# Patient Record
Sex: MALE | Race: BLACK OR AFRICAN AMERICAN | NOT HISPANIC OR LATINO | Employment: OTHER | URBAN - METROPOLITAN AREA
[De-identification: names, ages, dates, MRNs, and addresses within clinical notes are randomized per-mention and may not be internally consistent; named-entity substitution may affect disease eponyms.]

---

## 2021-04-19 ENCOUNTER — HOSPITAL ENCOUNTER (OUTPATIENT)
Dept: MEDSURG UNIT | Facility: HOSPITAL | Age: 66
End: 2021-04-20
Attending: STUDENT IN AN ORGANIZED HEALTH CARE EDUCATION/TRAINING PROGRAM | Admitting: STUDENT IN AN ORGANIZED HEALTH CARE EDUCATION/TRAINING PROGRAM

## 2021-04-19 LAB
ABS NEUT (OLG): 10.29 X10(3)/MCL (ref 2.1–9.2)
ALBUMIN SERPL-MCNC: 3.3 GM/DL (ref 3.4–4.8)
ALBUMIN/GLOB SERPL: 0.7 RATIO (ref 1.1–2)
ALP SERPL-CCNC: 108 UNIT/L (ref 40–150)
ALT SERPL-CCNC: 9 UNIT/L (ref 0–55)
AST SERPL-CCNC: 16 UNIT/L (ref 5–34)
BASOPHILS # BLD AUTO: 0 X10(3)/MCL (ref 0–0.2)
BASOPHILS NFR BLD AUTO: 0 %
BILIRUB SERPL-MCNC: 0.6 MG/DL
BILIRUBIN DIRECT+TOT PNL SERPL-MCNC: 0.3 MG/DL (ref 0–0.5)
BILIRUBIN DIRECT+TOT PNL SERPL-MCNC: 0.3 MG/DL (ref 0–0.8)
BNP BLD-MCNC: <10 PG/ML
BUN SERPL-MCNC: 11.6 MG/DL (ref 8.4–25.7)
CALCIUM SERPL-MCNC: 9.5 MG/DL (ref 8.8–10)
CHLORIDE SERPL-SCNC: 104 MMOL/L (ref 98–107)
CK MB SERPL-MCNC: 1.9 NG/ML
CK SERPL-CCNC: 85 U/L (ref 30–200)
CO2 SERPL-SCNC: 25 MMOL/L (ref 23–31)
CREAT SERPL-MCNC: 0.78 MG/DL (ref 0.73–1.18)
D DIMER PPP IA.FEU-MCNC: 0.36 MCG/ML FEU
EOSINOPHIL # BLD AUTO: 0 X10(3)/MCL (ref 0–0.9)
EOSINOPHIL NFR BLD AUTO: 0 %
ERYTHROCYTE [DISTWIDTH] IN BLOOD BY AUTOMATED COUNT: 14.3 % (ref 11.5–14.5)
FLUAV AG UPPER RESP QL IA.RAPID: NEGATIVE
FLUBV AG UPPER RESP QL IA.RAPID: NEGATIVE
GLOBULIN SER-MCNC: 4.9 GM/DL (ref 2.4–3.5)
GLUCOSE SERPL-MCNC: 93 MG/DL (ref 82–115)
HCO3 UR-SCNC: 21.1 MMOL/L (ref 22–26)
HCT VFR BLD AUTO: 55.1 % (ref 40–51)
HGB BLD-MCNC: 16.9 GM/DL (ref 13.5–17.5)
IMM GRANULOCYTES # BLD AUTO: 0.06 10*3/UL
IMM GRANULOCYTES NFR BLD AUTO: 0 %
LYMPHOCYTES # BLD AUTO: 0.9 X10(3)/MCL (ref 0.6–4.6)
LYMPHOCYTES NFR BLD AUTO: 7 %
MCH RBC QN AUTO: 25.9 PG (ref 26–34)
MCHC RBC AUTO-ENTMCNC: 30.7 GM/DL (ref 31–37)
MCV RBC AUTO: 84.4 FL (ref 80–100)
MONOCYTES # BLD AUTO: 0.9 X10(3)/MCL (ref 0.1–1.3)
MONOCYTES NFR BLD AUTO: 8 %
NEUTROPHILS # BLD AUTO: 10.29 X10(3)/MCL (ref 2.1–9.2)
NEUTROPHILS NFR BLD AUTO: 84 %
O2 HGB ARTERIAL: 85.4 % (ref 94–100)
PCO2 BLDA: 31 MMHG (ref 35–45)
PH SMN: 7.44 [PH] (ref 7.35–7.45)
PLATELET # BLD AUTO: 291 X10(3)/MCL (ref 130–400)
PMV BLD AUTO: 10.3 FL (ref 7.4–10.4)
PO2 BLDA: 51 MMHG (ref 80–100)
POC ALLENS TEST: POSITIVE
POC BE: -1.9 (ref -2–2)
POC CO HGB: 2.7 %
POC CO2: 22.1 MMOL/L (ref 22–26)
POC MET HGB: 1.1 % (ref 0.4–1.5)
POC SAMPLESOURCE: ABNORMAL
POC SATURATED O2: 88.7 %
POC SITE: ABNORMAL
POC THB: 16.5 GM/DL (ref 12–16)
POC TREATMENT: ABNORMAL
POTASSIUM SERPL-SCNC: 4.2 MMOL/L (ref 3.5–5.1)
PROT SERPL-MCNC: 8.2 GM/DL (ref 5.8–7.6)
RBC # BLD AUTO: 6.53 X10(6)/MCL (ref 4.5–5.9)
SARS-COV-2 RNA RESP QL NAA+PROBE: NOT DETECTED
SODIUM SERPL-SCNC: 139 MMOL/L (ref 136–145)
TROPONIN I SERPL-MCNC: <0.01 NG/ML (ref 0–0.04)
WBC # SPEC AUTO: 12.2 X10(3)/MCL (ref 4.5–11)

## 2021-04-20 LAB
ABS NEUT (OLG): 6.74 X10(3)/MCL (ref 2.1–9.2)
ALBUMIN SERPL-MCNC: 2.9 GM/DL (ref 3.4–4.8)
ALBUMIN/GLOB SERPL: 0.6 RATIO (ref 1.1–2)
ALP SERPL-CCNC: 95 UNIT/L (ref 40–150)
ALT SERPL-CCNC: 8 UNIT/L (ref 0–55)
APPEARANCE, UA: CLEAR
AST SERPL-CCNC: 12 UNIT/L (ref 5–34)
BACTERIA #/AREA URNS AUTO: ABNORMAL /HPF
BASOPHILS # BLD AUTO: 0 X10(3)/MCL (ref 0–0.2)
BASOPHILS NFR BLD AUTO: 0 %
BILIRUB SERPL-MCNC: 0.3 MG/DL
BILIRUB UR QL STRIP: NEGATIVE
BILIRUBIN DIRECT+TOT PNL SERPL-MCNC: 0.1 MG/DL (ref 0–0.8)
BILIRUBIN DIRECT+TOT PNL SERPL-MCNC: 0.2 MG/DL (ref 0–0.5)
BUN SERPL-MCNC: 14.6 MG/DL (ref 8.4–25.7)
CALCIUM SERPL-MCNC: 9.2 MG/DL (ref 8.8–10)
CHLORIDE SERPL-SCNC: 106 MMOL/L (ref 98–107)
CO2 SERPL-SCNC: 20 MMOL/L (ref 23–31)
COLOR UR: YELLOW
CREAT SERPL-MCNC: 0.71 MG/DL (ref 0.73–1.18)
ERYTHROCYTE [DISTWIDTH] IN BLOOD BY AUTOMATED COUNT: 14 % (ref 11.5–14.5)
EST. AVERAGE GLUCOSE BLD GHB EST-MCNC: 122.6 MG/DL
GLOBULIN SER-MCNC: 4.7 GM/DL (ref 2.4–3.5)
GLUCOSE (UA): 150 MG/DL
GLUCOSE SERPL-MCNC: 212 MG/DL (ref 82–115)
HBA1C MFR BLD: 5.9 %
HCT VFR BLD AUTO: 48.6 % (ref 40–51)
HGB BLD-MCNC: 14.8 GM/DL (ref 13.5–17.5)
HGB UR QL STRIP: NEGATIVE
HYALINE CASTS #/AREA URNS LPF: ABNORMAL /LPF
IMM GRANULOCYTES # BLD AUTO: 0.03 10*3/UL
IMM GRANULOCYTES NFR BLD AUTO: 0 %
KETONES UR QL STRIP: ABNORMAL
LEUKOCYTE ESTERASE UR QL STRIP: NEGATIVE
LYMPHOCYTES # BLD AUTO: 0.8 X10(3)/MCL (ref 0.6–4.6)
LYMPHOCYTES NFR BLD AUTO: 10 %
MCH RBC QN AUTO: 25.6 PG (ref 26–34)
MCHC RBC AUTO-ENTMCNC: 30.5 GM/DL (ref 31–37)
MCV RBC AUTO: 84.2 FL (ref 80–100)
MONOCYTES # BLD AUTO: 0.1 X10(3)/MCL (ref 0.1–1.3)
MONOCYTES NFR BLD AUTO: 1 %
NEUTROPHILS # BLD AUTO: 6.74 X10(3)/MCL (ref 2.1–9.2)
NEUTROPHILS NFR BLD AUTO: 88 %
NITRITE UR QL STRIP: NEGATIVE
PH UR STRIP: 6 [PH] (ref 4.5–8)
PLATELET # BLD AUTO: 292 X10(3)/MCL (ref 130–400)
PMV BLD AUTO: 10.4 FL (ref 7.4–10.4)
POTASSIUM SERPL-SCNC: 4.3 MMOL/L (ref 3.5–5.1)
PROT SERPL-MCNC: 7.6 GM/DL (ref 5.8–7.6)
PROT UR QL STRIP: 10 MG/DL
RBC # BLD AUTO: 5.77 X10(6)/MCL (ref 4.5–5.9)
RBC #/AREA URNS AUTO: ABNORMAL /HPF
SODIUM SERPL-SCNC: 137 MMOL/L (ref 136–145)
SP GR UR STRIP: >1.05 (ref 1–1.03)
SQUAMOUS #/AREA URNS LPF: ABNORMAL /LPF
T4 FREE SERPL-MCNC: 0.93 NG/DL (ref 0.7–1.48)
TSH SERPL-ACNC: 0.05 UIU/ML (ref 0.35–4.94)
UROBILINOGEN UR STRIP-ACNC: NORMAL
WBC # SPEC AUTO: 7.6 X10(3)/MCL (ref 4.5–11)
WBC #/AREA URNS AUTO: ABNORMAL /HPF

## 2021-06-11 ENCOUNTER — HISTORICAL (OUTPATIENT)
Dept: ADMINISTRATIVE | Facility: HOSPITAL | Age: 66
End: 2021-06-11

## 2021-06-11 LAB
ABS NEUT (OLG): 6.03 X10(3)/MCL (ref 2.1–9.2)
ALBUMIN SERPL-MCNC: 4 GM/DL (ref 3.4–4.8)
ALBUMIN/GLOB SERPL: 0.8 RATIO (ref 1.1–2)
ALP SERPL-CCNC: 132 UNIT/L (ref 40–150)
ALT SERPL-CCNC: 11 UNIT/L (ref 0–55)
ANISOCYTOSIS BLD QL SMEAR: NORMAL
AST SERPL-CCNC: 15 UNIT/L (ref 5–34)
BASOPHILS # BLD AUTO: 0.1 X10(3)/MCL (ref 0–0.2)
BASOPHILS NFR BLD AUTO: 1 %
BILIRUB SERPL-MCNC: 0.7 MG/DL
BILIRUBIN DIRECT+TOT PNL SERPL-MCNC: 0.3 MG/DL (ref 0–0.5)
BILIRUBIN DIRECT+TOT PNL SERPL-MCNC: 0.4 MG/DL (ref 0–0.8)
BUN SERPL-MCNC: 8.9 MG/DL (ref 8.4–25.7)
C3 SERPL-MCNC: 159 MG/DL (ref 80–173)
C4 SERPL-MCNC: 30.1 MG/DL (ref 13–46)
CALCIUM SERPL-MCNC: 10.3 MG/DL (ref 8.8–10)
CHLORIDE SERPL-SCNC: 106 MMOL/L (ref 98–107)
CO2 SERPL-SCNC: 26 MMOL/L (ref 23–31)
CREAT SERPL-MCNC: 0.93 MG/DL (ref 0.73–1.18)
CREAT UR-MCNC: 124.2 MG/DL (ref 58–161)
CRP SERPL-MCNC: 2.59 MG/DL
EOSINOPHIL # BLD AUTO: 0.2 X10(3)/MCL (ref 0–0.9)
EOSINOPHIL NFR BLD AUTO: 2 %
ERYTHROCYTE [DISTWIDTH] IN BLOOD BY AUTOMATED COUNT: 17.7 % (ref 11.5–14.5)
ERYTHROCYTE [SEDIMENTATION RATE] IN BLOOD: 10 MM/HR (ref 0–15)
GLOBULIN SER-MCNC: 5.1 GM/DL (ref 2.4–3.5)
GLUCOSE SERPL-MCNC: 106 MG/DL (ref 82–115)
HCT VFR BLD AUTO: 57.3 % (ref 40–51)
HGB BLD-MCNC: 17.6 GM/DL (ref 13.5–17.5)
IMM GRANULOCYTES # BLD AUTO: 0.01 10*3/UL
IMM GRANULOCYTES NFR BLD AUTO: 0 %
LYMPHOCYTES # BLD AUTO: 2.4 X10(3)/MCL (ref 0.6–4.6)
LYMPHOCYTES NFR BLD AUTO: 26 %
MCH RBC QN AUTO: 26.2 PG (ref 26–34)
MCHC RBC AUTO-ENTMCNC: 30.7 GM/DL (ref 31–37)
MCV RBC AUTO: 85.4 FL (ref 80–100)
MONOCYTES # BLD AUTO: 0.8 X10(3)/MCL (ref 0.1–1.3)
MONOCYTES NFR BLD AUTO: 8 %
NEUTROPHILS # BLD AUTO: 6.03 X10(3)/MCL (ref 2.1–9.2)
NEUTROPHILS NFR BLD AUTO: 64 %
NRBC BLD AUTO-RTO: 0 % (ref 0–0.2)
PLATELET # BLD AUTO: 259 X10(3)/MCL (ref 130–400)
PLATELET # BLD EST: ADEQUATE 10*3/UL
PMV BLD AUTO: 9.9 FL (ref 7.4–10.4)
POTASSIUM SERPL-SCNC: 4.7 MMOL/L (ref 3.5–5.1)
PROT SERPL-MCNC: 9.1 GM/DL (ref 5.8–7.6)
PROT UR STRIP-MCNC: 10.3 MG/DL
PROT/CREAT UR-RTO: 82.9 MG/GM CR
RBC # BLD AUTO: 6.71 X10(6)/MCL (ref 4.5–5.9)
SODIUM SERPL-SCNC: 142 MMOL/L (ref 136–145)
WBC # SPEC AUTO: 9.5 X10(3)/MCL (ref 4.5–11)

## 2021-07-15 ENCOUNTER — HISTORICAL (OUTPATIENT)
Dept: ADMINISTRATIVE | Facility: HOSPITAL | Age: 66
End: 2021-07-15

## 2021-07-15 LAB
ABS NEUT (OLG): 4.86 X10(3)/MCL (ref 2.1–9.2)
ALBUMIN SERPL-MCNC: 3.9 GM/DL (ref 3.4–4.8)
ALBUMIN/GLOB SERPL: 0.9 RATIO (ref 1.1–2)
ALP SERPL-CCNC: 134 UNIT/L (ref 40–150)
ALT SERPL-CCNC: 13 UNIT/L (ref 0–55)
ANISOCYTOSIS BLD QL SMEAR: NORMAL
APPEARANCE, UA: CLEAR
AST SERPL-CCNC: 14 UNIT/L (ref 5–34)
BACTERIA #/AREA URNS AUTO: ABNORMAL /HPF
BASOPHILS NFR BLD MANUAL: 0 %
BILIRUB SERPL-MCNC: 0.4 MG/DL
BILIRUB UR QL STRIP: NEGATIVE
BILIRUBIN DIRECT+TOT PNL SERPL-MCNC: 0.2 MG/DL (ref 0–0.5)
BILIRUBIN DIRECT+TOT PNL SERPL-MCNC: 0.2 MG/DL (ref 0–0.8)
BUN SERPL-MCNC: 7.9 MG/DL (ref 8.4–25.7)
C3 SERPL-MCNC: 142 MG/DL (ref 80–173)
C4 SERPL-MCNC: 26.4 MG/DL (ref 13–46)
CALCIUM SERPL-MCNC: 9.7 MG/DL (ref 8.8–10)
CHLORIDE SERPL-SCNC: 104 MMOL/L (ref 98–107)
CHOLEST SERPL-MCNC: 110 MG/DL
CHOLEST/HDLC SERPL: 3 {RATIO} (ref 0–5)
CO2 SERPL-SCNC: 29 MMOL/L (ref 23–31)
COLOR UR: ABNORMAL
CREAT SERPL-MCNC: 0.93 MG/DL (ref 0.73–1.18)
CREAT UR-MCNC: 45.3 MG/DL (ref 58–161)
CRP SERPL-MCNC: 1.26 MG/DL
EOSINOPHIL NFR BLD MANUAL: 8 %
ERYTHROCYTE [DISTWIDTH] IN BLOOD BY AUTOMATED COUNT: 17.7 % (ref 11.5–14.5)
ERYTHROCYTE [SEDIMENTATION RATE] IN BLOOD: 6 MM/HR (ref 0–15)
GLOBULIN SER-MCNC: 4.3 GM/DL (ref 2.4–3.5)
GLUCOSE (UA): NEGATIVE
GLUCOSE SERPL-MCNC: 74 MG/DL (ref 82–115)
GRANULOCYTES NFR BLD MANUAL: 56 % (ref 43–75)
HBV CORE AB SERPL QL IA: REACTIVE
HBV CORE IGM SERPL QL IA: NONREACTIVE
HBV SURFACE AG SERPL QL IA: NONREACTIVE
HCT VFR BLD AUTO: 55.5 % (ref 40–51)
HCV AB SERPL QL IA: NONREACTIVE
HDLC SERPL-MCNC: 33 MG/DL (ref 35–60)
HGB BLD-MCNC: 16.9 GM/DL (ref 13.5–17.5)
HGB UR QL STRIP: NEGATIVE
HYALINE CASTS #/AREA URNS LPF: ABNORMAL /LPF
KETONES UR QL STRIP: NEGATIVE
LDLC SERPL CALC-MCNC: 56 MG/DL (ref 50–140)
LEUKOCYTE ESTERASE UR QL STRIP: NEGATIVE
LYMPHOCYTES NFR BLD MANUAL: 28 % (ref 20.5–51.1)
MCH RBC QN AUTO: 26.4 PG (ref 26–34)
MCHC RBC AUTO-ENTMCNC: 30.5 GM/DL (ref 31–37)
MCV RBC AUTO: 86.7 FL (ref 80–100)
MICROCYTES BLD QL SMEAR: NORMAL
MONOCYTES NFR BLD MANUAL: 8 % (ref 2–9)
NEG CONT SPOT COUNT: ABNORMAL
NITRITE UR QL STRIP: NEGATIVE
PANEL A SPOT COUNT: >50
PANEL B SPOT COUNT: >50
PH UR STRIP: 5.5 [PH] (ref 4.5–8)
PLATELET # BLD AUTO: 255 X10(3)/MCL (ref 130–400)
PLATELET # BLD EST: ADEQUATE 10*3/UL
PMV BLD AUTO: 10.3 FL (ref 7.4–10.4)
POS CONT SPOT COUNT: ABNORMAL
POTASSIUM SERPL-SCNC: 4.5 MMOL/L (ref 3.5–5.1)
PROT SERPL-MCNC: 8.2 GM/DL (ref 5.8–7.6)
PROT UR QL STRIP: NEGATIVE
PROT UR STRIP-MCNC: <6.8 MG/DL
PROT/CREAT UR-RTO: <150.1 MG/GM CR
RBC # BLD AUTO: 6.4 X10(6)/MCL (ref 4.5–5.9)
RBC #/AREA URNS AUTO: ABNORMAL /HPF
SODIUM SERPL-SCNC: 140 MMOL/L (ref 136–145)
SP GR UR STRIP: 1 (ref 1–1.03)
SQUAMOUS #/AREA URNS LPF: ABNORMAL /LPF
T-SPOT.TB: ABNORMAL
TRIGL SERPL-MCNC: 104 MG/DL (ref 34–140)
UROBILINOGEN UR STRIP-ACNC: NORMAL
VLDLC SERPL CALC-MCNC: 21 MG/DL
WBC # SPEC AUTO: 9.2 X10(3)/MCL (ref 4.5–11)
WBC #/AREA URNS AUTO: ABNORMAL /HPF

## 2021-08-16 ENCOUNTER — HISTORICAL (OUTPATIENT)
Dept: RADIOLOGY | Facility: HOSPITAL | Age: 66
End: 2021-08-16

## 2021-09-09 ENCOUNTER — HISTORICAL (OUTPATIENT)
Dept: ADMINISTRATIVE | Facility: HOSPITAL | Age: 66
End: 2021-09-09

## 2021-11-10 ENCOUNTER — HISTORICAL (OUTPATIENT)
Dept: LAB | Facility: HOSPITAL | Age: 66
End: 2021-11-10

## 2021-11-10 LAB
ABS NEUT (OLG): 4 X10(3)/MCL (ref 2.1–9.2)
ALBUMIN SERPL-MCNC: 4.2 GM/DL (ref 3.4–4.8)
ALBUMIN/GLOB SERPL: 1.3 RATIO (ref 1.1–2)
ALP SERPL-CCNC: 138 UNIT/L (ref 40–150)
ALT SERPL-CCNC: 16 UNIT/L (ref 0–55)
AST SERPL-CCNC: 19 UNIT/L (ref 5–34)
BASOPHILS # BLD AUTO: 0.1 X10(3)/MCL (ref 0–0.2)
BASOPHILS NFR BLD AUTO: 2 %
BILIRUB SERPL-MCNC: 0.7 MG/DL
BILIRUBIN DIRECT+TOT PNL SERPL-MCNC: 0.3 MG/DL (ref 0–0.5)
BILIRUBIN DIRECT+TOT PNL SERPL-MCNC: 0.4 MG/DL (ref 0–0.8)
BUN SERPL-MCNC: 10.6 MG/DL (ref 8.4–25.7)
CALCIUM SERPL-MCNC: 9.6 MG/DL (ref 8.7–10.5)
CHLORIDE SERPL-SCNC: 111 MMOL/L (ref 98–107)
CK SERPL-CCNC: 67 U/L (ref 30–200)
CO2 SERPL-SCNC: 26 MMOL/L (ref 23–31)
CREAT SERPL-MCNC: 0.91 MG/DL (ref 0.73–1.18)
EOSINOPHIL # BLD AUTO: 0.3 X10(3)/MCL (ref 0–0.9)
EOSINOPHIL NFR BLD AUTO: 4 %
ERYTHROCYTE [DISTWIDTH] IN BLOOD BY AUTOMATED COUNT: 16.1 % (ref 11.5–14.5)
GLOBULIN SER-MCNC: 3.3 GM/DL (ref 2.4–3.5)
GLUCOSE SERPL-MCNC: 71 MG/DL (ref 82–115)
HCT VFR BLD AUTO: 58.3 % (ref 40–51)
HGB BLD-MCNC: 18.2 GM/DL (ref 13.5–17.5)
IMM GRANULOCYTES # BLD AUTO: 0.02 10*3/UL
IMM GRANULOCYTES NFR BLD AUTO: 0 %
LYMPHOCYTES # BLD AUTO: 1.9 X10(3)/MCL (ref 0.6–4.6)
LYMPHOCYTES NFR BLD AUTO: 28 %
MCH RBC QN AUTO: 27.9 PG (ref 26–34)
MCHC RBC AUTO-ENTMCNC: 31.2 GM/DL (ref 31–37)
MCV RBC AUTO: 89.4 FL (ref 80–100)
MONOCYTES # BLD AUTO: 0.6 X10(3)/MCL (ref 0.1–1.3)
MONOCYTES NFR BLD AUTO: 9 %
NEUTROPHILS # BLD AUTO: 4 X10(3)/MCL (ref 2.1–9.2)
NEUTROPHILS NFR BLD AUTO: 58 %
NRBC BLD AUTO-RTO: 0 % (ref 0–0.2)
PLATELET # BLD AUTO: 165 X10(3)/MCL (ref 130–400)
PMV BLD AUTO: 10.4 FL (ref 7.4–10.4)
POTASSIUM SERPL-SCNC: 3.7 MMOL/L (ref 3.5–5.1)
PROT SERPL-MCNC: 7.5 GM/DL (ref 5.8–7.6)
RBC # BLD AUTO: 6.52 X10(6)/MCL (ref 4.5–5.9)
SODIUM SERPL-SCNC: 145 MMOL/L (ref 136–145)
WBC # SPEC AUTO: 6.9 X10(3)/MCL (ref 4.5–11)

## 2022-04-10 ENCOUNTER — HISTORICAL (OUTPATIENT)
Dept: ADMINISTRATIVE | Facility: HOSPITAL | Age: 67
End: 2022-04-10
Payer: MEDICARE

## 2022-04-22 ENCOUNTER — HISTORICAL (OUTPATIENT)
Dept: ADMINISTRATIVE | Facility: HOSPITAL | Age: 67
End: 2022-04-22
Payer: MEDICARE

## 2022-04-26 VITALS
WEIGHT: 168.19 LBS | SYSTOLIC BLOOD PRESSURE: 146 MMHG | OXYGEN SATURATION: 89 % | BODY MASS INDEX: 26.4 KG/M2 | HEIGHT: 67 IN | DIASTOLIC BLOOD PRESSURE: 84 MMHG

## 2022-04-30 NOTE — ED PROVIDER NOTES
"   Patient:   Clarence Trejo Sr            MRN: [id number]            FIN: [id number]               Age:   65 years     Sex:  Male     :  1955   Associated Diagnoses:   Hypoxemia   Author:   Tejas Rollins MD      Basic Information   Time seen: Date & time 2021 11:10:00.   History source: Patient.   Arrival mode: Private vehicle.   History limitation: None.   Additional information: Chief Complaint from Nursing Triage Note : Chief Complaint   2021 10:43 CDT      Chief Complaint           PT WEARING MASK W CO SOB AND TROUBLE SLEEPING SINCE  "I RECEIVED MY 2ND SHOT" 21. HR >120 O2 88%. EKG OBTAINED.  .      History of Present Illness   The patient presents with difficulty breathing.  He presents with various complaints, possibly related to recent coronavirus vaccination.  He is a smoker but does not carry a known history of heart or lung disease.  His chart lists a coronavirus infection last summer, but he does not know if he ever tested positive.  He reports that his significant other did at the time and he quarantined at the time.  In fact, his COVID-19 test was positive in  of last year.  He received his 1st Moderna coronavirus vaccine on about 3/28, and reports that he had some symptoms afterward for which he went to [facility].  There he was diagnosed with an upper respiratory infection and declined flu and coronavirus testing, symptomatic management led to improvement of symptoms.  At that time, a chest x-ray showed diffuse mild interstitial pattern that seemed to be progressing, but he is not aware of this.  He now has received his 2nd Moderna COVID-19 vaccine on the  or  of this month, and feels that he has had symptoms since that time of insomnia and increased dyspnea.  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  Smoking less than usual because of symptoms.  No fever, nausea, vomiting, cough, chest " pain, urinary symptoms, gastrointestinal symptoms, change in sense of smell or taste, or other localizing complaints.  At the time of presentation, afebrile but tachycardic, tachypneic, and persistently low room air pulse oximetry in the upper 80s..        Review of Systems   Constitutional symptoms:  Negative except as documented in HPI, no fever, no chills, no weakness.    Skin symptoms:  Negative except as documented in HPI, no rash, no breakdown.    Eye symptoms:  Negative except as documented in HPI, no recent vision problems, no pain.    ENMT symptoms:  Negative except as documented in HPI, no ear pain, no sore throat.    Respiratory symptoms:  Shortness of breath, no cough, no wheezing.    Cardiovascular symptoms:  Negative except as documented in HPI, no chest pain, no palpitations, no syncope.    Gastrointestinal symptoms:  Negative except as documented in HPI, no abdominal pain, no nausea, no vomiting, no diarrhea.    Genitourinary symptoms:  Negative except as documented in HPI, no dysuria, no hematuria.    Musculoskeletal symptoms:  Negative except as documented in HPI, no Muscle pain, no Joint pain.    Neurologic symptoms:  Negative except as documented in HPI, Insomnia, no altered level of consciousness, no weakness.    Psychiatric symptoms:  Negative except as documented in HPI, no anxiety, no depression.    Endocrine symptoms:  Negative except as documented in HPI, no polyuria, no polydipsia.    Hematologic/Lymphatic symptoms:  Negative except as documented in HPI, bleeding tendency negative, bruising tendency negative.    Allergy/immunologic symptoms:  Negative except as documented in HPI, no recurrent infections, no impaired immunity.              Additional review of systems information: All other systems reviewed and otherwise negative, All systems reviewed as documented in chart.      Health Status   Allergies:    Allergic Reactions (Selected)  Severity Not Documented  Lortab- No reactions were  documented.,    Allergies (1) Active Reaction  Lortab None Documented  .   Medications:  (Selected)   Prescriptions  Prescribed  Flonase 50 mcg/inh nasal spray: 1 spray(s), Nasal, BID, PRN PRN nasal congestion, # 16 gm, 0 Refill(s), Pharmacy: [facility]/pharmacy #[id number], 177, cm, Height/Length Dosing, 03/09/21 9:52:00 CST, 68, kg, Weight Dosing, 03/09/21 9:52:00 CST  Zyrtec 10 mg oral tablet: 10 mg = 1 tab(s), Oral, Daily, PRN PRN allergy symptoms, # 14 tab(s), 0 Refill(s), Pharmacy: [facility]/pharmacy #[id number], 177, cm, Height/Length Dosing, 03/09/21 9:52:00 CST, 68, kg, Weight Dosing, 03/09/21 9:52:00 CST.      Past Medical/ Family/ Social History   Surgical history:    denies..   Family history:    History is unknown..   Social history:    Social & Psychosocial Habits    Alcohol  06/23/2020  Use: Past    Substance Use  06/23/2020  Use: Never    Tobacco  06/23/2020  Use: 10 or more cigarettes (1/    Patient Wants Consult For Cessation Counseling No    Tobacco use per day: 40    04/19/2021  Use: 10 or more cigarettes (1/    Patient Wants Consult For Cessation Counseling No    Abuse/Neglect  03/30/2021  SHX Any signs of abuse or neglect No    04/19/2021  SHX Any signs of abuse or neglect No  .   Problem list:    Active Problems (2)  2019-nCoV   Smoker   .      Physical Examination               Vital Signs   Vital Signs   4/19/2021 11:06 CDT      Peripheral Pulse Rate     115 bpm  HI                             Respiratory Rate          24 br/min                             SpO2                      85 %  LOW                             Oxygen Therapy            Room air                             Systolic Blood Pressure   129 mmHg                             Diastolic Blood Pressure  76 mmHg                             Mean Arterial Pressure, Cuff              94 mmHg    4/19/2021 10:43 CDT      Temperature Oral          36.7 DegC                             Temperature Oral (calculated)             98.06 DegF                              Peripheral Pulse Rate     122 bpm  HI                             Respiratory Rate          22 br/min                             SpO2                      88 %  LOW                             Oxygen Therapy            Room air                             Systolic Blood Pressure   120 mmHg                             Diastolic Blood Pressure  83 mmHg  .      Vital Signs (last 24 hrs)_____  Last Charted___________  Temp Oral     36.7 DegC  (APR 19 10:43)  Heart Rate Peripheral   H 115bpm  (APR 19 11:06)  Resp Rate         24 br/min  (APR 19 11:06)  SBP      129 mmHg  (APR 19 11:06)  DBP      76 mmHg  (APR 19 11:06)  SpO2      L 85%  (APR 19 11:06)  Weight      69 kg  (APR 19 10:43)  Height      172 cm  (APR 19 10:43)  BMI      23.32  (APR 19 10:43)  .   Measurements   4/19/2021 10:43 CDT      Weight Dosing             69 kg                             Weight Measured           69 kg                             Weight Measured and Calculated in Lbs     152.12 lb                             Height/Length Dosing      172 cm                             Height/Length Measured    172 cm                             Body Mass Index Measured  23.32 kg/m2  .   Basic Oxygen Information   4/19/2021 11:06 CDT      SpO2                      85 %  LOW                             Oxygen Therapy            Room air    4/19/2021 10:43 CDT      SpO2                      88 %  LOW                             Oxygen Therapy            Room air  .   General:  Alert, mild distress, Thin.    Skin:  Warm, dry, normal for ethnicity.    Head:  Normocephalic, atraumatic.    Neck:  Supple, trachea midline, no tenderness.    Eye:  Pupils are equal, round and reactive to light, extraocular movements are intact, normal conjunctiva.    Ears, nose, mouth and throat:  Oral mucosa moist, no pharyngeal erythema or exudate.    Cardiovascular:  No murmur, Normal peripheral perfusion, No edema, Tachycardic RRR, mildly hyperkinetic precordium; no  m/n/g/ JVD/ edema, No Regular rate and rhythm,    Respiratory:  Symmetrical chest wall expansion, Mild tachypnea, mild dyspnea, moderately decreased air entry throughout, left more so than right.  No definite rales or wheezing..    Chest wall:  No tenderness, No deformity.    Back:  Nontender, Normal range of motion, Normal alignment.    Musculoskeletal:  Normal ROM, normal strength, no tenderness, no swelling, no deformity.    Gastrointestinal:  Soft, Nontender, Non distended, Normal bowel sounds.    Neurological:  Alert and oriented to person, place, time, and situation, No focal neurological deficit observed, CN II-XII intact, normal motor observed, normal speech observed.    Lymphatics:  No lymphadenopathy.   Psychiatric:  Cooperative, appropriate mood & affect, normal judgment.       Medical Decision Making   Documents reviewed:  Emergency department nurses' notes, emergency department records, prior records.    Orders  Launch Orders   Patient Care:  Room air for 10 min for ABG then resume O2 (Order): 4/19/2021 11:19 CDT, Room air for 10 min for ABG then resume O2, Launch Orders   Laboratory:  COVID-19 IDnow (Order): Stat collect, Nasal, 4/19/2021 11:22 CDT, Nurse collect  Troponin-I (Order): Stat collect, 4/19/2021 11:21 CDT, Blood, Lab Collect, 4/19/2021 11:21 CDT  Flu A & B PCR (Order): Stat collect, Nasopharyngeal Swab, 4/19/2021 11:21 CDT, Nurse collect, Print Label By Order Location  D-Dimer (Order): Stat collect, 4/19/2021 11:21 CDT, Blood, Lab Collect, 4/19/2021 11:21 CDT  CK MB (Order): Stat collect, 4/19/2021 11:21 CDT, Blood, Lab Collect, 4/19/2021 11:21 CDT  CPK (Order): Stat collect, 4/19/2021 11:21 CDT, Blood, Lab Collect, 4/19/2021 11:21 CDT  CMP (Order): Stat collect, 4/19/2021 11:21 CDT, Blood, Lab Collect, 4/19/2021 11:21 CDT  CBC w/ Auto Diff (Order): Now collect, 4/19/2021 11:21 CDT, Blood, Lab Collect, 4/19/2021 11:21 CDT  BNP (Order): Stat collect, 4/19/2021 11:20 CDT, Blood, Lab Collect,  4/19/2021 11:20 CDT  ABG Adult w/ Coox RT (Order): Stat collect, Arterial Blood, 4/19/2021 11:20 CDT, Once, Stop date 4/19/2021 11:20 CDT  Patient Care:  Pulse Oximetry (Order): 4/19/2021 11:22 CDT  Saline Lock Insert (Order): 4/19/2021 11:21 CDT  Radiology:  CXR 1 View (Order): Stat, 4/19/2021 11:21 CDT, Dyspnea, None, Stretcher, Rad Type, Not Scheduled  Cardiology:  EKG (Order): 4/19/2021 11:21 CDT, NOW, -1, -1, 4/19/2021 11:21 CDT  Respiratory Therapy:  Oxygen Therapy (Order): 4/19/2021 11:20 CDT, 2, Nasal Cannula, CM Oxygen, Launch Orders   Consults:  [facility] ED Consult Internal Medicine (Order): 4/19/2021 14:55 CDT, Hypoxemia.    Cardiac monitor:  Time 4/19/2021 11:00:00, Rate 120, Sinus Tachycardia.    Electrocardiogram:  Time 4/19/2021 10:46:00, rate 126, No ST-T changes, no ectopy, EP Interp, Left atrial enlargement, sinus tachycardia, otherwise no significant findings..    Results review:  Lab results : Lab View   4/19/2021 12:38 CDT      Est Creat Clearance Ser   90.48 mL/min    4/19/2021 11:33 CDT      Sodium Lvl                139 mmol/L                             Potassium Lvl             4.2 mmol/L                             Chloride                  104 mmol/L                             CO2                       25 mmol/L                             Calcium Lvl               9.5 mg/dL                             Glucose Lvl               93 mg/dL                             BUN                       11.6 mg/dL                             Creatinine                0.78 mg/dL                             eGFR-AA                   >105                             eGFR-DEBBY                  >105 mL/min/1.73 m2                             Bili Total                0.6 mg/dL                             Bili Direct               0.3 mg/dL                             Bili Indirect             0.30 mg/dL                             AST                       16 unit/L                             ALT                        9 unit/L                             Alk Phos                  108 unit/L                             Total Protein             8.2 gm/dL  HI                             Albumin Lvl               3.3 gm/dL  LOW                             Globulin                  4.9 gm/dL  HI                             A/G Ratio                 0.7 ratio  LOW                             BNP                       <10.0 pg/mL                             Total CK                  85 U/L                             CK MB                     1.9 ng/mL                             Troponin-I                <0.010 ng/mL                             D-Dimer                   0.36 mcg/ml FEU                             WBC                       12.2 x10(3)/mcL  HI                             RBC                       6.53 x10(6)/mcL  HI                             Hgb                       16.9 gm/dL                             Hct                       55.1 %  HI                             Platelet                  291 x10(3)/mcL                             MCV                       84.4 fL                             MCH                       25.9 pg  LOW                             MCHC                      30.7 gm/dL  LOW                             RDW                       14.3 %                             MPV                       10.3 fL                             Abs Neut                  10.29 x10(3)/mcL  HI                             Neutro Auto               84 %  NA                             Lymph Auto                7 %  NA                             Mono Auto                 8 %  NA                             Eos Auto                  0 %  NA                             Abs Eos                   0.0 x10(3)/mcL                             Basophil Auto             0 %  NA                             Abs Neutro                10.29 x10(3)/mcL  HI                             Abs Lymph                 0.9  x10(3)/mcL                             Abs Mono                  0.9 x10(3)/mcL                             Abs Baso                  0.0 x10(3)/mcL                             IG%                       0 %  NA                             IG#                       0.060  NA                             Sample ABG                ARTERIAL                             Treatment                 ROOM AIR                             Site                      Radial Rt                             pH Art                    7.44                             pCO2 Art                  31.0 mmHg  LOW                             pO2 Art                   51.0 mmHg  CRIT                             HCO3 Art                  21.1 mmol/L  LOW                             CO2 Totl Art              22.1 mmol/L                             O2 Sat Art                88.7 %  LOW                             D base                    -1.9                             THB ABG                   16.5 gm/dL  HI                             CO Hgb                    2.7 %  NA                             Met Hgb Art               1.1 %                             O2 Hgb                    85.4 %  LOW                             Allens                    Positive    4/19/2021 11:31 CDT      Influ A PCR               Negative                             Influ B PCR               Negative                             SARS-CoV-2 PCR            Not Detected  .   Radiology results:  Rad Results (ST)  < 12 hrs   Accession: [id number]  Order: XR Chest 1 View  Report Dt/Tm: 04/19/2021 12:26  Report:   PORTABLE CHEST X-RAY, ONE FRONTAL VIEW     HISTORY: Dyspnea     COMPARISON:   3/9/2021     FINDINGS:     Diffuse reticulonodular interstitial opacities.  No lobar consolidations, pleural effusions or pneumothoraces.  Cardiomediastinal silhouette within normal limits.  No acute bony pathology.  Soft tissues within normal limits.     IMPRESSION:     Diffuse  reticulonodular interstitial opacities may reflect chronic  changes.  No significant interval change.    .      Reexamination/ Reevaluation   Time: 4/19/2021 14:43:00 .   Vital signs   results included from flowsheet : Vital Signs   4/19/2021 14:33 CDT      Peripheral Pulse Rate     97 bpm                             Heart Rate Monitored      97 bpm                             Respiratory Rate          30 br/min  HI                             SpO2                      96 %                             Oxygen Therapy            Nasal cannula                             Oxygen Flow Rate          2 L/min                             Systolic Blood Pressure   121 mmHg                             Diastolic Blood Pressure  85 mmHg                             Mean Arterial Pressure, Cuff              97 mmHg    4/19/2021 14:29 CDT      Peripheral Pulse Rate     96 bpm                             Respiratory Rate          20 br/min    4/19/2021 14:22 CDT      Peripheral Pulse Rate     94 bpm                             Peripheral Pulse Rate     94 bpm                             Respiratory Rate          20 br/min                             Respiratory Rate          20 br/min                             FIO2                      100 %                             SpO2                      98 %                             Oxygen Therapy            All-Purpose nebulizer    4/19/2021 14:19 CDT      Peripheral Pulse Rate     93 bpm                             Peripheral Pulse Rate     93 bpm                             Respiratory Rate          20 br/min                             Respiratory Rate          20 br/min                             FIO2                      100 %                             SpO2                      98 %                             Oxygen Therapy            All-Purpose nebulizer     Notes: Some improvement but remains dyspneic, tachypneic, and tachycardic.  Counseled patient in detail.  Consult  internal medicine for significant hypoxemia in the setting of chronic tobacco abuse and prior COVID-19 infection..      Impression and Plan   Diagnosis   Hypoxemia ([id number] R09.02)      Calls-Consults   -  4/19/2021 14:54:00 , Int. Med. - to see and admit.    Plan   Disposition: Admit time  4/19/2021 14:56:00, Admit to Inpatient Telemetry Unit, Int. Med..

## 2022-05-03 NOTE — HISTORICAL [facility] CERNER
This is a historical note converted from [employer]. Formatting and pictures may have been removed.  Please reference [employer] for original formatting and attached multimedia. Admit and Discharge Dates  Admit Date: 04/19/2021  Discharge Date: 04/20/2021  Physicians  Attending Physician - Hayden ADAMS, Fabian  Admitting Physician - Hayden ADAMS, Fabian  Consulting Physician - Bethany Davison DO  Primary Care Physician - Alesia PCP, No  Discharge Diagnosis  Acute Hypoxemic Respiratory Failure 2/2 to Acute ILD Exacerbation  Community Acquired Pneumonia  Surgical Procedures  No procedures recorded for this visit.  Immunizations  No immunizations recorded for this visit.  Admission Information  Mr. Trejo is a 65 year old male who presented to the ED with dyspnea for three days. He denies any significant past medical history including, asthma, COPD, diabetes or hypertension and does not follow regularly with a PCP. He is?on no medications at home.?He received his second covid-19 vaccine?12 days ago and believes that his shortness of breath is related to the the vaccine. He reports a similar episode after his first vaccine. He first noticed the shortness of breath three nights ago when he had difficult sleeping. He denies orthopnea or PND. He had some improvement yesterday and then acute worsening of dyspnea today while at work. 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 He has?smoked 2 PPD for many years. He denies any recent sick contacts or associated headache, chest pain, dizziness, nausea, vomiting, or abdominal pain.  In the ED pt was noted to be hypoxic with A-a gradient of 60 (20 = normal for age), requiring 3L?O2 to maintain Sats at 88%. CXR showed diffuse reticulonodular interstitial opacities. ABG: pH 7.44 PaCo2 31, PaO2 51. Covid negative. Medicine was consulted and the patient was admitted for acute hypoxemia.  Hospital Course  This is a 65 year old male with hx of tobacco  use who presented to [facility] on 4/19 and was admitted with a diagnosis of acute hypoxemic respiratory failure 2/2 to COPD exacerbation vs. ILD exacerbation. Patient was started on 80 mg of solumedrol, duonebs q6hr and levaquin. The patient had a CTA PE protocol that did not reveal a PE but did reveal significant honeycombing. The patient showed improvement of symptoms after day of admission. He was given a prescription for home O2, scheduled follow up with Rheumatology, Pulmonology and Internal Medicine as an outpatient.  Time Spent on discharge  > 30 minutes  Objective  Vitals & Measurements  T:?36.5? ?C (Oral)? TMIN:?36.5? ?C (Oral)? TMAX:?36.6? ?C (Oral)? HR:?107(Peripheral)? RR:?20? BP:?175/63? SpO2:?96%?  Physical Exam  General:?well-developed well-nourished in no acute distress  Eye:?EOMI, clear conjunctiva, eyelids normal  HEENT: Normocephalic, atraumatic  Respiratory:?diminished air sounds at the apices b/l, fine dry inspiratory crackles at the bases  Cardiovascular:?regular rate and rhythm without murmurs, gallops or rubs  Gastrointestinal:?soft, non-tender, non-distended with normal bowel sounds, without masses to palpation  Musculoskeletal:?full range of motion of all extremities/spine without limitation or discomfort  Integumentary: Prominent nail clubbing  Neurologic: cranial nerves II-VII grossly intact, no signs of peripheral neurological deficit  Patient Discharge Condition  The patient is hemodynamically and clinically stable for discharge.  Discharge Disposition  1) The patient will be discharged home.  2) The patient will be discharged with home O2, Advair inhaler, Albuterol rescue inhalers and a prednisone taper.  ??? The patient will take 60 mg/day of prednisone x7 days, then 40 mg/day of prednisone x7 days then 20 mg/day of prednisone x7 days, then 10 mg/day of prednisone x7 days then 5 mg/day????? of?prednisone x7 days.  3) The patient is to follow up with Dr. Mcarthur Firm [facility], in Post wards clinic at  the next available spot.  ??? The patient is to follow up with Rheumatology as an outpatient.  ??? The patient is to follow up with Pulmonology as an outpatient.  4) The patient was given return precautions for new or worsening symptoms.  ?  Faculty? addendum:  I have reviewed the patients history, residents ?findings on physical examination, diagnosis and treatment plan. Care provided was reasonable and necessary.   Discharge Medication Reconciliation  Prescribed  albuterol (ProAir HFA 90 mcg/inh inhalation aerosol with adapter)?2 puff(s), INH, Once, PRN as needed for wheezing  fluticasone-salmeterol (fluticasone-salmeterol 55 mcg-14 mcg/inh inhalation powder)?1 puff(s), INH, BID  levoFLOXacin (Levaquin 750 mg oral tablet)?750 mg, Oral, q24hr  predniSONE (predniSONE 5 mg oral delayed release tablet)?5 mg, Oral, Daily  predniSONE (prednisONE 10 mg oral tablet)?10 mg, Oral, Daily  Continue  Misc Prescription (Home O2)?See Instructions  Discontinue  cetirizine (Zyrtec 10 mg oral tablet)?10 mg, Oral, Daily, PRN allergy symptoms  fluticasone nasal (Flonase 50 mcg/inh nasal spray)?1 spray(s), Nasal, BID, PRN nasal congestion  Education and Orders Provided  Pulmonary Fibrosis  Blank Custom Personal (Custom) (Custom)  Discharge - 04/20/21 15:22:00 CDT, Home?  Follow up  Report to Emergency Department if symptoms return or worsen  [facility]  ????Please follow up with Dr. Mcarthur, [facility] 2, in post wards clinic.  Car Seat Challenge  No Qualifying Data

## 2022-05-03 NOTE — HISTORICAL [facility] CERNER
This is a historical note converted from Harjit. Formatting and pictures may have been removed.  Please reference Cerjas for original formatting and attached multimedia. Chief Complaint  New patient  History of Present Illness  Mr. 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 He reported to the ED on 4/19/21 w/ SOB. States he had received his second COVID vaccine and SOB worsened following this and has not improved. He does have BRAN w/ ADLs and w/ talking. Denies cough.?Using home O2 to sleep. 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  ?  ROS: +SOB, back pain w/ morning stiffness, hands and wrists get stiff, spasm.  Denies constitutional s/s, eye inflammation, sicca, LAD, ulcers, serositis, seizures, clots, kidney or liver issues, blood dyscrasias, rash, sun sensitivity,?Raynauds, hair loss, muscle weakness.  ?  PMH: non-contributory  Social Hx: +TBO 1.5-2 PPD x 40 + yr. Denies ETOH or drug use.  FH AI dz: negative  Review of Systems  General: Denies chills and fever  Ophthalmologic: Denies discharge. Denies flashes of light in visual field  ENT: Denies decreased sense of smell. Denies nosebleeds.?  Endocrine: Denies excessive sweating. Denies weight loss  Respiratory: Denies hemoptysis. Denies tuberculosis.  Cardiovascular: Denies cyanosis. Denies rheumatic fever.  Gastrointestinal: Denies difficulty swallowing. Denies hematemesis.  Hematology: Denies bleeding problems. Denies recent transfusion.  Peripheral vascular: Denies absent pulses in feet. Denies ulceration of the feet  Skin: Denies blistering of skin. Denies skin cancer.  Neurologic denies seizures. Denies stroke.  Psychiatric: Denies eating disorder. Denies loss of appetite.?  Physical Exam  Vitals & Measurements  T:?36.8? ?C (Oral)? HR:?111(Peripheral)? RR:?18? BP:?134/75?  HT:?180.00?cm? WT:?69.800?kg? BMI:?21.54?  General examination: Alert,  pleasant, in no acute distress.  Head: Normocephalic, atraumatic.  Eyes: Conjunctiva clear, upper eyelids normal, lower eyelids normal.  Oral cavity: Mucosa moist, no lesions, fair dentition.  Throat: No exudate, no erythema.  Neck/thyroid: Neck supple, no lymphadenopathy, no thyroid nodules  Skin: No suspicious lesions, warm and dry.  Heart: S1, S2 normal, regular rate and rhythm. No JVD.  Lungs: LLL crackles, RLL absent, upper 2/3 diminished, no accessory muscle use.  Musculoskeletal: loss of ROM right wrist w/ bony changes, left wrist tender and full. ?No synovitis. No deformities.  Extremities: +clubbing fingers and toes. No cyanosis.  Neurologic: Alert and oriented, cranial nerves II through XII grossly intact. Muscle strength grossly intact. Sensation grossly intact.  Psych: Cognitive function intact, mood/affect full range.?  ?  Echocardiogram 4/20/2021:  Structurally normal mitral valve.  Structurally aortic valve is trileaflet.  No evidence of tricuspid regurgitation.? Unable to estimate RVSP.  Normal-sized left atrium.  LVEF approximately 60%  Normal right atrial size.  Normal right ventricular size.  No evidence of pericardial effusion or pleural effusion.  IVC is normal.  ?  (04/19/2021 21:45 CDT CT Angio Chest PE W Contrast)  Radiology Report  CTA of the chest with coronal and sagittal MIPS images  ?  Clinical history is high probability for pulmonary embolism.  ?  Automatic exposure control.  ?  The DLP is 1052.72.  ?  There are mildly enlarged nodes in the mediastinum and in both  pulmonary hilum. The thoracic aorta shows no evidence of an aneurysm.  The visualized portion of the upper abdomen appears normal. There are  no filling defects seen within the pulmonary arteries to indicate an  embolus there are mild emphysematous changes seen bilaterally. Cannot  rule out cylindrical bronchiectasis in both lower lobes there is  marked emphysema throughout both upper lobes.  ?  IMPRESSION: Extensive chronic  lung disease.  ?  No pulmonary embolus are seen. [1]  Assessment/Plan  1. ILD. As detected on CTA chest 4/19/21. He reported to the ED on 4/19/21 w/ SOB. States he had received his second COVID vaccine and SOB worsened following this and has not improved. He does have BRAN w/ ADLs and w/ talking. Denies cough.?Using home O2 to sleep. From an autoimmune standpoint, he does have some joint deformities in the wrists and loss of ROM. Will cast a large net for autoimmune eval w/ RDL labs: SAM, MIGUEL, RF, CCP, Scleroderma panel, Myositis panel. CBC, CMP, ESR, CRP, UPC, C3, C4, dsDNA. Check PFT. Plan to repeat ECHO (no RVSP on first).  ?  2. Low back pain. Lumbar excursion ? + Schobers. Occiput to wall. Check HLA-B27. Consider SI joint film.  ?  3. TBO. 1.5-2 PPD x 40+ years. Quit in April 2021.  ?  4. High Risk Med Use. Will need to check Hep serologies, Tspot, HIV and syphilis ab, TPMT.  ?  RTC 1 month.   Problem List/Past Medical History  Ongoing  2019-nCoV  Clubbing of fingers  Clubbing of toes  ILD (interstitial lung disease)  Joint pain  Smoker  Historical  No qualifying data  Procedure/Surgical History  jaw   Medications  cetirizine 10 mg oral tablet, 10 mg= 1 tab(s), Oral, Daily,? ?Not taking  fluticasone 50 mcg/inh nasal spray, 1 spray(s), Both Nostrils, BID,? ?Not taking  fluticasone-salmeterol 55 mcg-14 mcg/inh inhalation powder, 1 puff(s), INH, BID,? ?Not taking  Home O2, See Instructions  levofloxacin 750 mg oral tablet, 750 mg= 1 tab(s), Oral, Daily,? ?Not taking  prednisONE 10 mg oral tablet, 10 mg= 1 tab(s), Oral, Daily,? ?Not taking  predniSONE 5 mg oral delayed release tablet, 5 mg= 1 tab(s), Oral, Daily,? ?Not taking  Allergies  Lortab  Social History  Abuse/Neglect  No, No, Yes, 06/11/2021  Alcohol  Past, 06/23/2020  Substance Use  Never, 06/23/2020  Tobacco  Former smoker, quit more than 30 days ago, N/A, 06/11/2021  Family History  Family history is unknown  Health Maintenance  Health  Maintenance  ???Pending?(in the next year)  ??? ??OverDue  ??? ? ? ?Influenza Vaccine due??10/01/20??and every 1??day(s)  ??? ? ? ?Cognitive Screening due??01/02/21??and every 1??year(s)  ??? ??Due?  ??? ? ? ?Aspirin Therapy for CVD Prevention due??06/11/21??and every 1??year(s)  ??? ? ? ?Colorectal Screening due??06/11/21??Unknown Frequency  ??? ? ? ?Depression Screening due??06/11/21??Unknown Frequency  ??? ? ? ?IPPE due??06/11/21??One-time only  ??? ? ? ?Lipid Screening due??06/11/21??Unknown Frequency  ??? ? ? ?Medicare Annual Wellness Exam due??06/11/21??and every 1??year(s)  ??? ? ? ?Pneumococcal Vaccine due??06/11/21??Unknown Frequency  ??? ? ? ?Tetanus Vaccine due??06/11/21??and every 10??year(s)  ??? ? ? ?Zoster Vaccine due??06/11/21??Unknown Frequency  ??? ??Refused?  ??? ? ? ?Alcohol Misuse Screening due??01/02/21??and every 1??year(s)  ??? ??Due In Future?  ??? ? ? ?Obesity Screening not due until??01/01/22??and every 1??year(s)  ??? ? ? ?Advance Directive not due until??01/02/22??and every 1??year(s)  ??? ? ? ?Fall Risk Assessment not due until??01/02/22??and every 1??year(s)  ??? ? ? ?Functional Assessment not due until??01/02/22??and every 1??year(s)  ???Satisfied?(in the past 1 year)  ??? ??Satisfied?  ??? ? ? ?ADL Screening on??06/11/21.??Satisfied by LILI Peacock, Riya  ??? ? ? ?Advance Directive on??04/19/21.??Satisfied by Yulia Millan  ??? ? ? ?Blood Pressure Screening on??06/11/21.??Satisfied by LILI Peacock Krystle  ??? ? ? ?Body Mass Index Check on??06/11/21.??Satisfied by LILI Peacock Krystle  ??? ? ? ?Diabetes Screening on??06/11/21.??Satisfied by Tejas Aguilar  ??? ? ? ?Fall Risk Assessment on??06/11/21.??Satisfied by LILI Peacock Krystle  ??? ? ? ?Functional Assessment on??04/20/21.??Satisfied by Scarlett Richards PT  ??? ? ? ?Obesity Screening on??06/11/21.??Satisfied by LILI Peacock Krystle  ??? ??Refused?  ??? ? ? ?Alcohol Misuse Screening on??06/23/20.??Recorded by Travis  Bishop??Reason: Patient Refuses  ?     [1]?CT Angio Chest PE W Contrast; Tami VEE, Alberto INIGUEZ 04/19/2021 21:45 CDT

## 2022-05-03 NOTE — HISTORICAL [facility] CERNER
This is a historical note converted from Harjit. Formatting and pictures may have been removed.  Please reference Cerjas for original formatting and attached multimedia. Chief Complaint  PT WEARING MASK W CO SOB AND TROUBLE SLEEPING SINCE ?I RECEIVED MY 2ND SHOT 4/9/21. HR >120 O2 88%. EKG OBTAINED.  History of Present Illness  Mr. Trejo is a 65 year old male who presented to the ED with dyspnea for three days. He denies any significant past medical history including, asthma, COPD, diabetes or hypertension and does not follow regularly with a PCP. He is?on no medications at home.?He received his second covid-19 vaccine?12 days ago and believes that his shortness of breath is related to the the vaccine. He reports a similar episode after his first vaccine. He first noticed the shortness of breath three nights ago when he had difficult sleeping. He denies orthopnea or PND. He had some improvement yesterday and then acute worsening of dyspnea today while at work. 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 He has?smoked 2 PPD for many years. He denies any recent sick contacts or associated headache, chest pain, dizziness, nausea, vomiting, or abdominal pain.  ?  In the ED pt was noted to be hypoxic with A-a gradient of 60 (20 = normal for age), requiring 3L?O2 to maintain Sats at 88%. CXR showed diffuse reticulonodular interstitial opacities. ABG: pH 7.44 PaCo2 31, PaO2 51. Covid negative. Medicine was consulted and the patient was admitted for acute hypoxemia.  ?  Review of Systems  Constitutional:?no fever, fatigue, weakness  Eye:?no vision loss, eye redness, drainage, or pain  ENMT:?no sore throat, ear pain, sinus pain/congestion, nasal congestion/drainage  Respiratory: +?intermittent dry cough, + shortness of breath,?no wheezing,  Cardiovascular:?no chest pain, no palpitations, no edema  Gastrointestinal:?no nausea, vomiting, or diarrhea. No abdominal  pain  Integumentary:?no skin rash or abnormal lesion  Neurologic: no headache, no dizziness, no weakness or numbness  ?  Physical Exam  Vitals & Measurements  T:?36.7? ?C (Oral)? HR:?108(Peripheral)? HR:?108(Monitored)? RR:?29? BP:?128/83? SpO2:?81%? WT:?69?kg? BMI:?23.32?  General:?well-developed well-nourished in no acute distress  Eye:?EOMI, clear conjunctiva, eyelids normal  HEENT:Normocephalic, atraumatic  Respiratory:?diminished air sounds at the apices b/l, fine dry inspiratory crackles at the bases  Cardiovascular:?regular rate and rhythm without murmurs, gallops or rubs  Gastrointestinal:?soft, non-tender, non-distended with normal bowel sounds, without masses to palpation  Musculoskeletal:?full range of motion of all extremities/spine without limitation or discomfort  Integumentary: Prominent nail clubbing  Neurologic: cranial nerves II-VII grossly intact, no signs of peripheral neurological deficit  ?  Assessment/Plan  Acute Hypoxemic Respiratory Failure  Possible Acute ILD Exacerbation Vs Acute COPD Exacerbation  Severe Sepsis 2/2 to possible CAP  -No prior pulmonary diagnosis, currently smokes 2 ppd  -A-a gradient of 60 (20 = normal for age)  -ABG: pH 7.44 PaCo2 31, PaO2 51  -Requiring 3L O2 via?NC to maintain?Sat at 88%  -Elevated WBC 12.2  -Start IV Methylprednisolone 80mg?q8h?(already received 125 mg in ED)  -Scheduled DuoNebs q6h  -Levaquin 750mg daily  -CTA ordered to r/o PE given tachypnea and tachycardia  ?  ?   Disposition: Pt without significant medical history presented with acute hypoxemia. History of smoking and working as a /in shipyard concerning for ILD vs COPD exacerbation. Will admit given pts oxygen requirements and treat with IV steroids, IV ABX, and respiratory support.  ?   Kristopher Yates, U MS3  ?  ?  ?  ?  ?  ?  ?  ?  ?  ?  ?  ?  ?  ?  ?  ?  ?  ?  HOIII RAN: Pt seen and examined with the medical student. Agree with above assessment and plan.?  ?  The patient is a  65-year-old male with no significant past medical history who presented to the ED with complaints of worsening shortness of breath for the last 3 days.? He reports having difficulty sleeping over the weekend which made him feel more fatigued, earlier this morning he felt acutely dyspneic while at work.? He denies any sick contacts, fevers, chills, nausea/vomiting.? Does endorse a worsening cough from baseline that is productive with yellow sputum.? Of note, he did have his second Covid vaccine a little over 1 week ago, has been blaming his symptoms on this.? Medicine consulted for COPD exacerbation.  ?  On exam, the patient is in no acute distress.? He is breathing well on 3 L of nasal cannula.? He does have some faint wheezing in bilateral lung fields.? Coarse crackles heard bilaterally.? RRR without appreciable murmurs.? Abdomen is soft, nontender, bowel sounds present, no distention.? No focal deficits appreciated.? Pulses are present and palpable, equal bilaterally.  ?  Assessment and plan:  1.? Acute hypoxemic respiratory failure requiring oxygen supplementation  2.? Probable acute COPD exacerbation in the setting of undiagnosed COPD  3.? History of COVID-19 infection in June 2020 with possible post infectious fibrotic changes  ?  -Admit to telemetry for observation  -Wean O2 as tolerated  -Treating COPD exacerbation with levaquin, solumedrol and prn duonebs  -If unable to wean O2 will need to consult case management for home O2  -CTPA ordered to rule out PE. Also would have utility in better viewing the lung fields as cxr suggests possible fibrotic changes  ?  Faculty addendum:  I have reviewed the patients history, residents ?findings on physical examination, diagnosis and treatment plan. Care provided was reasonable and necessary.  spoke with rheum and pulm today, suspicion is idiopathic pulmonary fibrosis, but will send off extensive collagen vascular disease workup  ECHO, 6 min walk pending   Problem  List/Past Medical History  Ongoing  2019-nCoV  Smoker  Historical  No qualifying data  Procedure/Surgical History  denies   Medications  Inpatient  No active inpatient medications  Home  Flonase 50 mcg/inh nasal spray, 1 spray(s), Nasal, BID, PRN,? ?Not taking  Zyrtec 10 mg oral tablet, 10 mg= 1 tab(s), Oral, Daily, PRN,? ?Not taking  Allergies  Lortab  Social History  Abuse/Neglect  No, 04/19/2021  No, 03/30/2021  Alcohol  Past, 06/23/2020  Substance Use  Never, 06/23/2020  Tobacco  10 or more cigarettes (1/2 pack or more)/day in last 30 days, No, 04/19/2021  10 or more cigarettes (1/2 pack or more)/day in last 30 days, No, 40 per day., 06/23/2020  Family History  Family history is unknown  Lab Results  Labs Last 24 Hours?  ?Chemistry? Hematology/Coagulation? Blood Gases?   Sodium Lvl: 139 mmol/L (04/19/21 11:33:00) D-Dimer: 0.36 mcg/ml FEU (04/19/21 11:33:00) Sample ABG: ARTERIAL (04/19/21 11:33:00)   Potassium Lvl: 4.2 mmol/L (04/19/21 11:33:00) WBC:?12.2 x10(3)/mcL?High (04/19/21 11:33:00) Treatment: ROOM AIR (04/19/21 11:33:00)   Chloride: 104 mmol/L (04/19/21 11:33:00) RBC:?6.53 x10(6)/mcL?High (04/19/21 11:33:00) Site: Radial Rt (04/19/21 11:33:00)   CO2: 25 mmol/L (04/19/21 11:33:00) Hgb: 16.9 gm/dL (04/19/21 11:33:00) pH Art: 7.44 (04/19/21 11:33:00)   Calcium Lvl: 9.5 mg/dL (04/19/21 11:33:00) Hct:?55.1 %?High (04/19/21 11:33:00) pCO2 Art:?31 mmHg?Low (04/19/21 11:33:00)   Glucose Lvl: 93 mg/dL (04/19/21 11:33:00) Platelet: 291 x10(3)/mcL (04/19/21 11:33:00) pO2 Art:?51 mmHg?Critical (04/19/21 11:33:00)   BUN: 11.6 mg/dL (04/19/21 11:33:00) MCV: 84.4 fL (04/19/21 11:33:00) HCO3 Art:?21.1 mmol/L?Low (04/19/21 11:33:00)   Creatinine: 0.78 mg/dL (04/19/21 11:33:00) MCH:?25.9 pg?Low (04/19/21 11:33:00) CO2 Totl Art: 22.1 mmol/L (04/19/21 11:33:00)   Est Creat Clearance Ser: 90.48 mL/min (04/19/21 12:38:14) MCHC:?30.7 gm/dL?Low (04/19/21 11:33:00) O2 Sat Art:?88.7 %?Low (04/19/21 11:33:00)   eGFR-AA: >105  (04/19/21 11:33:00) RDW: 14.3 % (04/19/21 11:33:00) D base: -1.9 (04/19/21 11:33:00)   eGFR-DEBBY: >105 (04/19/21 11:33:00) MPV: 10.3 fL (04/19/21 11:33:00) THB ABG:?16.5 gm/dL?High (04/19/21 11:33:00)   Bili Total: 0.6 mg/dL (04/19/21 11:33:00) Abs Neut:?10.29 x10(3)/mcL?High (04/19/21 11:33:00) CO Hgb: 2.7 % (04/19/21 11:33:00)   Bili Direct: 0.3 mg/dL (04/19/21 11:33:00) Neutro Auto: 84 % (04/19/21 11:33:00) Met Hgb Art: 1.1 % (04/19/21 11:33:00)   Bili Indirect: 0.3 mg/dL (04/19/21 11:33:00) Lymph Auto: 7 % (04/19/21 11:33:00) O2 Hgb:?85.4 %?Low (04/19/21 11:33:00)   AST: 16 unit/L (04/19/21 11:33:00) Mono Auto: 8 % (04/19/21 11:33:00) Allens: Positive (04/19/21 11:33:00)   ALT: 9 unit/L (04/19/21 11:33:00) Eos Auto: 0 % (04/19/21 11:33:00)    Alk Phos: 108 unit/L (04/19/21 11:33:00) Abs Eos: 0 x10(3)/mcL (04/19/21 11:33:00)    Total Protein:?8.2 gm/dL?High (04/19/21 11:33:00) Basophil Auto: 0 % (04/19/21 11:33:00)    Albumin Lvl:?3.3 gm/dL?Low (04/19/21 11:33:00) Abs Neutro:?10.29 x10(3)/mcL?High (04/19/21 11:33:00)    Globulin:?4.9 gm/dL?High (04/19/21 11:33:00) Abs Lymph: 0.9 x10(3)/mcL (04/19/21 11:33:00)    A/G Ratio:?0.7 ratio?Low (04/19/21 11:33:00) Abs Mono: 0.9 x10(3)/mcL (04/19/21 11:33:00)    BNP: <10.0 (04/19/21 11:33:00) Abs Baso: 0 x10(3)/mcL (04/19/21 11:33:00)    Total CK: 85 U/L (04/19/21 11:33:00) IG%: 0 % (04/19/21 11:33:00)    CK MB: 1.9 ng/mL (04/19/21 11:33:00) IG#: 0.06 (04/19/21 11:33:00)    Troponin-I: <0.010 (04/19/21 11:33:00)     Diagnostic Results  Accession:?[id number]  Order:?XR Chest 1 View  Report Dt/Tm:?04/19/2021 12:26  Report:?  PORTABLE CHEST X-RAY, ONE FRONTAL VIEW  ?  HISTORY: Dyspnea  ?  COMPARISON:?  3/9/2021  ?  FINDINGS:  ?  Diffuse reticulonodular interstitial opacities.  No lobar consolidations, pleural effusions or pneumothoraces.  Cardiomediastinal silhouette within normal limits.  No acute bony pathology.  Soft tissues within normal  limits.  ?  IMPRESSION:  ?  Diffuse reticulonodular interstitial opacities may reflect chronic  changes.  No significant interval change.  ?

## 2022-05-03 NOTE — HISTORICAL [facility] CERNER
This is a historical note converted from Harjit. Formatting and pictures may have been removed.  Please reference Cerjas for original formatting and attached multimedia. Chief Complaint  Established  History of Present Illness  Mr. Trejo is a 64 y/o AAM who presents to Rheumatology?w/ seronegative RA and ILD. Autoimmune work up resulted with weakly positive PL-7 and positive SAM.1:320?homogeneous and 1:160 speckled patterns.?He reported to the ED on 4/19/21 w/ SOB. States he had received his second COVID vaccine and SOB worsened following this and has not improved. He does have BRAN w/ ADLs and w/ talking. Denies cough.?Using home O2 to sleep. 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 Here for scheduled f/u to discuss labs and treatment.  ?   ROS: +SOB, back pain w/ morning stiffness, hands and wrists get stiff, spasm.  Denies constitutional s/s, eye inflammation, sicca, LAD, ulcers, serositis, seizures, clots, kidney or liver issues, blood dyscrasias, rash, sun sensitivity,?Raynauds, hair loss, muscle weakness.  ?   PMH: non-contributory  Social Hx: +TBO 1.5-2 PPD x 40 + yr. Denies ETOH or drug use.  FH AI dz: negative  Review of Systems  General: Denies chills and fever  Ophthalmologic: Denies discharge. Denies flashes of light in visual field  ENT: Denies decreased sense of smell. Denies nosebleeds.?  Endocrine: Denies excessive sweating. Denies weight loss  Respiratory: Denies hemoptysis. Denies tuberculosis.  Cardiovascular: Denies cyanosis. Denies rheumatic fever.  Gastrointestinal: Denies difficulty swallowing. Denies hematemesis.  Hematology: Denies bleeding problems. Denies recent transfusion.  Peripheral vascular: Denies absent pulses in feet. Denies ulceration of the feet  Skin: Denies blistering of skin. Denies skin cancer.  Neurologic denies seizures. Denies stroke.  Psychiatric: Denies eating disorder. Denies loss of appetite.?  Physical Exam  Vitals &  Measurements  T:?36.4? ?C (Oral)? HR:?76(Peripheral)? RR:?16? BP:?115/75?  HT:?178.00?cm? WT:?70.400?kg? BMI:?22.22?  General examination: Alert, pleasant, in no acute distress.  Head: Normocephalic, atraumatic.  Eyes: Conjunctiva clear, upper eyelids normal, lower eyelids normal.  Oral cavity: Mucosa moist, no lesions, fair dentition.  Throat: No exudate, no erythema.  Neck/thyroid: Neck supple, no lymphadenopathy, no thyroid nodules  Skin: No suspicious lesions, warm and dry.  Heart: S1, S2 normal, regular rate and rhythm. No JVD.  Lungs: LLL crackles, RLL absent, upper 2/3 diminished, no accessory muscle use.  Musculoskeletal: loss of ROM right wrist w/ bony changes, +synovitis?left wrist, +tender and full. ?No synovitis. No deformities.  Extremities: +clubbing fingers and toes. No cyanosis.  Neurologic: Alert and oriented, cranial nerves II through XII grossly intact. Muscle strength grossly intact. Sensation grossly intact.  Psych: Cognitive function intact, mood/affect full range.?  ?   Echocardiogram 4/20/2021:  Structurally normal mitral valve.  Structurally aortic valve is trileaflet.  No evidence of tricuspid regurgitation.? Unable to estimate RVSP.  Normal-sized left atrium.  LVEF approximately 60%  Normal right atrial size.  Normal right ventricular size.  No evidence of pericardial effusion or pleural effusion.  IVC is normal.  ?   (04/19/2021 21:45 CDT CT Angio Chest PE W Contrast)  Radiology Report  CTA of the chest with coronal and sagittal MIPS images  ?  Clinical history is high probability for pulmonary embolism.  ?  Automatic exposure control.  ?  The DLP is 1052.72.  ?  There are mildly enlarged nodes in the mediastinum and in both  pulmonary hilum. The thoracic aorta shows no evidence of an aneurysm.  The visualized portion of the upper abdomen appears normal. There are  no filling defects seen within the pulmonary arteries to indicate an  embolus there are mild emphysematous changes seen  bilaterally. Cannot  rule out cylindrical bronchiectasis in both lower lobes there is  marked emphysema throughout both upper lobes.  ?  IMPRESSION: Extensive chronic lung disease.  ?  No pulmonary embolus are seen.  ?   (06/11/2021 11:05 CDT XR Hand Left Minimum 3 Views)  ?  Radiology Report  Clinical History:  Pain  ?  Technique:  Radiographs of the left hand with AP, lateral and oblique ?views.  ?  Comparison:  No prior imaging available for comparison.  ?  Findings:  No displaced fracture. The soft tissues are grossly unremarkable.  Degenerative changes greatest about the carpal bones with loss of  radiocarpal interval and sclerosis throughout the proximal carpal row.  ?  ?  IMPRESSION:  No displaced fracture. The soft tissues are grossly unremarkable.  Degenerative changes greatest about the carpal bones with loss of  radiocarpal interval. [1] (06/11/2021 11:04 CDT XR Hand Right Minimum 3 Views)  adiology Report  Clinical History:  Arthritis  ?  Technique:  Radiographs of the right hand with AP, lateral and oblique ?views.  ?  Comparison:  No prior imaging available for comparison.  ?  Findings:  No displaced fracture. The soft tissues are grossly unremarkable.  Degenerative changes greatest about the carpal bones with loss of  radiocarpal interval and sclerosis throughout the proximal carpal row.  ?  Impression:  No displaced fracture. The soft tissues are grossly unremarkable.  Degenerative changes greatest about the carpal bones with loss of  radiocarpal interval and sclerosis throughout the proximal carpal row. [2]  Assessment/Plan  1. Seronegative RA. Patient referred w/ ILD of suspected autoimmune etiology. Work up and PE reveal negative RF in the setting of joint pain and synovitis. Does give pattern of morning stiffness however w/ issues quantifying length of time. The patient would benefit from Actemra 162mg subcu weekly for joint symptoms and lung disease as there is positive data with systemic  sclerosis ILD that could be extrapolated to ILD secondary to RA. She has a positive SAM therefore we are avoiding use of TNFi as they can worsen connective tissue disease and they can worsen ILD. Will give samples of Actemra and submit to [facility] for PA processing.  ?   2. SAM. Dual pattern- 1:320?homogeneous and 1:160 speckled. MIGUEL, complements dsDNA, UPC, UA, ESR, all returned negative. Will monitor. Avoid use of TNFi as they can worsen connective tissue disease.  ?  3. ILD. As detected on CTA chest 4/19/21. Using home O2 to sleep. Update CT chest---HRCT. Repeat ECHO to assess right-sided heart pressures w/ fulminant ILD. Check PFT. Avoid use of TNFi as they can worsen ILD.  ?   4. Low back pain. Work up and management per PCP.  ?   5. TBO. 1.5-2 PPD x 40+ years. Quit in April 2021.  ?   6. High Risk Med Use. Check Hep serologies, Tspot, TPMT. Labs every 2-3 months on Actemra. Hold w/ infections. Monitor lipids-check baseline in the next week.  ?   7. Vaccines. PPSV 23 7/6/21. COVID 4/7/21.  ?   RTC?6-8 weeks w/ Dr. Davison   Problem List/Past Medical History  Ongoing  2019-nCoV  SAM positive  Clubbing of fingers  Clubbing of toes  Dyspnea  Fatigue  High risk medication use  ILD (interstitial lung disease)  Joint pain  Lipid screening  Seronegative rheumatoid arthritis  Smoker  Historical  Systemic sclerosis with lung involvement  Procedure/Surgical History  jaw  Knee joint operation   Medications  Actemra ACTPen 162 mg/0.9 mL subcutaneous solution, 162 mg, Subcutaneous, qWeek, 2 refills  baclofen 10 mg oral tablet, 10 mg= 1 tab(s), Oral, TID  Home O2, See Instructions  Symbicort 80 mcg-4.5 mcg/inh inhalation aerosol, 2 puff(s), INH, BID, 2 refills  Voltaren 1% topical gel, 2 gm, TOP, QID  Allergies  No Known Allergies  Social History  Abuse/Neglect  No, No, Yes, 07/15/2021  Alcohol  Past, 06/23/2020  Employment/School  Unemployed, Highest education level: None., 07/06/2021  Exercise  Exercise duration: 0.,  07/06/2021  Home/Environment  Lives with Significant other. Living situation: Home/Independent. Oxygen, Single family house, 07/06/2021  Nutrition/Health  Regular, Good, 07/06/2021  Sexual  Sexually active: Yes. Number of current partners 1. Sexual orientation: Straight or heterosexual. Gender Identity Identifies as male., 07/06/2021  Spiritual/Cultural  Non denomination, Yes, 07/06/2021  Substance Use  Never, 06/23/2020  Tobacco  Former smoker, quit more than 30 days ago, N/A, 07/15/2021  Family History  Family history is unknown  Immunizations  Vaccine Date Status   pneumococcal 23-polyvalent vaccine 07/06/2021 Given   COVID-19 mRNA, LNP-S, PF - Moderna 04/07/2021 Recorded   COVID-19 mRNA, LNP-S, PF - Moderna 03/07/2021 Recorded   Health Maintenance  Health Maintenance  ???Pending?(in the next year)  ??? ??OverDue  ??? ? ? ?Influenza Vaccine due??10/01/20??and every 1??day(s)  ??? ??Due?  ??? ? ? ?Aspirin Therapy for CVD Prevention due??07/15/21??and every 1??year(s)  ??? ? ? ?Colorectal Screening due??07/15/21??Unknown Frequency  ??? ? ? ?IPPE due??07/15/21??One-time only  ??? ? ? ?Medicare Annual Wellness Exam due??07/15/21??and every 1??year(s)  ??? ? ? ?Tetanus Vaccine due??07/15/21??and every 10??year(s)  ??? ? ? ?Zoster Vaccine due??07/15/21??Unknown Frequency  ??? ??Due In Future?  ??? ? ? ?Obesity Screening not due until??01/01/22??and every 1??year(s)  ??? ? ? ?Advance Directive not due until??01/02/22??and every 1??year(s)  ??? ? ? ?Alcohol Misuse Screening not due until??01/02/22??and every 1??year(s)  ??? ? ? ?Cognitive Screening not due until??01/02/22??and every 1??year(s)  ??? ? ? ?Fall Risk Assessment not due until??01/02/22??and every 1??year(s)  ??? ? ? ?Functional Assessment not due until??01/02/22??and every 1??year(s)  ??? ? ? ?Depression Screening not due until??07/06/22??and every 1??year(s)  ???Satisfied?(in the past 1 year)  ??? ??Satisfied?  ??? ? ? ?ADL Screening on??07/15/21.??Satisfied  by LILI Peacock Krystle  ??? ? ? ?Advance Directive on??07/06/21.??Satisfied by Darcy Kim LPN  ??? ? ? ?Alcohol Misuse Screening on??07/06/21.??Satisfied by Emery Mcarthur MD  ??? ? ? ?Blood Pressure Screening on??07/15/21.??Satisfied by LILI Peacock Krystle  ??? ? ? ?Body Mass Index Check on??07/15/21.??Satisfied by LILI Peacock Krystle  ??? ? ? ?Cognitive Screening on??07/06/21.??Satisfied by Darcy Kim LPN  ??? ? ? ?Depression Screening on??07/06/21.??Satisfied by Darcy Kim LPN  ??? ? ? ?Diabetes Screening on??07/15/21.??Satisfied by Gavino Gillette.  ??? ? ? ?Fall Risk Assessment on??07/15/21.??Satisfied by LILI Peacock Krystle  ??? ? ? ?Functional Assessment on??07/06/21.??Satisfied by Darcy Kim LPN  ??? ? ? ?Hypertension Management-BMP on??07/15/21.??Satisfied by Gavino Gillette.  ??? ? ? ?Lipid Screening on??07/15/21.??Satisfied by Gavino Gillette  ??? ? ? ?Obesity Screening on??07/15/21.??Satisfied by LILI Peacock Krystle  ??? ? ? ?Pneumococcal Vaccine on??07/06/21.??Satisfied by Darcy Kim LPN  ?     [1]?XR Hand Left Minimum 3 Views; Nitish Low MD 06/11/2021 11:05 CDT  [2]?XR Hand Right Minimum 3 Views; Nitish Low MD 06/11/2021 11:04 CDT   Positive T spot---Pt reports hx of TB, treated.

## 2022-05-12 DIAGNOSIS — J84.9 LUNG INTERSTITIAL DISEASE: Primary | ICD-10-CM

## 2022-05-12 DIAGNOSIS — J84.9 INTERSTITIAL LUNG DISEASE: Primary | ICD-10-CM

## 2022-06-08 RX ORDER — DULOXETIN HYDROCHLORIDE 20 MG/1
CAPSULE, DELAYED RELEASE ORAL
COMMUNITY
Start: 2022-04-11 | End: 2022-06-14

## 2022-06-08 RX ORDER — PREDNISONE 10 MG/1
10 TABLET ORAL
COMMUNITY
Start: 2021-11-25 | End: 2022-10-13

## 2022-06-08 RX ORDER — BACLOFEN 10 MG/1
10 TABLET ORAL 3 TIMES DAILY
COMMUNITY
Start: 2021-12-29 | End: 2022-09-21

## 2022-06-08 RX ORDER — BUDESONIDE AND FORMOTEROL FUMARATE DIHYDRATE 80; 4.5 UG/1; UG/1
AEROSOL RESPIRATORY (INHALATION)
COMMUNITY
Start: 2022-04-19 | End: 2022-07-14 | Stop reason: SDUPTHER

## 2022-06-08 RX ORDER — CARVEDILOL 3.12 MG/1
6.25 TABLET ORAL 2 TIMES DAILY WITH MEALS
COMMUNITY
Start: 2022-02-16 | End: 2022-06-14

## 2022-06-08 RX ORDER — OXYCODONE AND ACETAMINOPHEN 5; 325 MG/1; MG/1
1 TABLET ORAL EVERY 4 HOURS PRN
COMMUNITY
Start: 2021-12-20 | End: 2022-09-21 | Stop reason: SDUPTHER

## 2022-06-08 RX ORDER — IPRATROPIUM BROMIDE AND ALBUTEROL SULFATE 2.5; .5 MG/3ML; MG/3ML
SOLUTION RESPIRATORY (INHALATION)
COMMUNITY
Start: 2022-05-03 | End: 2022-06-14

## 2022-06-14 ENCOUNTER — OFFICE VISIT (OUTPATIENT)
Dept: PULMONOLOGY | Facility: CLINIC | Age: 67
End: 2022-06-14
Payer: MEDICARE

## 2022-06-14 VITALS
WEIGHT: 160 LBS | OXYGEN SATURATION: 92 % | TEMPERATURE: 98 F | BODY MASS INDEX: 22.9 KG/M2 | RESPIRATION RATE: 20 BRPM | DIASTOLIC BLOOD PRESSURE: 86 MMHG | HEART RATE: 69 BPM | SYSTOLIC BLOOD PRESSURE: 140 MMHG | HEIGHT: 70 IN

## 2022-06-14 DIAGNOSIS — J84.9 INTERSTITIAL LUNG DISEASE: Primary | ICD-10-CM

## 2022-06-14 DIAGNOSIS — D89.89 ANTISYNTHETASE SYNDROME: ICD-10-CM

## 2022-06-14 DIAGNOSIS — R76.8 POSITIVE ANTINUCLEAR ANTIBODY: ICD-10-CM

## 2022-06-14 DIAGNOSIS — J84.112 IPF (IDIOPATHIC PULMONARY FIBROSIS): Chronic | ICD-10-CM

## 2022-06-14 DIAGNOSIS — R68.3 CLUBBING OF FINGERS: ICD-10-CM

## 2022-06-14 PROCEDURE — 99214 OFFICE O/P EST MOD 30 MIN: CPT | Mod: S$PBB,,, | Performed by: INTERNAL MEDICINE

## 2022-06-14 PROCEDURE — 99214 PR OFFICE/OUTPT VISIT, EST, LEVL IV, 30-39 MIN: ICD-10-PCS | Mod: S$PBB,,, | Performed by: INTERNAL MEDICINE

## 2022-06-14 PROCEDURE — 99214 OFFICE O/P EST MOD 30 MIN: CPT | Mod: PBBFAC

## 2022-06-14 NOTE — PROGRESS NOTES
"[facility] Visit    Chief Complaint:      Interstitial Lung Disease (States been feeling better)     Subjective:     HPI:  Mr. 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 Repeat CT imaging with severe centrilobular emphysema. Pt was started on Actemra in Dec 2021, continued to have fibrotic changes, started at [facility] at last visit. Not on Actemra currently, not under rheumatology care due to previous MD moving and patient yet to follow up with new rheumatologist; referral has been pending. Patient endorses SOB on exertion, fever, chills, no cough/wheezing, no syncope, no increased sputum production, has quit smoking x1 year.       Review of Systems  Constitutional: no fever, fatigue, weakness  Eye: no vision loss, eye redness, drainage, or pain  ENMT: no sore throat, ear pain, sinus pain/congestion, nasal congestion/drainage  Respiratory: no cough, no wheezing, +SOB with exertion  Cardiovascular: no chest pain, no palpitations, no edema  Gastrointestinal: no nausea, vomiting, or diarrhea. No abdominal pain  Genitourinary: no dysuria, no urinary frequency or urgency, no hematuria  Hema/Lymph: no abnormal bruising or bleeding  Endocrine: no heat or cold intolerance, no excessive thirst or excessive urination  Musculoskeletal: no muscle or joint pain, no joint swelling  Integumentary: no skin rash or abnormal lesion  Neurologic: no headache, no dizziness, no weakness or numbness    Objective:   Last 24 Hour Vital Signs:  Vitals  BP: (!) 140/86  Temp: 97.7 °F (36.5 °C)  Pulse: 69  Resp: 20  SpO2: (!) 92 %  Height: 5' 10" (177.8 cm)  Weight: 72.6 kg (160 lb)    Physical Examination:    General: Well nourished, well developed " -American male in NAD  HEENT: PERRLA, NC/AT, MMM; poor dentition  Respiratory: diminished breath sounds with fine crackles in bilateral bases, normal respiratory effort; digital clubbing on b/l hands  Cardiovascular: RRR, no m/r/g, no b/l LE edema  Gastrointestinal: S, NT, ND, active BS, no masses palpable  Musculoskeletal: full range of motion of all extremities/spine without limitation or discomfort  Integumentary: no rashes or skin lesions present  Neurologic: AAOx4, CN II-XII grossly intact, normal gait      Assessment & Plan:     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  -High res CT thorax showed stable, severe centrilobular emphysema  -Repeat PFTs pending  -Continue Symbicort  -Continue Ofev 150 mg twice daily  -Ambulatory sats today: started 92% on RA at rest, decreased to 83% on RA w/ ambulation, increased to 92% on 2L NC supplementation; Home O2 ordered today  -Pt has rheumatology referral pending, was instructed to call for appointment    History of longstanding tobacco abuse, quit in 2021.  -Previous smoker, quit x1 year; 60+ pack-year history    +Tb spot, H/o of Rx latent Tb  -s/p previous 9x month tx  -Monitor annually     Robbi Woodard MD  [employer] IM PGY1

## 2022-06-15 DIAGNOSIS — J84.9 INTERSTITIAL LUNG DISEASE: Primary | ICD-10-CM

## 2022-09-15 ENCOUNTER — TELEPHONE (OUTPATIENT)
Dept: PULMONOLOGY | Facility: CLINIC | Age: 67
End: 2022-09-15
Payer: MEDICARE

## 2022-09-15 NOTE — TELEPHONE ENCOUNTER
I called and spoke to Mr. Trejo regarding his pulmonary appt.  He was a no show for his pft in August and stated he forgot to go do them.  I am going to reschedule his pft and see if he can do them before 9/20/2022./tdr

## 2022-09-21 ENCOUNTER — OFFICE VISIT (OUTPATIENT)
Dept: RHEUMATOLOGY | Facility: CLINIC | Age: 67
End: 2022-09-21
Payer: MEDICARE

## 2022-09-21 VITALS
TEMPERATURE: 98 F | SYSTOLIC BLOOD PRESSURE: 136 MMHG | HEART RATE: 83 BPM | HEIGHT: 70 IN | OXYGEN SATURATION: 88 % | BODY MASS INDEX: 22.88 KG/M2 | RESPIRATION RATE: 20 BRPM | DIASTOLIC BLOOD PRESSURE: 86 MMHG | WEIGHT: 159.81 LBS

## 2022-09-21 DIAGNOSIS — M06.00 SERONEGATIVE RHEUMATOID ARTHRITIS: ICD-10-CM

## 2022-09-21 DIAGNOSIS — Z79.899 HIGH RISK MEDICATION USE: ICD-10-CM

## 2022-09-21 DIAGNOSIS — Z22.7 LATENT TUBERCULOSIS: ICD-10-CM

## 2022-09-21 DIAGNOSIS — D89.89 ANTISYNTHETASE SYNDROME: Primary | ICD-10-CM

## 2022-09-21 DIAGNOSIS — R53.83 FATIGUE, UNSPECIFIED TYPE: ICD-10-CM

## 2022-09-21 DIAGNOSIS — J84.9 INTERSTITIAL LUNG DISEASE: ICD-10-CM

## 2022-09-21 PROCEDURE — 3288F FALL RISK ASSESSMENT DOCD: CPT | Mod: CPTII,,, | Performed by: INTERNAL MEDICINE

## 2022-09-21 PROCEDURE — 3079F PR MOST RECENT DIASTOLIC BLOOD PRESSURE 80-89 MM HG: ICD-10-PCS | Mod: CPTII,,, | Performed by: INTERNAL MEDICINE

## 2022-09-21 PROCEDURE — 1159F PR MEDICATION LIST DOCUMENTED IN MEDICAL RECORD: ICD-10-PCS | Mod: CPTII,,, | Performed by: INTERNAL MEDICINE

## 2022-09-21 PROCEDURE — 3288F PR FALLS RISK ASSESSMENT DOCUMENTED: ICD-10-PCS | Mod: CPTII,,, | Performed by: INTERNAL MEDICINE

## 2022-09-21 PROCEDURE — 3075F PR MOST RECENT SYSTOLIC BLOOD PRESS GE 130-139MM HG: ICD-10-PCS | Mod: CPTII,,, | Performed by: INTERNAL MEDICINE

## 2022-09-21 PROCEDURE — 99214 PR OFFICE/OUTPT VISIT, EST, LEVL IV, 30-39 MIN: ICD-10-PCS | Mod: S$PBB,,, | Performed by: INTERNAL MEDICINE

## 2022-09-21 PROCEDURE — 99214 OFFICE O/P EST MOD 30 MIN: CPT | Mod: S$PBB,,, | Performed by: INTERNAL MEDICINE

## 2022-09-21 PROCEDURE — 1160F RVW MEDS BY RX/DR IN RCRD: CPT | Mod: CPTII,,, | Performed by: INTERNAL MEDICINE

## 2022-09-21 PROCEDURE — 3075F SYST BP GE 130 - 139MM HG: CPT | Mod: CPTII,,, | Performed by: INTERNAL MEDICINE

## 2022-09-21 PROCEDURE — 99213 OFFICE O/P EST LOW 20 MIN: CPT | Mod: PBBFAC | Performed by: INTERNAL MEDICINE

## 2022-09-21 PROCEDURE — 1126F AMNT PAIN NOTED NONE PRSNT: CPT | Mod: CPTII,,, | Performed by: INTERNAL MEDICINE

## 2022-09-21 PROCEDURE — 1159F MED LIST DOCD IN RCRD: CPT | Mod: CPTII,,, | Performed by: INTERNAL MEDICINE

## 2022-09-21 PROCEDURE — 1101F PT FALLS ASSESS-DOCD LE1/YR: CPT | Mod: CPTII,,, | Performed by: INTERNAL MEDICINE

## 2022-09-21 PROCEDURE — 3079F DIAST BP 80-89 MM HG: CPT | Mod: CPTII,,, | Performed by: INTERNAL MEDICINE

## 2022-09-21 PROCEDURE — 1101F PR PT FALLS ASSESS DOC 0-1 FALLS W/OUT INJ PAST YR: ICD-10-PCS | Mod: CPTII,,, | Performed by: INTERNAL MEDICINE

## 2022-09-21 PROCEDURE — 3008F BODY MASS INDEX DOCD: CPT | Mod: CPTII,,, | Performed by: INTERNAL MEDICINE

## 2022-09-21 PROCEDURE — 1126F PR PAIN SEVERITY QUANTIFIED, NO PAIN PRESENT: ICD-10-PCS | Mod: CPTII,,, | Performed by: INTERNAL MEDICINE

## 2022-09-21 PROCEDURE — 1160F PR REVIEW ALL MEDS BY PRESCRIBER/CLIN PHARMACIST DOCUMENTED: ICD-10-PCS | Mod: CPTII,,, | Performed by: INTERNAL MEDICINE

## 2022-09-21 PROCEDURE — 3008F PR BODY MASS INDEX (BMI) DOCUMENTED: ICD-10-PCS | Mod: CPTII,,, | Performed by: INTERNAL MEDICINE

## 2022-09-21 RX ORDER — OXYCODONE AND ACETAMINOPHEN 5; 325 MG/1; MG/1
1 TABLET ORAL DAILY PRN
Qty: 30 TABLET | Refills: 0 | Status: SHIPPED | OUTPATIENT
Start: 2022-09-21 | End: 2022-11-23 | Stop reason: ALTCHOICE

## 2022-09-21 RX ORDER — HYDROXYCHLOROQUINE SULFATE 200 MG/1
200 TABLET, FILM COATED ORAL 2 TIMES DAILY
Qty: 60 TABLET | Refills: 5 | Status: SHIPPED | OUTPATIENT
Start: 2022-09-21 | End: 2022-11-01 | Stop reason: SDUPTHER

## 2022-09-21 NOTE — PROGRESS NOTES
"Subjective:       Patient ID: Clarence Trejo Sr. is a 66 y.o. male.    Chief Complaint: Antisynthetase syndrome and Seronegative, Erosive Rheumatoid Arthritis With Deformities (Complains of shortness of breath uses his machine at night )    Pt  is complaining of joint pain involving his MCP PIP wrist elbow shoulders hips knees and ankles bilaterally.  Is 3/10 in intensity dull in quality and continuous.  It is associated with a morning stiffness lasting for more than 60 minutes. Pt reports having difficulty maintaining a good night of sleep and this has been associated with myalgia of 2/10 in intensity.  This pain is dull continuous and gets worse mainly at night.  It is associated with fatigue.  No fever no chills no others.        Review of Systems   Constitutional:  Negative for appetite change, chills and fever.   HENT:  Negative for congestion, ear pain, mouth sores, nosebleeds and trouble swallowing.    Eyes:  Negative for photophobia and discharge.   Respiratory:  Negative for chest tightness.         ANTISYNTHETASE SYNDROME ON HOME O2 QHS ONLY    Cardiovascular:  Negative for chest pain.   Gastrointestinal:  Negative for abdominal pain and vomiting.   Endocrine: Negative.    Genitourinary:  Negative for hematuria.   Musculoskeletal:         As per HPI   Skin:  Negative for rash.   Neurological:  Negative for weakness.       Objective:   /86 (BP Location: Left arm, Patient Position: Sitting)   Pulse 83   Temp 97.7 °F (36.5 °C)   Resp 20   Ht 5' 10" (1.778 m)   Wt 72.5 kg (159 lb 12.8 oz)   SpO2 (!) 88% Comment: hasn't used his oxygen machine left from work to appointment could be reason for low SpO2  BMI 22.93 kg/m²      Physical Exam   Constitutional: He is oriented to person, place, and time. He appears well-developed and well-nourished. No distress.   HENT:   Head: Normocephalic and atraumatic.   Right Ear: External ear normal.   Left Ear: External ear normal.   Eyes: Pupils are equal, round, " and reactive to light.   Cardiovascular: Normal rate, regular rhythm and normal heart sounds.   Pulmonary/Chest: Breath sounds normal.   Pulmonary Comments: ANTISYNTHETASE SYNDROME ON HOME O2 QHS ONLY   Abdominal: Soft. There is no abdominal tenderness.   Musculoskeletal:      Right shoulder: Normal.      Left shoulder: Normal.      Right elbow: Normal.      Left elbow: Normal.      Right wrist: Normal.      Left wrist: Normal.      Cervical back: Neck supple.      Right hip: Normal.      Left hip: Normal.      Right knee: Normal.      Left knee: Normal.   Lymphadenopathy:     He has no cervical adenopathy.   Neurological: He is alert and oriented to person, place, and time. He displays normal reflexes. No cranial nerve deficit or sensory deficit. He exhibits normal muscle tone. Coordination normal.   Skin: No rash noted. No erythema.   Vitals reviewed.      Right Side Rheumatological Exam     Examination finds the shoulder, elbow, wrist, knee, 1st PIP, 1st MCP, 2nd PIP, 2nd MCP, 3rd PIP, 3rd MCP, 4th PIP, 4th MCP, 5th PIP, 5th MCP, temporomandibular, hip, ankle, 1st MTP, 2nd MTP, 3rd MTP, 4th MTP and 5th MTP normal.    Left Side Rheumatological Exam     Examination finds the shoulder, elbow, wrist, knee, 1st PIP, 1st MCP, 2nd PIP, 2nd MCP, 3rd PIP, 3rd MCP, 4th PIP, 4th MCP, 5th PIP, 5th MCP, temporomandibular, hip, ankle, 1st MTP, 2nd MTP, 3rd MTP, 4th MTP and 5th MTP normal.       Completed Fibromyalgia exam 2/18 tender points.  No data to display    ANTISYNTHETASE SYNDROME ON HOME O2 QHS ONLY      Assessment:       1. Antisynthetase syndrome    2. Seronegative rheumatoid arthritis    3. High risk medication use    4. Fatigue, unspecified type    5. Interstitial lung disease    6. Latent tuberculosis              Plan:       Problem List Items Addressed This Visit          Pulmonary    Interstitial lung disease    Relevant Medications    oxyCODONE-acetaminophen (PERCOCET) 5-325 mg per tablet    hydrOXYchloroQUINE  (PLAQUENIL) 200 mg tablet       Immunology/Multi System    Antisynthetase syndrome - Primary    Relevant Medications    oxyCODONE-acetaminophen (PERCOCET) 5-325 mg per tablet    hydrOXYchloroQUINE (PLAQUENIL) 200 mg tablet    Seronegative rheumatoid arthritis    Relevant Medications    oxyCODONE-acetaminophen (PERCOCET) 5-325 mg per tablet    hydrOXYchloroQUINE (PLAQUENIL) 200 mg tablet       Palliative Care    High risk medication use    Relevant Medications    oxyCODONE-acetaminophen (PERCOCET) 5-325 mg per tablet    hydrOXYchloroQUINE (PLAQUENIL) 200 mg tablet       Other    Fatigue    Relevant Medications    oxyCODONE-acetaminophen (PERCOCET) 5-325 mg per tablet    hydrOXYchloroQUINE (PLAQUENIL) 200 mg tablet     Other Visit Diagnoses       Latent tuberculosis        Relevant Medications    oxyCODONE-acetaminophen (PERCOCET) 5-325 mg per tablet    hydrOXYchloroQUINE (PLAQUENIL) 200 mg tablet

## 2022-10-07 ENCOUNTER — HOSPITAL ENCOUNTER (OUTPATIENT)
Dept: PULMONOLOGY | Facility: HOSPITAL | Age: 67
Discharge: HOME | End: 2022-10-07
Attending: STUDENT IN AN ORGANIZED HEALTH CARE EDUCATION/TRAINING PROGRAM
Payer: MEDICARE

## 2022-10-07 DIAGNOSIS — J84.9 INTERSTITIAL LUNG DISEASE: ICD-10-CM

## 2022-10-07 LAB
DLCO SINGLE BREATH LLN: 18.52
DLCO SINGLE BREATH PRE REF: 18.5 %
DLCO SINGLE BREATH REF: 25.44
DLCOC SBVA LLN: 2.63
DLCOC SBVA REF: 3.9
DLCOC SINGLE BREATH LLN: 18.52
DLCOC SINGLE BREATH REF: 25.44
DLCOVA LLN: 2.63
DLCOVA PRE REF: 36 %
DLCOVA PRE: 1.41 ML/(MIN*MMHG*L) (ref 2.63–5.17)
DLCOVA REF: 3.9
ERV LLN: -16448.97
ERV PRE REF: 118 %
ERV REF: 1.03
FEF 25 75 CHG: 20.1 %
FEF 25 75 LLN: 0.8
FEF 25 75 POST REF: 88.7 %
FEF 25 75 PRE REF: 73.8 %
FEF 25 75 REF: 2.11
FET100 CHG: 6.2 %
FEV1 CHG: 10 %
FEV1 FVC CHG: 1.7 %
FEV1 FVC LLN: 65
FEV1 FVC POST REF: 102.4 %
FEV1 FVC PRE REF: 100.7 %
FEV1 FVC REF: 78
FEV1 LLN: 1.8
FEV1 POST REF: 71.3 %
FEV1 PRE REF: 64.8 %
FEV1 REF: 2.58
FRCPLETH LLN: 2.5
FRCPLETH PREREF: 89.6 %
FRCPLETH REF: 3.49
FVC CHG: 8.2 %
FVC LLN: 2.42
FVC POST REF: 69.7 %
FVC PRE REF: 64.4 %
FVC REF: 3.32
IVC PRE: 2.51 L (ref 2.42–4.24)
IVC SINGLE BREATH LLN: 2.42
IVC SINGLE BREATH PRE REF: 75.5 %
IVC SINGLE BREATH REF: 3.32
MVV LLN: 99
MVV PRE REF: 51.8 %
MVV REF: 117
PEF CHG: 10.9 %
PEF LLN: 5.02
PEF POST REF: 60.4 %
PEF PRE REF: 54.4 %
PEF REF: 7.45
POST FEF 25 75: 1.87 L/S (ref 0.8–4.04)
POST FET 100: 7.41 SEC
POST FEV1 FVC: 79.45 % (ref 64.98–88.74)
POST FEV1: 1.84 L (ref 1.8–3.3)
POST FVC: 2.31 L (ref 2.42–4.24)
POST PEF: 4.5 L/S (ref 5.02–9.88)
PRE DLCO: 4.71 ML/(MIN*MMHG) (ref 18.52–32.37)
PRE ERV: 1.22 L (ref -16448.97–16451.03)
PRE FEF 25 75: 1.55 L/S (ref 0.8–4.04)
PRE FET 100: 6.98 SEC
PRE FEV1 FVC: 78.14 % (ref 64.98–88.74)
PRE FEV1: 1.67 L (ref 1.8–3.3)
PRE FRC PL: 3.12 L (ref 2.5–4.47)
PRE FVC: 2.14 L (ref 2.42–4.24)
PRE MVV: 60.35 L/MIN (ref 99.07–134.03)
PRE PEF: 4.05 L/S (ref 5.02–9.88)
PRE RV: 1.9 L (ref 1.78–3.13)
PRE TLC: 4.08 L (ref 5.37–7.67)
RAW LLN: 3.06
RAW PRE REF: 61.9 %
RAW PRE: 1.89 CMH2O*S/L (ref 3.06–3.06)
RAW REF: 3.06
RV LLN: 1.78
RV PRE REF: 77.6 %
RV REF: 2.45
RVTLC LLN: 31
RVTLC PRE REF: 117.3 %
RVTLC PRE: 46.58 % (ref 30.72–48.68)
RVTLC REF: 40
TLC LLN: 5.37
TLC PRE REF: 62.6 %
TLC REF: 6.52
VA PRE: 3.35 L (ref 6.37–6.37)
VA SINGLE BREATH LLN: 6.37
VA SINGLE BREATH PRE REF: 52.6 %
VA SINGLE BREATH REF: 6.37
VC LLN: 2.42
VC PRE REF: 65.6 %
VC PRE: 2.18 L (ref 2.42–4.24)
VC REF: 3.32

## 2022-10-07 PROCEDURE — 94060 EVALUATION OF WHEEZING: CPT

## 2022-10-07 PROCEDURE — 94640 AIRWAY INHALATION TREATMENT: CPT | Mod: 59

## 2022-10-07 PROCEDURE — 94729 DIFFUSING CAPACITY: CPT

## 2022-10-07 PROCEDURE — 94726 PLETHYSMOGRAPHY LUNG VOLUMES: CPT

## 2022-10-07 RX ORDER — ALBUTEROL SULFATE 0.83 MG/ML
2.5 SOLUTION RESPIRATORY (INHALATION)
Status: COMPLETED | OUTPATIENT
Start: 2022-10-07 | End: 2022-10-07

## 2022-10-07 RX ORDER — IPRATROPIUM BROMIDE 0.5 MG/2.5ML
0.5 SOLUTION RESPIRATORY (INHALATION)
Status: COMPLETED | OUTPATIENT
Start: 2022-10-07 | End: 2022-10-07

## 2022-10-07 RX ADMIN — ALBUTEROL SULFATE 2.5 MG: 0.83 SOLUTION RESPIRATORY (INHALATION) at 08:10

## 2022-10-07 RX ADMIN — IPRATROPIUM BROMIDE 0.5 MG: 0.5 SOLUTION RESPIRATORY (INHALATION) at 08:10

## 2022-10-13 ENCOUNTER — HOSPITAL ENCOUNTER (EMERGENCY)
Facility: HOSPITAL | Age: 67
Discharge: HOME | End: 2022-10-13
Attending: INTERNAL MEDICINE
Payer: MEDICARE

## 2022-10-13 VITALS
HEART RATE: 81 BPM | HEIGHT: 69 IN | DIASTOLIC BLOOD PRESSURE: 98 MMHG | OXYGEN SATURATION: 94 % | BODY MASS INDEX: 23.6 KG/M2 | TEMPERATURE: 98 F | SYSTOLIC BLOOD PRESSURE: 129 MMHG | RESPIRATION RATE: 16 BRPM

## 2022-10-13 DIAGNOSIS — R06.00 DYSPNEA: ICD-10-CM

## 2022-10-13 DIAGNOSIS — R06.02 SHORTNESS OF BREATH: ICD-10-CM

## 2022-10-13 DIAGNOSIS — J44.1 COPD EXACERBATION: Primary | ICD-10-CM

## 2022-10-13 DIAGNOSIS — R06.02 SOB (SHORTNESS OF BREATH): ICD-10-CM

## 2022-10-13 LAB
ALBUMIN SERPL-MCNC: 3.4 GM/DL (ref 3.4–4.8)
ALBUMIN/GLOB SERPL: 0.9 RATIO (ref 1.1–2)
ALP SERPL-CCNC: 109 UNIT/L (ref 40–150)
ALT SERPL-CCNC: 13 UNIT/L (ref 0–55)
AST SERPL-CCNC: 15 UNIT/L (ref 5–34)
BASOPHILS # BLD AUTO: 0.06 X10(3)/MCL (ref 0–0.2)
BASOPHILS NFR BLD AUTO: 0.7 %
BILIRUBIN DIRECT+TOT PNL SERPL-MCNC: 0.7 MG/DL
BUN SERPL-MCNC: 8.1 MG/DL (ref 8.4–25.7)
CALCIUM SERPL-MCNC: 9 MG/DL (ref 8.8–10)
CHLORIDE SERPL-SCNC: 104 MMOL/L (ref 98–107)
CO2 SERPL-SCNC: 22 MMOL/L (ref 23–31)
CORRECTED TEMPERATURE (PCO2): 37 MMHG (ref 35–45)
CORRECTED TEMPERATURE (PH): 7.42 (ref 7.35–7.45)
CORRECTED TEMPERATURE (PO2): 56 MMHG (ref 75–100)
CREAT SERPL-MCNC: 0.89 MG/DL (ref 0.73–1.18)
EOSINOPHIL # BLD AUTO: 0.08 X10(3)/MCL (ref 0–0.9)
EOSINOPHIL NFR BLD AUTO: 0.9 %
ERYTHROCYTE [DISTWIDTH] IN BLOOD BY AUTOMATED COUNT: 17 % (ref 11.5–17)
GFR SERPLBLD CREATININE-BSD FMLA CKD-EPI: >60 MLS/MIN/1.73/M2
GLOBULIN SER-MCNC: 3.8 GM/DL (ref 2.4–3.5)
GLUCOSE SERPL-MCNC: 156 MG/DL (ref 82–115)
HCO3 UR-SCNC: 24 MMOL/L (ref 22–26)
HCT VFR BLD AUTO: 55.7 % (ref 42–52)
HGB BLD-MCNC: 17.3 G/DL (ref 12–18)
HGB BLD-MCNC: 17.8 GM/DL (ref 14–18)
IMM GRANULOCYTES # BLD AUTO: 0.02 X10(3)/MCL (ref 0–0.04)
IMM GRANULOCYTES NFR BLD AUTO: 0.2 %
LYMPHOCYTES # BLD AUTO: 1.5 X10(3)/MCL (ref 0.6–4.6)
LYMPHOCYTES NFR BLD AUTO: 16.9 %
MCH RBC QN AUTO: 26 PG (ref 27–31)
MCHC RBC AUTO-ENTMCNC: 32 MG/DL (ref 33–36)
MCV RBC AUTO: 81.4 FL (ref 80–94)
MONOCYTES # BLD AUTO: 0.76 X10(3)/MCL (ref 0.1–1.3)
MONOCYTES NFR BLD AUTO: 8.6 %
NEUTROPHILS # BLD AUTO: 6.4 X10(3)/MCL (ref 2.1–9.2)
NEUTROPHILS NFR BLD AUTO: 72.7 %
NRBC BLD AUTO-RTO: 0 %
PCO2 BLDA: 37 MMHG (ref 35–45)
PH SMN: 7.42 [PH] (ref 7.35–7.45)
PLATELET # BLD AUTO: 224 X10(3)/MCL (ref 130–400)
PMV BLD AUTO: 11.1 FL (ref 7.4–10.4)
PO2 BLDA: 56 MMHG (ref 75–100)
POC BASE DEFICIT: -0.1 MMOL/L (ref -2–2)
POC COHB: 2.5 % (ref 0.5–1.5)
POC METHB: 0.7 % (ref 0–1.5)
POC O2HB: 86.6 % (ref 94–100)
POC PERFORMED BY: ABNORMAL
POC SATURATED O2: 89.3 % (ref 90–100)
POC TEMPERATURE: 37 C
POTASSIUM SERPL-SCNC: 4.1 MMOL/L (ref 3.5–5.1)
PROT SERPL-MCNC: 7.2 GM/DL (ref 5.8–7.6)
RBC # BLD AUTO: 6.84 X10(6)/MCL (ref 4.7–6.1)
SODIUM SERPL-SCNC: 139 MMOL/L (ref 136–145)
SPECIMEN SOURCE: ABNORMAL
TROPONIN I SERPL-MCNC: <0.01 NG/ML (ref 0–0.04)
WBC # SPEC AUTO: 8.9 X10(3)/MCL (ref 4.5–11.5)

## 2022-10-13 PROCEDURE — 85025 COMPLETE CBC W/AUTO DIFF WBC: CPT

## 2022-10-13 PROCEDURE — 94640 AIRWAY INHALATION TREATMENT: CPT

## 2022-10-13 PROCEDURE — 82803 BLOOD GASES ANY COMBINATION: CPT

## 2022-10-13 PROCEDURE — 36600 WITHDRAWAL OF ARTERIAL BLOOD: CPT

## 2022-10-13 PROCEDURE — 93005 ELECTROCARDIOGRAM TRACING: CPT

## 2022-10-13 PROCEDURE — 94760 N-INVAS EAR/PLS OXIMETRY 1: CPT

## 2022-10-13 PROCEDURE — 25000242 PHARM REV CODE 250 ALT 637 W/ HCPCS

## 2022-10-13 PROCEDURE — 84484 ASSAY OF TROPONIN QUANT: CPT

## 2022-10-13 PROCEDURE — 63600175 PHARM REV CODE 636 W HCPCS

## 2022-10-13 PROCEDURE — 25000242 PHARM REV CODE 250 ALT 637 W/ HCPCS: Performed by: INTERNAL MEDICINE

## 2022-10-13 PROCEDURE — 36415 COLL VENOUS BLD VENIPUNCTURE: CPT | Performed by: INTERNAL MEDICINE

## 2022-10-13 PROCEDURE — 99900035 HC TECH TIME PER 15 MIN (STAT)

## 2022-10-13 PROCEDURE — 96374 THER/PROPH/DIAG INJ IV PUSH: CPT

## 2022-10-13 PROCEDURE — 80053 COMPREHEN METABOLIC PANEL: CPT

## 2022-10-13 PROCEDURE — 99285 EMERGENCY DEPT VISIT HI MDM: CPT | Mod: 25

## 2022-10-13 RX ORDER — DOXYCYCLINE 100 MG/1
100 CAPSULE ORAL 2 TIMES DAILY
Qty: 20 CAPSULE | Refills: 0 | Status: SHIPPED | OUTPATIENT
Start: 2022-10-13 | End: 2022-10-23

## 2022-10-13 RX ORDER — ALBUTEROL SULFATE 90 UG/1
1-2 AEROSOL, METERED RESPIRATORY (INHALATION) EVERY 6 HOURS PRN
Qty: 18 G | Refills: 6 | Status: SHIPPED | OUTPATIENT
Start: 2022-10-13 | End: 2023-05-23 | Stop reason: SDUPTHER

## 2022-10-13 RX ORDER — ALBUTEROL SULFATE 0.83 MG/ML
2.5 SOLUTION RESPIRATORY (INHALATION)
Status: COMPLETED | OUTPATIENT
Start: 2022-10-13 | End: 2022-10-13

## 2022-10-13 RX ORDER — METHYLPREDNISOLONE SOD SUCC 125 MG
125 VIAL (EA) INJECTION
Status: COMPLETED | OUTPATIENT
Start: 2022-10-13 | End: 2022-10-13

## 2022-10-13 RX ORDER — PREDNISONE 20 MG/1
40 TABLET ORAL DAILY
Qty: 14 TABLET | Refills: 0 | Status: SHIPPED | OUTPATIENT
Start: 2022-10-13 | End: 2022-10-20

## 2022-10-13 RX ORDER — IPRATROPIUM BROMIDE AND ALBUTEROL SULFATE 2.5; .5 MG/3ML; MG/3ML
3 SOLUTION RESPIRATORY (INHALATION)
Status: COMPLETED | OUTPATIENT
Start: 2022-10-13 | End: 2022-10-13

## 2022-10-13 RX ORDER — IPRATROPIUM BROMIDE AND ALBUTEROL SULFATE 2.5; .5 MG/3ML; MG/3ML
3 SOLUTION RESPIRATORY (INHALATION) EVERY 6 HOURS PRN
Qty: 75 ML | Refills: 5 | Status: SHIPPED | OUTPATIENT
Start: 2022-10-13 | End: 2023-05-23 | Stop reason: SDUPTHER

## 2022-10-13 RX ADMIN — IPRATROPIUM BROMIDE AND ALBUTEROL SULFATE 3 ML: 2.5; .5 SOLUTION RESPIRATORY (INHALATION) at 11:10

## 2022-10-13 RX ADMIN — METHYLPREDNISOLONE SODIUM SUCCINATE 125 MG: 125 INJECTION, POWDER, FOR SOLUTION INTRAMUSCULAR; INTRAVENOUS at 11:10

## 2022-10-13 RX ADMIN — ALBUTEROL SULFATE 2.5 MG: 2.5 SOLUTION RESPIRATORY (INHALATION) at 10:10

## 2022-10-13 NOTE — Clinical Note
"Clarence Fitzpatrick" Neil was seen and treated in our emergency department on 10/13/2022.  He may return to work on 10/17/2022.       If you have any questions or concerns, please don't hesitate to call.      Henry CHAVEZ    "

## 2022-10-13 NOTE — ED PROVIDER NOTES
Encounter Date: 10/13/2022       History     Chief Complaint   Patient presents with    Shortness of Breath     PT W CO SOB AND COUGH X 3 DAYS.  DENIES CP, NO FEVER. O2 85%,  STATES ON HOME O2.   EKG OBTAINED.       Pt is a 66-year-old male on home O2 after shortness of breath developed about 1 year ago after covid. He  states that about 1 day ago his dyspnea acutely worsened. He reports no change in sputum character or production. He always uses 2 pillows to sleep at night. He denies hx of blood clots, hemoptysis, chest pain, leg swelling, fever, chills, nausea. He states that other than his home O2 he takes no meds other than percocet for back pain and does not think he has any medical diagnoses. Today in ED his O2 sat was in the 80s upon arrival, dropped to 77% in the room, then was placed on 4L NC and is satting above 90 percent.      Shortness of Breath  This is a new (acute on chronic) problem. The average episode lasts 1 day. The problem occurs continuously.The problem has not changed since onset.Associated symptoms include cough and orthopnea. Pertinent negatives include no fever, no headaches, no rhinorrhea, no sore throat, no sputum production, no hemoptysis, no wheezing, no chest pain, no syncope, no vomiting, no abdominal pain, no rash, no leg pain, no leg swelling and no claudication. He has tried nothing for the symptoms. He has had Prior hospitalizations. Associated medical issues include chronic lung disease. Associated medical issues do not include asthma, PE or CAD.   Review of patient's allergies indicates:  No Known Allergies  No past medical history on file.  No past surgical history on file.  No family history on file.  Social History     Tobacco Use    Smoking status: Former    Smokeless tobacco: Never   Substance Use Topics    Alcohol use: Never    Drug use: Never     Review of Systems   Constitutional:  Negative for fever.   HENT:  Negative for rhinorrhea and sore throat.    Respiratory:   Positive for cough and shortness of breath. Negative for hemoptysis, sputum production, chest tightness and wheezing.    Cardiovascular:  Positive for orthopnea. Negative for chest pain, palpitations, claudication, leg swelling and syncope.   Gastrointestinal:  Negative for abdominal distention, abdominal pain, nausea and vomiting.   Genitourinary:  Negative for difficulty urinating and dysuria.   Musculoskeletal:  Negative for arthralgias and joint swelling.   Skin:  Negative for rash.   Neurological:  Negative for headaches.   Psychiatric/Behavioral: Negative.       Physical Exam     Initial Vitals [10/13/22 0833]   BP Pulse Resp Temp SpO2   126/81 106 20 97.9 °F (36.6 °C) (!) 85 %      MAP       --         Physical Exam    Nursing note and vitals reviewed.  Constitutional: He appears well-developed and well-nourished. No distress.   HENT:   Head: Normocephalic and atraumatic.   Eyes: Conjunctivae and EOM are normal.   Neck: Neck supple.   Normal range of motion.  Cardiovascular:  Regular rhythm, normal heart sounds and intact distal pulses.           No murmur heard.  tachycardic   Pulmonary/Chest:   Crackles lower lobes bilaterally   Abdominal: Abdomen is soft. Bowel sounds are normal.   Musculoskeletal:         General: Normal range of motion.      Cervical back: Normal range of motion and neck supple.     Neurological: He is alert and oriented to person, place, and time. No cranial nerve deficit.   Skin: Skin is warm and dry.   Psychiatric: He has a normal mood and affect.       ED Course   Critical Care    Date/Time: 10/13/2022 12:23 PM  Performed by: Nicholas Greene MD  Authorized by: Nicholas Greene MD   Direct patient critical care time: 10 minutes  Additional history critical care time: 5 minutes  Ordering / reviewing critical care time: 12 minutes  Documentation critical care time: 5 minutes  Total critical care time (exclusive of procedural time) : 32 minutes  Critical care was  necessary to treat or prevent imminent or life-threatening deterioration of the following conditions: respiratory failure.  Critical care was time spent personally by me on the following activities: development of treatment plan with patient or surrogate, evaluation of patient's response to treatment, examination of patient, obtaining history from patient or surrogate, ordering and performing treatments and interventions, ordering and review of laboratory studies, ordering and review of radiographic studies, pulse oximetry, re-evaluation of patient's condition and review of old charts.      Labs Reviewed   COMPREHENSIVE METABOLIC PANEL - Abnormal; Notable for the following components:       Result Value    Carbon Dioxide 22 (*)     Glucose Level 156 (*)     Blood Urea Nitrogen 8.1 (*)     Globulin 3.8 (*)     Albumin/Globulin Ratio 0.9 (*)     All other components within normal limits   CBC WITH DIFFERENTIAL - Abnormal; Notable for the following components:    RBC 6.84 (*)     Hct 55.7 (*)     MCH 26.0 (*)     MCHC 32.0 (*)     MPV 11.1 (*)     All other components within normal limits   TROPONIN I - Normal   CBC W/ AUTO DIFFERENTIAL    Narrative:     The following orders were created for panel order CBC Auto Differential.  Procedure                               Abnormality         Status                     ---------                               -----------         ------                     CBC with Differential[[id number]]        Abnormal            Final result                 Please view results for these tests on the individual orders.   EXTRA TUBES    Narrative:     The following orders were created for panel order EXTRA TUBES.  Procedure                               Abnormality         Status                     ---------                               -----------         ------                     Light Blue Top Hold[[id number]]                              In process                   Please view results  for these tests on the individual orders.   LIGHT BLUE TOP HOLD     EKG Readings: (Independently Interpreted)   Initial Reading: No STEMI. Rhythm: Sinus Tachycardia. Heart Rate: 103. Conduction: Normal. ST Segments: Normal ST Segments. Axis: Normal. Clinical Impression: Sinus Tachycardia   ECG Results              EKG 12-lead (Shortness of Breath) Age > 50 (In process)  Result time 10/13/22 08:45:32      In process by Interface, Lab In [facility] (10/13/22 08:45:32)                   Narrative:    Test Reason : R06.02,    Vent. Rate : 103 BPM     Atrial Rate : 103 BPM     P-R Int : 160 ms          QRS Dur : 072 ms      QT Int : 346 ms       P-R-T Axes : 060 040 057 degrees     QTc Int : 453 ms    Sinus tachycardia  Biatrial enlargement  Abnormal ECG  No previous ECGs available    Referred By: [email or web address]   SELF           Confirmed By:                                   Imaging Results              X-Ray Chest PA And Lateral (Final result)  Result time 10/13/22 10:05:33      Final result by Chica Mckenzie MD (10/13/22 10:05:33)                   Impression:      Chronic interstitial changes of the lungs.  No new focal airspace consolidation.      Electronically signed by: Chica Mckenzie  Date:    10/13/2022  Time:    10:05               Narrative:    EXAMINATION:  XR CHEST PA AND LATERAL    CLINICAL HISTORY:  Dyspnea, unspecified    TECHNIQUE:  PA and lateral chest radiographs    COMPARISON:  Chest x-ray dated 11/20/2021, CT chest dated 04/22/2022    FINDINGS:  The heart is normal in size.  There are diffuse bilateral interstitial opacities, likely corresponding to chronic interstitial changes of the lungs.  There is no new focal airspace consolidation.  There is no pleural effusion or pneumothorax.  There are mild degenerative changes of the thoracic spine.                                       Medications   albuterol nebulizer solution 2.5 mg (2.5 mg Nebulization Given 10/13/22 1010)   methylPREDNISolone sodium  succinate injection 125 mg (125 mg Intravenous Given 10/13/22 1126)   albuterol-ipratropium 2.5 mg-0.5 mg/3 mL nebulizer solution 3 mL (3 mLs Nebulization Given 10/13/22 1155)     12:20 PM  At this time pt states he is feeling better. Pt with CXR with chronic fibrotic changes and ABG with marked hypoxia, pt using 2 L O2 at home and after nebs in ED pulse Ox between 90 and 92 % with his 2 L nasal cannula. Pt does not want admission, states will like to go home because he feels better and his wife is alone at home. Pt understood his condition and agrees to return to ED if condition worsen          Attending Attestation:   Physician Attestation Statement for Resident:  As the supervising MD   Physician Attestation Statement: I have personally seen and examined this patient.   I agree with the above history.  -:   As the supervising MD I agree with the above PE.     As the supervising MD I agree with the above treatment, course, plan, and disposition.    I have reviewed and agree with the residents interpretation of the following: lab data, x-rays and EKG.  I have reviewed the following: old records at this facility, records from a referring facility, old x-rays and old CTs.            Attending ED Notes:   I performed a face to face evaluation of the patient Clarence Trejo  and my history reveal COPD )2 depended presents with SOB the physical exam was remarkable for tachypnea, decrease breath sounds and dry crackles.  I reviewed the history, physical exam and diagnostic studies performed by the resident Dr. ROGELIO Lyles and I concur with the diagnosis and assessment. This case was initially evaluated by Dr. Lyles  under my supervision and I agree with the documentation and plan.    Total teaching time: 15 min                     Clinical Impression:   Final diagnoses:  [R06.02] Shortness of breath  [R06.02] SOB (shortness of breath)  [J44.1] COPD exacerbation (Primary)        ED Disposition Condition    Discharge Stable           ED Prescriptions    None       Follow-up Information    None          Nicholas Greene MD  10/13/22 8990

## 2022-10-26 ENCOUNTER — OFFICE VISIT (OUTPATIENT)
Dept: INTERNAL MEDICINE | Facility: CLINIC | Age: 67
End: 2022-10-26
Payer: MEDICARE

## 2022-10-26 VITALS
WEIGHT: 156.5 LBS | HEART RATE: 83 BPM | SYSTOLIC BLOOD PRESSURE: 127 MMHG | BODY MASS INDEX: 23.18 KG/M2 | RESPIRATION RATE: 20 BRPM | DIASTOLIC BLOOD PRESSURE: 84 MMHG | HEIGHT: 69 IN | TEMPERATURE: 98 F | OXYGEN SATURATION: 90 %

## 2022-10-26 DIAGNOSIS — Z12.12 ENCOUNTER FOR COLORECTAL CANCER SCREENING: ICD-10-CM

## 2022-10-26 DIAGNOSIS — Z12.11 ENCOUNTER FOR COLORECTAL CANCER SCREENING: ICD-10-CM

## 2022-10-26 DIAGNOSIS — Z13.6 ENCOUNTER FOR ABDOMINAL AORTIC ANEURYSM (AAA) SCREENING: ICD-10-CM

## 2022-10-26 DIAGNOSIS — D89.89 ANTISYNTHETASE SYNDROME: Primary | ICD-10-CM

## 2022-10-26 DIAGNOSIS — M54.9 BACK PAIN, UNSPECIFIED BACK LOCATION, UNSPECIFIED BACK PAIN LATERALITY, UNSPECIFIED CHRONICITY: ICD-10-CM

## 2022-10-26 DIAGNOSIS — J84.9 INTERSTITIAL LUNG DISEASE: ICD-10-CM

## 2022-10-26 PROCEDURE — 99214 OFFICE O/P EST MOD 30 MIN: CPT | Mod: PBBFAC

## 2022-10-26 RX ORDER — HYDROCODONE BITARTRATE AND ACETAMINOPHEN 5; 325 MG/1; MG/1
1 TABLET ORAL EVERY 6 HOURS PRN
Qty: 12 TABLET | Refills: 0 | Status: SHIPPED | OUTPATIENT
Start: 2022-10-26 | End: 2023-01-19 | Stop reason: SDUPTHER

## 2022-10-26 NOTE — PROGRESS NOTES
"[facility] INTERNAL MEDICINE  OUTPATIENT OFFICE VISIT NOTE    SUBJECTIVE:      HPI: Clarence Trejo Sr. is a 66 y.o. 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 Patient continues to complain of the same back pain he has been complaining of for the last few years. Notes compliance with his inhalers and home oxygen when he asleep. 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 Denies associated chest pain, fever, chills, nausea and vomiting.      ROS:  (+)  (-) Chest pain, palpitations, SOB, fever, night sweats, chills, diarrhea, constipation.       OBJECTIVE:     Vital signs:   /84 (BP Location: Right arm, Patient Position: Sitting, BP Method: Medium (Automatic))   Pulse 83   Temp 97.5 °F (36.4 °C) (Oral)   Resp 20   Ht 5' 8.9" (1.75 m)   Wt 71 kg (156 lb 8.4 oz)   SpO2 (!) 90%   BMI 23.18 kg/m²      Physical Examination:  General: Well nourished w/o distress  HEENT: NC/AT; PERRLA; nasal and oral mucosa moist and clear; no sinus tenderness; no thyromegaly  Neck: Full ROM; no lymphadenopathy  Pulm: Ronchi noted bilaterally with good air movement  CV: S1, S2 w/o murmurs or gallops; no edema noted  GI: Soft with normal bowel sounds in all quadrants, no masses on palpation  MSK: Full ROM of all extremities and spine w/o limitation or discomfort  Derm: No rashes, abnormal bruising, or skin lesions  Neuro: AAOx4; CN II-XII intact; motor/sensory function intact  Psych: Cooperative; appropriate mood and affect         ASSESSMENT & PLAN:     Ant-synthetase Syndrome  Interstitial Lung Disease  -Followed by [facility] and is currently on Plaquenil.  -Will be seen by [facility] next month.  -Will continue with Symbicort, duonebs and albuterol PRN.      Health Maintenance:    Colorectal CA screening: Will get cologuard today    Lung CA screening: UTD    Abdominal Aorta Aneurysm screening: Will order " ultrasound today    Prostate CA screening: UTD    Vaccines: Will discuss next time  -Varicella:    -Tdap:    -Pneumonia:  A) Prevnar 13  B) Pneumovax 23    -Flu:    -Covid:    Return to clinic in 4 months.     Emery Mcarthur MD  [employer] [facility], PGY-3

## 2022-10-27 ENCOUNTER — TELEPHONE (OUTPATIENT)
Dept: PULMONOLOGY | Facility: CLINIC | Age: 67
End: 2022-10-27
Payer: MEDICARE

## 2022-10-27 NOTE — PROGRESS NOTES
I have reviewed and concur with the resident's history, physical, assessment, and plan.  I have discussed with him all issues related to the diagnosis, workup and treatment plan.Care provided as reasonable and necessary.    Tha Schreiber MD  [facility]

## 2022-10-30 ENCOUNTER — HOSPITAL ENCOUNTER (EMERGENCY)
Facility: HOSPITAL | Age: 67
Discharge: HOME | End: 2022-10-30
Attending: FAMILY MEDICINE
Payer: MEDICARE

## 2022-10-30 VITALS
HEART RATE: 95 BPM | BODY MASS INDEX: 22.87 KG/M2 | HEIGHT: 68 IN | SYSTOLIC BLOOD PRESSURE: 137 MMHG | DIASTOLIC BLOOD PRESSURE: 91 MMHG | RESPIRATION RATE: 26 BRPM | TEMPERATURE: 98 F | OXYGEN SATURATION: 88 % | WEIGHT: 150.88 LBS

## 2022-10-30 DIAGNOSIS — Z99.81 ON HOME OXYGEN THERAPY: ICD-10-CM

## 2022-10-30 DIAGNOSIS — R06.02 SOB (SHORTNESS OF BREATH): ICD-10-CM

## 2022-10-30 DIAGNOSIS — J84.9 CHRONIC INTERSTITIAL LUNG DISEASE: Primary | ICD-10-CM

## 2022-10-30 DIAGNOSIS — J18.9 COMMUNITY ACQUIRED PNEUMONIA, UNSPECIFIED LATERALITY: ICD-10-CM

## 2022-10-30 LAB
ALBUMIN SERPL-MCNC: 3.6 GM/DL (ref 3.4–4.8)
ALBUMIN/GLOB SERPL: 1.1 RATIO (ref 1.1–2)
ALP SERPL-CCNC: 106 UNIT/L (ref 40–150)
ALT SERPL-CCNC: 16 UNIT/L (ref 0–55)
AST SERPL-CCNC: 16 UNIT/L (ref 5–34)
BASOPHILS # BLD AUTO: 0.07 X10(3)/MCL (ref 0–0.2)
BASOPHILS NFR BLD AUTO: 0.6 %
BILIRUBIN DIRECT+TOT PNL SERPL-MCNC: 0.9 MG/DL
BUN SERPL-MCNC: 13.9 MG/DL (ref 8.4–25.7)
CALCIUM SERPL-MCNC: 9.2 MG/DL (ref 8.8–10)
CHLORIDE SERPL-SCNC: 104 MMOL/L (ref 98–107)
CK MB SERPL-MCNC: 0.5 NG/ML
CK SERPL-CCNC: 39 U/L (ref 30–200)
CO2 SERPL-SCNC: 24 MMOL/L (ref 23–31)
CORRECTED TEMPERATURE (PCO2): 36 MMHG (ref 35–45)
CORRECTED TEMPERATURE (PH): 7.37 (ref 7.35–7.45)
CORRECTED TEMPERATURE (PO2): 62 MMHG (ref 75–100)
CREAT SERPL-MCNC: 1.16 MG/DL (ref 0.73–1.18)
EOSINOPHIL # BLD AUTO: 0.01 X10(3)/MCL (ref 0–0.9)
EOSINOPHIL NFR BLD AUTO: 0.1 %
ERYTHROCYTE [DISTWIDTH] IN BLOOD BY AUTOMATED COUNT: 18.5 % (ref 11.5–17)
FLUAV AG UPPER RESP QL IA.RAPID: NOT DETECTED
FLUBV AG UPPER RESP QL IA.RAPID: NOT DETECTED
GFR SERPLBLD CREATININE-BSD FMLA CKD-EPI: >60 MLS/MIN/1.73/M2
GLOBULIN SER-MCNC: 3.3 GM/DL (ref 2.4–3.5)
GLUCOSE SERPL-MCNC: 85 MG/DL (ref 82–115)
HCO3 UR-SCNC: 20.8 MMOL/L (ref 22–26)
HCT VFR BLD AUTO: 59.3 % (ref 42–52)
HGB BLD-MCNC: 17.8 G/DL (ref 12–18)
HGB BLD-MCNC: 18 GM/DL (ref 14–18)
IMM GRANULOCYTES # BLD AUTO: 0.03 X10(3)/MCL (ref 0–0.04)
IMM GRANULOCYTES NFR BLD AUTO: 0.3 %
LYMPHOCYTES # BLD AUTO: 1.3 X10(3)/MCL (ref 0.6–4.6)
LYMPHOCYTES NFR BLD AUTO: 11.5 %
MCH RBC QN AUTO: 25 PG (ref 27–31)
MCHC RBC AUTO-ENTMCNC: 30.4 MG/DL (ref 33–36)
MCV RBC AUTO: 82.4 FL (ref 80–94)
MONOCYTES # BLD AUTO: 1.72 X10(3)/MCL (ref 0.1–1.3)
MONOCYTES NFR BLD AUTO: 15.2 %
NEUTROPHILS # BLD AUTO: 8.2 X10(3)/MCL (ref 2.1–9.2)
NEUTROPHILS NFR BLD AUTO: 72.3 %
NRBC BLD AUTO-RTO: 0 %
PCO2 BLDA: 36 MMHG (ref 35–45)
PH SMN: 7.37 [PH] (ref 7.35–7.45)
PLATELET # BLD AUTO: 200 X10(3)/MCL (ref 130–400)
PMV BLD AUTO: 10.6 FL (ref 7.4–10.4)
PO2 BLDA: 62 MMHG (ref 75–100)
POC BASE DEFICIT: -3.7 MMOL/L (ref -2–2)
POC COHB: 2.9 % (ref 0.5–1.5)
POC METHB: 0.7 % (ref 0–1.5)
POC O2HB: 88.8 % (ref 94–100)
POC PERFORMED BY: ABNORMAL
POC SATURATED O2: 90.6 % (ref 90–100)
POC TEMPERATURE: 37 C
POTASSIUM SERPL-SCNC: 4.6 MMOL/L (ref 3.5–5.1)
PROT SERPL-MCNC: 6.9 GM/DL (ref 5.8–7.6)
RBC # BLD AUTO: 7.2 X10(6)/MCL (ref 4.7–6.1)
SARS-COV-2 RNA RESP QL NAA+PROBE: NOT DETECTED
SODIUM SERPL-SCNC: 139 MMOL/L (ref 136–145)
SPECIMEN SOURCE: ABNORMAL
TROPONIN I SERPL-MCNC: 0.02 NG/ML (ref 0–0.04)
WBC # SPEC AUTO: 11.3 X10(3)/MCL (ref 4.5–11.5)

## 2022-10-30 PROCEDURE — 25000003 PHARM REV CODE 250: Performed by: FAMILY MEDICINE

## 2022-10-30 PROCEDURE — 84484 ASSAY OF TROPONIN QUANT: CPT | Performed by: FAMILY MEDICINE

## 2022-10-30 PROCEDURE — 82553 CREATINE MB FRACTION: CPT | Performed by: FAMILY MEDICINE

## 2022-10-30 PROCEDURE — 82550 ASSAY OF CK (CPK): CPT | Performed by: FAMILY MEDICINE

## 2022-10-30 PROCEDURE — 99285 EMERGENCY DEPT VISIT HI MDM: CPT | Mod: 25

## 2022-10-30 PROCEDURE — 36600 WITHDRAWAL OF ARTERIAL BLOOD: CPT

## 2022-10-30 PROCEDURE — 99900035 HC TECH TIME PER 15 MIN (STAT)

## 2022-10-30 PROCEDURE — 96360 HYDRATION IV INFUSION INIT: CPT

## 2022-10-30 PROCEDURE — 82803 BLOOD GASES ANY COMBINATION: CPT

## 2022-10-30 PROCEDURE — 85025 COMPLETE CBC W/AUTO DIFF WBC: CPT | Performed by: FAMILY MEDICINE

## 2022-10-30 PROCEDURE — 93005 ELECTROCARDIOGRAM TRACING: CPT

## 2022-10-30 PROCEDURE — 0241U COVID/FLU A&B PCR: CPT | Performed by: FAMILY MEDICINE

## 2022-10-30 PROCEDURE — 36415 COLL VENOUS BLD VENIPUNCTURE: CPT | Performed by: FAMILY MEDICINE

## 2022-10-30 PROCEDURE — 80053 COMPREHEN METABOLIC PANEL: CPT | Performed by: FAMILY MEDICINE

## 2022-10-30 RX ORDER — AZITHROMYCIN 250 MG/1
250 TABLET, FILM COATED ORAL DAILY
Qty: 6 TABLET | Refills: 0 | Status: SHIPPED | OUTPATIENT
Start: 2022-10-30 | End: 2022-11-23 | Stop reason: ALTCHOICE

## 2022-10-30 RX ADMIN — SODIUM CHLORIDE 500 ML: 9 INJECTION, SOLUTION INTRAVENOUS at 08:10

## 2022-10-30 NOTE — ED PROVIDER NOTES
"Encounter Date: 10/30/2022       History     Chief Complaint   Patient presents with    Shortness of Breath     SOB since Friday. Pale nail beds. Denies cough. Subjective fever. Denies CP. Poor historian.     66-year-old male with history of chronic chronic interstitial lung disease presents to the ED with complaint of "my oxygen ran out and I feel weak ".  Patient reports at baseline he is on home oxygen on 2-3 L. patient denies chest pain, headache, fever, chills,  dizziness, abdominal pain, N/V/D.     The history is provided by the patient. No  was used.   Shortness of Breath  This is a recurrent problem. The problem occurs intermittently.Episode onset: this morning. The problem has not changed since onset.Pertinent negatives include no headaches, no rhinorrhea, no sore throat, no cough, no sputum production, no hemoptysis, no wheezing, no chest pain, no syncope, no vomiting, no abdominal pain, no rash, no leg pain and no leg swelling. He has had Prior hospitalizations. He has had Prior ED visits. Associated medical issues include chronic lung disease.   Review of patient's allergies indicates:  No Known Allergies  Past Medical History:   Diagnosis Date    SAM positive     Antisynthetase syndrome     ILD (interstitial lung disease)     Seronegative rheumatoid arthritis     TB lung, latent     Tobacco use      No past surgical history on file.  Family History   Problem Relation Age of Onset    COPD Mother     COPD Father      Social History     Tobacco Use    Smoking status: Former    Smokeless tobacco: Never   Substance Use Topics    Alcohol use: Not Currently    Drug use: Never     Review of Systems   HENT:  Negative for congestion, rhinorrhea and sore throat.    Respiratory:  Positive for shortness of breath. Negative for cough, hemoptysis, sputum production and wheezing.    Cardiovascular:  Negative for chest pain, palpitations, leg swelling and syncope.   Gastrointestinal:  Negative for " abdominal pain, diarrhea, nausea and vomiting.   Genitourinary:  Negative for dysuria.   Musculoskeletal:  Negative for gait problem.   Skin:  Negative for rash.   Neurological:  Positive for weakness. Negative for dizziness, tremors, syncope and headaches.   All other systems reviewed and are negative.    Physical Exam     Initial Vitals [10/30/22 0733]   BP Pulse Resp Temp SpO2   126/74 104 (!) 26 97.9 °F (36.6 °C) (!) 84 %      MAP       --         Physical Exam    Nursing note and vitals reviewed.  Constitutional: He appears well-developed and well-nourished. He is not diaphoretic.   HENT:   Head: Normocephalic and atraumatic.   Eyes: Conjunctivae are normal.   Cardiovascular:  Normal heart sounds.           Pulmonary/Chest: Breath sounds normal. No respiratory distress. He has no wheezes. He has no rales.   Abdominal: Abdomen is soft. Bowel sounds are normal. There is no abdominal tenderness. There is no rebound and no guarding.   Musculoskeletal:         General: Normal range of motion.     Neurological: He is alert and oriented to person, place, and time. He has normal strength. No sensory deficit. Gait normal. GCS score is 15. GCS eye subscore is 4. GCS verbal subscore is 5. GCS motor subscore is 6.   Skin: Skin is warm and dry. Capillary refill takes less than 2 seconds.   Psychiatric: He has a normal mood and affect. His behavior is normal. Judgment and thought content normal.       ED Course   Procedures  Labs Reviewed   CBC WITH DIFFERENTIAL - Abnormal; Notable for the following components:       Result Value    RBC 7.20 (*)     Hct 59.3 (*)     MCH 25.0 (*)     MCHC 30.4 (*)     RDW 18.5 (*)     MPV 10.6 (*)     Mono # 1.72 (*)     All other components within normal limits   TROPONIN I - Normal   CK-MB - Normal   CK - Normal   COVID/FLU A&B PCR - Normal    Narrative:     The Xpert Xpress SARS-CoV-2/FLU/RSV plus is a rapid, multiplexed real-time PCR test intended for the simultaneous qualitative  detection and differentiation of SARS-CoV-2, Influenza A, Influenza B, and respiratory syncytial virus (RSV) viral RNA in either nasopharyngeal swab or nasal swab specimens.         CBC W/ AUTO DIFFERENTIAL    Narrative:     The following orders were created for panel order CBC Auto Differential.  Procedure                               Abnormality         Status                     ---------                               -----------         ------                     CBC with Differential[[id number]]        Abnormal            Final result                 Please view results for these tests on the individual orders.   COMPREHENSIVE METABOLIC PANEL   EXTRA TUBES    Narrative:     The following orders were created for panel order EXTRA TUBES.  Procedure                               Abnormality         Status                     ---------                               -----------         ------                     Light Blue Top Hold[[id number]]                              In process                   Please view results for these tests on the individual orders.   LIGHT BLUE TOP HOLD     EKG Readings: (Independently Interpreted)   Initial Reading: No STEMI. Rhythm: Sinus Tachycardia. Heart Rate: 101. ST Segments: Normal ST Segments. T Waves: Normal.   ECG Results              EKG 12-lead (Shortness of Breath) Age > 50 (Final result)  Result time 10/30/22 12:51:21      Final result by Interface, Lab In [facility] (10/30/22 12:51:21)                   Narrative:    Test Reason : R06.02,    Vent. Rate : 101 BPM     Atrial Rate : 101 BPM     P-R Int : 156 ms          QRS Dur : 066 ms      QT Int : 348 ms       P-R-T Axes : 057 015 067 degrees     QTc Int : 451 ms    Sinus tachycardia with Premature atrial complexes  Possible Left atrial enlargement  Borderline Abnormal ECG  When compared with ECG of 13-OCT-2022 08:44,  Premature atrial complexes are now Present  Confirmed by Salvador Joy MD ([id number]) on 10/30/2022 12:51:14  PM    Referred By: UNKNOWN REFERRING           Confirmed By:Salvador Joy MD                                  Imaging Results              X-Ray Chest 1 View (Final result)  Result time 10/30/22 11:40:17      Final result by Neftaly Grimaldo MD (10/30/22 11:40:17)                   Impression:      Chronic lungs interstitial changes, unchanged.      Electronically signed by: Neftaly Grimaldo  Date:    10/30/2022  Time:    11:40               Narrative:    EXAMINATION:  XR CHEST 1 VIEW    CLINICAL HISTORY:  sob;    TECHNIQUE:  Two-view    COMPARISON:  November 20, 2021.    FINDINGS:  Cardiopericardial silhouette is within normal limits.  There are pulmonary emphysematous changes combined with severe fibrosis with overall similar appearance.  Difficult to exclude possibility of mild superimposed congestion.  No focally dense consolidation or fluid accumulation within the pleural spaces.                                       Medications   sodium chloride 0.9% bolus 500 mL (0 mLs Intravenous Stopped 10/30/22 0904)     Medical Decision Making:   ED Management:  Curb 65 score - 1 ( age > 65)  Patient resting comfortably in hospital bed.  Patient reports he is feeling better with treatment in the ED.  Reviewed all labs and imaging is at bedside.  I Did offer the patient CT to rule out PE given his tachycardia upon her arrival to ED however patient declines reports he is ready to go home.  Patient understand the risk for having a blood clot to his lungs.  Patient reports if he feels better he will come back.  Patient is medically coherent competent in his medical decision-making today. I Offered admission today however pt declines today. Pt reports he has oxygen at home. Strict ER precautions given to the patient.  Patient stable for discharge.    At time of discharge- Manual HR- 98,  manual RR- 20                        Clinical Impression:   Final diagnoses:  [R06.02] SOB (shortness of breath)  [J84.9] Chronic interstitial  lung disease (Primary)  [J18.9] Community acquired pneumonia, unspecified laterality  [Z99.81] On home oxygen therapy        ED Disposition Condition    Discharge Stable          ED Prescriptions       Medication Sig Dispense Start Date End Date Auth. Provider    azithromycin (ZITHROMAX Z-MI) 250 MG tablet Take 1 tablet (250 mg total) by mouth once daily. 6 tablet 10/30/2022 -- Brandyn Szymanski MD          Follow-up Information       Follow up With Specialties Details Why Contact Info    Emery Mcarthur MD Internal Medicine In 1 day  [address] [facility] [place] [address]  [phone]      [facility] Emergency Medicine  As needed, If symptoms worsen [address] [facility] [address] [phone]             Brandyn Szymanski MD  10/30/22 1300

## 2022-10-30 NOTE — LETTER
"Clarence Julesrandall Trejo was seen and treated in our emergency department on 10/30/2022.  He may return to work on 11/01/2022.  ?     If you have any questions or concerns, please don't hesitate to call.      Randi Hoover RN    "

## 2022-11-01 ENCOUNTER — OFFICE VISIT (OUTPATIENT)
Dept: PULMONOLOGY | Facility: CLINIC | Age: 67
End: 2022-11-01
Payer: MEDICARE

## 2022-11-01 VITALS
BODY MASS INDEX: 24.64 KG/M2 | OXYGEN SATURATION: 92 % | RESPIRATION RATE: 20 BRPM | TEMPERATURE: 98 F | HEART RATE: 92 BPM | HEIGHT: 67 IN | DIASTOLIC BLOOD PRESSURE: 76 MMHG | SYSTOLIC BLOOD PRESSURE: 112 MMHG | WEIGHT: 157 LBS

## 2022-11-01 DIAGNOSIS — Z22.7 LATENT TUBERCULOSIS: ICD-10-CM

## 2022-11-01 DIAGNOSIS — D89.89 ANTISYNTHETASE SYNDROME: ICD-10-CM

## 2022-11-01 DIAGNOSIS — J84.9 INTERSTITIAL LUNG DISEASE: ICD-10-CM

## 2022-11-01 DIAGNOSIS — R68.3 CLUBBING OF FINGERS: ICD-10-CM

## 2022-11-01 DIAGNOSIS — Z99.81 DEPENDENCE ON CONTINUOUS SUPPLEMENTAL OXYGEN: ICD-10-CM

## 2022-11-01 DIAGNOSIS — R53.83 FATIGUE, UNSPECIFIED TYPE: ICD-10-CM

## 2022-11-01 DIAGNOSIS — Z86.15 HISTORY OF LATENT TUBERCULOSIS: ICD-10-CM

## 2022-11-01 DIAGNOSIS — J84.9 INTERSTITIAL LUNG DISEASE: Primary | ICD-10-CM

## 2022-11-01 DIAGNOSIS — M06.00 SERONEGATIVE RHEUMATOID ARTHRITIS: ICD-10-CM

## 2022-11-01 DIAGNOSIS — Z79.899 HIGH RISK MEDICATION USE: ICD-10-CM

## 2022-11-01 PROCEDURE — 3074F PR MOST RECENT SYSTOLIC BLOOD PRESSURE < 130 MM HG: ICD-10-PCS | Mod: CPTII,,, | Performed by: INTERNAL MEDICINE

## 2022-11-01 PROCEDURE — 1101F PR PT FALLS ASSESS DOC 0-1 FALLS W/OUT INJ PAST YR: ICD-10-PCS | Mod: CPTII,,, | Performed by: INTERNAL MEDICINE

## 2022-11-01 PROCEDURE — 3008F BODY MASS INDEX DOCD: CPT | Mod: CPTII,,, | Performed by: INTERNAL MEDICINE

## 2022-11-01 PROCEDURE — 1159F MED LIST DOCD IN RCRD: CPT | Mod: CPTII,,, | Performed by: INTERNAL MEDICINE

## 2022-11-01 PROCEDURE — 3008F PR BODY MASS INDEX (BMI) DOCUMENTED: ICD-10-PCS | Mod: CPTII,,, | Performed by: INTERNAL MEDICINE

## 2022-11-01 PROCEDURE — 1126F AMNT PAIN NOTED NONE PRSNT: CPT | Mod: CPTII,,, | Performed by: INTERNAL MEDICINE

## 2022-11-01 PROCEDURE — 3078F DIAST BP <80 MM HG: CPT | Mod: CPTII,,, | Performed by: INTERNAL MEDICINE

## 2022-11-01 PROCEDURE — 1160F RVW MEDS BY RX/DR IN RCRD: CPT | Mod: CPTII,,, | Performed by: INTERNAL MEDICINE

## 2022-11-01 PROCEDURE — 99214 OFFICE O/P EST MOD 30 MIN: CPT | Mod: S$PBB,GC,, | Performed by: INTERNAL MEDICINE

## 2022-11-01 PROCEDURE — 1126F PR PAIN SEVERITY QUANTIFIED, NO PAIN PRESENT: ICD-10-PCS | Mod: CPTII,,, | Performed by: INTERNAL MEDICINE

## 2022-11-01 PROCEDURE — 1101F PT FALLS ASSESS-DOCD LE1/YR: CPT | Mod: CPTII,,, | Performed by: INTERNAL MEDICINE

## 2022-11-01 PROCEDURE — 1159F PR MEDICATION LIST DOCUMENTED IN MEDICAL RECORD: ICD-10-PCS | Mod: CPTII,,, | Performed by: INTERNAL MEDICINE

## 2022-11-01 PROCEDURE — 3074F SYST BP LT 130 MM HG: CPT | Mod: CPTII,,, | Performed by: INTERNAL MEDICINE

## 2022-11-01 PROCEDURE — 3078F PR MOST RECENT DIASTOLIC BLOOD PRESSURE < 80 MM HG: ICD-10-PCS | Mod: CPTII,,, | Performed by: INTERNAL MEDICINE

## 2022-11-01 PROCEDURE — 99214 PR OFFICE/OUTPT VISIT, EST, LEVL IV, 30-39 MIN: ICD-10-PCS | Mod: S$PBB,GC,, | Performed by: INTERNAL MEDICINE

## 2022-11-01 PROCEDURE — 99214 OFFICE O/P EST MOD 30 MIN: CPT | Mod: PBBFAC

## 2022-11-01 PROCEDURE — 1160F PR REVIEW ALL MEDS BY PRESCRIBER/CLIN PHARMACIST DOCUMENTED: ICD-10-PCS | Mod: CPTII,,, | Performed by: INTERNAL MEDICINE

## 2022-11-01 PROCEDURE — 3288F FALL RISK ASSESSMENT DOCD: CPT | Mod: CPTII,,, | Performed by: INTERNAL MEDICINE

## 2022-11-01 PROCEDURE — 3288F PR FALLS RISK ASSESSMENT DOCUMENTED: ICD-10-PCS | Mod: CPTII,,, | Performed by: INTERNAL MEDICINE

## 2022-11-01 RX ORDER — HYDROXYCHLOROQUINE SULFATE 200 MG/1
200 TABLET, FILM COATED ORAL 2 TIMES DAILY
Qty: 60 TABLET | Refills: 5 | Status: SHIPPED | OUTPATIENT
Start: 2022-11-01 | End: 2022-11-23

## 2022-11-01 NOTE — PROGRESS NOTES
"[facility] PULMONARY MEDICINE  OUTPATIENT OFFICE VISIT NOTE    SUBJECTIVE:      HPI: Clarence Trejo Sr. is a 66 y.o. 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 He was seen in the ED on 10/30/2022 d/t fever, increased shortness of breath and cough; he was sent home with Z-pack 250mg 1 tab PO daily x 6 days. Today, his SpO2 decreased to 86-87% d/t his home oxygen tank running out; he was placed on 3 L/min via NC with SpO2 93-94%. He states he continues to have productive cough with clear sputum and dyspnea on exertion; he uses albuterol approximately once a week and duo-neb 2-3 times per week.     ROS:  (+) Shortness of breath, cough, decreased endurance  (-) Chest pain, palpitations, SOB, fever, night sweats, chills, diarrhea, constipation.       OBJECTIVE:     Vital signs:   /76 (BP Location: Left arm, Patient Position: Sitting, BP Method: Medium (Automatic))   Pulse 92   Temp 98.3 °F (36.8 °C)   Resp 20   Ht 5' 7" (1.702 m)   Wt 71.2 kg (157 lb)   SpO2 (!) 92% Comment: 4 liter nasal cannula  BMI 24.59 kg/m²      Physical Examination:  General: Thin w/o distress  HEENT: NC/AT; PERRL; nasal and oral mucosa moist and clear  Neck: Full ROM; no lymphadenopathy  Pulm: Fine crackles in lower lobes, rhonchi in upper lobes; normal work of breathing on 3L/min via NC; barrel chest  CV: S1, S2 w/o murmurs or gallops; no edema noted  GI: Soft with normal bowel sounds in all quadrants, no masses on palpation  MSK: Full ROM of all extremities and spine w/o limitation or discomfort  Derm: Significant clubbing in all fingers  Neuro: AAOx4; motor/sensory function intact  Psych: Cooperative; appropriate mood and affect    Significant findings:  4/22/2022 - CT chest high resolution shows advanced emphysema, grossly stable; mildly enlarged mediastinal lymph nodes  8/16/2021 - CT chest high res shows severe emphysema with mild bronchiectasis.   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  -Previous smoker, quit x1 year; 60+ pack-year history     HX of latent TB  -s/p previous 9x month TX    Return to clinic in 4 months. Will consider obtaining Trelegy next F/U visit.     Niko Hsu DO   [facility] Internal Medicine, PGY-1

## 2022-11-18 ENCOUNTER — TELEPHONE (OUTPATIENT)
Dept: INTERNAL MEDICINE | Facility: CLINIC | Age: 67
End: 2022-11-18
Payer: MEDICARE

## 2022-11-18 NOTE — TELEPHONE ENCOUNTER
Korey Taveras,    Please let Mr. Trejo know that our clinic has a strict policy for no recurring opiate orders. Tell him he ahs to unfortunately go to the ED.    Thanks,  Emery

## 2022-11-18 NOTE — TELEPHONE ENCOUNTER
----- Message from Marietta Suarez sent at 11/18/2022  8:36 AM CST -----  Regarding: Mcarthur/medication refill  Hydrocodone 5- Super one

## 2022-11-22 NOTE — TELEPHONE ENCOUNTER
Pt called, name and  verified. Pt informed of refill of [place] was denied per Dr. Mcarthur and pt is to report to the urgent care or ED if pain gets unbearable. Pt verbalized 100% understanding. Call ended.

## 2022-11-23 ENCOUNTER — LAB VISIT (OUTPATIENT)
Dept: LAB | Facility: HOSPITAL | Age: 67
End: 2022-11-23
Attending: INTERNAL MEDICINE
Payer: MEDICARE

## 2022-11-23 ENCOUNTER — OFFICE VISIT (OUTPATIENT)
Dept: RHEUMATOLOGY | Facility: CLINIC | Age: 67
End: 2022-11-23
Payer: MEDICARE

## 2022-11-23 VITALS
DIASTOLIC BLOOD PRESSURE: 89 MMHG | RESPIRATION RATE: 16 BRPM | TEMPERATURE: 98 F | HEIGHT: 66 IN | WEIGHT: 154 LBS | HEART RATE: 97 BPM | BODY MASS INDEX: 24.75 KG/M2 | SYSTOLIC BLOOD PRESSURE: 130 MMHG | OXYGEN SATURATION: 89 %

## 2022-11-23 DIAGNOSIS — R76.8 POSITIVE ANTINUCLEAR ANTIBODY: ICD-10-CM

## 2022-11-23 DIAGNOSIS — D89.89 ANTISYNTHETASE SYNDROME: ICD-10-CM

## 2022-11-23 DIAGNOSIS — J84.9 INTERSTITIAL LUNG DISEASE: ICD-10-CM

## 2022-11-23 DIAGNOSIS — Z87.891 HISTORY OF TOBACCO ABUSE: ICD-10-CM

## 2022-11-23 DIAGNOSIS — Z79.899 HIGH RISK MEDICATION USE: ICD-10-CM

## 2022-11-23 DIAGNOSIS — Z22.7 TB LUNG, LATENT: ICD-10-CM

## 2022-11-23 DIAGNOSIS — M06.00 SERONEGATIVE RHEUMATOID ARTHRITIS: ICD-10-CM

## 2022-11-23 DIAGNOSIS — Z79.899 HIGH RISK MEDICATION USE: Primary | ICD-10-CM

## 2022-11-23 PROBLEM — R06.02 SHORTNESS OF BREATH: Status: ACTIVE | Noted: 2022-11-23

## 2022-11-23 LAB
CK SERPL-CCNC: 71 U/L (ref 30–200)
HBV CORE AB SERPL QL IA: REACTIVE
HBV SURFACE AB SER-ACNC: 0.97 MIU/ML
HBV SURFACE AB SERPL IA-ACNC: NONREACTIVE M[IU]/ML
HBV SURFACE AG SERPL QL IA: NONREACTIVE
HCV AB SERPL QL IA: NONREACTIVE
HIV 1+2 AB+HIV1 P24 AG SERPL QL IA: NONREACTIVE

## 2022-11-23 PROCEDURE — 82550 ASSAY OF CK (CPK): CPT

## 2022-11-23 PROCEDURE — 99215 OFFICE O/P EST HI 40 MIN: CPT | Mod: S$PBB,,, | Performed by: INTERNAL MEDICINE

## 2022-11-23 PROCEDURE — 87389 HIV-1 AG W/HIV-1&-2 AB AG IA: CPT

## 2022-11-23 PROCEDURE — 1159F MED LIST DOCD IN RCRD: CPT | Mod: CPTII,,, | Performed by: INTERNAL MEDICINE

## 2022-11-23 PROCEDURE — 36415 COLL VENOUS BLD VENIPUNCTURE: CPT

## 2022-11-23 PROCEDURE — 3008F BODY MASS INDEX DOCD: CPT | Mod: CPTII,,, | Performed by: INTERNAL MEDICINE

## 2022-11-23 PROCEDURE — 1101F PR PT FALLS ASSESS DOC 0-1 FALLS W/OUT INJ PAST YR: ICD-10-PCS | Mod: CPTII,,, | Performed by: INTERNAL MEDICINE

## 2022-11-23 PROCEDURE — 3008F PR BODY MASS INDEX (BMI) DOCUMENTED: ICD-10-PCS | Mod: CPTII,,, | Performed by: INTERNAL MEDICINE

## 2022-11-23 PROCEDURE — 86803 HEPATITIS C AB TEST: CPT

## 2022-11-23 PROCEDURE — 3079F PR MOST RECENT DIASTOLIC BLOOD PRESSURE 80-89 MM HG: ICD-10-PCS | Mod: CPTII,,, | Performed by: INTERNAL MEDICINE

## 2022-11-23 PROCEDURE — 86704 HEP B CORE ANTIBODY TOTAL: CPT

## 2022-11-23 PROCEDURE — 87340 HEPATITIS B SURFACE AG IA: CPT

## 2022-11-23 PROCEDURE — 1125F AMNT PAIN NOTED PAIN PRSNT: CPT | Mod: CPTII,,, | Performed by: INTERNAL MEDICINE

## 2022-11-23 PROCEDURE — 86480 TB TEST CELL IMMUN MEASURE: CPT

## 2022-11-23 PROCEDURE — 99214 OFFICE O/P EST MOD 30 MIN: CPT | Mod: PBBFAC | Performed by: INTERNAL MEDICINE

## 2022-11-23 PROCEDURE — 99417 PROLNG OP E/M EACH 15 MIN: CPT | Mod: S$PBB,,, | Performed by: INTERNAL MEDICINE

## 2022-11-23 PROCEDURE — 86706 HEP B SURFACE ANTIBODY: CPT

## 2022-11-23 PROCEDURE — 1159F PR MEDICATION LIST DOCUMENTED IN MEDICAL RECORD: ICD-10-PCS | Mod: CPTII,,, | Performed by: INTERNAL MEDICINE

## 2022-11-23 PROCEDURE — 3288F FALL RISK ASSESSMENT DOCD: CPT | Mod: CPTII,,, | Performed by: INTERNAL MEDICINE

## 2022-11-23 PROCEDURE — 1125F PR PAIN SEVERITY QUANTIFIED, PAIN PRESENT: ICD-10-PCS | Mod: CPTII,,, | Performed by: INTERNAL MEDICINE

## 2022-11-23 PROCEDURE — 82085 ASSAY OF ALDOLASE: CPT

## 2022-11-23 PROCEDURE — 3075F SYST BP GE 130 - 139MM HG: CPT | Mod: CPTII,,, | Performed by: INTERNAL MEDICINE

## 2022-11-23 PROCEDURE — 99215 PR OFFICE/OUTPT VISIT, EST, LEVL V, 40-54 MIN: ICD-10-PCS | Mod: S$PBB,,, | Performed by: INTERNAL MEDICINE

## 2022-11-23 PROCEDURE — 3079F DIAST BP 80-89 MM HG: CPT | Mod: CPTII,,, | Performed by: INTERNAL MEDICINE

## 2022-11-23 PROCEDURE — 1101F PT FALLS ASSESS-DOCD LE1/YR: CPT | Mod: CPTII,,, | Performed by: INTERNAL MEDICINE

## 2022-11-23 PROCEDURE — 99417 PR PROLONGED SVC, OUTPT, W/WO DIRECT PT CONTACT,  EA ADDTL 15 MIN: ICD-10-PCS | Mod: S$PBB,,, | Performed by: INTERNAL MEDICINE

## 2022-11-23 PROCEDURE — 3075F PR MOST RECENT SYSTOLIC BLOOD PRESS GE 130-139MM HG: ICD-10-PCS | Mod: CPTII,,, | Performed by: INTERNAL MEDICINE

## 2022-11-23 PROCEDURE — 3288F PR FALLS RISK ASSESSMENT DOCUMENTED: ICD-10-PCS | Mod: CPTII,,, | Performed by: INTERNAL MEDICINE

## 2022-11-23 RX ORDER — TOFACITINIB 11 MG/1
11 TABLET, FILM COATED, EXTENDED RELEASE ORAL DAILY
Qty: 30 TABLET | Refills: 6 | Status: SHIPPED | OUTPATIENT
Start: 2022-11-23 | End: 2022-12-23

## 2022-11-23 RX ORDER — NINTEDANIB 150 MG/1
150 CAPSULE ORAL 2 TIMES DAILY
Qty: 60 EACH | Refills: 0 | Status: SHIPPED | OUTPATIENT
Start: 2022-11-23 | End: 2022-11-23 | Stop reason: SDUPTHER

## 2022-11-23 RX ORDER — NINTEDANIB 150 MG/1
150 CAPSULE ORAL 2 TIMES DAILY
Qty: 60 CAPSULE | Refills: 0 | Status: SHIPPED | OUTPATIENT
Start: 2022-11-23 | End: 2022-12-23

## 2022-11-23 NOTE — TELEPHONE ENCOUNTER
Pt was in clinic today and asked about pain med for back pain  Reviewed chart and noted documentation from PCP Dr. Mcarthur re: not filling ongoing opioids  Message sent to Dr. Mcarhtur to ensure he's aware pt's back pain is not related to Rheumatology dx and we won't be assessing cause of his back pain

## 2022-11-23 NOTE — PROGRESS NOTES
"  Patient ID: [id number]     Chief Complaint: follow up- New to [place] (Back and legs are hurting/Loratab is helping but needing more. /)      HPI:     Clarence Trejo Sr. is a 67 y.o. male here today for a follow up of antisynthetase syndrome.     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 Diagnosed in 2021. He was treated with Actemra and OFEV in the past.  CPK was normal in the past.  He does not like taking injection and stopped taking it. He ran out of OFEV. He was recently started on Plaquenil 200 mg BID, he is taking it.     C/o back pain and asking refills on Lortab and baclofen. Chronic DJD and osteoarthritis, advised to follow up with PCP.  Denies bowel/bladder incontinence or saddle anesthesia.  He stopped taking Actemra"I am done with it and do not want to take it no more".     C/o SOB and cough, oxygen saturation is low on room air, he is supposed to have oxygen continuously.  He is currently only using ulcers and at home, 3 L and he is not using oxygen when he goes to work.  Oxygen saturation was low in clinic, improved with 3 L of oxygen, nasal cannula.  He had a recent ER visit for shortness of breath, he was treated with azithromycin as an outpatient.    Denies history of fevers, rashes, photosensitivity, oral or nasal ulcers, h/o MI, stroke, seizures, h/o PE or DVT, Raynaud's phenomenon, uveitis, malignancies.     Family history of autoimmune disease: none.     Smokin pack years. Quit smoking in 2022.  Social History     Tobacco Use   Smoking Status Former   Smokeless Tobacco Never         ----------------------------  SAM positive  Antisynthetase syndrome  ILD (interstitial lung disease)  Seronegative rheumatoid arthritis  TB lung, latent  Tobacco use     History reviewed. No pertinent surgical history.    Review of patient's allergies indicates:  No Known Allergies    Outpatient " Medications Marked as Taking for the 11/23/22 encounter (Office Visit) with Dixie Caraballo MD   Medication Sig Dispense Refill    albuterol (PROVENTIL/VENTOLIN HFA) 90 mcg/actuation inhaler Inhale 1-2 puffs into the lungs every 6 (six) hours as needed for Wheezing. Rescue 18 g 6    albuterol-ipratropium (DUO-NEB) 2.5 mg-0.5 mg/3 mL nebulizer solution Take 3 mLs by nebulization every 6 (six) hours as needed for Wheezing. Rescue 75 mL 5    budesonide-formoterol 80-4.5 mcg (SYMBICORT) 80-4.5 mcg/actuation HFAA Inhale 2 puffs into the lungs 2 (two) times a day. 3 g 0    HYDROcodone-acetaminophen (NORCO) 5-325 mg per tablet Take 1 tablet by mouth every 6 (six) hours as needed for Pain. 12 tablet 0    [DISCONTINUED] hydrOXYchloroQUINE (PLAQUENIL) 200 mg tablet Take 1 tablet (200 mg total) by mouth 2 (two) times daily. After food 60 tablet 5       Social History     Socioeconomic History    Marital status:    Tobacco Use    Smoking status: Former    Smokeless tobacco: Never   Substance and Sexual Activity    Alcohol use: Not Currently    Drug use: Never    Sexual activity: Not Currently     Partners: Female        Family History   Problem Relation Age of Onset    COPD Mother     COPD Father         Immunization History   Administered Date(s) Administered    COVID-19, MRNA, LN-S, PF (MODERNA FULL 0.5 ML DOSE) 03/07/2021, 04/07/2021    Pneumococcal Polysaccharide - 23 Valent 07/06/2021       Patient Care Team:  Emery Mcarthur MD as PCP - General (Internal Medicine)     Subjective:     ROS    Constitutional:  Denies chills. Denies fever. Denies night sweats. Denies weight loss. Denies sleep disturbance.   Ophthalmology: Denies blurred vision. Denies diminished visual acuity. Denies dry eyes. Denies eye pain. Denies Itching and redness. Denies red eye.   ENT: Denies decreased hearing. Denies oral ulcers. Denies epistaxis. Denies swelling of ears or throat. Denies dry mouth. Denies swollen glands.   Endocrine: Denies  "diabetes. Denies thyroid Problems.   Respiratory: Admits cough. Admits  shortness of breath. Denies shortness of breath with exertion. Denies hemoptysis.   Cardiovascular: Denies chest pain at rest. Denies chest pain with exertion. Denies Irregular heartbeat. Denies palpitations. Denies edema. Denies orthopnea.   Gastrointestinal: Denies abdominal pain. Denies diarrhea. Denies nausea. Denies vomiting. Denies hematemesis or hematochezia. Denies change in appetite. Denies heartburn.  Genitourinary: Denies dysuria. Denies urinary frequency. Denies urinary urgency. Denies blood in urine.  Musculoskeletal: See HPI for details  Integumentary: Denies rash. Denies Itching. Denies dry skin. Denies photosensitivity.   Peripheral Vascular: Denies Ulcers of hands and/or feet. Denies Cold extremities.   Neurologic: Denies dizziness. Denies headache. Denies difficulty speaking. Denies loss of strength. Denies seizures. Denies history of stroke. Denies numbness or tingling.   Psychiatric: Denies depression. Denies anxiety. Denies suicidal/homicidal ideations.      Objective:     /89 (BP Location: Right arm, Patient Position: Sitting)   Pulse 97   Temp 98 °F (36.7 °C) (Oral)   Resp 16   Ht 5' 6" (1.676 m)   Wt 69.9 kg (154 lb)   SpO2 (!) 89%   BMI 24.86 kg/m²     Physical Exam    General Appearance: alert, pleasant, in no acute distress.  Skin: Skin color, texture, turgor normal. No rashes or lesions.  Club in bilateral hands.  Eyes:  extraocular movement intact (EOMI), pupils equal, round, reactive to light and accommodation, conjunctiva clear.  ENT: No oral or nasal ulcers.  Neck:  Neck supple. No adenopathy.   Lungs:  Crackles on exam.  Heart: RRR w/o murmurs.  No JVD or edema.  Abdomen: Soft, non-tender, no masses, rebound or guarding.  Neuro: Alert, oriented, CN II-XII GI, sensory and motor innervation intact.  Musculoskeletal:  Decreased range of motion of bilateral wrists, worse on right.  DJD changes in " bilateral hands.  Crepitus in bilateral knees.  No overt synovitis on exam.  Psych: Alert, oriented, normal eye contact.        Labs:     Lab Results   Component Value Date    WBC 11.3 10/30/2022    HGB 18.0 10/30/2022    HCT 59.3 (H) 10/30/2022     10/30/2022    ALT 16 10/30/2022    AST 16 10/30/2022    BUN 13.9 10/30/2022    CREATININE 1.16 10/30/2022     10/30/2022    K 4.6 10/30/2022    CO2 24 10/30/2022    CRP 1.26 (H) 07/15/2021    SEDRATE 6 07/15/2021    No results found for: NEUTROABS       LAB 6/11/2021: SAM by IFA (RDL) A Positive.  PL 7 antibody weak positive.  Other myositis panel negative.  Negative centromere, PM/Scl, RNA polymerase 3, Scl 70, SRP, SSA, RNP.  SAM 1:320 homogeneous and 1:160 speckle  6/21/21:  C3-C4 okay.  Anticardiolipin negative.  Negative anti TPO and chromatin antibody.    Imaging:   ECHO 11/21:  LV EF 64%. Cavity normal size, no hypertrophy. Normal RV structure and systolic function. No effusion noted PASP unable to estimate.    April 2022:  CT chest showed advanced emphysema, grossly stable.  Slightly enlarged mediastinal lymph nodes unchanged.  Centrilobular emphysema.  08/17/2021:  High-resolution chest CT showed prominent mediastinal and hilar lymph nodes.  Severe emphysema with subpleural scarring.  Diffuse ground-glass opacities or septal thickening.  No honeycombing.  Severe emphysema with mild bronchiectasis.    PFTs August 2021:  FVC 71%, ratio 71%, total lung capacity 82%, DLCO by VA 57%.  10/7/2022 shows severe diffusion defect.  FVC 64%, FEV1 64%, ratio 100%, total lung capacity 62%, DLCO 18%, DLCO by VA 36%.    Assessment:       ICD-10-CM ICD-9-CM   1. High risk medication use  Z79.899 V58.69   2. Antisynthetase syndrome  D89.89 279.49   3. Interstitial lung disease  J84.9 515   4. Seronegative rheumatoid arthritis  M06.00 714.0   5. History of tobacco abuse  Z87.891 V15.82   6. TB lung, latent  Z22.7 795.51   7. Positive antinuclear antibody  R76.8 795.79         Plan:     1. 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 Diagnosed in 4/2021. He was treated with Actemra and OFEV in the past.  CPK was normal in the past.  He does not like taking injection and stopped taking Actemra. He ran out of OFEV. He was recently started on Plaquenil 200 mg BID, he is taking it.   - Will start him on Xeljanz 11 mg daily to help with RA and antisynthetase syndrome.  Discussed the risks and benefits of medication with the patient.  - restart OFEV.   2. Interstitial lung disease:  Fibrotic changes, diagnosed in April 2021.  Using 3 L of oxygen at home.  - severe emphysematous changes and bronchiectasis on CT chest, the secondary to smoking.  - restart OFEV.  Repeat CT chest and PFTs.   3. Seronegative rheumatoid arthritis: Seronegative, erosive RA w/ deformities of wrists.   Patient self discontinued Actemra, starting him on Xeljanz.   6/21/21: RF and anti CCP negative.     4. High risk medication use:  Repeat labs in 4 weeks.Advised to stay up-to-date on age appropriate vaccinations and malignancy screening.     5. History of tobacco abuse:  60 pack year smoking history, encouraged continuing smoking cessation.   6. TB lung, latent:  He was treated for latent TB in the past, years back.  Check QuantiFERON TB today.   7. Persons with rheumatoid arthritis, lupus, psoriatic arthritis and other autoimmune diseases are at increased risk of cardiovascular disease including heart attack and stroke. We recommend that all patients with these conditions have annual health maintenance exams including lipid measurements, blood pressure measurements, and smoking cessation counseling when applicable at their primary care provider's office.     8.   His major complaint is back pain and requesting for narcotics, reviewed x-rays of lumbar spine, x-ray showed  osteoarthritis.  Advised to follow up with his PCP for further management of osteoarthritis.  Recommend doing stretches and exercises as tolerated.  Recommend heat therapy.      Discussed the risks/benefits of taking Janus Kinases pathway inhibitor. Specifically but not limited to: increased risks for infection, reactivation of TB, lab abnormalities: including decreased ANC, anemia, elevated liver enzymes, malignancies, paresthesia, insomnia, respiratory disorders, GI issues: Abdominal pain, vomiting, nausea, diverticulitis, GI perforation, neoplasms benign, malignant and unspecified: Nonmelanoma skin cancers, peripheral edema, fatigue, shingles, & DVT''s and increased overall risk of thrombosis. The patient was encouraged to get Shingrix vaccine before starting this new medication. The patient was also advised of fetal toxicity and need for pregnancy prevention. The patient understood the risks and benefits and has agreed to initiate therapy, and will hold the medication for serious infections, surgery or immunizations. They were encouraged to call with questions or concerns as well as to continue routine lab work as directed to ensure safety.      Follow up in about 7 weeks (around 1/11/2023). In addition to their scheduled follow up, the patient has also been instructed to follow up on as needed basis.        Total time spent with patient and documentation is more than 75 minute. All questions were answered to patient's satisfaction and patient verbalized understanding.

## 2022-11-25 DIAGNOSIS — J84.9 ILD (INTERSTITIAL LUNG DISEASE): Primary | ICD-10-CM

## 2022-11-26 LAB — ALDOLASE SERPL-CCNC: 5.8 U/L

## 2022-11-28 LAB
GAMMA INTERFERON BACKGROUND BLD IA-ACNC: 0.25 IU/ML
M TB IFN-G BLD-IMP: POSITIVE
M TB IFN-G CD4+ BCKGRND COR BLD-ACNC: 9.51 IU/ML
M TB IFN-G CD4+CD8+ BCKGRND COR BLD-ACNC: 9.28 IU/ML
MITOGEN IGNF BCKGRD COR BLD-ACNC: 9.58 IU/ML

## 2022-12-05 ENCOUNTER — TELEPHONE (OUTPATIENT)
Dept: RHEUMATOLOGY | Facility: CLINIC | Age: 67
End: 2022-12-05
Payer: MEDICARE

## 2022-12-09 ENCOUNTER — HOSPITAL ENCOUNTER (OUTPATIENT)
Dept: PULMONOLOGY | Facility: HOSPITAL | Age: 67
Discharge: HOME | End: 2022-12-09
Attending: INTERNAL MEDICINE
Payer: MEDICARE

## 2022-12-09 ENCOUNTER — TELEPHONE (OUTPATIENT)
Dept: INTERNAL MEDICINE | Facility: CLINIC | Age: 67
End: 2022-12-09
Payer: MEDICARE

## 2022-12-09 ENCOUNTER — HOSPITAL ENCOUNTER (OUTPATIENT)
Dept: RADIOLOGY | Facility: HOSPITAL | Age: 67
Discharge: HOME | End: 2022-12-09
Attending: INTERNAL MEDICINE
Payer: MEDICARE

## 2022-12-09 DIAGNOSIS — Z22.7 TB LUNG, LATENT: ICD-10-CM

## 2022-12-09 DIAGNOSIS — D89.89 ANTISYNTHETASE SYNDROME: ICD-10-CM

## 2022-12-09 DIAGNOSIS — J84.9 INTERSTITIAL LUNG DISEASE: ICD-10-CM

## 2022-12-09 DIAGNOSIS — Z87.891 HISTORY OF TOBACCO ABUSE: ICD-10-CM

## 2022-12-09 DIAGNOSIS — J84.9 ILD (INTERSTITIAL LUNG DISEASE): ICD-10-CM

## 2022-12-09 LAB
DLCO SINGLE BREATH LLN: 17.48
DLCO SINGLE BREATH PRE REF: 24.7 %
DLCO SINGLE BREATH REF: 24.4
DLCOC SBVA LLN: 2.56
DLCOC SBVA REF: 3.86
DLCOC SINGLE BREATH LLN: 17.48
DLCOC SINGLE BREATH REF: 24.4
DLCOVA LLN: 2.56
DLCOVA PRE REF: 38.3 %
DLCOVA PRE: 1.48 ML/(MIN*MMHG*L) (ref 2.56–5.17)
DLCOVA REF: 3.86
ERV LLN: -16449
ERV PRE REF: 39.7 %
ERV REF: 1
FEF 25 75 LLN: 0.77
FEF 25 75 PRE REF: 96.5 %
FEF 25 75 REF: 2.05
FEV1 FVC LLN: 65
FEV1 FVC PRE REF: 103.8 %
FEV1 FVC REF: 78
FEV1 LLN: 1.74
FEV1 PRE REF: 75.3 %
FEV1 REF: 2.49
FRCPLETH LLN: 2.45
FRCPLETH PREREF: 66.1 %
FRCPLETH REF: 3.44
FVC LLN: 2.33
FVC PRE REF: 72.5 %
FVC REF: 3.2
IVC PRE: 2.77 L (ref 2.33–4.08)
IVC SINGLE BREATH LLN: 2.33
IVC SINGLE BREATH PRE REF: 86.5 %
IVC SINGLE BREATH REF: 3.2
PEF LLN: 4.81
PEF PRE REF: 95.9 %
PEF REF: 7.17
PRE DLCO: 6.03 ML/(MIN*MMHG) (ref 17.48–31.33)
PRE ERV: 0.4 L (ref -16449–16451)
PRE FEF 25 75: 1.97 L/S (ref 0.77–3.93)
PRE FET 100: 6.56 SEC
PRE FEV1 FVC: 80.7 % (ref 65.08–88.95)
PRE FEV1: 1.87 L (ref 1.74–3.18)
PRE FRC PL: 2.27 L (ref 2.45–4.42)
PRE FVC: 2.32 L (ref 2.33–4.08)
PRE PEF: 6.88 L/S (ref 4.81–9.53)
PRE RV: 1.88 L (ref 1.77–3.11)
PRE TLC: 4.54 L (ref 5.16–7.47)
RAW LLN: 3.06
RAW PRE REF: 62.7 %
RAW PRE: 1.92 CMH2O*S/L (ref 3.06–3.06)
RAW REF: 3.06
RV LLN: 1.77
RV PRE REF: 76.8 %
RV REF: 2.44
RVTLC LLN: 31
RVTLC PRE REF: 102.9 %
RVTLC PRE: 41.27 % (ref 31.11–49.07)
RVTLC REF: 40
TLC LLN: 5.16
TLC PRE REF: 71.9 %
TLC REF: 6.31
VA PRE: 4.08 L (ref 6.16–6.16)
VA SINGLE BREATH LLN: 6.16
VA SINGLE BREATH PRE REF: 66.2 %
VA SINGLE BREATH REF: 6.16
VC LLN: 2.33
VC PRE REF: 83.4 %
VC PRE: 2.67 L (ref 2.33–4.08)
VC REF: 3.2

## 2022-12-09 PROCEDURE — 94726 PLETHYSMOGRAPHY LUNG VOLUMES: CPT

## 2022-12-09 PROCEDURE — 94010 BREATHING CAPACITY TEST: CPT

## 2022-12-09 PROCEDURE — 94729 DIFFUSING CAPACITY: CPT

## 2022-12-09 PROCEDURE — 71250 CT THORAX DX C-: CPT | Mod: TC

## 2022-12-09 NOTE — TELEPHONE ENCOUNTER
----- Message from Marietta Suarez sent at 12/9/2022  8:40 AM CST -----  Regarding: Dr.Chan Godinez 5mg

## 2022-12-12 ENCOUNTER — TELEPHONE (OUTPATIENT)
Dept: INTERNAL MEDICINE | Facility: CLINIC | Age: 67
End: 2022-12-12
Payer: MEDICARE

## 2022-12-12 NOTE — TELEPHONE ENCOUNTER
Pt called, name and  verified. Pt informed of unable to continue Rx pain medication for pt , needs an pain management doctor. Pt reported has an appointment in February with pain management doctor but his back is hurting him now. Pt requesting some medication for his back until he sees the pain management doctor in February. Please review and advise.

## 2023-01-12 ENCOUNTER — OFFICE VISIT (OUTPATIENT)
Dept: RHEUMATOLOGY | Facility: CLINIC | Age: 68
End: 2023-01-12
Payer: MEDICARE

## 2023-01-12 VITALS
BODY MASS INDEX: 25.42 KG/M2 | HEART RATE: 95 BPM | SYSTOLIC BLOOD PRESSURE: 123 MMHG | HEIGHT: 66 IN | DIASTOLIC BLOOD PRESSURE: 80 MMHG | OXYGEN SATURATION: 91 % | WEIGHT: 158.19 LBS | RESPIRATION RATE: 16 BRPM | TEMPERATURE: 98 F

## 2023-01-12 DIAGNOSIS — Z87.891 HISTORY OF TOBACCO ABUSE: ICD-10-CM

## 2023-01-12 DIAGNOSIS — M06.00 SERONEGATIVE RHEUMATOID ARTHRITIS: ICD-10-CM

## 2023-01-12 DIAGNOSIS — D89.89 ANTISYNTHETASE SYNDROME: Primary | ICD-10-CM

## 2023-01-12 DIAGNOSIS — R76.8 POSITIVE ANTINUCLEAR ANTIBODY: ICD-10-CM

## 2023-01-12 DIAGNOSIS — Z79.899 HIGH RISK MEDICATION USE: ICD-10-CM

## 2023-01-12 DIAGNOSIS — J84.9 INTERSTITIAL LUNG DISEASE: ICD-10-CM

## 2023-01-12 DIAGNOSIS — R76.8 HEPATITIS B CORE ANTIBODY POSITIVE: ICD-10-CM

## 2023-01-12 DIAGNOSIS — Z22.7 TB LUNG, LATENT: ICD-10-CM

## 2023-01-12 PROCEDURE — 99215 OFFICE O/P EST HI 40 MIN: CPT | Mod: S$PBB,,, | Performed by: INTERNAL MEDICINE

## 2023-01-12 PROCEDURE — 3008F PR BODY MASS INDEX (BMI) DOCUMENTED: ICD-10-PCS | Mod: CPTII,,, | Performed by: INTERNAL MEDICINE

## 2023-01-12 PROCEDURE — 3079F DIAST BP 80-89 MM HG: CPT | Mod: CPTII,,, | Performed by: INTERNAL MEDICINE

## 2023-01-12 PROCEDURE — 3288F PR FALLS RISK ASSESSMENT DOCUMENTED: ICD-10-PCS | Mod: CPTII,,, | Performed by: INTERNAL MEDICINE

## 2023-01-12 PROCEDURE — 3074F SYST BP LT 130 MM HG: CPT | Mod: CPTII,,, | Performed by: INTERNAL MEDICINE

## 2023-01-12 PROCEDURE — 1159F PR MEDICATION LIST DOCUMENTED IN MEDICAL RECORD: ICD-10-PCS | Mod: CPTII,,, | Performed by: INTERNAL MEDICINE

## 2023-01-12 PROCEDURE — 1125F AMNT PAIN NOTED PAIN PRSNT: CPT | Mod: CPTII,,, | Performed by: INTERNAL MEDICINE

## 2023-01-12 PROCEDURE — 99215 PR OFFICE/OUTPT VISIT, EST, LEVL V, 40-54 MIN: ICD-10-PCS | Mod: S$PBB,,, | Performed by: INTERNAL MEDICINE

## 2023-01-12 PROCEDURE — 3288F FALL RISK ASSESSMENT DOCD: CPT | Mod: CPTII,,, | Performed by: INTERNAL MEDICINE

## 2023-01-12 PROCEDURE — 99214 OFFICE O/P EST MOD 30 MIN: CPT | Mod: PBBFAC | Performed by: INTERNAL MEDICINE

## 2023-01-12 PROCEDURE — 3074F PR MOST RECENT SYSTOLIC BLOOD PRESSURE < 130 MM HG: ICD-10-PCS | Mod: CPTII,,, | Performed by: INTERNAL MEDICINE

## 2023-01-12 PROCEDURE — 1125F PR PAIN SEVERITY QUANTIFIED, PAIN PRESENT: ICD-10-PCS | Mod: CPTII,,, | Performed by: INTERNAL MEDICINE

## 2023-01-12 PROCEDURE — 1101F PT FALLS ASSESS-DOCD LE1/YR: CPT | Mod: CPTII,,, | Performed by: INTERNAL MEDICINE

## 2023-01-12 PROCEDURE — 1159F MED LIST DOCD IN RCRD: CPT | Mod: CPTII,,, | Performed by: INTERNAL MEDICINE

## 2023-01-12 PROCEDURE — 1101F PR PT FALLS ASSESS DOC 0-1 FALLS W/OUT INJ PAST YR: ICD-10-PCS | Mod: CPTII,,, | Performed by: INTERNAL MEDICINE

## 2023-01-12 PROCEDURE — 3079F PR MOST RECENT DIASTOLIC BLOOD PRESSURE 80-89 MM HG: ICD-10-PCS | Mod: CPTII,,, | Performed by: INTERNAL MEDICINE

## 2023-01-12 PROCEDURE — 3008F BODY MASS INDEX DOCD: CPT | Mod: CPTII,,, | Performed by: INTERNAL MEDICINE

## 2023-01-12 RX ORDER — NINTEDANIB 100 MG/1
CAPSULE ORAL
COMMUNITY
End: 2023-01-12 | Stop reason: SDUPTHER

## 2023-01-12 RX ORDER — NINTEDANIB 150 MG/1
CAPSULE ORAL
COMMUNITY
End: 2023-01-12 | Stop reason: SDUPTHER

## 2023-01-12 RX ORDER — NINTEDANIB 150 MG/1
150 CAPSULE ORAL 2 TIMES DAILY
Qty: 60 CAPSULE | Refills: 6 | Status: SHIPPED | OUTPATIENT
Start: 2023-01-12 | End: 2023-01-12

## 2023-01-12 NOTE — PROGRESS NOTES
"  Patient ID: [id number]     Chief Complaint: Follow-up (Back giving issues, hurts him a lot. More when he takes a deep breath/States when he takes his "lung pill" twice a day, he has trouble with his breathing. Half hour after taking it, it feels like he is breathing harder. /)      HPI:     Clarence Trejo Sr. is a 67 y.o. male here today for a follow up of antisynthetase syndrome.     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 Diagnosed in 2021. He was treated with Actemra and OFEV in the past.  CPK was normal in the past.  He does not like taking injection and stopped taking it.     He started taking OFEV 150 mg BID. Stopped taking Plaquenil, reason not clear, poor historian. He has not started taking Xeljanz.   C/o back pain and asking refills on Lortab and baclofen. Chronic DJD and osteoarthritis, advised to follow up with PCP.  Denies bowel/bladder incontinence or saddle anesthesia. Scheduled to see pain management in 2023.  He stopped taking Actemra"I am done with it and do not want to take it no more".     C/o SOB and cough, oxygen saturation is low on room air, he is supposed to have oxygen continuously.  He is currently only using at home, 3 L and he is not using oxygen when he goes to work.  Oxygen saturation was low in clinic, improved with 3 L of oxygen, nasal cannula. Scheduled to see pulm in 3/2023.    Denies history of fevers, rashes, photosensitivity, oral or nasal ulcers, h/o MI, stroke, seizures, h/o PE or DVT, Raynaud's phenomenon, uveitis, malignancies.   Family history of autoimmune disease: none.   Smokin pack years. Quit smoking in 2022.    Social History     Tobacco Use   Smoking Status Former   Smokeless Tobacco Never         ----------------------------  SAM positive  Antisynthetase syndrome  ILD (interstitial lung disease)  Seronegative rheumatoid arthritis  TB lung, " latent  Tobacco use     History reviewed. No pertinent surgical history.    Review of patient's allergies indicates:  No Known Allergies    Outpatient Medications Marked as Taking for the 1/12/23 encounter (Office Visit) with Dixie Caraballo MD   Medication Sig Dispense Refill    albuterol (PROVENTIL/VENTOLIN HFA) 90 mcg/actuation inhaler Inhale 1-2 puffs into the lungs every 6 (six) hours as needed for Wheezing. Rescue 18 g 6    albuterol-ipratropium (DUO-NEB) 2.5 mg-0.5 mg/3 mL nebulizer solution Take 3 mLs by nebulization every 6 (six) hours as needed for Wheezing. Rescue 75 mL 5    budesonide-formoterol 80-4.5 mcg (SYMBICORT) 80-4.5 mcg/actuation HFAA Inhale 2 puffs into the lungs 2 (two) times a day. 3 g 0    [DISCONTINUED] nintedanib (OFEV) 150 mg Cap Take by mouth.         Social History     Socioeconomic History    Marital status:    Tobacco Use    Smoking status: Former    Smokeless tobacco: Never   Substance and Sexual Activity    Alcohol use: Not Currently    Drug use: Never    Sexual activity: Not Currently     Partners: Female        Family History   Problem Relation Age of Onset    COPD Mother     COPD Father         Immunization History   Administered Date(s) Administered    COVID-19, MRNA, LN-S, PF (MODERNA FULL 0.5 ML DOSE) 03/07/2021, 04/07/2021    Pneumococcal Polysaccharide - 23 Valent 07/06/2021       Patient Care Team:  Emery Mcarthur MD as PCP - General (Internal Medicine)     Subjective:     ROS    Constitutional:  Denies chills. Denies fever. Denies night sweats. Denies weight loss. Denies sleep disturbance.   Ophthalmology: Denies blurred vision. Denies diminished visual acuity. Denies dry eyes. Denies eye pain. Denies Itching and redness. Denies red eye.   ENT: Denies decreased hearing. Denies oral ulcers. Denies epistaxis. Denies swelling of ears or throat. Denies dry mouth. Denies swollen glands.   Endocrine: Denies diabetes. Denies thyroid Problems.   Respiratory: Admits cough.  "Admits shortness of breath. Denies shortness of breath with exertion. Denies hemoptysis.   Cardiovascular: Denies chest pain at rest. Denies chest pain with exertion. Denies Irregular heartbeat. Denies palpitations. Denies edema. Denies orthopnea.   Gastrointestinal: Denies abdominal pain. Denies diarrhea. Denies nausea. Denies vomiting. Denies hematemesis or hematochezia. Denies change in appetite. Denies heartburn.  Genitourinary: Denies dysuria. Denies urinary frequency. Denies urinary urgency. Denies blood in urine.  Musculoskeletal: See HPI for details  Integumentary: Denies rash. Denies Itching. Denies dry skin. Denies photosensitivity.   Peripheral Vascular: Denies Ulcers of hands and/or feet. Denies Cold extremities.   Neurologic: Denies dizziness. Denies headache. Denies difficulty speaking. Denies loss of strength. Denies seizures. Denies history of stroke. Denies numbness or tingling.   Psychiatric: Denies depression. Denies anxiety. Denies suicidal/homicidal ideations.      Objective:     /80 (BP Location: Right arm, Patient Position: Sitting)   Pulse 95   Temp 97.7 °F (36.5 °C) (Oral)   Resp 16   Ht 5' 6" (1.676 m)   Wt 71.8 kg (158 lb 3.2 oz)   SpO2 (!) 91%   BMI 25.53 kg/m²     Physical Exam    General Appearance: alert, pleasant, in no acute distress.  Skin: Skin color, texture, turgor normal. No rashes or lesions.  Club in bilateral hands.  Eyes:  extraocular movement intact (EOMI), pupils equal, round, reactive to light and accommodation, conjunctiva clear.  ENT: No oral or nasal ulcers.  Neck:  Neck supple. No adenopathy.   Lungs:  Crackles on exam.  Heart: RRR w/o murmurs.  No JVD or edema.  Abdomen: Soft, non-tender, no masses, rebound or guarding.  Neuro: Alert, oriented, CN II-XII GI, sensory and motor innervation intact.  Musculoskeletal:  Decreased range of motion of bilateral wrists, worse on right.  DJD changes in bilateral hands.  Crepitus in bilateral knees.  No overt " synovitis on exam.  Psych: Alert, oriented, normal eye contact.        Labs:     Lab Results   Component Value Date    WBC 8.8 01/12/2023    HGB 18.4 (H) 01/12/2023    HCT 56.9 (H) 01/12/2023     01/12/2023    ALT 8 01/12/2023    AST 15 01/12/2023    BUN 11.6 01/12/2023    CREATININE 0.86 01/12/2023     01/12/2023    K 4.2 01/12/2023    CO2 22 (L) 01/12/2023    CRP 1.26 (H) 07/15/2021    SEDRATE 6 07/15/2021    No results found for: NEUTROABS     11/22/22: quanteiferon tb positive, he was treated for it in the past. Aldolase and CPK normal. Hep B core positive, surface ag and ab negative. Hep C, HIV negative.    LAB 6/11/2021: SAM by IFA (RDL) A Positive.  PL 7 antibody weak positive.  Other myositis panel negative.  Negative centromere, PM/Scl, RNA polymerase 3, Scl 70, SRP, SSA, RNP.  SAM 1:320 homogeneous and 1:160 speckle  6/21/21:  C3-C4 okay.  Anticardiolipin negative.  Negative anti TPO and chromatin antibody.    Imaging:   ECHO 11/21:  LV EF 64%. Cavity normal size, no hypertrophy. Normal RV structure and systolic function. No effusion noted PASP unable to estimate.    12/9/2022: CT Chest HR: FINDINGS: Advanced emphysema with mild upper lung predominance.  Overall similar appearance since April.  No significant bronchiectasis or air trapping.   Normal heart size.  Mild coronary artery and aortic calcification.  No significant pleural or pericardial fluid.  Mildly enlarged mediastinal lymph nodes stable since prior exam.  No acute or aggressive osseous findings.  Impression: Emphysema and mediastinal lymphadenopathy show no significant change since April.  April 2022:  CT chest showed advanced emphysema, grossly stable.  Slightly enlarged mediastinal lymph nodes unchanged.  Centrilobular emphysema.  08/17/2021:  High-resolution chest CT showed prominent mediastinal and hilar lymph nodes.  Severe emphysema with subpleural scarring.  No diffuse groundglass or septal thickening. No convincing  Honeycombing. Severe emphysema with mild bronchiectasis.     PFTs August 2021:  FVC 71%, ratio 71%, total lung capacity 82%, DLCO by VA 57%.  10/7/2022 shows severe diffusion defect.  FVC 64%, FEV1 64%, ratio 100%, total lung capacity 62%, DLCO 18%, DLCO by VA 36%.  PFTs:  12/09/2022:  FVC 72%, FEV1 75%, ratio 78%, total lung capacity 71%, DLCO 24%.  DLCO by VA 38%    Assessment:       ICD-10-CM ICD-9-CM   1. Antisynthetase syndrome  D89.89 279.49   2. Positive antinuclear antibody  R76.8 795.79   3. Seronegative rheumatoid arthritis  M06.00 714.0   4. Interstitial lung disease  J84.9 515   5. High risk medication use  Z79.899 V58.69   6. TB lung, latent  Z22.7 795.51   7. History of tobacco abuse  Z87.891 V15.82   8. Hepatitis B core antibody positive  R76.8 795.79        Plan:     1. 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 Diagnosed in 4/2021. He was treated with Actemra and OFEV in the past.  CPK was normal in the past.  He does not like taking injection and stopped taking Actemra.   - Restarted OFEV after last visit. Taking 150 mg BID. C/o SOB mild 30 min after taking OFEV, is not side effect of OFEV, he can continue to take it.  - He stopped taking Plaquenil, reason not known.  - He has not started taking Xeljanz, will not start it now, will watch him off of immunosuppressive medications and follow up closely.  - labs today.     2. Interstitial lung disease:  Fibrotic changes, diagnosed in April 2021.  Using 3 L of oxygen at home. PFTs:  12/09/2022:  FVC 72%, FEV1 75%, ratio 78%, total lung capacity 71%, DLCO 24%.  DLCO by VA 38%. 12/2022 HR Chest CT: Emphysema and mediastinal lymphadenopathy show no significant change since April.  - I reviewed films with radiologist, he has some ground glass opacities in 8/2021 CT and it resolved after that, which were not seen on CT done  in 4/2022 and in 12/2022. CT chest on 12/2022 showed more emphysema and UIP pattern. No progression since 4/2022. Will not start him on any immunosuppressively medication now as ILD is not progressing and also because there are no inflammatory signs of ILD on CT.  - Will monitor him close will do CT chest in 3-4 months and will reevaluate.   - Severe emphysematous changes and bronchiectasis on CT chest, the secondary to smoking.  - Continue OFEV.       3. Previous history of seroneg RA: Patient self discontinued Actemra.   6/21/21: RF and anti CCP negative.  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 MCP's  normal, no erosions, I doubt the diagnosis of RA.      4. High risk medication use:  Repeat labs in 4 weeks.Advised to stay up-to-date on age appropriate vaccinations and malignancy screening.       5. History of tobacco abuse:  60 pack year smoking history, encouraged continuing smoking cessation.     6. TB lung, latent:  He was treated for latent TB in the past, years back.  QuantiFERON TB today, 11/23/2022.     7. Persons with rheumatoid arthritis, lupus, psoriatic arthritis and other autoimmune diseases are at increased risk of cardiovascular disease including heart attack and stroke. We recommend that all patients with these conditions have annual health maintenance exams including lipid measurements, blood pressure measurements, and smoking cessation counseling when applicable at their primary care provider's office.   - Advised to stay up-to-date on age appropriate vaccinations and malignancy screening, including yearly skin exams.       8. His major complaint is back pain and requesting for narcotics, reviewed x-rays of lumbar spine, x-ray showed osteoarthritis.  Advised to follow up with his PCP for further management of osteoarthritis.  Recommend doing stretches and exercises as tolerated.  Recommend heat therapy.  - Scheduled to see pain management in 2/2023.     9. Hep  B core positive: will check hep B RNA in the future. Surface ag negative. Likely old infection, will check labs periodically.           Follow up in about 3 months (around 4/12/2023). In addition to their scheduled follow up, the patient has also been instructed to follow up on as needed basis.      Total time spent with patient and documentation is more than 45 minute. All questions were answered to patient's satisfaction and patient verbalized understanding.

## 2023-01-12 NOTE — LETTER
January 12, 2023      [facility]  [address] [facility] [address] [address]  Phone: [phone]       Patient: Clarence Trejo   YOB: 1955  Date of Visit: 01/12/2023    To Whom It May Concern:    Debi Trejo  was at [facility] on 01/12/2023. The patient may return to work/school on 1/12/2023 with no restrictions. If you have any questions or concerns, or if I can be of further assistance, please do not hesitate to contact me.    Sincerely,    Marce Stephens MT

## 2023-01-18 ENCOUNTER — HOSPITAL ENCOUNTER (EMERGENCY)
Facility: HOSPITAL | Age: 68
Discharge: HOME | End: 2023-01-18
Attending: FAMILY MEDICINE
Payer: MEDICARE

## 2023-01-18 VITALS
OXYGEN SATURATION: 96 % | HEART RATE: 90 BPM | SYSTOLIC BLOOD PRESSURE: 133 MMHG | HEIGHT: 66 IN | DIASTOLIC BLOOD PRESSURE: 94 MMHG | TEMPERATURE: 99 F | RESPIRATION RATE: 8 BRPM | BODY MASS INDEX: 24.8 KG/M2 | WEIGHT: 154.31 LBS

## 2023-01-18 DIAGNOSIS — R07.9 CHEST PAIN: ICD-10-CM

## 2023-01-18 DIAGNOSIS — R53.1 WEAKNESS: Primary | ICD-10-CM

## 2023-01-18 LAB
ALBUMIN SERPL-MCNC: 3.4 G/DL (ref 3.4–4.8)
ALBUMIN/GLOB SERPL: 0.8 RATIO (ref 1.1–2)
ALP SERPL-CCNC: 118 UNIT/L (ref 40–150)
ALT SERPL-CCNC: 7 UNIT/L (ref 0–55)
AST SERPL-CCNC: 13 UNIT/L (ref 5–34)
BASOPHILS # BLD AUTO: 0.07 X10(3)/MCL (ref 0–0.2)
BASOPHILS NFR BLD AUTO: 0.7 %
BILIRUBIN DIRECT+TOT PNL SERPL-MCNC: 1.1 MG/DL
BUN SERPL-MCNC: 6.9 MG/DL (ref 8.4–25.7)
CALCIUM SERPL-MCNC: 9.2 MG/DL (ref 8.8–10)
CHLORIDE SERPL-SCNC: 107 MMOL/L (ref 98–107)
CO2 SERPL-SCNC: 22 MMOL/L (ref 23–31)
CREAT SERPL-MCNC: 0.82 MG/DL (ref 0.73–1.18)
D DIMER PPP IA.FEU-MCNC: 0.38 UG/ML FEU (ref 0–0.5)
EOSINOPHIL # BLD AUTO: 0.16 X10(3)/MCL (ref 0–0.9)
EOSINOPHIL NFR BLD AUTO: 1.5 %
ERYTHROCYTE [DISTWIDTH] IN BLOOD BY AUTOMATED COUNT: 18.5 % (ref 11.5–17)
FLUAV AG UPPER RESP QL IA.RAPID: NOT DETECTED
FLUBV AG UPPER RESP QL IA.RAPID: NOT DETECTED
GFR SERPLBLD CREATININE-BSD FMLA CKD-EPI: >60 MLS/MIN/1.73/M2
GLOBULIN SER-MCNC: 4.2 GM/DL (ref 2.4–3.5)
GLUCOSE SERPL-MCNC: 96 MG/DL (ref 82–115)
HCT VFR BLD AUTO: 53.2 % (ref 42–52)
HGB BLD-MCNC: 16.8 GM/DL (ref 14–18)
IMM GRANULOCYTES # BLD AUTO: 0.03 X10(3)/MCL (ref 0–0.04)
IMM GRANULOCYTES NFR BLD AUTO: 0.3 %
LYMPHOCYTES # BLD AUTO: 1.32 X10(3)/MCL (ref 0.6–4.6)
LYMPHOCYTES NFR BLD AUTO: 12.6 %
MCH RBC QN AUTO: 26.4 PG
MCHC RBC AUTO-ENTMCNC: 31.6 MG/DL (ref 33–36)
MCV RBC AUTO: 83.5 FL (ref 80–94)
MONOCYTES # BLD AUTO: 1.03 X10(3)/MCL (ref 0.1–1.3)
MONOCYTES NFR BLD AUTO: 9.8 %
NEUTROPHILS # BLD AUTO: 7.89 X10(3)/MCL (ref 2.1–9.2)
NEUTROPHILS NFR BLD AUTO: 75.1 %
NRBC BLD AUTO-RTO: 0 %
PLATELET # BLD AUTO: 212 X10(3)/MCL (ref 130–400)
PMV BLD AUTO: 10 FL (ref 7.4–10.4)
POTASSIUM SERPL-SCNC: 4.1 MMOL/L (ref 3.5–5.1)
PROT SERPL-MCNC: 7.6 GM/DL (ref 5.8–7.6)
RBC # BLD AUTO: 6.37 X10(6)/MCL (ref 4.7–6.1)
RSV A 5' UTR RNA NPH QL NAA+PROBE: NOT DETECTED
SARS-COV-2 RNA RESP QL NAA+PROBE: NOT DETECTED
SODIUM SERPL-SCNC: 138 MMOL/L (ref 136–145)
TROPONIN I SERPL-MCNC: <0.01 NG/ML (ref 0–0.04)
WBC # SPEC AUTO: 10.5 X10(3)/MCL (ref 4.5–11.5)

## 2023-01-18 PROCEDURE — 80053 COMPREHEN METABOLIC PANEL: CPT | Performed by: STUDENT IN AN ORGANIZED HEALTH CARE EDUCATION/TRAINING PROGRAM

## 2023-01-18 PROCEDURE — 84484 ASSAY OF TROPONIN QUANT: CPT | Performed by: FAMILY MEDICINE

## 2023-01-18 PROCEDURE — 85379 FIBRIN DEGRADATION QUANT: CPT | Performed by: FAMILY MEDICINE

## 2023-01-18 PROCEDURE — 85025 COMPLETE CBC W/AUTO DIFF WBC: CPT | Performed by: STUDENT IN AN ORGANIZED HEALTH CARE EDUCATION/TRAINING PROGRAM

## 2023-01-18 PROCEDURE — 99285 EMERGENCY DEPT VISIT HI MDM: CPT | Mod: 25

## 2023-01-18 PROCEDURE — 0241U COVID/RSV/FLU A&B PCR: CPT | Performed by: STUDENT IN AN ORGANIZED HEALTH CARE EDUCATION/TRAINING PROGRAM

## 2023-01-18 NOTE — Clinical Note
"Clarence"Alyson Trejo was seen and treated in our emergency department on 1/18/2023.  He may return to work on 01/19/2023.       If you have any questions or concerns, please don't hesitate to call.      Cristobal French MD"

## 2023-01-18 NOTE — DISCHARGE INSTRUCTIONS
You came into the emergency department with weakness.    Your labs, EKGs, chest x-ray were all normal. You tested negative for COVID, flu, RSV.    You should follow-up with a primary care doctor for reevaluation.      Return to the emergency department if you have worsening weakness, chest pain, shortness of breath, feel lightheaded or dizzy to the point where you may pass out.

## 2023-01-18 NOTE — Clinical Note
"Clarence Julesrandall Trejo was seen and treated in our emergency department on 1/18/2023.  He may return to work on 01/19/2023.       If you have any questions or concerns, please don't hesitate to call.      ray canales RN    "

## 2023-01-18 NOTE — ED PROVIDER NOTES
Name: Clarence Trejo Sr.   Age: 67 y.o.  Sex: male    Chief complaint:   Chief Complaint   Patient presents with    Weakness      Patient arrived with: Private  History obtained from: Patient    Subjective:   67-year-old male with a history of rheumatoid arthritis, interstitial lung disease (denies history of lung transplant) that presents to emergency department for generalized weakness.  Patient said he is on continuous 3 L of oxygen at home.  Started to feel weak last night.  Denies chest pain or shortness of breath.  Denies fever, chills, sweats.  Complains of a mild cough, denies any mucus production.  Denies sore throat or runny nose.  Denies abdominal pain, nausea, vomiting, diarrhea, dysuria, hematuria.    Past Medical History:   Diagnosis Date    SAM positive     Antisynthetase syndrome     ILD (interstitial lung disease)     Seronegative rheumatoid arthritis     TB lung, latent     Tobacco use      No past surgical history on file.  Social History     Socioeconomic History    Marital status:    Tobacco Use    Smoking status: Former    Smokeless tobacco: Never   Substance and Sexual Activity    Alcohol use: Not Currently    Drug use: Never    Sexual activity: Not Currently     Partners: Female     Review of patient's allergies indicates:  No Known Allergies     Review of Systems   Constitutional:  Negative for diaphoresis and fever.   HENT:  Negative for congestion and sore throat.    Eyes:  Negative for pain and discharge.   Respiratory:  Negative for cough and shortness of breath.    Cardiovascular:  Negative for chest pain and palpitations.   Gastrointestinal:  Negative for diarrhea and vomiting.   Genitourinary:  Negative for dysuria and hematuria.   Musculoskeletal:  Negative for back pain and myalgias.   Skin:  Negative for itching and rash.   Neurological:  Positive for weakness. Negative for headaches.        Objective:     Vitals:    01/18/23 0905   BP: (!) 128/107   Pulse: 97   Resp:     Temp:         Physical Exam  Constitutional:       Appearance: He is not toxic-appearing.   HENT:      Head: Normocephalic and atraumatic.   Cardiovascular:      Rate and Rhythm: Regular rhythm.      Pulses: Normal pulses.   Pulmonary:      Effort: Pulmonary effort is normal.      Breath sounds: Normal breath sounds.   Abdominal:      General: Abdomen is flat. Bowel sounds are normal.      Palpations: Abdomen is soft.      Tenderness: There is no right CVA tenderness or left CVA tenderness.   Musculoskeletal:         General: No deformity. Normal range of motion.      Cervical back: Normal range of motion and neck supple.      Right lower leg: No edema.      Left lower leg: No edema.   Skin:     General: Skin is warm and dry.   Neurological:      General: No focal deficit present.      Mental Status: He is alert and oriented to person, place, and time.   Psychiatric:         Mood and Affect: Mood normal.         Behavior: Behavior normal.        Records:  Nursing records and triage records reviewed  Prior records reviewed    Medical decision making:   Presents to the emergency department for generalized weakness.  Patient is afebrile, tachycardic to 102, tachypnea 32, normotensive, saturating well on room air.  Lungs are clear to auscultation bilaterally with no signs of lower extremity edema.  Will get cardiac workup for further evaluation.  Will add on D-dimer because of sinus tachycardia and a new right axis deviation on EKG.  Patient says right now he is feeling much better compared to when he initially came into emergency department.  He is concerned that there may been a hole in his oxygen tubing, when he was put on oxygen here all his symptoms improved.    ED Course as of 01/18/23 0933   Wed Jan 18, 2023   0735 EKG is nonischemic, shows a new right axis deviation.    My interpretation of chest x-ray is within normal limits.    Troponin negative.    No leukocytosis, anemia, thrombocytopenia.  No severe  electrolyte abnormality.  No ELIA hyperglycemia.  Liver enzymes are within normal limits. [RK]   0752 Dimer negative   [RK]   0928 COVID, flu, RSV negative    On re-evaluation patient is nontoxic appearing.  Continues to be comfortable.  Will be discharged with a nose are nasal cannula tubing.  We discussed return precautions and I listed them in the discharge instructions.  Patient understands the plan, had no further questions, felt safe for discharge. [RK]   0932 Has appointment with the primary care doctor tomorrow. [RK]      ED Course User Index  [RK] Cristobal French MD          EKG:  ECG Results              EKG 12-lead (Preliminary result)  Result time 01/18/23 07:35:46      ED Interpretation by Cristobal French MD (01/18/23 07:35:46, [facility] Medicine)    Normal sinus rhythm at a rate of 97, no signs of ST elevation or depression, right axis deviation, normal intervals with a QTC of 447.  Compared to previous EKG on 10/30/2022 there is a new right axis deviation.                                       Procedures       Diagnosis:  Final diagnoses:  [R07.9] Chest pain  [R53.1] Weakness (Primary)     ED Prescriptions    None       Follow-up Information       Follow up With Specialties Details Why Contact Info    PCP  Schedule an appointment as soon as possible for a visit in 1 week Follow up with your primary care doctor for re-evaluation Follow up with your primary care doctor for re-evaluation            Cristobal French M.D.  Emergency Medicine Physician     (Please note that this chart was completed via voice to text dictation. There may be typographical errors or substitutions that are unintentional, or uncorrected. Every attempt was made to proofread the chart prior to completion. If there are any questions, please contact the physician for final clarification).         Cristobal French MD  01/18/23 9295

## 2023-01-19 ENCOUNTER — OFFICE VISIT (OUTPATIENT)
Dept: INTERNAL MEDICINE | Facility: CLINIC | Age: 68
End: 2023-01-19
Payer: MEDICARE

## 2023-01-19 VITALS
DIASTOLIC BLOOD PRESSURE: 84 MMHG | WEIGHT: 154.56 LBS | HEIGHT: 67 IN | SYSTOLIC BLOOD PRESSURE: 128 MMHG | TEMPERATURE: 98 F | OXYGEN SATURATION: 98 % | BODY MASS INDEX: 24.26 KG/M2 | HEART RATE: 93 BPM | RESPIRATION RATE: 20 BRPM

## 2023-01-19 DIAGNOSIS — J84.9 INTERSTITIAL LUNG DISEASE: Primary | ICD-10-CM

## 2023-01-19 DIAGNOSIS — M54.9 BACK PAIN, UNSPECIFIED BACK LOCATION, UNSPECIFIED BACK PAIN LATERALITY, UNSPECIFIED CHRONICITY: ICD-10-CM

## 2023-01-19 PROCEDURE — 99214 OFFICE O/P EST MOD 30 MIN: CPT | Mod: PBBFAC

## 2023-01-19 RX ORDER — HYDROCODONE BITARTRATE AND ACETAMINOPHEN 5; 325 MG/1; MG/1
1 TABLET ORAL EVERY 6 HOURS PRN
Qty: 12 TABLET | Refills: 0 | Status: SHIPPED | OUTPATIENT
Start: 2023-01-19 | End: 2023-08-28 | Stop reason: ALTCHOICE

## 2023-01-19 NOTE — LETTER
Pt Name: Clarence Trejo .  YOB: 1955   Employer: [employer]   Job Title:        Doctors comments: Please excuse the patient from work on 1/19/23. He can return to work on Sunday, 1/22.      Additional comments:            Provider Signature/Printed Name: Emery Mcarthur MD Date:01/19/2023     [facility]  [address] [place] [id number]

## 2023-01-19 NOTE — PROGRESS NOTES
"[facility] INTERNAL MEDICINE  OUTPATIENT OFFICE VISIT NOTE    SUBJECTIVE:      HPI: Clarence Trejo Sr. is a 67 y.o. yo male w/ PMH of Anti-synthetase syndrome, Interstitial Lung Disease and degenerative disc disease of lumbar spine who presents today for chronic back pain. States this is chronic pain and that he sees his pain doctor in 7 days but he is out of his medications.  Otherwise, states his breathing is stable. Continues to use 3L of O2. Does note that his back seems to feel worse daily and he has more pain he breathes. Denies associated fever, chills, nausea, vomiting.      ROS:  (+) Back Pain, dry cough  (-) Chest pain, palpitations, SOB, fever, night sweats, chills, diarrhea, constipation.       OBJECTIVE:     Vital signs:   /84 (BP Location: Right arm, Patient Position: Sitting, BP Method: Medium (Automatic))   Pulse 93   Temp 98.4 °F (36.9 °C) (Oral)   Resp 20   Ht 5' 6.93" (1.7 m)   Wt 70.1 kg (154 lb 8.7 oz)   SpO2 98%   BMI 24.26 kg/m²      Physical Examination:  General: Well nourished w/o distress  HEENT: NC/AT; PERRLA; nasal and oral mucosa moist and clear; no sinus tenderness; no thyromegaly  Neck: Full ROM; no lymphadenopathy  Pulm: Ronchi noted on expiration. Clubbing noted to all fingers  CV: S1, S2 w/o murmurs or gallops; no edema noted  GI: Soft with normal bowel sounds in all quadrants, no masses on palpation  MSK: Full ROM of all extremities and spine w/o limitation or discomfort  Derm: No rashes, abnormal bruising, or skin lesions  Neuro: AAOx4; CN II-XII intact; motor/sensory function intact  Psych: Cooperative; appropriate mood and affect         ASSESSMENT & PLAN:     Ant-synthetase Syndrome  -Followed by [facility]. Patient took himself off his plaquenil and their plan is to watch him off immunosuppressants.    Chronic Hypoxic Respiratory Failure   Interstitial Lung Disease  -Followed by [facility]. Currently on Symbicort and Ofev. There is a tentative plan to switch " to Trelegy.  -Will continue with Symbicort, duonebs and albuterol PRN.  -Currently on home O2, 3L.  -Will consult case management for palliative care consult. Will talk to daughter, [phone], [place].    Multilevel Degenerative Disc Disease of Lumbar spine  -Seen on x-ray on 9/9/21.  -Patient followed by outside pain management doctor.    HX of latent TB  -s/p previous 9x month [place]     Health Maintenance:     Colorectal CA screening: Will get cologuard today     Lung CA screening: UTD     Abdominal Aorta Aneurysm screening: US ordered, but awaiting patient to do it.     Prostate CA screening: UTD     Vaccines: Will discuss next time  -Varicella:     -Tdap:     -Pneumonia:  A) Prevnar 13  B) Pneumovax 23     -Flu:     -Covid:       Return to clinic in 5 months.     Emery Mcarthur MD  [facility], PGY-3

## 2023-01-25 ENCOUNTER — PATIENT OUTREACH (OUTPATIENT)
Dept: ADMINISTRATIVE | Facility: OTHER | Age: 68
End: 2023-01-25

## 2023-01-25 NOTE — PROGRESS NOTES
CHW - Initial Contact    This Community Health Worker completed the Social Determinant of Health questionnaire with patient via telephone today.    Pt identified barriers of most importance are: food insecurities.    Referrals to community agencies completed with patient/caregiver consent outside of [facility] include: none at this time.  Referrals were put through [facility] - yes: [facility]  Support and Services: has support from significant other.  Other information discussed the patient needs / wants help with: none at this time.   Follow up required: yes, follow up on status on resources for emergency food assistance.  Follow-up Outreach - Due: 1/31/2023

## 2023-02-01 ENCOUNTER — PATIENT OUTREACH (OUTPATIENT)
Dept: ADMINISTRATIVE | Facility: OTHER | Age: 68
End: 2023-02-01

## 2023-02-01 NOTE — PROGRESS NOTES
CHW - Outreach Attempt    Community Health Worker to attempt to contact patient on:2/1/23 for follow up visit.

## 2023-02-09 NOTE — PROGRESS NOTES
CHW - Outreach Attempt    Community Health Worker to attempt to contact patient on: 2/9/23 voicemail box not set up.

## 2023-02-13 ENCOUNTER — TELEPHONE (OUTPATIENT)
Dept: RHEUMATOLOGY | Facility: CLINIC | Age: 68
End: 2023-02-13

## 2023-02-13 RX ORDER — BACLOFEN 10 MG/1
10 TABLET ORAL 2 TIMES DAILY
COMMUNITY
Start: 2023-01-27

## 2023-02-13 RX ORDER — MELOXICAM 7.5 MG/1
7.5 TABLET ORAL 2 TIMES DAILY
COMMUNITY
Start: 2023-01-27 | End: 2023-08-28 | Stop reason: SDUPTHER

## 2023-02-13 NOTE — TELEPHONE ENCOUNTER
Notified by pharmacy that they can't reach pt to ship his Ofev  Tried to reach pt, VM not set up  Will call again

## 2023-02-16 NOTE — PROGRESS NOTES
CHW - Follow Up    This Community Health Worker completed a follow up visit with patient via telephone today.  Pt/Caregiver reported: Has to send paperwork to the [employer] office to get an approval.  Community Health Worker provided: will follow up in one week to follow up on status of sent paperwork.  Follow up required: yes.  Follow-up Outreach - Due: 2/22/2023

## 2023-02-23 ENCOUNTER — PATIENT OUTREACH (OUTPATIENT)
Dept: ADMINISTRATIVE | Facility: OTHER | Age: 68
End: 2023-02-23

## 2023-02-23 NOTE — PROGRESS NOTES
CHW - Follow Up    This Community Health Worker completed a follow up visit with patient via telephone today.  Pt/Caregiver reported: patient would like for his provider to refer him to Pain Management for his back problems.  Community Health Worker provided: sent a in basket message to patient provider about the request, and will follow up with patient on his well being for a possible case closure.  Follow up required: yes.  Follow-up Outreach - Due: 3/1/2023

## 2023-03-03 ENCOUNTER — TELEPHONE (OUTPATIENT)
Dept: PULMONOLOGY | Facility: CLINIC | Age: 68
End: 2023-03-03

## 2023-03-03 ENCOUNTER — PATIENT OUTREACH (OUTPATIENT)
Dept: ADMINISTRATIVE | Facility: OTHER | Age: 68
End: 2023-03-03

## 2023-03-03 NOTE — PROGRESS NOTES
CHW - Outreach Attempt    Community Health Worker to attempt to contact patient on: 3/3/23 voicemail box not set up.

## 2023-03-10 NOTE — PROGRESS NOTES
CHW - Outreach Attempt    Community Health Worker to attempt to contact patient on: 3/10/23 Voicemail box isn't set up. 2nd follow up attempt.

## 2023-03-17 NOTE — TELEPHONE ENCOUNTER
"Called and spoke to pt  He said he got the Ofev but needs more "little blue pills"  Pt not at home and not sure of name of med he needs  Told pt to check med package when he gets home and to call the pharmacy and let them know he needs a refill  If no refills available they should contact our office  If pt has any issues he said he'll contact us for help  "

## 2023-04-06 ENCOUNTER — TELEPHONE (OUTPATIENT)
Dept: GASTROENTEROLOGY | Facility: CLINIC | Age: 68
End: 2023-04-06

## 2023-04-08 ENCOUNTER — HOSPITAL ENCOUNTER (INPATIENT)
Facility: HOSPITAL | Age: 68
LOS: 1 days | Discharge: HOME | DRG: 194 | End: 2023-04-11
Attending: EMERGENCY MEDICINE | Admitting: STUDENT IN AN ORGANIZED HEALTH CARE EDUCATION/TRAINING PROGRAM
Payer: MEDICARE

## 2023-04-08 DIAGNOSIS — Z13.9 SCREENING DUE: ICD-10-CM

## 2023-04-08 DIAGNOSIS — R06.00 DYSPNEA: Primary | ICD-10-CM

## 2023-04-08 DIAGNOSIS — R07.9 CHEST PAIN: ICD-10-CM

## 2023-04-08 DIAGNOSIS — J84.9 ILD (INTERSTITIAL LUNG DISEASE): ICD-10-CM

## 2023-04-08 LAB
ALBUMIN SERPL-MCNC: 3.2 G/DL (ref 3.4–4.8)
ALBUMIN/GLOB SERPL: 0.6 RATIO (ref 1.1–2)
ALP SERPL-CCNC: 85 UNIT/L (ref 40–150)
ALT SERPL-CCNC: 12 UNIT/L (ref 0–55)
APPEARANCE UR: CLEAR
AST SERPL-CCNC: 21 UNIT/L (ref 5–34)
BACTERIA #/AREA URNS AUTO: ABNORMAL /HPF
BASOPHILS # BLD AUTO: 0.04 X10(3)/MCL (ref 0–0.2)
BASOPHILS NFR BLD AUTO: 0.6 %
BILIRUB UR QL STRIP.AUTO: NEGATIVE MG/DL
BILIRUBIN DIRECT+TOT PNL SERPL-MCNC: 0.9 MG/DL
BNP BLD-MCNC: 31.9 PG/ML
BUN SERPL-MCNC: 11 MG/DL (ref 8.4–25.7)
CALCIUM SERPL-MCNC: 9.2 MG/DL (ref 8.8–10)
CHLORIDE SERPL-SCNC: 102 MMOL/L (ref 98–107)
CK MB SERPL-MCNC: 0.4 NG/ML
CK SERPL-CCNC: 59 U/L (ref 30–200)
CO2 SERPL-SCNC: 23 MMOL/L (ref 23–31)
COLOR UR AUTO: ABNORMAL
CREAT SERPL-MCNC: 0.87 MG/DL (ref 0.73–1.18)
EOSINOPHIL # BLD AUTO: 0.01 X10(3)/MCL (ref 0–0.9)
EOSINOPHIL NFR BLD AUTO: 0.2 %
ERYTHROCYTE [DISTWIDTH] IN BLOOD BY AUTOMATED COUNT: 17.2 % (ref 11.5–17)
GFR SERPLBLD CREATININE-BSD FMLA CKD-EPI: >60 MLS/MIN/1.73/M2
GLOBULIN SER-MCNC: 5.4 GM/DL (ref 2.4–3.5)
GLUCOSE SERPL-MCNC: 92 MG/DL (ref 82–115)
GLUCOSE UR QL STRIP.AUTO: NORMAL MG/DL
HCT VFR BLD AUTO: 59.2 % (ref 42–52)
HGB BLD-MCNC: 18.6 G/DL (ref 14–18)
HYALINE CASTS #/AREA URNS LPF: ABNORMAL /LPF
IMM GRANULOCYTES # BLD AUTO: 0.03 X10(3)/MCL (ref 0–0.04)
IMM GRANULOCYTES NFR BLD AUTO: 0.5 %
KETONES UR QL STRIP.AUTO: NEGATIVE MG/DL
LACTATE SERPL-SCNC: 1.7 MMOL/L (ref 0.5–2.2)
LEUKOCYTE ESTERASE UR QL STRIP.AUTO: NEGATIVE UNIT/L
LYMPHOCYTES # BLD AUTO: 1.37 X10(3)/MCL (ref 0.6–4.6)
LYMPHOCYTES NFR BLD AUTO: 21.2 %
MCH RBC QN AUTO: 26 PG (ref 27–31)
MCHC RBC AUTO-ENTMCNC: 31.4 G/DL (ref 33–36)
MCV RBC AUTO: 82.7 FL (ref 80–94)
MONOCYTES # BLD AUTO: 0.61 X10(3)/MCL (ref 0.1–1.3)
MONOCYTES NFR BLD AUTO: 9.4 %
MUCOUS THREADS URNS QL MICRO: ABNORMAL /LPF
NEUTROPHILS # BLD AUTO: 4.4 X10(3)/MCL (ref 2.1–9.2)
NEUTROPHILS NFR BLD AUTO: 68.1 %
NITRITE UR QL STRIP.AUTO: NEGATIVE
NRBC BLD AUTO-RTO: 0 %
PH UR STRIP.AUTO: 6 [PH]
PLATELET # BLD AUTO: 197 X10(3)/MCL (ref 130–400)
PMV BLD AUTO: 10.9 FL (ref 7.4–10.4)
POTASSIUM SERPL-SCNC: 4.6 MMOL/L (ref 3.5–5.1)
PROT SERPL-MCNC: 8.6 GM/DL (ref 5.8–7.6)
PROT UR QL STRIP.AUTO: ABNORMAL MG/DL
RBC # BLD AUTO: 7.16 X10(6)/MCL (ref 4.7–6.1)
RBC #/AREA URNS AUTO: ABNORMAL /HPF
RBC UR QL AUTO: NEGATIVE UNIT/L
SODIUM SERPL-SCNC: 135 MMOL/L (ref 136–145)
SP GR UR STRIP.AUTO: 1.04
SQUAMOUS #/AREA URNS LPF: ABNORMAL /HPF
TROPONIN I SERPL-MCNC: 0.02 NG/ML (ref 0–0.04)
UROBILINOGEN UR STRIP-ACNC: NORMAL MG/DL
WBC # SPEC AUTO: 6.5 X10(3)/MCL (ref 4.5–11.5)
WBC #/AREA URNS AUTO: ABNORMAL /HPF

## 2023-04-08 PROCEDURE — 96367 TX/PROPH/DG ADDL SEQ IV INF: CPT

## 2023-04-08 PROCEDURE — G0378 HOSPITAL OBSERVATION PER HR: HCPCS

## 2023-04-08 PROCEDURE — 25500020 PHARM REV CODE 255

## 2023-04-08 PROCEDURE — 96365 THER/PROPH/DIAG IV INF INIT: CPT

## 2023-04-08 PROCEDURE — 63600175 PHARM REV CODE 636 W HCPCS: Performed by: EMERGENCY MEDICINE

## 2023-04-08 PROCEDURE — 82553 CREATINE MB FRACTION: CPT | Performed by: EMERGENCY MEDICINE

## 2023-04-08 PROCEDURE — 83880 ASSAY OF NATRIURETIC PEPTIDE: CPT | Performed by: EMERGENCY MEDICINE

## 2023-04-08 PROCEDURE — 96372 THER/PROPH/DIAG INJ SC/IM: CPT

## 2023-04-08 PROCEDURE — 85025 COMPLETE CBC W/AUTO DIFF WBC: CPT | Performed by: EMERGENCY MEDICINE

## 2023-04-08 PROCEDURE — 83605 ASSAY OF LACTIC ACID: CPT | Performed by: EMERGENCY MEDICINE

## 2023-04-08 PROCEDURE — 63600175 PHARM REV CODE 636 W HCPCS

## 2023-04-08 PROCEDURE — 82550 ASSAY OF CK (CPK): CPT | Performed by: EMERGENCY MEDICINE

## 2023-04-08 PROCEDURE — 99291 CRITICAL CARE FIRST HOUR: CPT

## 2023-04-08 PROCEDURE — 80053 COMPREHEN METABOLIC PANEL: CPT | Performed by: EMERGENCY MEDICINE

## 2023-04-08 PROCEDURE — 96375 TX/PRO/DX INJ NEW DRUG ADDON: CPT

## 2023-04-08 PROCEDURE — 93005 ELECTROCARDIOGRAM TRACING: CPT

## 2023-04-08 PROCEDURE — 63700000 PHARM REV CODE 250 ALT 637 W/O HCPCS

## 2023-04-08 PROCEDURE — 84484 ASSAY OF TROPONIN QUANT: CPT | Performed by: EMERGENCY MEDICINE

## 2023-04-08 PROCEDURE — 81001 URINALYSIS AUTO W/SCOPE: CPT

## 2023-04-08 PROCEDURE — 96361 HYDRATE IV INFUSION ADD-ON: CPT

## 2023-04-08 PROCEDURE — 87040 BLOOD CULTURE FOR BACTERIA: CPT | Performed by: EMERGENCY MEDICINE

## 2023-04-08 PROCEDURE — 87641 MR-STAPH DNA AMP PROBE: CPT

## 2023-04-08 PROCEDURE — 25000003 PHARM REV CODE 250: Performed by: EMERGENCY MEDICINE

## 2023-04-08 RX ORDER — GLUCAGON 1 MG
1 KIT INJECTION
Status: DISCONTINUED | OUTPATIENT
Start: 2023-04-08 | End: 2023-04-11 | Stop reason: HOSPADM

## 2023-04-08 RX ORDER — FLUTICASONE FUROATE AND VILANTEROL 100; 25 UG/1; UG/1
1 POWDER RESPIRATORY (INHALATION) DAILY
Status: DISCONTINUED | OUTPATIENT
Start: 2023-04-08 | End: 2023-04-11 | Stop reason: HOSPADM

## 2023-04-08 RX ORDER — METHYLPREDNISOLONE SOD SUCC 125 MG
125 VIAL (EA) INJECTION
Status: COMPLETED | OUTPATIENT
Start: 2023-04-08 | End: 2023-04-08

## 2023-04-08 RX ORDER — SODIUM CHLORIDE 0.9 % (FLUSH) 0.9 %
10 SYRINGE (ML) INJECTION EVERY 12 HOURS PRN
Status: DISCONTINUED | OUTPATIENT
Start: 2023-04-08 | End: 2023-04-11 | Stop reason: HOSPADM

## 2023-04-08 RX ORDER — IBUPROFEN 200 MG
24 TABLET ORAL
Status: DISCONTINUED | OUTPATIENT
Start: 2023-04-08 | End: 2023-04-11 | Stop reason: HOSPADM

## 2023-04-08 RX ORDER — IBUPROFEN 200 MG
16 TABLET ORAL
Status: DISCONTINUED | OUTPATIENT
Start: 2023-04-08 | End: 2023-04-11 | Stop reason: HOSPADM

## 2023-04-08 RX ORDER — NALOXONE HCL 0.4 MG/ML
0.02 VIAL (ML) INJECTION
Status: DISCONTINUED | OUTPATIENT
Start: 2023-04-08 | End: 2023-04-11 | Stop reason: HOSPADM

## 2023-04-08 RX ORDER — ACETAMINOPHEN 500 MG
1000 TABLET ORAL
Status: COMPLETED | OUTPATIENT
Start: 2023-04-08 | End: 2023-04-08

## 2023-04-08 RX ORDER — ENOXAPARIN SODIUM 100 MG/ML
40 INJECTION SUBCUTANEOUS EVERY 24 HOURS
Status: DISCONTINUED | OUTPATIENT
Start: 2023-04-08 | End: 2023-04-11 | Stop reason: HOSPADM

## 2023-04-08 RX ORDER — ALBUTEROL SULFATE 90 UG/1
1 AEROSOL, METERED RESPIRATORY (INHALATION) EVERY 6 HOURS PRN
Status: DISCONTINUED | OUTPATIENT
Start: 2023-04-08 | End: 2023-04-11 | Stop reason: HOSPADM

## 2023-04-08 RX ORDER — ACETAMINOPHEN 325 MG/1
650 TABLET ORAL EVERY 8 HOURS PRN
Status: DISCONTINUED | OUTPATIENT
Start: 2023-04-08 | End: 2023-04-11 | Stop reason: HOSPADM

## 2023-04-08 RX ORDER — AZITHROMYCIN 250 MG/1
500 TABLET, FILM COATED ORAL DAILY
Status: DISCONTINUED | OUTPATIENT
Start: 2023-04-08 | End: 2023-04-11 | Stop reason: HOSPADM

## 2023-04-08 RX ORDER — SODIUM CHLORIDE FOR INHALATION 3 %
4 VIAL, NEBULIZER (ML) INHALATION ONCE
Status: DISCONTINUED | OUTPATIENT
Start: 2023-04-08 | End: 2023-04-11 | Stop reason: HOSPADM

## 2023-04-08 RX ADMIN — IOHEXOL 100 ML: 350 INJECTION, SOLUTION INTRAVENOUS at 06:04

## 2023-04-08 RX ADMIN — VANCOMYCIN HYDROCHLORIDE 1500 MG: 1.5 INJECTION, POWDER, LYOPHILIZED, FOR SOLUTION INTRAVENOUS at 08:04

## 2023-04-08 RX ADMIN — AZITHROMYCIN MONOHYDRATE 500 MG: 250 TABLET ORAL at 09:04

## 2023-04-08 RX ADMIN — ENOXAPARIN SODIUM 40 MG: 40 INJECTION SUBCUTANEOUS at 09:04

## 2023-04-08 RX ADMIN — PIPERACILLIN AND TAZOBACTAM 4.5 G: 4; .5 INJECTION, POWDER, LYOPHILIZED, FOR SOLUTION INTRAVENOUS; PARENTERAL at 07:04

## 2023-04-08 RX ADMIN — ACETAMINOPHEN 1000 MG: 500 TABLET, FILM COATED ORAL at 06:04

## 2023-04-08 RX ADMIN — METHYLPREDNISOLONE SODIUM SUCCINATE 125 MG: 125 INJECTION, POWDER, FOR SOLUTION INTRAMUSCULAR; INTRAVENOUS at 06:04

## 2023-04-08 RX ADMIN — SODIUM CHLORIDE 1950 ML: 9 INJECTION, SOLUTION INTRAVENOUS at 06:04

## 2023-04-08 NOTE — ED PROVIDER NOTES
Encounter Date: 4/8/2023       History     Chief Complaint   Patient presents with    Shortness of Breath    Cough     C/o cough and sob, fever since o2 machine broke Monday. O2 @ 3l/nc at home. O2 sats 67% RA    Fever     Hypoxia since 2020, patient believes this is secondary to having received covid vaccine. Patient requireds o2 at 3L at home on chronic basis. He reports dyspnea since Wednesday despite home o2. He is hypoxic in 70's on arrival and is found febrile here. He his bibasilarly rhonchorous on arrrival in the ED today.      Shortness of Breath  This is a new problem. The average episode lasts 4 days. The problem occurs continuously.The current episode started more than 2 days ago. The problem has been gradually worsening. Associated symptoms include a fever, cough and wheezing. Pertinent negatives include no headaches, no coryza, no rhinorrhea, no sore throat, no swollen glands, no ear pain, no neck pain, no sputum production, no hemoptysis, no PND, no orthopnea, no chest pain, no syncope, no vomiting, no abdominal pain, no rash, no leg pain, no leg swelling and no claudication. He has tried beta-agonist inhalers for the symptoms. The treatment provided mild relief. He has had Prior hospitalizations. He has had Prior ED visits. Associated medical issues include COPD and chronic lung disease. Associated medical issues do not include asthma, pneumonia, PE, CAD, heart failure, past MI, DVT or recent surgery.   Review of patient's allergies indicates:  No Known Allergies  Past Medical History:   Diagnosis Date    SAM positive     Antisynthetase syndrome     ILD (interstitial lung disease)     Seronegative rheumatoid arthritis     TB lung, latent     Tobacco use      History reviewed. No pertinent surgical history.  Family History   Problem Relation Age of Onset    COPD Mother     COPD Father      Social History     Tobacco Use    Smoking status: Former    Smokeless tobacco: Never   Substance Use Topics     Alcohol use: Not Currently    Drug use: Never     Review of Systems   Constitutional:  Positive for fever.   HENT:  Negative for ear pain, rhinorrhea and sore throat.    Respiratory:  Positive for cough, shortness of breath and wheezing. Negative for hemoptysis and sputum production.    Cardiovascular:  Negative for chest pain, orthopnea, claudication, leg swelling, syncope and PND.   Gastrointestinal:  Negative for abdominal pain and vomiting.   Musculoskeletal:  Negative for neck pain.   Skin:  Negative for rash.   Neurological:  Negative for headaches.   All other systems reviewed and are negative.    Physical Exam     Initial Vitals [04/08/23 1716]   BP Pulse Resp Temp SpO2   (!) 150/91 103 (!) 28 (!) 101.3 °F (38.5 °C) (!) 76 %      MAP       --         Physical Exam    Nursing note and vitals reviewed.  Constitutional: He appears well-developed and well-nourished. He is not diaphoretic. No distress.   HENT:   Head: Normocephalic and atraumatic.   Right Ear: External ear normal.   Left Ear: External ear normal.   Eyes: Conjunctivae and EOM are normal. Pupils are equal, round, and reactive to light. Right eye exhibits no discharge. Left eye exhibits no discharge. No scleral icterus.   Neck: Neck supple. No thyromegaly present. No tracheal deviation present. No JVD present.   Normal range of motion.  Cardiovascular:  Normal rate, regular rhythm, normal heart sounds and intact distal pulses.     Exam reveals no gallop and no friction rub.       No murmur heard.  Pulmonary/Chest: No stridor. He is in respiratory distress. He has no wheezes. He has rhonchi. He has no rales. He exhibits no tenderness.   Abdominal: Abdomen is soft. Bowel sounds are normal. He exhibits no distension and no mass. There is no abdominal tenderness. There is no rebound and no guarding.   Musculoskeletal:         General: No tenderness or edema. Normal range of motion.      Cervical back: Normal range of motion and neck supple.      Lymphadenopathy:     He has no cervical adenopathy.   Neurological: He is alert and oriented to person, place, and time. He has normal strength. He displays normal reflexes. No cranial nerve deficit or sensory deficit. GCS score is 15. GCS eye subscore is 4. GCS verbal subscore is 5. GCS motor subscore is 6.   Skin: Skin is warm and dry. Capillary refill takes less than 2 seconds. No rash and no abscess noted. No erythema. No pallor.   Psychiatric: He has a normal mood and affect. His behavior is normal. Judgment and thought content normal.       ED Course   Critical Care    Date/Time: 4/8/2023 9:15 PM  Performed by: Akbar Davis MD  Authorized by: Fabian Blake MD   Direct patient critical care time: 30 minutes  Additional history critical care time: 10 minutes  Ordering / reviewing critical care time: 10 minutes  Documentation critical care time: 10 minutes  Consulting other physicians critical care time: 5 minutes  Total critical care time (exclusive of procedural time) : 65 minutes  Critical care time was exclusive of separately billable procedures and treating other patients.  Critical care was necessary to treat or prevent imminent or life-threatening deterioration of the following conditions: dehydration, metabolic crisis, sepsis and respiratory failure.  Critical care was time spent personally by me on the following activities: blood draw for specimens, development of treatment plan with patient or surrogate, discussions with consultants, interpretation of cardiac output measurements, evaluation of patient's response to treatment, examination of patient, ordering and performing treatments and interventions, ordering and review of laboratory studies, ordering and review of radiographic studies, pulse oximetry, re-evaluation of patient's condition and review of old charts.      Labs Reviewed   COMPREHENSIVE METABOLIC PANEL - Abnormal; Notable for the following components:       Result Value     Sodium Level 135 (*)     Protein Total 8.6 (*)     Albumin Level 3.2 (*)     Globulin 5.4 (*)     Albumin/Globulin Ratio 0.6 (*)     All other components within normal limits   CBC WITH DIFFERENTIAL - Abnormal; Notable for the following components:    RBC 7.16 (*)     Hgb 18.6 (*)     Hct 59.2 (*)     MCH 26.0 (*)     MCHC 31.4 (*)     RDW 17.2 (*)     MPV 10.9 (*)     All other components within normal limits   TROPONIN I - Normal   CK - Normal   CK-MB - Normal   LACTIC ACID, PLASMA - Normal   B-TYPE NATRIURETIC PEPTIDE - Normal   BLOOD CULTURE [facility]   BLOOD CULTURE [facility]   RESPIRATORY CULTURE ([facility])   CBC W/ AUTO DIFFERENTIAL    Narrative:     The following orders were created for panel order CBC auto differential.  Procedure                               Abnormality         Status                     ---------                               -----------         ------                     CBC with Differential[[id number]]        Abnormal            Final result                 Please view results for these tests on the individual orders.   EXTRA TUBES    Narrative:     The following orders were created for panel order EXTRA TUBES.  Procedure                               Abnormality         Status                     ---------                               -----------         ------                     Light Blue Top Hold[[id number]]                              In process                 Red Top Hold[[id number]]                                     In process                 Lavender Top Hold[[id number]]                                In process                 Gold Top Hold[[id number]]                                    In process                   Please view results for these tests on the individual orders.   LIGHT BLUE TOP HOLD   RED TOP HOLD   LAVENDER TOP HOLD   GOLD TOP HOLD   URINALYSIS, REFLEX TO URINE CULTURE   MRSA PCR     EKG Readings: (Independently Interpreted)   ST @ 101, non acute and non ischemic appearing ;    ECG Results              EKG 12-lead (In process)  Result time 04/08/23 18:17:35      In process by Interface, Lab In [facility] (04/08/23 18:17:35)                   Narrative:    Test Reason : Z13.9,    Vent. Rate : 101 BPM     Atrial Rate : 101 BPM     P-R Int : 144 ms          QRS Dur : 072 ms      QT Int : 344 ms       P-R-T Axes : 060 032 056 degrees     QTc Int : 446 ms    Sinus tachycardia  Otherwise normal ECG  When compared with ECG of 18-JAN-2023 07:05,  No significant change was found    Referred By: [email or web address]   SELF           Confirmed By:                                   Imaging Results              CTA Chest Non-Coronary (PE Studies) (Final result)  Result time 04/08/23 20:28:26      Final result by Neftaly Grimaldo MD (04/08/23 20:28:26)                   Impression:      1.  No pulmonary embolism identified.    2.  Extensive emphysematous changes and lungs fibrosis.    3.  Extensive mediastinal and bilateral hilar lymphadenopathy, unchanged.    .      Electronically signed by: Neftaly Grimaldo  Date:    04/08/2023  Time:    20:28               Narrative:    EXAMINATION:  CTA CHEST NON CORONARY (PE STUDIES)    CLINICAL HISTORY:  hypoxia, fever;    TECHNIQUE:  Sequential axial images performed of the chest using pulmonary embolism protocol following intravenous contrast bolus. Sagittal and contrast reformations performed.    Dose product length of 144 mGycm. Automated exposure control was utilized to minimize radiation dose.    COMPARISON:  December 9, 2022.    FINDINGS:  Optimal contrast bolus timing with adequately opacified pulmonary artery system is without evidence of filling defects from pulmonary thromboembolism within the main pulmonary artery and the major branches. Thoracic aorta is without aneurysmal dilatation or dissection.  Cardiac chambers are within normal limits in size.    There is extensive mediastinal and bilateral hilar lymphadenopathy.  For example the aortic pulmonary window lymph  node is 2.0 cm and a paratracheal enlarged lymph node is 2.4 cm.  These lymph nodes are without significant interval change.    Lungs are remarkable for extensive emphysematous changes with cystic formations and ground-glass attenuation due to small airway disease.  No overt superimposed pulmonary edema, dense consolidation, cavitary abnormality or fluid within the pleural or the pericardial spaces.                                       X-Ray Chest AP Portable (Final result)  Result time 04/08/23 18:38:05      Final result by Neftaly Grimaldo MD (04/08/23 18:38:05)                   Impression:      As above.      Electronically signed by: Neftaly Grimaldo  Date:    04/08/2023  Time:    18:38               Narrative:    EXAMINATION:  XR CHEST AP PORTABLE    CLINICAL HISTORY:  Dyspnea, unspecified    TECHNIQUE:  One view    COMPARISON:  January 18, 2023    FINDINGS:  Lungs are remarkable for emphysematous changes and severe interstitial fibrosis without significant interval change.  Mild superimposed congestive process or hazy pneumonitis is difficult exclude.  No focally dense consolidation or significant fluid accumulation within the pleural spaces.  No pneumothorax.                                    X-Rays:   Independently Interpreted Readings:   Chest X-Ray: No acute abnormal ;   Other Readings:  Ct chest: diffuse emphysema, no focal infiltrate identified ;  Medications   vancomycin 1,500 mg in sodium chloride 0.9% 250 mL IVPB (1,500 mg Intravenous New Bag 4/8/23 2000)   vancomycin - pharmacy to dose (has no administration in time range)   piperacillin-tazobactam (ZOSYN) 4.5 g in sodium chloride 0.9 % 100 mL IVPB (MB+) (has no administration in time range)   sodium chloride 3% nebulizer solution 4 mL (has no administration in time range)   sodium chloride 0.9% flush 10 mL (has no administration in time range)   naloxone 0.4 mg/mL injection 0.02 mg (has no administration in time range)   glucose chewable tablet 16 g  (has no administration in time range)   glucose chewable tablet 24 g (has no administration in time range)   dextrose 50% injection 12.5 g (has no administration in time range)   dextrose 10% bolus 250 mL 250 mL (has no administration in time range)   glucagon (human recombinant) injection 1 mg (has no administration in time range)   enoxaparin injection 40 mg (has no administration in time range)   acetaminophen tablet 650 mg (has no administration in time range)   azithromycin tablet 500 mg (has no administration in time range)   albuterol inhaler 1 puff (has no administration in time range)   fluticasone furoate-vilanteroL 100-25 mcg/dose diskus inhaler 1 puff (has no administration in time range)   sodium chloride 0.9% bolus 1,950 mL 1,950 mL (0 mLs Intravenous Stopped 4/8/23 2020)   iohexoL (OMNIPAQUE 350) 350 mg iodine/mL injection (100 mLs Intravenous Given 4/8/23 1845)   acetaminophen tablet 1,000 mg (1,000 mg Oral Given 4/8/23 1859)   methylPREDNISolone sodium succinate injection 125 mg (125 mg Intravenous Given 4/8/23 1859)   piperacillin-tazobactam (ZOSYN) 4.5 g in sodium chloride 0.9 % 100 mL IVPB (MB+) (0 g Intravenous Stopped 4/8/23 2020)     Medical Decision Making:   History:   Old Medical Records: I decided to obtain old medical records.  Old Records Summarized: other records.       <> Summary of Records: Records confirm history of interstitial lung disease and chronic home o2  Initial Assessment:   Patient here with significant dyspnea, cough and endorsing chills and is relatively hypoxic in comparison to baseline. Patient spikes temperature >102 in the ED. Given quite severe decompensation from baseline pulmonary status, find risk sufficient to warrant expanded evaluation with objective data.  Differential Diagnosis:   Pnuemonia, exacerabation of reactive airway syndrome, bronchitis in setting of pulmonary fibrosis, sepsis, acs/nstemi, pe, some of these, all of these, others ...  Independently  Interpreted Test(s):   I have ordered and independently interpreted X-rays - see prior notes.  I have ordered and independently interpreted EKG Reading(s) - see prior notes  Clinical Tests:   Lab Tests: Ordered and Reviewed  Radiological Study: Ordered and Reviewed  Medical Tests: Ordered and Reviewed  ED Management:  Patient improving with supplemental oxygen and initializing interventions targeting sepsis. Patient is admitted to hospital in improved, nevertheless guarded condition.   Other:   I have discussed this case with another health care provider.       <> Summary of the Discussion: Medicine team is aware of case, agrees plan admit for further evaluation and treatment.            ED Course as of 04/08/23 2117   Sat Apr 08, 2023   1946 Bnp 31 ; [CT]   1946 Initial lactate negative ; [CT]   1947 Reassuring hemogram ; [CT]   1947 Reasuring chemistries ; [CT]   1947 Negative troponin ; [CT]      ED Course User Index  [CT] Akbar Davis MD                 Clinical Impression:   Final diagnoses:  [Z13.9] Screening due  [R06.00] Dyspnea (Primary)        ED Disposition Condition    Observation                 Akbar Davis MD  04/08/23 2117

## 2023-04-09 LAB
ALBUMIN SERPL-MCNC: 2.7 G/DL (ref 3.4–4.8)
ALBUMIN/GLOB SERPL: 0.6 RATIO (ref 1.1–2)
ALP SERPL-CCNC: 72 UNIT/L (ref 40–150)
ALT SERPL-CCNC: 12 UNIT/L (ref 0–55)
AST SERPL-CCNC: 21 UNIT/L (ref 5–34)
BASOPHILS # BLD AUTO: 0.01 X10(3)/MCL (ref 0–0.2)
BASOPHILS NFR BLD AUTO: 0.3 %
BILIRUBIN DIRECT+TOT PNL SERPL-MCNC: 0.5 MG/DL
BUN SERPL-MCNC: 13.6 MG/DL (ref 8.4–25.7)
CALCIUM SERPL-MCNC: 8.8 MG/DL (ref 8.8–10)
CHLORIDE SERPL-SCNC: 110 MMOL/L (ref 98–107)
CO2 SERPL-SCNC: 19 MMOL/L (ref 23–31)
CREAT SERPL-MCNC: 0.73 MG/DL (ref 0.73–1.18)
EOSINOPHIL # BLD AUTO: 0 X10(3)/MCL (ref 0–0.9)
EOSINOPHIL NFR BLD AUTO: 0 %
ERYTHROCYTE [DISTWIDTH] IN BLOOD BY AUTOMATED COUNT: 17 % (ref 11.5–17)
GFR SERPLBLD CREATININE-BSD FMLA CKD-EPI: >60 MLS/MIN/1.73/M2
GLOBULIN SER-MCNC: 4.6 GM/DL (ref 2.4–3.5)
GLUCOSE SERPL-MCNC: 162 MG/DL (ref 82–115)
HCT VFR BLD AUTO: 53.7 % (ref 42–52)
HGB BLD-MCNC: 17 G/DL (ref 14–18)
IMM GRANULOCYTES # BLD AUTO: 0.01 X10(3)/MCL (ref 0–0.04)
IMM GRANULOCYTES NFR BLD AUTO: 0.3 %
LYMPHOCYTES # BLD AUTO: 0.81 X10(3)/MCL (ref 0.6–4.6)
LYMPHOCYTES NFR BLD AUTO: 21 %
MAGNESIUM SERPL-MCNC: 2 MG/DL (ref 1.6–2.6)
MCH RBC QN AUTO: 26.6 PG (ref 27–31)
MCHC RBC AUTO-ENTMCNC: 31.7 G/DL (ref 33–36)
MCV RBC AUTO: 84 FL (ref 80–94)
MONOCYTES # BLD AUTO: 0.03 X10(3)/MCL (ref 0.1–1.3)
MONOCYTES NFR BLD AUTO: 0.8 %
MRSA PCR SCRN (OHS): NOT DETECTED
NEUTROPHILS # BLD AUTO: 2.99 X10(3)/MCL (ref 2.1–9.2)
NEUTROPHILS NFR BLD AUTO: 77.6 %
NRBC BLD AUTO-RTO: 0 %
PHOSPHATE SERPL-MCNC: 3.5 MG/DL (ref 2.3–4.7)
PLATELET # BLD AUTO: 172 X10(3)/MCL (ref 130–400)
PMV BLD AUTO: 10.9 FL (ref 7.4–10.4)
POTASSIUM SERPL-SCNC: 4.8 MMOL/L (ref 3.5–5.1)
PROT SERPL-MCNC: 7.3 GM/DL (ref 5.8–7.6)
RBC # BLD AUTO: 6.39 X10(6)/MCL (ref 4.7–6.1)
SODIUM SERPL-SCNC: 137 MMOL/L (ref 136–145)
WBC # SPEC AUTO: 3.9 X10(3)/MCL (ref 4.5–11.5)

## 2023-04-09 PROCEDURE — 96372 THER/PROPH/DIAG INJ SC/IM: CPT

## 2023-04-09 PROCEDURE — 63700000 PHARM REV CODE 250 ALT 637 W/O HCPCS

## 2023-04-09 PROCEDURE — 96368 THER/DIAG CONCURRENT INF: CPT

## 2023-04-09 PROCEDURE — 87070 CULTURE OTHR SPECIMN AEROBIC: CPT

## 2023-04-09 PROCEDURE — 25000003 PHARM REV CODE 250

## 2023-04-09 PROCEDURE — 80053 COMPREHEN METABOLIC PANEL: CPT

## 2023-04-09 PROCEDURE — 94761 N-INVAS EAR/PLS OXIMETRY MLT: CPT

## 2023-04-09 PROCEDURE — 83735 ASSAY OF MAGNESIUM: CPT

## 2023-04-09 PROCEDURE — 84100 ASSAY OF PHOSPHORUS: CPT

## 2023-04-09 PROCEDURE — 96366 THER/PROPH/DIAG IV INF ADDON: CPT

## 2023-04-09 PROCEDURE — G0378 HOSPITAL OBSERVATION PER HR: HCPCS

## 2023-04-09 PROCEDURE — 63600175 PHARM REV CODE 636 W HCPCS

## 2023-04-09 PROCEDURE — 27100171 HC OXYGEN HIGH FLOW UP TO 24 HOURS

## 2023-04-09 PROCEDURE — 85025 COMPLETE CBC W/AUTO DIFF WBC: CPT

## 2023-04-09 PROCEDURE — 94640 AIRWAY INHALATION TREATMENT: CPT

## 2023-04-09 RX ORDER — AMOXICILLIN AND CLAVULANATE POTASSIUM 875; 125 MG/1; MG/1
1 TABLET, FILM COATED ORAL 2 TIMES DAILY
Qty: 28 TABLET | Refills: 0 | Status: SHIPPED | OUTPATIENT
Start: 2023-04-09 | End: 2023-04-11 | Stop reason: SDUPTHER

## 2023-04-09 RX ORDER — METHYLPREDNISOLONE 4 MG/1
TABLET ORAL
Qty: 21 EACH | Refills: 0 | Status: SHIPPED | OUTPATIENT
Start: 2023-04-09 | End: 2023-04-11 | Stop reason: HOSPADM

## 2023-04-09 RX ORDER — AZITHROMYCIN 250 MG/1
TABLET, FILM COATED ORAL
Qty: 6 TABLET | Refills: 0 | Status: SHIPPED | OUTPATIENT
Start: 2023-04-09 | End: 2023-04-11 | Stop reason: HOSPADM

## 2023-04-09 RX ADMIN — PIPERACILLIN AND TAZOBACTAM 4.5 G: 4; .5 INJECTION, POWDER, LYOPHILIZED, FOR SOLUTION INTRAVENOUS; PARENTERAL at 04:04

## 2023-04-09 RX ADMIN — VANCOMYCIN HYDROCHLORIDE 750 MG: 750 INJECTION, POWDER, LYOPHILIZED, FOR SOLUTION INTRAVENOUS at 08:04

## 2023-04-09 RX ADMIN — AZITHROMYCIN MONOHYDRATE 500 MG: 250 TABLET ORAL at 09:04

## 2023-04-09 RX ADMIN — PIPERACILLIN AND TAZOBACTAM 4.5 G: 4; .5 INJECTION, POWDER, LYOPHILIZED, FOR SOLUTION INTRAVENOUS; PARENTERAL at 08:04

## 2023-04-09 RX ADMIN — ENOXAPARIN SODIUM 40 MG: 40 INJECTION SUBCUTANEOUS at 04:04

## 2023-04-09 RX ADMIN — VANCOMYCIN HYDROCHLORIDE 750 MG: 750 INJECTION, POWDER, LYOPHILIZED, FOR SOLUTION INTRAVENOUS at 09:04

## 2023-04-09 RX ADMIN — PIPERACILLIN AND TAZOBACTAM 4.5 G: 4; .5 INJECTION, POWDER, LYOPHILIZED, FOR SOLUTION INTRAVENOUS; PARENTERAL at 10:04

## 2023-04-09 NOTE — PROGRESS NOTES
Pharmacokinetic Initial Assessment: IV Vancomycin    Assessment/Plan:    Initiate intravenous vancomycin with loading dose of 1500 mg once followed by a maintenance dose of vancomycin 750mg IV every 12 hours  Desired empiric serum trough concentration is 15 to 20 mcg/mL  Draw vancomycin trough level 60 min prior to fourth dose on 04/10/23 at approximately 0700  Pharmacy will continue to follow and monitor vancomycin.      Please contact pharmacy at extension [phone] with any questions regarding this assessment.     Thank you for the consult,   Lizzy Devine       Patient brief summary:  Clarence Trejo Sr. is a 67 y.o. male initiated on antimicrobial therapy with IV Vancomycin for treatment of suspected sepsis    Drug Allergies:   Review of patient's allergies indicates:  No Known Allergies    Actual Body Weight:   65 kg    Renal Function:   Estimated Creatinine Clearance: 74.4 mL/min (based on SCr of 0.87 mg/dL).,     Dialysis Method (if applicable):  N/A    CBC (last 72 hours):  Recent Labs   Lab Result Units 04/08/23  1822   WBC x10(3)/mcL 6.5   Hgb g/dL 18.6*   Hct % 59.2*   Platelet x10(3)/mcL 197   Mono % % 9.4   Eos % % 0.2   Basophil % % 0.6       Metabolic Panel (last 72 hours):  Recent Labs   Lab Result Units 04/08/23  1822   Sodium Level mmol/L 135*   Potassium Level mmol/L 4.6   Chloride mmol/L 102   Carbon Dioxide mmol/L 23   Glucose Level mg/dL 92   Blood Urea Nitrogen mg/dL 11.0   Creatinine mg/dL 0.87   Albumin Level g/dL 3.2*   Bilirubin Total mg/dL 0.9   Alkaline Phosphatase unit/L 85   Aspartate Aminotransferase unit/L 21   Alanine Aminotransferase unit/L 12       Drug levels (last 3 results):  No results for input(s): VANCOMYCINRA, VANCORANDOM, VANCOMYCINPE, VANCOPEAK, VANCOMYCINTR, VANCOTROUGH in the last 72 hours.    Microbiologic Results:  Microbiology Results (last 7 days)       Procedure Component Value Units Date/Time    Respiratory Culture [[id number]]     Order Status: Sent Specimen:  Sputum, Induced     Blood Culture #2 **CANNOT BE ORDERED STAT** [[id number]] Collected: 04/08/23 1822    Order Status: Sent Specimen: Blood from Antecubital, Right Updated: 04/08/23 1831    Blood Culture #1 **CANNOT BE ORDERED STAT** [[id number]] Collected: 04/08/23 1822    Order Status: Sent Specimen: Blood from Hand, Right Updated: 04/08/23 1831

## 2023-04-09 NOTE — PROGRESS NOTES
[facility]   Progress Note     Resident Team: [facility] Medicine List 3  Attending Physician: Fabian Blake MD  Resident: Alesha Woodard  Intern: Sania Valles   Hospital Length of Stay: 0 days    Subjective:      Brief HPI:  Clarence Trjeo Sr. is a 67 y.o. male who with a history of antisynthetase syndrome, ILD, chronic hypoxic respiratory failure on 3L NC at home who presented to [facility] ED on 4/8/2023 with a primary complaint of shortness of breath.     Patient is a poor historian. 2 day history of fevers/chills with a non-productive cough. Reported shortness of breath because his home O2 broke earlier this week. Unknown if he had increasing O2 requirements over the past few days that coincided with his fevers/cough. Followed by rheumatology, but only taking OFEV, took himself off plaquenil. Has been out of his inhalers for the past week. Denies chest pain, dyspnea on exertion.     In the ED, initial vitals were febrile at 101.3, tachycardic 103, tachypneic 28, bp 150/91, 76% on RA. Patient was resting comfortably in bed on 10L NRB, saturating 95%. Crackles heard bilaterally and in all lung fields. Lab workup revealed no leukocytosis, normocytic anemia at 7.16, around baseline, negative lactate, negative for Flu and COVID. CXR showed mild superimposed congestive process, no focally dense consolidation or pneumothorax. CTA chest did not reveal PE, but showed extensive emphysematous changes and lung fibrosis, and extensive mediastinal/bilateral hilar lymphadenopathy unchanged. Admitted to internal medicine for CAP rule out/ILD flare management.     Interval History: NAEON. No complaints this morning, reports feeling well and is ready to leave. Afebrile overnight, VSS. Discussed with patient that he has a pneumonia, and should stay to see rheumatology and pulmonology tomorrow.       Review of Systems:  Pertinent items are noted in HPI.     Objective:     Vital Signs (Most Recent):  Temp: 97.6 °F (36.4 °C) (04/08/23  2215)  Pulse: 69 (04/09/23 0324)  Resp: 20 (04/08/23 2215)  BP: 139/84 (04/09/23 0324)  SpO2: 98 % (04/09/23 0324) Vital Signs (24h Range):  Temp:  [97.6 °F (36.4 °C)-102.6 °F (39.2 °C)] 97.6 °F (36.4 °C)  Pulse:  [] 69  Resp:  [20-28] 20  SpO2:  [76 %-98 %] 98 %  BP: (112-150)/(77-91) 139/84       Physical Examination:  General appearance: alert, appears stated age, and cooperative  Lungs: Crackles in all lung fields  Heart: regular rate and rhythm, S1, S2 normal, no murmur, click, rub or gallop  Abdomen: soft, non-tender; bowel sounds normal; no masses,  no organomegaly  Extremities: extremities normal, atraumatic, no cyanosis or edema  Pulses: 2+ and symmetric  Skin: Skin color, texture, turgor normal. No rashes or lesions    Laboratory:  Most Recent Data:  CBC:   Recent Labs   Lab 04/08/23  1822 04/09/23  0345   WBC 6.5 3.9*   HGB 18.6* 17.0   HCT 59.2* 53.7*    172     CMP:   Recent Labs   Lab 04/09/23  0345   CALCIUM 8.8   ALBUMIN 2.7*      K 4.8   CO2 19*   BUN 13.6   CREATININE 0.73   ALKPHOS 72   ALT 12   AST 21   BILITOT 0.5         Microbiology Data Reviewed: yes  Pertinent Findings:  4/9/23 Respiratory culture pending  4/8/23 Blood culture x 2 pending    Other Results:      Radiology:  Imaging Results              CTA Chest Non-Coronary (PE Studies) (Final result)  Result time 04/08/23 20:28:26      Final result by Neftaly Grimaldo MD (04/08/23 20:28:26)                   Impression:      1.  No pulmonary embolism identified.    2.  Extensive emphysematous changes and lungs fibrosis.    3.  Extensive mediastinal and bilateral hilar lymphadenopathy, unchanged.    .      Electronically signed by: Neftaly Grimaldo  Date:    04/08/2023  Time:    20:28               Narrative:    EXAMINATION:  CTA CHEST NON CORONARY (PE STUDIES)    CLINICAL HISTORY:  hypoxia, fever;    TECHNIQUE:  Sequential axial images performed of the chest using pulmonary embolism protocol following intravenous contrast  bolus. Sagittal and contrast reformations performed.    Dose product length of 144 mGycm. Automated exposure control was utilized to minimize radiation dose.    COMPARISON:  December 9, 2022.    FINDINGS:  Optimal contrast bolus timing with adequately opacified pulmonary artery system is without evidence of filling defects from pulmonary thromboembolism within the main pulmonary artery and the major branches. Thoracic aorta is without aneurysmal dilatation or dissection.  Cardiac chambers are within normal limits in size.    There is extensive mediastinal and bilateral hilar lymphadenopathy.  For example the aortic pulmonary window lymph node is 2.0 cm and a paratracheal enlarged lymph node is 2.4 cm.  These lymph nodes are without significant interval change.    Lungs are remarkable for extensive emphysematous changes with cystic formations and ground-glass attenuation due to small airway disease.  No overt superimposed pulmonary edema, dense consolidation, cavitary abnormality or fluid within the pleural or the pericardial spaces.                                       X-Ray Chest AP Portable (Final result)  Result time 04/08/23 18:38:05      Final result by Neftaly Grimaldo MD (04/08/23 18:38:05)                   Impression:      As above.      Electronically signed by: Neftaly Grimaldo  Date:    04/08/2023  Time:    18:38               Narrative:    EXAMINATION:  XR CHEST AP PORTABLE    CLINICAL HISTORY:  Dyspnea, unspecified    TECHNIQUE:  One view    COMPARISON:  January 18, 2023    FINDINGS:  Lungs are remarkable for emphysematous changes and severe interstitial fibrosis without significant interval change.  Mild superimposed congestive process or hazy pneumonitis is difficult exclude.  No focally dense consolidation or significant fluid accumulation within the pleural spaces.  No pneumothorax.                                      Current Medications:     Infusions:       Scheduled:   azithromycin  500 mg Oral  Daily    enoxaparin  40 mg Subcutaneous Daily    fluticasone furoate-vilanteroL  1 puff Inhalation Daily    piperacillin-tazobactam (ZOSYN) IVPB  4.5 g Intravenous Q8H    sodium chloride 3%  4 mL Nebulization Once    vancomycin (VANCOCIN) IVPB  750 mg Intravenous Q12H        PRN:  acetaminophen, albuterol, dextrose 10%, dextrose 10%, glucagon (human recombinant), glucose, glucose, naloxone, sodium chloride 0.9%, Pharmacy to dose Vancomycin consult **AND** vancomycin - pharmacy to dose    Antibiotics and Day Number of Therapy:  Vancomycin, Zosyn, and Azithromycin, 4/8/23-present      Intake/Output Summary (Last 24 hours) at 4/9/2023 0731  Last data filed at 4/9/2023 0614  Gross per 24 hour   Intake 120 ml   Output 400 ml   Net -280 ml       Lines/Drains/Airways       Peripheral Intravenous Line  Duration                  Peripheral IV - Single Lumen 04/08/23 1815 18 G Anterior;Distal;Left Antecubital <1 day                      Assessment & Plan:     1) Shortness of breath, likely CAP superimposed on ILD, SIRS 3/4  -Increasing shortness of breath for the past 2 days, with fevers and dry cough that started at the same time  -Saturating 100% on RA, no documented fevers since starting antibiotics and only received one dose of tylenol yesterday in the ED  -Lactate negative  -Blood cultures drawn, induced respiratory culture pending  -MRSA PCR pending, will d/c vanc if negative  -Continue vancomycin, zosyn, and azithromycin  -More likely an infectious process, will hold solumedrol  -Attempt to wean off NRB while maintaining O2 saturation at 90-95%.  -Will consult Pulmonology 4/10/23     2) Anti-synthetase syndrome, ILD  -Has not been on plaquenil, been out of inhalers for the past week  -No OFEV in hospital  -Started breo qd and albuterol prn wheezing  -Will consult Rheumatology on 4/10/23      CODE STATUS: Full Code  Access: Peripheral  Antibiotics: Vanc, Zosyn, Azithromycin  Diet: Regular  DVT Prophylaxis: Lovenox  GI  Prophylaxis: none needed  Fluids: none    Disposition: day 1 of admission for CAP. Pending blood cultures and sputum cultures. Continue IV antibiotics. Pending MRSA PCR.      Ridge Valles DO  [facility], PGY-1

## 2023-04-09 NOTE — H&P
[facility] History & Physical Note     Date of Admit: 4/8/2023    Chief Complaint     Shortness of breath    Subjective:      History of Present Illness:  Clarence Trejo Sr. is a 67 y.o. male who with a history of antisynthetase syndrome, ILD, chronic hypoxic respiratory failure on 3L NC at home who presented to [facility] ED on 4/8/2023 with a primary complaint of shortness of breath.    Patient is a poor historian. 2 day history of fevers/chills with a non-productive cough. Reported shortness of breath because his home O2 broke earlier this week. Unknown if he had increasing O2 requirements over the past few days that coincided with his fevers/cough. Followed by rheumatology, but only taking OFEV, took himself off plaquenil. Has been out of his inhalers for the past week. Denies chest pain, dyspnea on exertion.    In the ED, initial vitals were febrile at 101.3, tachycardic 103, tachypneic 28, bp 150/91, 76% on RA. Patient was resting comfortably in bed on 10L NRB, saturating 95%. Crackles heard bilaterally and in all lung fields. Lab workup revealed no leukocytosis, normocytic anemia at 7.16, around baseline, negative lactate, negative for Flu and COVID. CXR showed mild superimposed congestive process, no focally dense consolidation or pneumothorax. CTA chest did not reveal PE, but showed extensive emphysematous changes and lung fibrosis, and extensive mediastinal/bilateral hilar lymphadenopathy unchanged. Admitted to internal medicine for CAP rule out/ILD flare management.       Past Medical History:  Past Medical History:   Diagnosis Date    SAM positive     Antisynthetase syndrome     ILD (interstitial lung disease)     Seronegative rheumatoid arthritis     TB lung, latent     Tobacco use        Past Surgical History:  History reviewed. No pertinent surgical history.    Family History:  Family History   Problem Relation Age of Onset    COPD Mother     COPD Father        Social History:  Social History  "    Tobacco Use    Smoking status: Former    Smokeless tobacco: Never   Substance Use Topics    Alcohol use: Not Currently    Drug use: Never       Allergies:  Review of patient's allergies indicates:  No Known Allergies    Home Medications:  Prior to Admission medications    Medication Sig Start Date End Date Taking? Authorizing Provider   albuterol (PROVENTIL/VENTOLIN HFA) 90 mcg/actuation inhaler Inhale 1-2 puffs into the lungs every 6 (six) hours as needed for Wheezing. Rescue 10/13/22   Nicholas Greene MD   albuterol-ipratropium (DUO-NEB) 2.5 mg-0.5 mg/3 mL nebulizer solution Take 3 mLs by nebulization every 6 (six) hours as needed for Wheezing. Rescue 10/13/22 10/13/23  Nicholas Greene MD   baclofen (LIORESAL) 10 MG tablet Take 10 mg by mouth 2 (two) times daily. 1/27/23   Historical Provider   budesonide-formoterol 80-4.5 mcg (SYMBICORT) 80-4.5 mcg/actuation HFAA Inhale 2 puffs into the lungs 2 (two) times a day. 7/14/22 1/19/23  Kim Morrison MD   HYDROcodone-acetaminophen (NORCO) 5-325 mg per tablet Take 1 tablet by mouth every 6 (six) hours as needed for Pain. 1/19/23   Emery Mcarthur MD   meloxicam (MOBIC) 7.5 MG tablet Take 7.5 mg by mouth 2 (two) times daily. 1/27/23   Historical Provider   OFEV 150 mg Cap TAKE 1 CAPSULE BY MOUTH TWICE A DAY 1/12/23   Dixie Caraballo MD         Review of Systems:  Gen: +fever/chills, no weight changes  Eye: No blindness or vision changes  Heart: No chest pain, palpitations, or diaphoresis  Lungs: +shortness of breath and cough, no wheezing  GI: No abdominal pain, nausea, vomiting, or diarrhea  : No hematuria, No dysuria  Musk: No lower extremity swelling  Integumentary: No rash or itching  Neuro: Normal speech, no focal weakness or headache       Objective:   Last 24 Hour Vital Signs:  Blood pressure (!) 133/90, pulse 100, temperature 97.8 °F (36.6 °C), temperature source Oral, resp. rate (!) 26, height 5' 6" (1.676 m), weight 65 kg (143 " lb 4.8 oz), SpO2 96 %.  Body mass index is 23.13 kg/m².    Physical Examination:    General: NAD, on nasal cannula  Eye: PERRL, EOMI  HENT: Normocephalic, atraumatic, moist mucous membranes  Neck: Supple, nontender  Respiratory: crackles, no diminished breathing  Cardiovascular: Tachycardic, regular rhythm, no murmurs, rubs, or gallops  Gastrointestinal: soft, nontender  Musculoskeletal: full ROM  Integumentary: warm, dry  Neurologic: grossly intact  Psychiatric: Appropriate mood and affect    Laboratory:  Most Recent Data:  Admission on 04/08/2023   Component Date Value    Sodium Level 04/08/2023 135 (L)     Potassium Level 04/08/2023 4.6     Chloride 04/08/2023 102     Carbon Dioxide 04/08/2023 23     Glucose Level 04/08/2023 92     Blood Urea Nitrogen 04/08/2023 11.0     Creatinine 04/08/2023 0.87     Calcium Level Total 04/08/2023 9.2     Protein Total 04/08/2023 8.6 (H)     Albumin Level 04/08/2023 3.2 (L)     Globulin 04/08/2023 5.4 (H)     Albumin/Globulin Ratio 04/08/2023 0.6 (L)     Bilirubin Total 04/08/2023 0.9     Alkaline Phosphatase 04/08/2023 85     Alanine Aminotransferase 04/08/2023 12     Aspartate Aminotransfera* 04/08/2023 21     eGFR 04/08/2023 >60     Troponin-I 04/08/2023 0.016     Creatine Kinase 04/08/2023 59     Creatine Kinase MB 04/08/2023 0.4     Lactic Acid Level 04/08/2023 1.7     Natriuretic Peptide 04/08/2023 31.9     WBC 04/08/2023 6.5     RBC 04/08/2023 7.16 (H)     Hgb 04/08/2023 18.6 (H)     Hct 04/08/2023 59.2 (H)     MCV 04/08/2023 82.7     MCH 04/08/2023 26.0 (L)     MCHC 04/08/2023 31.4 (L)     RDW 04/08/2023 17.2 (H)     Platelet 04/08/2023 197     MPV 04/08/2023 10.9 (H)     Neut % 04/08/2023 68.1     Lymph % 04/08/2023 21.2     Mono % 04/08/2023 9.4     Eos % 04/08/2023 0.2     Basophil % 04/08/2023 0.6     Lymph # 04/08/2023 1.37     Neut # 04/08/2023 4.40     Mono # 04/08/2023 0.61     Eos # 04/08/2023 0.01     Baso # 04/08/2023 0.04     IG# 04/08/2023 0.03     IG%  04/08/2023 0.5     NRBC% 04/08/2023 0.0        Radiology:  Imaging Results              CTA Chest Non-Coronary (PE Studies) (Final result)  Result time 04/08/23 20:28:26      Final result by Neftaly Grimaldo MD (04/08/23 20:28:26)                   Impression:      1.  No pulmonary embolism identified.    2.  Extensive emphysematous changes and lungs fibrosis.    3.  Extensive mediastinal and bilateral hilar lymphadenopathy, unchanged.    .      Electronically signed by: Neftaly Grimaldo  Date:    04/08/2023  Time:    20:28               Narrative:    EXAMINATION:  CTA CHEST NON CORONARY (PE STUDIES)    CLINICAL HISTORY:  hypoxia, fever;    TECHNIQUE:  Sequential axial images performed of the chest using pulmonary embolism protocol following intravenous contrast bolus. Sagittal and contrast reformations performed.    Dose product length of 144 mGycm. Automated exposure control was utilized to minimize radiation dose.    COMPARISON:  December 9, 2022.    FINDINGS:  Optimal contrast bolus timing with adequately opacified pulmonary artery system is without evidence of filling defects from pulmonary thromboembolism within the main pulmonary artery and the major branches. Thoracic aorta is without aneurysmal dilatation or dissection.  Cardiac chambers are within normal limits in size.    There is extensive mediastinal and bilateral hilar lymphadenopathy.  For example the aortic pulmonary window lymph node is 2.0 cm and a paratracheal enlarged lymph node is 2.4 cm.  These lymph nodes are without significant interval change.    Lungs are remarkable for extensive emphysematous changes with cystic formations and ground-glass attenuation due to small airway disease.  No overt superimposed pulmonary edema, dense consolidation, cavitary abnormality or fluid within the pleural or the pericardial spaces.                                       X-Ray Chest AP Portable (Final result)  Result time 04/08/23 18:38:05      Final result by  Neftaly Grimaldo MD (04/08/23 18:38:05)                   Impression:      As above.      Electronically signed by: Neftaly Grimaldo  Date:    04/08/2023  Time:    18:38               Narrative:    EXAMINATION:  XR CHEST AP PORTABLE    CLINICAL HISTORY:  Dyspnea, unspecified    TECHNIQUE:  One view    COMPARISON:  January 18, 2023    FINDINGS:  Lungs are remarkable for emphysematous changes and severe interstitial fibrosis without significant interval change.  Mild superimposed congestive process or hazy pneumonitis is difficult exclude.  No focally dense consolidation or significant fluid accumulation within the pleural spaces.  No pneumothorax.                                         Assessment & Plan:     1) Shortness of breath, likely CAP superimposed on ILD, SIRS 3/4  -Increasing shortness of breath for the past 2 days, with fevers and dry cough that started 2 days ago  -Desaturating to the high 70s on RA.  -Non-compliant with plaquenil, OFEV since 11/2022 and has been off of pulmonary inhalers for at least a week  -Subjective fevers at home, documented 102.6F in ED, tachycardic to 103, tachypneic 28, no leukocytosis  -Lactate negative  -Blood cultures drawn, induced respiratory culture pending  -MRSA PCR pending, will d/c vanc if negative  -Started on vancomycin, zosyn, and azithromycin  -Given solumedrol 125 in the ED.  -Consider starting 500 mg solumedrol qd for severe disease    2) Anti-synthetase syndrome  -Has not been on plaquenil, been out of inhalers for the past week  -No OFEV in hospital  -Started breo qd and albuterol prn wheezing    Antibiotics: Vancomycin, Zosyn, Azithromycin  Diet: Adult regular  DVT Prophylaxis: Lovenox  GI Prophylaxis: None  Fluids: None    Disposition: Day 1 of admission for possible CAP/ILD flare. Receiving IV antibiotics and IV steroids    Ridge Valles DO  [facility] HO-1

## 2023-04-09 NOTE — NURSING
"Received 67M from ER. Dx: SOB. Hx: TB (latent), clubbing fingers, RA, Interstitial Lung Disease, Antisynthetase Syndrome, smoker. Patient is alert and O X 4, independent. SOB upon exertion. Coughing at intervals.    Introduced patient and significant other to unit, room, TV, call light, and surroundings. All questions answered. O2 at 10L via NRB. Head to toe assessment done. Patient requested to keep on clothes, stating, "I'll be leaving tomorrow anyway". Will monitor  "

## 2023-04-10 LAB
ALBUMIN SERPL-MCNC: 2.5 G/DL (ref 3.4–4.8)
ALBUMIN/GLOB SERPL: 0.6 RATIO (ref 1.1–2)
ALP SERPL-CCNC: 64 UNIT/L (ref 40–150)
ALT SERPL-CCNC: 11 UNIT/L (ref 0–55)
AST SERPL-CCNC: 13 UNIT/L (ref 5–34)
AV INDEX (PROSTH): 0.9
AV MEAN GRADIENT: 3 MMHG
AV PEAK GRADIENT: 6 MMHG
AV VALVE AREA: 2.85 CM2
AV VELOCITY RATIO: 0.89
BASOPHILS # BLD AUTO: 0.02 X10(3)/MCL (ref 0–0.2)
BASOPHILS NFR BLD AUTO: 0.2 %
BILIRUBIN DIRECT+TOT PNL SERPL-MCNC: 0.3 MG/DL
BSA FOR ECHO PROCEDURE: 1.75 M2
BUN SERPL-MCNC: 14.1 MG/DL (ref 8.4–25.7)
CALCIUM SERPL-MCNC: 8.4 MG/DL (ref 8.8–10)
CHLORIDE SERPL-SCNC: 109 MMOL/L (ref 98–107)
CO2 SERPL-SCNC: 23 MMOL/L (ref 23–31)
CREAT SERPL-MCNC: 0.74 MG/DL (ref 0.73–1.18)
CV ECHO LV RWT: 0.34 CM
DOP CALC AO PEAK VEL: 1.2 M/S
DOP CALC AO VTI: 24 CM
DOP CALC LVOT AREA: 3.2 CM2
DOP CALC LVOT DIAMETER: 2.01 CM
DOP CALC LVOT PEAK VEL: 1.07 M/S
DOP CALC LVOT STROKE VOLUME: 68.5 CM3
DOP CALC MV VTI: 22.2 CM
DOP CALCLVOT PEAK VEL VTI: 21.6 CM
E WAVE DECELERATION TIME: 194.37 MSEC
E/A RATIO: 0.74
E/E' RATIO: 9.47 M/S
ECHO LV POSTERIOR WALL: 0.83 CM (ref 0.6–1.1)
EJECTION FRACTION: 60 %
EOSINOPHIL # BLD AUTO: 0.02 X10(3)/MCL (ref 0–0.9)
EOSINOPHIL NFR BLD AUTO: 0.2 %
ERYTHROCYTE [DISTWIDTH] IN BLOOD BY AUTOMATED COUNT: 15.8 % (ref 11.5–17)
FRACTIONAL SHORTENING: 33 % (ref 28–44)
GFR SERPLBLD CREATININE-BSD FMLA CKD-EPI: >60 MLS/MIN/1.73/M2
GLOBULIN SER-MCNC: 4.1 GM/DL (ref 2.4–3.5)
GLUCOSE SERPL-MCNC: 107 MG/DL (ref 82–115)
HCT VFR BLD AUTO: 52.2 % (ref 42–52)
HGB BLD-MCNC: 15.9 G/DL (ref 14–18)
IMM GRANULOCYTES # BLD AUTO: 0.05 X10(3)/MCL (ref 0–0.04)
IMM GRANULOCYTES NFR BLD AUTO: 0.5 %
INTERVENTRICULAR SEPTUM: 1.18 CM (ref 0.6–1.1)
LEFT ATRIUM SIZE: 3.79 CM
LEFT INTERNAL DIMENSION IN SYSTOLE: 3.27 CM (ref 2.1–4)
LEFT VENTRICLE DIASTOLIC VOLUME INDEX: 63.3 ML/M2
LEFT VENTRICLE DIASTOLIC VOLUME: 110.14 ML
LEFT VENTRICLE MASS INDEX: 100 G/M2
LEFT VENTRICLE SYSTOLIC VOLUME INDEX: 24.7 ML/M2
LEFT VENTRICLE SYSTOLIC VOLUME: 43.03 ML
LEFT VENTRICULAR INTERNAL DIMENSION IN DIASTOLE: 4.85 CM (ref 3.5–6)
LEFT VENTRICULAR MASS: 174.28 G
LV LATERAL E/E' RATIO: 10.14 M/S
LV SEPTAL E/E' RATIO: 8.88 M/S
LVOT MG: 1.6 MMHG
LVOT MV: 0.53 CM/S
LYMPHOCYTES # BLD AUTO: 1.48 X10(3)/MCL (ref 0.6–4.6)
LYMPHOCYTES NFR BLD AUTO: 15.1 %
MAGNESIUM SERPL-MCNC: 1.9 MG/DL (ref 1.6–2.6)
MCH RBC QN AUTO: 25.6 PG (ref 27–31)
MCHC RBC AUTO-ENTMCNC: 30.5 G/DL (ref 33–36)
MCV RBC AUTO: 84.2 FL (ref 80–94)
MONOCYTES # BLD AUTO: 0.76 X10(3)/MCL (ref 0.1–1.3)
MONOCYTES NFR BLD AUTO: 7.7 %
MV MEAN GRADIENT: 1 MMHG
MV PEAK A VEL: 0.96 M/S
MV PEAK E VEL: 0.71 M/S
MV PEAK GRADIENT: 3 MMHG
MV STENOSIS PRESSURE HALF TIME: 56.37 MS
MV VALVE AREA BY CONTINUITY EQUATION: 3.09 CM2
MV VALVE AREA P 1/2 METHOD: 3.9 CM2
NEUTROPHILS # BLD AUTO: 7.48 X10(3)/MCL (ref 2.1–9.2)
NEUTROPHILS NFR BLD AUTO: 76.3 %
NRBC BLD AUTO-RTO: 0 %
PHOSPHATE SERPL-MCNC: 3.4 MG/DL (ref 2.3–4.7)
PISA TR MAX VEL: 3.29 M/S
PLATELET # BLD AUTO: 164 X10(3)/MCL (ref 130–400)
PMV BLD AUTO: 10.8 FL (ref 7.4–10.4)
POTASSIUM SERPL-SCNC: 4.1 MMOL/L (ref 3.5–5.1)
PROT SERPL-MCNC: 6.6 GM/DL (ref 5.8–7.6)
RA PRESSURE: 8 MMHG
RBC # BLD AUTO: 6.2 X10(6)/MCL (ref 4.7–6.1)
RIGHT VENTRICULAR END-DIASTOLIC DIMENSION: 2.44 CM
SODIUM SERPL-SCNC: 140 MMOL/L (ref 136–145)
TDI LATERAL: 0.07 M/S
TDI SEPTAL: 0.08 M/S
TDI: 0.08 M/S
TR MAX PG: 43 MMHG
TV REST PULMONARY ARTERY PRESSURE: 51 MMHG
VANCOMYCIN TROUGH SERPL-MCNC: 8.7 UG/ML (ref 15–20)
WBC # SPEC AUTO: 9.8 X10(3)/MCL (ref 4.5–11.5)

## 2023-04-10 PROCEDURE — 25000003 PHARM REV CODE 250

## 2023-04-10 PROCEDURE — 11000001 HC ACUTE MED/SURG PRIVATE ROOM

## 2023-04-10 PROCEDURE — 80053 COMPREHEN METABOLIC PANEL: CPT

## 2023-04-10 PROCEDURE — 84100 ASSAY OF PHOSPHORUS: CPT

## 2023-04-10 PROCEDURE — 99223 PR INITIAL HOSPITAL CARE,LEVL III: ICD-10-PCS | Mod: ,,, | Performed by: INTERNAL MEDICINE

## 2023-04-10 PROCEDURE — 63600175 PHARM REV CODE 636 W HCPCS: Performed by: FAMILY MEDICINE

## 2023-04-10 PROCEDURE — 85025 COMPLETE CBC W/AUTO DIFF WBC: CPT

## 2023-04-10 PROCEDURE — 96366 THER/PROPH/DIAG IV INF ADDON: CPT

## 2023-04-10 PROCEDURE — 99223 1ST HOSP IP/OBS HIGH 75: CPT | Mod: ,,, | Performed by: INTERNAL MEDICINE

## 2023-04-10 PROCEDURE — 63700000 PHARM REV CODE 250 ALT 637 W/O HCPCS

## 2023-04-10 PROCEDURE — 80202 ASSAY OF VANCOMYCIN: CPT

## 2023-04-10 PROCEDURE — 63600175 PHARM REV CODE 636 W HCPCS

## 2023-04-10 PROCEDURE — 25000242 PHARM REV CODE 250 ALT 637 W/ HCPCS

## 2023-04-10 PROCEDURE — 99900035 HC TECH TIME PER 15 MIN (STAT)

## 2023-04-10 PROCEDURE — 83735 ASSAY OF MAGNESIUM: CPT

## 2023-04-10 PROCEDURE — 94640 AIRWAY INHALATION TREATMENT: CPT

## 2023-04-10 PROCEDURE — 27100171 HC OXYGEN HIGH FLOW UP TO 24 HOURS

## 2023-04-10 PROCEDURE — 97161 PT EVAL LOW COMPLEX 20 MIN: CPT

## 2023-04-10 PROCEDURE — 94761 N-INVAS EAR/PLS OXIMETRY MLT: CPT

## 2023-04-10 PROCEDURE — 97165 OT EVAL LOW COMPLEX 30 MIN: CPT

## 2023-04-10 RX ADMIN — PIPERACILLIN AND TAZOBACTAM 4.5 G: 4; .5 INJECTION, POWDER, LYOPHILIZED, FOR SOLUTION INTRAVENOUS; PARENTERAL at 03:04

## 2023-04-10 RX ADMIN — AZITHROMYCIN MONOHYDRATE 500 MG: 250 TABLET ORAL at 08:04

## 2023-04-10 RX ADMIN — PIPERACILLIN AND TAZOBACTAM 4.5 G: 4; .5 INJECTION, POWDER, LYOPHILIZED, FOR SOLUTION INTRAVENOUS; PARENTERAL at 11:04

## 2023-04-10 RX ADMIN — METHYLPREDNISOLONE SODIUM SUCCINATE 80 MG: 40 INJECTION, POWDER, FOR SOLUTION INTRAMUSCULAR; INTRAVENOUS at 01:04

## 2023-04-10 RX ADMIN — FLUTICASONE FUROATE AND VILANTEROL TRIFENATATE 1 PUFF: 100; 25 POWDER RESPIRATORY (INHALATION) at 07:04

## 2023-04-10 RX ADMIN — ENOXAPARIN SODIUM 40 MG: 40 INJECTION SUBCUTANEOUS at 04:04

## 2023-04-10 RX ADMIN — PIPERACILLIN AND TAZOBACTAM 4.5 G: 4; .5 INJECTION, POWDER, LYOPHILIZED, FOR SOLUTION INTRAVENOUS; PARENTERAL at 06:04

## 2023-04-10 RX ADMIN — METHYLPREDNISOLONE SODIUM SUCCINATE 80 MG: 40 INJECTION, POWDER, FOR SOLUTION INTRAMUSCULAR; INTRAVENOUS at 09:04

## 2023-04-10 NOTE — NURSING
PT CONTINUALLY ASKING ABOUT DISCHARGE. STATES HE IS BEING DISCHARGED TODAY. DR. JOHNSON CONTACTED VIA TELEPHONE AND NURSE QUESTIONS DISCHARGE PLAN AS NO NOTE IS PUBLISHED AS OF YET. MD STATES PT IS NOT BEING DISCHARGED AND HE WILL COME SPEAK WITH PT.

## 2023-04-10 NOTE — PROGRESS NOTES
[facility] Medicine Wards   Progress Note     Resident Team: [facility] Medicine List 3  Attending Physician: Fabian Blake MD  Resident: Alesha Woodard  Intern: Sania Valles   Hospital Length of Stay: 0 days    Subjective:      Brief HPI:  Clarence Trejo Sr. is a 67 y.o. male who with a history of antisynthetase syndrome, ILD, chronic hypoxic respiratory failure on 3L NC at home who presented to [facility] ED on 4/8/2023 with a primary complaint of shortness of breath.     Patient is a poor historian. 2 day history of fevers/chills with a non-productive cough. Reported shortness of breath because his home O2 broke earlier this week. Unknown if he had increasing O2 requirements over the past few days that coincided with his fevers/cough. Followed by rheumatology, but only taking OFEV, took himself off plaquenil. Has been out of his inhalers for the past week. Denies chest pain, dyspnea on exertion.     In the ED, initial vitals were febrile at 101.3, tachycardic 103, tachypneic 28, bp 150/91, 76% on RA. Patient was resting comfortably in bed on 10L NRB, saturating 95%. Crackles heard bilaterally and in all lung fields. Lab workup revealed no leukocytosis, normocytic anemia at 7.16, around baseline, negative lactate, negative for Flu and COVID. CXR showed mild superimposed congestive process, no focally dense consolidation or pneumothorax. CTA chest did not reveal PE, but showed extensive emphysematous changes and lung fibrosis, and extensive mediastinal/bilateral hilar lymphadenopathy unchanged. Admitted to internal medicine for CAP rule out/ILD flare management.     Interval History:   Patient had no acute events overnight. He says he is doing well is feels like he is back to his baseline. He is currently on 4L NC saturating well in the room. When working with PT, patient would saturate in the upper 80s with minimal exertion. Consulted Pulmonology this morning who recommend starting patient on IV steroids 80 mg BID. Rheumatology has  also been consulted who plan to see patient later today.      Review of Systems:  Pertinent items are noted in HPI.     Objective:     Vital Signs (Most Recent):  Temp: 97.6 °F (36.4 °C) (04/10/23 0001)  Pulse: 71 (04/10/23 0523)  Resp: 20 (04/09/23 0749)  BP: 119/77 (04/10/23 0523)  SpO2: 97 % (04/10/23 0523) Vital Signs (24h Range):  Temp:  [97.5 °F (36.4 °C)-98.7 °F (37.1 °C)] 97.6 °F (36.4 °C)  Pulse:  [63-86] 71  Resp:  [20] 20  SpO2:  [95 %-99 %] 97 %  BP: (119-154)/(74-85) 119/77       Physical Examination:  General appearance: alert, appears stated age, and cooperative  Lungs: Crackles in all lung fields  Heart: regular rate and rhythm, S1, S2 normal, no murmur, click, rub or gallop  Abdomen: soft, non-tender; bowel sounds normal; no masses,  no organomegaly  Extremities: extremities normal, atraumatic, no cyanosis or edema  Pulses: 2+ and symmetric  Skin: Skin color, texture, turgor normal. No rashes or lesions    Laboratory:  Most Recent Data:  CBC:   Recent Labs   Lab 04/08/23  1822 04/09/23  0345 04/10/23  0353   WBC 6.5 3.9* 9.8   HGB 18.6* 17.0 15.9   HCT 59.2* 53.7* 52.2*    172 164       CMP:   Recent Labs   Lab 04/10/23  0353   CALCIUM 8.4*   ALBUMIN 2.5*      K 4.1   CO2 23   BUN 14.1   CREATININE 0.74   ALKPHOS 64   ALT 11   AST 13   BILITOT 0.3           Microbiology Data Reviewed: yes  Pertinent Findings:  4/9/23 Respiratory culture pending  4/8/23 Blood culture x 2 pending    Other Results:      Radiology:  Imaging Results              CTA Chest Non-Coronary (PE Studies) (Final result)  Result time 04/08/23 20:28:26      Final result by Neftaly Grimaldo MD (04/08/23 20:28:26)                   Impression:      1.  No pulmonary embolism identified.    2.  Extensive emphysematous changes and lungs fibrosis.    3.  Extensive mediastinal and bilateral hilar lymphadenopathy, unchanged.    .      Electronically signed by: Neftaly Grimaldo  Date:    04/08/2023  Time:    20:28                Narrative:    EXAMINATION:  CTA CHEST NON CORONARY (PE STUDIES)    CLINICAL HISTORY:  hypoxia, fever;    TECHNIQUE:  Sequential axial images performed of the chest using pulmonary embolism protocol following intravenous contrast bolus. Sagittal and contrast reformations performed.    Dose product length of 144 mGycm. Automated exposure control was utilized to minimize radiation dose.    COMPARISON:  December 9, 2022.    FINDINGS:  Optimal contrast bolus timing with adequately opacified pulmonary artery system is without evidence of filling defects from pulmonary thromboembolism within the main pulmonary artery and the major branches. Thoracic aorta is without aneurysmal dilatation or dissection.  Cardiac chambers are within normal limits in size.    There is extensive mediastinal and bilateral hilar lymphadenopathy.  For example the aortic pulmonary window lymph node is 2.0 cm and a paratracheal enlarged lymph node is 2.4 cm.  These lymph nodes are without significant interval change.    Lungs are remarkable for extensive emphysematous changes with cystic formations and ground-glass attenuation due to small airway disease.  No overt superimposed pulmonary edema, dense consolidation, cavitary abnormality or fluid within the pleural or the pericardial spaces.                                       X-Ray Chest AP Portable (Final result)  Result time 04/08/23 18:38:05      Final result by Neftaly Grimaldo MD (04/08/23 18:38:05)                   Impression:      As above.      Electronically signed by: Neftaly Grimaldo  Date:    04/08/2023  Time:    18:38               Narrative:    EXAMINATION:  XR CHEST AP PORTABLE    CLINICAL HISTORY:  Dyspnea, unspecified    TECHNIQUE:  One view    COMPARISON:  January 18, 2023    FINDINGS:  Lungs are remarkable for emphysematous changes and severe interstitial fibrosis without significant interval change.  Mild superimposed congestive process or hazy pneumonitis is difficult exclude.  No  focally dense consolidation or significant fluid accumulation within the pleural spaces.  No pneumothorax.                                      Current Medications:     Infusions:       Scheduled:   azithromycin  500 mg Oral Daily    enoxaparin  40 mg Subcutaneous Daily    fluticasone furoate-vilanteroL  1 puff Inhalation Daily    piperacillin-tazobactam (ZOSYN) IVPB  4.5 g Intravenous Q8H    sodium chloride 3%  4 mL Nebulization Once    vancomycin (VANCOCIN) IVPB  750 mg Intravenous Q12H        PRN:  acetaminophen, albuterol, dextrose 10%, dextrose 10%, glucagon (human recombinant), glucose, glucose, naloxone, sodium chloride 0.9%, Pharmacy to dose Vancomycin consult **AND** vancomycin - pharmacy to dose    Antibiotics and Day Number of Therapy:  Vancomycin, Zosyn, and Azithromycin, 4/8/23-present      Intake/Output Summary (Last 24 hours) at 4/10/2023 0714  Last data filed at 4/10/2023 0627  Gross per 24 hour   Intake 1490 ml   Output 1475 ml   Net 15 ml         Lines/Drains/Airways       Peripheral Intravenous Line  Duration                  Peripheral IV - Single Lumen 04/08/23 1815 18 G Anterior;Distal;Left Antecubital 1 day                      Assessment & Plan:     1) Shortness of breath, likely CAP superimposed on ILD, SIRS 3/4  -Increasing shortness of breath for the past 2 days, with fevers and dry cough that started at the same time  -Saturating 100% on RA, no documented fevers since starting antibiotics and only received one dose of tylenol in the ED  -Lactate negative  -Blood cultures pending, induced respiratory culture negative  -MRSA PCR negative; DC vancomycin   -Continue Zosyn, and azithromycin  -More likely an infectious process  -Attempt to wean off NRB while maintaining O2 saturation at 90-95%.  -Will consult Pulmonology    -Recommend starting patient on IV steroids 80 mg TID x 5 days     2) Anti-synthetase syndrome, ILD  -Has not been on plaquenil, been out of inhalers for the past week  -No  OFEV in hospital  -Started breo qd and albuterol prn wheezing  -Will consult Rheumatology about utility of IV corticosteroids      CODE STATUS: Full Code  Access: Peripheral  Antibiotics: Zosyn, Azithromycin  Diet: Regular  DVT Prophylaxis: Lovenox  GI Prophylaxis: none needed  Fluids: none    Disposition: Admission for CAP. Pending blood cultures. Continue IV antibiotics. MRSA PCR negative. Pulmonology and Rheumatology consulted. Will need 5 days of IV steroids per Pulmonology recs.      Aldair Lui MD  [facility], [facility]

## 2023-04-10 NOTE — PLAN OF CARE
Problem: Adult Inpatient Plan of Care  Goal: Plan of Care Review  4/10/2023 0933 by Gary Sanchez RN  Flowsheets (Taken 4/10/2023 0933)  Plan of Care Reviewed With: patient  4/10/2023 0933 by Gary Sanchez RN  Outcome: Ongoing, Progressing  Goal: Patient-Specific Goal (Individualized)  Outcome: Ongoing, Progressing  Goal: Absence of Hospital-Acquired Illness or Injury  Outcome: Ongoing, Progressing  Goal: Optimal Comfort and Wellbeing  Outcome: Ongoing, Progressing  Goal: Readiness for Transition of Care  Outcome: Ongoing, Progressing

## 2023-04-10 NOTE — NURSING
DR. JOHNSON CONTACTED VIA TELEPHONE AND INFORMED THAT PT HAS HOME O2 PRIOR TO ADMISSION TO HOSPITAL. MD ALSO INFORMED THAT PT O2 SATS DECREASED TO 87% WHILE WORKING WITH PHYS THERAPY AND RECOVERED TO 93% AFTER A FEW MOMENTS OF RESTING ON SIDE OF BED. NO NEW ORDERS AT THIS TIME.

## 2023-04-10 NOTE — CONSULTS
[facility] Rheumatology  Consult Note    Patient Name: Clarence Trejo Sr.  : 1955  MRN: [id number]  Admission Date: 2023  Attending Provider: Fabian Blake MD  Primary Care Physician: Emery Mcarthur MD    Date of Consultation: 4/10/23    Consultation Requested By: Dr Blake    Reason for Consultation: SOB and ILD    Subjective:     HPI: Mr Clarence Trejo Sr. is a 67 y.o. Black or  male with past medical history of ILD, chronic respiratory failure on 3 L of oxygen at home, SAM 1:320 homogeneous and 1:160 speckled pattern. Low titer PL-7, ILD and deformities of wrists d/t inflammatory arthritis. Diagnosed in 2021. He was treated with Actemra in the past.  He does not like taking injection and stopped taking it. CPK was normal in the past.  He was on Ofev 150 mg b.i.d..  Self discontinued Plaquenil in the past.    Last week oxygen machine at home stopped working, he presented to the ER with 2 day history of fever, chills, nonproductive cough and worsening shortness of breath.  Denies chest pain.  Received broad-spectrum antibiotics and IV steroids, today he reported feeling much better and waiting to be discharged.  Resting comfortably in his bed, denies any joint pain or red, warm and swollen joints.     Denies fevers, rashes, oral and nasal ulcers, history of DVT or PE, history of stroke or seizures, history of MI, history of malignancies, Raynaud's phenomenon, history of inflammatory eye diseases, history of inflammatory bowel disease.  Family history of autoimmune disease: none  Smokin pack-year smoking history, quit smoking in 2022.    Patient has No Known Allergies.     Past Medical History:  has a past medical history of SAM positive, Antisynthetase syndrome, ILD (interstitial lung disease), Seronegative rheumatoid arthritis, TB lung, latent, and Tobacco use.    Procedure History:  has no past surgical history on file.    Family History: family history includes COPD  in his father and mother.    Social History:  reports that he has quit smoking. He has never used smokeless tobacco. He reports that he does not currently use alcohol. He reports that he does not use drugs.    Review of systems:   CONSTITUTIONAL: negative with no fatigue.  Positive for fevers.  SKIN: negative with no recent rashes  EYES: negative with no complaints of dry irritated eyes  ENT: negative with no mouth dryness or sores  ENDO: negative with no hypothyroid symptoms  HEME: negative, with no history of anemia or DVT  CV: negative without exertional chest pain, palpitations, or edema  RESP:  Positive for nonproductive cough and shortness of breath.  GI: negative without nausea, vomiting, heartburn, or bleeding  NEURO: negative, with no imbalance and no numbness or tingling of the hands or feet  MUSCULOSKELETAL: as per HPI    Inpatient Medications:     Current Facility-Administered Medications:     acetaminophen tablet 650 mg, 650 mg, Oral, Q8H PRN, Ridge Valles, DO    albuterol inhaler 1 puff, 1 puff, Inhalation, Q6H PRN, Ridge Valles DO    azithromycin tablet 500 mg, 500 mg, Oral, Daily, Ridge Valles DO, 500 mg at 04/10/23 0826    dextrose 10% bolus 125 mL 125 mL, 12.5 g, Intravenous, PRN, Fabian Blake MD    dextrose 10% bolus 250 mL 250 mL, 25 g, Intravenous, PRN, Fabian Blake MD    enoxaparin injection 40 mg, 40 mg, Subcutaneous, Daily, Ridge Valles DO, 40 mg at 04/09/23 1650    fluticasone furoate-vilanteroL 100-25 mcg/dose diskus inhaler 1 puff, 1 puff, Inhalation, Daily, Ridge Valles DO, 1 puff at 04/10/23 0741    glucagon (human recombinant) injection 1 mg, 1 mg, Intramuscular, PRN, Ridge Valles DO    glucose chewable tablet 16 g, 16 g, Oral, PRN, Ridge Valles DO    glucose chewable tablet 24 g, 24 g, Oral, PRN, Ridge Valles, DO    methylPREDNISolone sodium succinate injection 80 mg, 80 mg, Intravenous, Q8H, Kristopher Eduardo MD, 80 mg at 04/10/23 1357    naloxone 0.4 mg/mL injection 0.02 mg, 0.02 mg,  Intravenous, PRN, Ridge Valles DO    piperacillin-tazobactam (ZOSYN) 4.5 g in sodium chloride 0.9 % 100 mL IVPB (MB+), 4.5 g, Intravenous, Q8H, Ridge Valles DO, Stopped at 04/10/23 1519    sodium chloride 0.9% flush 10 mL, 10 mL, Intravenous, Q12H PRN, Ridge Valles DO    sodium chloride 3% nebulizer solution 4 mL, 4 mL, Nebulization, Once, Ridge Valles DO     Objective:     Vital Signs (Most Recent):  Temp: 97.7 °F (36.5 °C) (04/10/23 1126)  Pulse: 76 (04/10/23 1126)  Resp: 18 (04/10/23 1126)  BP: 131/84 (04/10/23 1300)  SpO2: (!) 92 % (04/10/23 1500)   Vital Signs (24h Range):  Temp:  [97.6 °F (36.4 °C)-98.7 °F (37.1 °C)] 97.7 °F (36.5 °C)  Pulse:  [71-80] 76  Resp:  [18-20] 18  SpO2:  [92 %-99 %] 92 %  BP: (119-131)/(74-86) 131/84     Weight: 65.8 kg (145 lb) (04/10/23 1300)  Body mass index is 23.4 kg/m².  Body surface area is 1.75 meters squared.      Intake/Output Summary (Last 24 hours) at 4/10/2023 1553  Last data filed at 4/10/2023 1347  Gross per 24 hour   Intake 500 ml   Output 775 ml   Net -275 ml       Physical Exam:   General Appearance: no acute distress and cooperative  Skin: Skin color, texture, turgor normal. No rashes or lesions.  Eyes: conjunctivae/corneas clear. PERRL, EOM's intact.   ENT: No oral or nasal ulcers.  Neck:  Neck supple. No adenopathy.   Lungs:  Crackles in bilateral lungs.  Heart: RRR murmurs.   Abdomen: Soft, non-tender, no masses, organomegaly, rebound or guarding.  Neuro: CN II-XII GI, , sensory and motor innervation intact.  Musculoskeletal:  Decreased range of motion of bilateral wrists, worse on right.  DJD changes in bilateral hands.  Crepitus in bilateral knees.  No overt synovitis on exam.    Significant Labs:      Blood Culture: No results for input(s): LABBLOO in the last 24 hours.  CBC:   Recent Labs   Lab 04/10/23  0353   WBC 9.8   HGB 15.9   HCT 52.2*          CMP:   Recent Labs   Lab 04/10/23  0353   CALCIUM 8.4*   ALBUMIN 2.5*      K 4.1   CO2 23   BUN  14.1   CREATININE 0.74   ALKPHOS 64   ALT 11   AST 13   BILITOT 0.3       CRP:   No results for input(s): CRP in the last 24 hours.    ESR:   No results for input(s): SEDRATE in the last 24 hours.      All pertinent lab results from the last 24 hours have been reviewed.    Significant Imaging:     CTA Chest Non-Coronary (PE Studies)   Final Result      1.  No pulmonary embolism identified.      2.  Extensive emphysematous changes and lungs fibrosis.      3.  Extensive mediastinal and bilateral hilar lymphadenopathy, unchanged.      .         Electronically signed by: Neftaly Grimaldo   Date:    04/08/2023   Time:    20:28      X-Ray Chest AP Portable   Final Result      As above.         Electronically signed by: Neftaly Grimaldo   Date:    04/08/2023   Time:    18:38          Assessment/Plan:     67-year-old pleasant  gentleman with history of interstitial lung disease, hypoxic respiratory failure on oxygen, positive SAM and history of low titer PL 7 antibody presented with worsening shortness of breath, fevers and cough.    1. Interstitial lung disease, fibrotic changes and UIP pattern on CT chest.  CT angio on 04/08/2023 showed extensive emphysematous changes with cystic formation and ground-glass attenuation due to small airway disease.Patient improving with antibiotics and steroids, continue with broad-spectrum antibiotics per primary team.  Agree with IV steroid treatment for pulmonary.  Restart Plaquenil 200 mg daily.  - low titer PL 7 antibody in June 2021, repeat myositis panel was negative in November 2021.  Will check myositis panel now.  -  to work with his insurance to get new oxygen concentrator and machine.  - follow-up with me scheduled on May 23, 2023.    Plan discussed with primary team.     Thank you for your consult. I will sign off. Please contact us if you have any additional questions.    Dixie Caraballo MD  Rheumatology

## 2023-04-10 NOTE — CONSULTS
[facility] - [address] [place] [address] Surg Telemetry  Pulmonology  Consult Note    Patient Name: Clarence Trejo Sr.  MRN: [id number]  Admission Date: 4/8/2023  Hospital Length of Stay: 0 days  Code Status: Full Code  Attending Physician: Fabian Blake MD  Primary Care Provider: Emery Mcarthur MD   Principal Problem: Shortness of breath    Consults  Subjective:     HPI: Clarence Trejo Sr. is a 67 y.o. male who with a history of antisynthetase syndrome, ILD, chronic hypoxic respiratory failure on 3L NC at home who presented to [facility] ED on 4/8/2023 with a primary complaint of shortness of breath.     Patient is a poor historian. 2 day history of fevers/chills with a non-productive cough. Reported shortness of breath because his home O2 broke earlier this week. Unknown if he had increasing O2 requirements over the past few days that coincided with his fevers/cough. Followed by rheumatology, but only taking OFEV, took himself off plaquenil. Has been out of his inhalers for the past week. Denies chest pain, dyspnea on exertion.     In the ED, initial vitals were febrile at 101.3, tachycardic 103, tachypneic 28, bp 150/91, 76% on RA. Patient was resting comfortably in bed on 10L NRB, saturating 95%. Crackles heard bilaterally and in all lung fields. Lab workup revealed no leukocytosis, normocytic anemia at 7.16, around baseline, negative lactate, negative for Flu and COVID. CXR showed mild superimposed congestive process, no focally dense consolidation or pneumothorax. CTA chest did not reveal PE, but showed extensive emphysematous changes and lung fibrosis, and extensive mediastinal/bilateral hilar lymphadenopathy unchanged. Reports doing better today, still on 4L NC.     Past Medical History:   Diagnosis Date    SAM positive     Antisynthetase syndrome     ILD (interstitial lung disease)     Seronegative rheumatoid arthritis     TB lung, latent     Tobacco use        History reviewed. No pertinent surgical history.    Review of  patient's allergies indicates:  No Known Allergies    Family History       Problem Relation (Age of Onset)    COPD Mother, Father          Tobacco Use    Smoking status: Former    Smokeless tobacco: Never   Substance and Sexual Activity    Alcohol use: Not Currently    Drug use: Never    Sexual activity: Not Currently     Partners: Female        Review of Systems   Constitutional:  Positive for chills and fever.   HENT:  Negative for congestion and sinus pain.    Respiratory:  Positive for cough, shortness of breath and wheezing. Negative for chest tightness.    Cardiovascular:  Negative for chest pain and palpitations.   Gastrointestinal:  Negative for abdominal pain, constipation, diarrhea and nausea.   Genitourinary:  Negative for dysuria.   Neurological:  Negative for dizziness, syncope and weakness.   Objective:     Vital Signs (Most Recent):  Temp: 97.7 °F (36.5 °C) (04/10/23 1126)  Pulse: 76 (04/10/23 1126)  Resp: 18 (04/10/23 1126)  BP: 131/84 (04/10/23 1126)  SpO2: (!) 93 % (04/10/23 1126)   Vital Signs (24h Range):  Temp:  [97.6 °F (36.4 °C)-98.7 °F (37.1 °C)] 97.7 °F (36.5 °C)  Pulse:  [71-80] 76  Resp:  [18-20] 18  SpO2:  [92 %-99 %] 93 %  BP: (119-154)/(74-86) 131/84     Body mass index is 23.4 kg/m².      Intake/Output Summary (Last 24 hours) at 4/10/2023 1141  Last data filed at 4/10/2023 0627  Gross per 24 hour   Intake 1490 ml   Output 1125 ml   Net 365 ml       Physical Exam  HENT:      Head: Normocephalic.      Comments: On 4L NC  Eyes:      Conjunctiva/sclera: Conjunctivae normal.      Pupils: Pupils are equal, round, and reactive to light.   Cardiovascular:      Rate and Rhythm: Normal rate and regular rhythm.      Pulses: Normal pulses.      Heart sounds: No murmur heard.  Pulmonary:      Effort: Pulmonary effort is normal.      Breath sounds: Wheezing present.   Abdominal:      General: Abdomen is flat. Bowel sounds are normal. There is no distension.      Palpations: Abdomen is soft.       Tenderness: There is no abdominal tenderness.   Musculoskeletal:         General: Normal range of motion.      Cervical back: Normal range of motion.   Skin:     General: Skin is warm.      Capillary Refill: Capillary refill takes less than 2 seconds.   Neurological:      General: No focal deficit present.      Mental Status: He is alert and oriented to person, place, and time.       Vents:       Lines/Drains/Airways       Peripheral Intravenous Line  Duration                  Peripheral IV - Single Lumen 04/08/23 1815 18 G Anterior;Distal;Left Antecubital 1 day                    Significant Labs:    Lab Results   Component Value Date    WBC 9.8 04/10/2023    HGB 15.9 04/10/2023    HCT 52.2 (H) 04/10/2023    MCV 84.2 04/10/2023     04/10/2023         BMP  Lab Results   Component Value Date     04/10/2023    K 4.1 04/10/2023    CO2 23 04/10/2023    BUN 14.1 04/10/2023    CREATININE 0.74 04/10/2023    CALCIUM 8.4 (L) 04/10/2023    EGFRNONAA >105 11/25/2021       ABG  No results for input(s): PH, PO2, PCO2, HCO3, BE in the last 168 hours.      Blood Culture: No results for input(s): LABBLOO in the last 48 hours.  CMP:   Recent Labs   Lab 04/08/23  1822 04/09/23  0345 04/10/23  0353   * 137 140   K 4.6 4.8 4.1   CO2 23 19* 23   BUN 11.0 13.6 14.1   CREATININE 0.87 0.73 0.74   CALCIUM 9.2 8.8 8.4*   ALBUMIN 3.2* 2.7* 2.5*   BILITOT 0.9 0.5 0.3   ALKPHOS 85 72 64   AST 21 21 13   ALT 12 12 11       Significant Imaging:   I have reviewed all pertinent imaging results/findings within the past 24 hours.    Assessment/Plan:       ILD superimposed on suspected infectious process  -CXR showed emphysematous changes and severe interstitial fibrosis similar to previous CXRs in the past; cannot rule out infectious process  -CTA chest shows extensive emphysematous changes and lung fibrosis, more extensive in the upper lung fields. Also noted to have increased ground glass opacities compared to previous CT chest.  Picture consistent with interstitial lung disease and pulmonary fibrosis.  -In setting of fevers, chills, cough, and increased oxygen requirement while admitted, suspect current episode is due to pulmonary fibrosis disease flare in the setting of infectious process  -Recommend continuing antibiotic treatment   -Recommend starting patient on IV steroids 80 mg TID x5 days  -Will be okay to discontinue steroids after completion of 5 days of treatment as long as patient is able to restart on Plaquenil treatment outpatient      Thank you for your consult.      Aldair Lui MD  Pulmonology  [facility] - [facility] Surg Telemetry

## 2023-04-10 NOTE — PT/OT/SLP EVAL
Occupational Therapy   Evaluation and Discharge Note    Name: Clarence Trejo Sr.  MRN: [id number]  Admitting Diagnosis: Shortness of breath  Recent Surgery: * No surgery found *      Recommendations:     Discharge Recommendations: home  Discharge Equipment Recommendations: none  Barriers to discharge:  None    Assessment:     Clarence Trejo Sr. is a 67 y.o. male with a medical diagnosis of Shortness of breath. At this time, patient is functioning at their prior level of function and does not require further acute OT services.     Plan:     During this hospitalization, patient does not require further acute OT services.  Please re-consult if situation changes.    Plan of Care Reviewed with: patient    Subjective     Chief Complaint: SOB  Patient/Family Comments/goals: return home and resume all activities (including return to work)    Occupational Profile:  Living Environment: SSH with his S.O., 3 steps to enter with L handrail  Previous level of function: I with ADLs and mobility  Roles and Routines: pt drives, works at [employer]  Equipment Used at home: oxygen  Assistance upon Discharge: no anticipated need aside from home O2    Pain/Comfort:  Pain Rating 1: 0/10  Pain Rating Post-Intervention 1: 0/10    Patients cultural, spiritual, Sabianist conflicts given the current situation: no    Objective:     Communicated with: nurse Alamo prior to session.  Patient found HOB elevated with peripheral IV, oxygen upon OT entry to room.    General Precautions: Standard, airborne, other (see comments) (d/t latent TB)  Orthopedic Precautions: N/A  Braces: N/A  Respiratory Status: Nasal cannula, flow 4 L/min   Initial vital signs:  /81, HR 80, O2 sat 93%  Final vital signs:  /81, HR 76, O2 sat 88% after ambulating in room to sink and to restroom    Occupational Performance:    Bed Mobility:    Patient completed Supine to Sit with independence    Functional Mobility/Transfers:  Patient completed Sit  <> Stand Transfer with independence  with  no assistive device   Patient completed Toilet Transfer Step Transfer technique with modified independence with  grab bars  Functional Mobility: independent ambulating in room    Activities of Daily Living:  Feeding:  independence .  Grooming: independence .  Upper Body Dressing: stand by assistance to manage lines during dressing  Lower Body Dressing: modified independence seated EOB  Toileting: modified independence using grab bars    Cognitive/Visual Perceptual:  Cognitive/Psychosocial Skills:     -       Oriented to: Person, Place, Time, and Situation   -       Follows Commands/attention:Follows multistep  commands  -       Safety awareness/insight to disability: intact     Physical Exam:  BUE AROM WNL, strength 4/5, sensation intact      Treatment & Education:  Pt. educated on OT goals, no need for services d/t independent with ADLs, orientation to environment, use of call bell for assist as needed; okay to ambulate to and from restroom but call for nurse to connect nasal cannula extension d/t O2 sats dropping with activity and unsafe to ambulate to toilet on room air.  Pt stated agreement, nurse notified.      Patient left sitting edge of bed with all lines intact, call button in reach, and nurse notified    GOALS:   Multidisciplinary Problems       Occupational Therapy Goals       Not on file                    History:     Past Medical History:   Diagnosis Date    SAM positive     Antisynthetase syndrome     ILD (interstitial lung disease)     Seronegative rheumatoid arthritis     TB lung, latent     Tobacco use        History reviewed. No pertinent surgical history.    Time Tracking:     OT Date of Treatment: 04/10/23  OT Start Time: 0915  OT Stop Time: 0947  OT Total Time (min): 32 min    Billable Minutes:Evaluation 32    4/10/2023

## 2023-04-10 NOTE — PLAN OF CARE
04/10/23 1546   Discharge Assessment   Assessment Type Discharge Planning Assessment   Confirmed/corrected address, phone number and insurance Yes   Confirmed Demographics Correct on Facesheet   Source of Information patient   When was your last doctors appointment?   (Emery Mcarthur)   Reason For Admission CP, Dyspnea   People in Home significant other   Facility Arrived From: home   Do you expect to return to your current living situation? Yes   Do you have help at home or someone to help you manage your care at home? No   Prior to hospitilization cognitive status: Alert/Oriented   Current cognitive status: Alert/Oriented   Equipment Currently Used at Home none   Readmission within 30 days? No   Patient currently being followed by outpatient case management? No   Do you currently have service(s) that help you manage your care at home? No   Do you take prescription medications? Yes   Do you have prescription coverage? Yes   Coverage [facility] [employer] MEDICARE  [employer] [facility] COMPLETE   Do you have any problems affording any of your prescribed medications? No   Is the patient taking medications as prescribed? yes   Who is going to help you get home at discharge? Uncle J Luis Tamez [phone]   How do you get to doctors appointments? family or friend will provide;public transportation   Are you on dialysis? No   Do you take coumadin? No   Discharge Plan A Home   DME Needed Upon Discharge  none   Discharge Plan discussed with: Patient   Discharge Barriers Identified None   Physical Activity   On average, how many days per week do you engage in moderate to strenuous exercise (like a brisk walk)? 7 days   On average, how many minutes do you engage in exercise at this level? 10 min   Financial Resource Strain   How hard is it for you to pay for the very basics like food, housing, medical care, and heating? Not hard   Housing Stability   In the last 12 months, was there a time when you were not able to pay  the mortgage or rent on time? N   In the last 12 months, was there a time when you did not have a steady place to sleep or slept in a shelter (including now)? N   Transportation Needs   In the past 12 months, has lack of transportation kept you from medical appointments or from getting medications? no   In the past 12 months, has lack of transportation kept you from meetings, work, or from getting things needed for daily living? No   Food Insecurity   Within the past 12 months, the food you bought just didn't last and you didn't have money to get more. Never true   Stress   Do you feel stress - tense, restless, nervous, or anxious, or unable to sleep at night because your mind is troubled all the time - these days? Not at all   Social Connections   In a typical week, how many times do you talk on the phone with family, friends, or neighbors? Three   How often do you get together with friends or relatives? Three times   How often do you attend Taoist or Scientologist services? Never   Do you belong to any clubs or organizations such as Taoist groups, unions, fraternal or athletic groups, or school groups? No   How often do you attend meetings of the clubs or organizations you belong to? Never   Are you , , , , never , or living with a partner?    Alcohol Use   Q1: How often do you have a drink containing alcohol? Never   Q2: How many drinks containing alcohol do you have on a typical day when you are drinking? None   Q3: How often do you have six or more drinks on one occasion? Never

## 2023-04-10 NOTE — PT/OT/SLP EVAL
Physical Therapy Evaluation & Discharge Note    Patient Name:  Clarence Trejo Sr.   MRN:  [id number]    Recommendations     Discharge Recommendations:  home   Discharge Equipment Recommendations: none   Barriers to discharge: Barriers: none    Assessment     Clarence Trejo Sr. is a 67 y.o. male admitted with a medical diagnosis of Shortness of breath.  1. Dyspnea    2. Screening due    3. Chest pain      Per medical record    Clarence Trejo Sr. is a 67 y.o. male who with a history of antisynthetase syndrome, ILD, chronic hypoxic respiratory failure on 3L NC at home who presented to [facility] ED on 4/8/2023 with a primary complaint of shortness of breath.     Patient is a poor historian. 2 day history of fevers/chills with a non-productive cough. Reported shortness of breath because his home O2 broke earlier this week. Unknown if he had increasing O2 requirements over the past few days that coincided with his fevers/cough.     Patient is mod (I) w/ mobility and reported no concerns for discharge home. He is anxious for discharge. Pt with decr in ox sats to 87-88% on 4lPM w/ activity in room during evaluation and requires increased time to recover to 91%. Patient is d/c from PT to nursing    Discharge: Patient's current level of function is at baseline (PLOF). Patient does not require further acute PT services.     Recent Surgery: * No surgery found *      Plan     Patient discharged from acute PT Services on 04/10/23.  Re-consult PT Services if patient's status changes and therapy services warranted.    Subjective     Communicated with patient's nurse kimberlee prior to session.    Patient agreeable to participate in evaluation.     Chief Complaint: none  Patient/Family Comments/goals: to go home  Pain/Comfort:  Pain Rating 1: 0/10    Patients cultural, spiritual, Episcopalian conflicts given the current situation: no    Social History:  Living Environment: Patient lives with their significant other in a single level home with  3  steps outside and with left handrail.  Functional Level: Prior to admission patient was Employed, was driving, ambulated without assistive device, and was independent in ADL's. Pt was able to do what he was able to at work and wears home ox. His significant other manages all household duties  Equipment at Home :none  DME owned and not currently used: none  Assistance Upon Discharge: significant other.    Objective     Patient found supine in bed and with HOB elevated with peripheral IV, oxygen  upon PT entry to room.    General Precautions: Standard,     Orthopedic Precautions:N/A   Braces: N/A  Respiratory Status: 4 liters/min O2 via nasal cannula    Vitals   Pre session Post session   Blood Pressure mmHG 140/81 144/81   Heart Rate bpm 80 76   O2 SAT % 93 88     Exams:  Cognition: Patient is oriented to Person, Place, Time, Situation  Patient follows 100% of commands.   Sensation:    -       Intact  light/touch BUE / LE  RLE ROM: WFL  RLE Strength: WFL  LLE ROM: WFL  LLE Strength: WFL    Functional Mobility:    Bed Mobility:  Supine to Sit: independence    Transfers:  Sit to Stand: independence with no AD    Gait:  Patient ambulated 50ft in room (has airborne precautions sign on the door) with no AD and modified independence.  Patient demonstrates steady gait and no loss of balance.    Pt ambulates in room and is mod (I) for toileting and standing hygiene activities. PT  manages IV pole. Pt manages ox lines.  Ox sats are 87-88% on 4lPM after activity. Increased time for recovery to 91%. Pt without c/o SOB    Balance:  Sit  Static: FAIR: Maintains without assist, no challenges given  Dynamic: FAIR+: Maintains balance through MINIMAL excursions of active trunk motion  Stand  Static: FAIR: Maintains without assist but no challenges given  Dynamic: GOOD+: Independent gait (with or without assistive device)    Patient left sitting edge of bed with all lines intact, call button in reach, and nurse notified.    Goals - No  STG's or LTG's established secondary to patient was seen for evaluation and discharge     Multidisciplinary Problems       Physical Therapy Goals       Not on file                    Reasons for Discontinuation of Therapy Services: PT Services not warranted at this time    History     Past Medical History:   Diagnosis Date    SAM positive     Antisynthetase syndrome     ILD (interstitial lung disease)     Seronegative rheumatoid arthritis     TB lung, latent     Tobacco use        History reviewed. No pertinent surgical history.    Time Tracking     PT Received On: 04/10/23  PT Start Time: 0920     PT Stop Time: 0946  PT Total Time (min): 26 min     Billable Minutes: Evaluation 26    04/10/2023

## 2023-04-11 VITALS
DIASTOLIC BLOOD PRESSURE: 83 MMHG | RESPIRATION RATE: 16 BRPM | TEMPERATURE: 98 F | HEART RATE: 86 BPM | BODY MASS INDEX: 23.3 KG/M2 | OXYGEN SATURATION: 92 % | SYSTOLIC BLOOD PRESSURE: 151 MMHG | HEIGHT: 66 IN | WEIGHT: 145 LBS

## 2023-04-11 LAB
ALBUMIN SERPL-MCNC: 2.8 G/DL (ref 3.4–4.8)
ALBUMIN/GLOB SERPL: 0.6 RATIO (ref 1.1–2)
ALP SERPL-CCNC: 68 UNIT/L (ref 40–150)
ALT SERPL-CCNC: 9 UNIT/L (ref 0–55)
AST SERPL-CCNC: 12 UNIT/L (ref 5–34)
BACTERIA SPEC CULT: NORMAL
BASOPHILS # BLD AUTO: 0 X10(3)/MCL (ref 0–0.2)
BASOPHILS NFR BLD AUTO: 0 %
BILIRUBIN DIRECT+TOT PNL SERPL-MCNC: 0.3 MG/DL
BUN SERPL-MCNC: 9 MG/DL (ref 8.4–25.7)
CALCIUM SERPL-MCNC: 8.9 MG/DL (ref 8.8–10)
CHLORIDE SERPL-SCNC: 105 MMOL/L (ref 98–107)
CO2 SERPL-SCNC: 26 MMOL/L (ref 23–31)
CREAT SERPL-MCNC: 0.71 MG/DL (ref 0.73–1.18)
EOSINOPHIL # BLD AUTO: 0 X10(3)/MCL (ref 0–0.9)
EOSINOPHIL NFR BLD AUTO: 0 %
ERYTHROCYTE [DISTWIDTH] IN BLOOD BY AUTOMATED COUNT: 15.9 % (ref 11.5–17)
GFR SERPLBLD CREATININE-BSD FMLA CKD-EPI: >60 MLS/MIN/1.73/M2
GLOBULIN SER-MCNC: 4.5 GM/DL (ref 2.4–3.5)
GLUCOSE SERPL-MCNC: 144 MG/DL (ref 82–115)
GRAM STN SPEC: NORMAL
HCT VFR BLD AUTO: 54.2 % (ref 42–52)
HGB BLD-MCNC: 16.7 G/DL (ref 14–18)
IMM GRANULOCYTES # BLD AUTO: 0.03 X10(3)/MCL (ref 0–0.04)
IMM GRANULOCYTES NFR BLD AUTO: 0.5 %
LYMPHOCYTES # BLD AUTO: 0.65 X10(3)/MCL (ref 0.6–4.6)
LYMPHOCYTES NFR BLD AUTO: 10.8 %
MAGNESIUM SERPL-MCNC: 1.8 MG/DL (ref 1.6–2.6)
MCH RBC QN AUTO: 25.8 PG (ref 27–31)
MCHC RBC AUTO-ENTMCNC: 30.8 G/DL (ref 33–36)
MCV RBC AUTO: 83.8 FL (ref 80–94)
MONOCYTES # BLD AUTO: 0.04 X10(3)/MCL (ref 0.1–1.3)
MONOCYTES NFR BLD AUTO: 0.7 %
NEUTROPHILS # BLD AUTO: 5.3 X10(3)/MCL (ref 2.1–9.2)
NEUTROPHILS NFR BLD AUTO: 88 %
NRBC BLD AUTO-RTO: 0 %
PHOSPHATE SERPL-MCNC: 3.1 MG/DL (ref 2.3–4.7)
PLATELET # BLD AUTO: 191 X10(3)/MCL (ref 130–400)
PMV BLD AUTO: 11.2 FL (ref 7.4–10.4)
POTASSIUM SERPL-SCNC: 4.4 MMOL/L (ref 3.5–5.1)
PROT SERPL-MCNC: 7.3 GM/DL (ref 5.8–7.6)
RBC # BLD AUTO: 6.47 X10(6)/MCL (ref 4.7–6.1)
SODIUM SERPL-SCNC: 138 MMOL/L (ref 136–145)
WBC # SPEC AUTO: 6 X10(3)/MCL (ref 4.5–11.5)

## 2023-04-11 PROCEDURE — 83735 ASSAY OF MAGNESIUM: CPT

## 2023-04-11 PROCEDURE — 25000003 PHARM REV CODE 250

## 2023-04-11 PROCEDURE — 63600175 PHARM REV CODE 636 W HCPCS: Performed by: FAMILY MEDICINE

## 2023-04-11 PROCEDURE — 63600175 PHARM REV CODE 636 W HCPCS

## 2023-04-11 PROCEDURE — 94640 AIRWAY INHALATION TREATMENT: CPT

## 2023-04-11 PROCEDURE — 63700000 PHARM REV CODE 250 ALT 637 W/O HCPCS

## 2023-04-11 PROCEDURE — 85025 COMPLETE CBC W/AUTO DIFF WBC: CPT

## 2023-04-11 PROCEDURE — 86235 NUCLEAR ANTIGEN ANTIBODY: CPT | Mod: 90 | Performed by: INTERNAL MEDICINE

## 2023-04-11 PROCEDURE — 80053 COMPREHEN METABOLIC PANEL: CPT

## 2023-04-11 PROCEDURE — 27000221 HC OXYGEN, UP TO 24 HOURS

## 2023-04-11 PROCEDURE — 94761 N-INVAS EAR/PLS OXIMETRY MLT: CPT

## 2023-04-11 PROCEDURE — 83516 IMMUNOASSAY NONANTIBODY: CPT | Mod: 90 | Performed by: INTERNAL MEDICINE

## 2023-04-11 PROCEDURE — 84100 ASSAY OF PHOSPHORUS: CPT

## 2023-04-11 RX ORDER — HYDROXYCHLOROQUINE SULFATE 200 MG/1
200 TABLET, FILM COATED ORAL DAILY
Status: DISCONTINUED | OUTPATIENT
Start: 2023-04-11 | End: 2023-04-11 | Stop reason: HOSPADM

## 2023-04-11 RX ORDER — HYDROXYCHLOROQUINE SULFATE 200 MG/1
200 TABLET, FILM COATED ORAL DAILY
Qty: 90 TABLET | Refills: 0 | Status: SHIPPED | OUTPATIENT
Start: 2023-04-12 | End: 2023-05-23 | Stop reason: SDUPTHER

## 2023-04-11 RX ORDER — PREDNISONE 20 MG/1
TABLET ORAL
Qty: 56 TABLET | Refills: 0 | Status: SHIPPED | OUTPATIENT
Start: 2023-04-11 | End: 2023-05-23 | Stop reason: ALTCHOICE

## 2023-04-11 RX ORDER — LEVOFLOXACIN 750 MG/1
750 TABLET ORAL DAILY
Qty: 3 TABLET | Refills: 0 | Status: SHIPPED | OUTPATIENT
Start: 2023-04-12 | End: 2023-05-23 | Stop reason: ALTCHOICE

## 2023-04-11 RX ORDER — AMOXICILLIN AND CLAVULANATE POTASSIUM 875; 125 MG/1; MG/1
1 TABLET, FILM COATED ORAL 2 TIMES DAILY
Qty: 7 TABLET | Refills: 0 | Status: SHIPPED | OUTPATIENT
Start: 2023-04-11 | End: 2023-04-11 | Stop reason: HOSPADM

## 2023-04-11 RX ORDER — PREDNISONE 10 MG/1
TABLET ORAL
Qty: 18 TABLET | Refills: 0 | Status: SHIPPED | OUTPATIENT
Start: 2023-05-09 | End: 2023-05-23 | Stop reason: ALTCHOICE

## 2023-04-11 RX ADMIN — METHYLPREDNISOLONE SODIUM SUCCINATE 80 MG: 40 INJECTION, POWDER, FOR SOLUTION INTRAMUSCULAR; INTRAVENOUS at 06:04

## 2023-04-11 RX ADMIN — PIPERACILLIN AND TAZOBACTAM 4.5 G: 4; .5 INJECTION, POWDER, LYOPHILIZED, FOR SOLUTION INTRAVENOUS; PARENTERAL at 02:04

## 2023-04-11 RX ADMIN — FLUTICASONE FUROATE AND VILANTEROL TRIFENATATE 1 PUFF: 100; 25 POWDER RESPIRATORY (INHALATION) at 07:04

## 2023-04-11 RX ADMIN — HYDROXYCHLOROQUINE SULFATE 200 MG: 200 TABLET ORAL at 08:04

## 2023-04-11 RX ADMIN — AZITHROMYCIN MONOHYDRATE 500 MG: 250 TABLET ORAL at 08:04

## 2023-04-11 NOTE — MEDICAL/APP STUDENT
[facility]ards   Progress Note     Resident Team: [facility] Medicine List 3  Attending Physician: Fabian Blake MD  Resident: Alesha Woodard  Intern: Sania Valles   Hospital Length of Stay: 3 days    Subjective:      Brief HPI:  Clarence Trejo Sr. is a 67 y.o. male who with a history of antisynthetase syndrome, ILD, chronic hypoxic respiratory failure on 3L NC at home who presented to [facility] ED on 4/8/2023 with a primary complaint of shortness of breath.     Patient is a poor historian. 2 day history of fevers/chills with a non-productive cough. Reported shortness of breath because his home O2 broke earlier this week. Unknown if he had increasing O2 requirements over the past few days that coincided with his fevers/cough. Followed by rheumatology, but only taking OFEV, took himself off plaquenil. Has been out of his inhalers for the past week. Denies chest pain, dyspnea on exertion.     In the ED, initial vitals were febrile at 101.3, tachycardic 103, tachypneic 28, bp 150/91, 76% on RA. Patient was resting comfortably in bed on 10L NRB, saturating 95%. Crackles heard bilaterally and in all lung fields. Lab workup revealed no leukocytosis, normocytic anemia at 7.16, around baseline, negative lactate, negative for Flu and COVID. CXR showed mild superimposed congestive process, no focally dense consolidation or pneumothorax. CTA chest did not reveal PE, but showed extensive emphysematous changes and lung fibrosis, and extensive mediastinal/bilateral hilar lymphadenopathy unchanged. Admitted to internal medicine for CAP rule out/ILD flare management.      Interval History: NAEON. AFVSS. Patient reports feeling well. No new complaints. He would like to go home. Satting at 92% on nasal canula.     Review of Systems:  Pertinent items are noted in HPI.     Objective:     Vital Signs (Most Recent):  Temp: 98.2 °F (36.8 °C) (04/11/23 0711)  Pulse: 84 (04/11/23 0725)  Resp: 16 (04/11/23 0725)  BP: 118/75 (04/11/23 0711)  SpO2:  (!) 92 % (04/11/23 0725)   Vital Signs (24h Range):  Temp:  [97.6 °F (36.4 °C)-98.2 °F (36.8 °C)] 98.2 °F (36.8 °C)  Pulse:  [71-84] 84  Resp:  [16-20] 16  SpO2:  [86 %-95 %] 92 %  BP: (118-132)/(75-84) 118/75       Physical Examination:  General appearance: alert, appears stated age, and cooperative  Lungs: Crackles in all lung fields  Heart: regular rate and rhythm, S1, S2 normal, no murmur, click, rub or gallop  Abdomen: soft, non-tender; bowel sounds normal; no masses,  no organomegaly  Extremities: extremities normal, atraumatic, no cyanosis or edema, clubbing present on all digits   Pulses: 2+ and symmetric  Skin: Skin color, texture, turgor normal. No rashes or lesions    Laboratory:  Most Recent Data:  CBC:   Recent Labs   Lab 04/10/23  0353 04/11/23  0411   WBC 9.8 6.0   HGB 15.9 16.7   HCT 52.2* 54.2*    191     CMP:   Recent Labs   Lab 04/11/23  0411   CALCIUM 8.9   ALBUMIN 2.8*      K 4.4   CO2 26   BUN 9.0   CREATININE 0.71*   ALKPHOS 68   ALT 9   AST 12   BILITOT 0.3         Microbiology Data Reviewed: yes  Pertinent Findings:  Blood culture NG@24hr  Respiratory culture: normal respiratory alpesh    Radiology:  Imaging Results              CTA Chest Non-Coronary (PE Studies) (Final result)  Result time 04/08/23 20:28:26      Final result by Neftaly Grimaldo MD (04/08/23 20:28:26)                   Impression:      1.  No pulmonary embolism identified.    2.  Extensive emphysematous changes and lungs fibrosis.    3.  Extensive mediastinal and bilateral hilar lymphadenopathy, unchanged.    .      Electronically signed by: Neftaly Grimaldo  Date:    04/08/2023  Time:    20:28               Narrative:    EXAMINATION:  CTA CHEST NON CORONARY (PE STUDIES)    CLINICAL HISTORY:  hypoxia, fever;    TECHNIQUE:  Sequential axial images performed of the chest using pulmonary embolism protocol following intravenous contrast bolus. Sagittal and contrast reformations performed.    Dose product length of 144  mGycm. Automated exposure control was utilized to minimize radiation dose.    COMPARISON:  December 9, 2022.    FINDINGS:  Optimal contrast bolus timing with adequately opacified pulmonary artery system is without evidence of filling defects from pulmonary thromboembolism within the main pulmonary artery and the major branches. Thoracic aorta is without aneurysmal dilatation or dissection.  Cardiac chambers are within normal limits in size.    There is extensive mediastinal and bilateral hilar lymphadenopathy.  For example the aortic pulmonary window lymph node is 2.0 cm and a paratracheal enlarged lymph node is 2.4 cm.  These lymph nodes are without significant interval change.    Lungs are remarkable for extensive emphysematous changes with cystic formations and ground-glass attenuation due to small airway disease.  No overt superimposed pulmonary edema, dense consolidation, cavitary abnormality or fluid within the pleural or the pericardial spaces.                                       X-Ray Chest AP Portable (Final result)  Result time 04/08/23 18:38:05      Final result by Neftaly Grimaldo MD (04/08/23 18:38:05)                   Impression:      As above.      Electronically signed by: Neftaly Grimaldo  Date:    04/08/2023  Time:    18:38               Narrative:    EXAMINATION:  XR CHEST AP PORTABLE    CLINICAL HISTORY:  Dyspnea, unspecified    TECHNIQUE:  One view    COMPARISON:  January 18, 2023    FINDINGS:  Lungs are remarkable for emphysematous changes and severe interstitial fibrosis without significant interval change.  Mild superimposed congestive process or hazy pneumonitis is difficult exclude.  No focally dense consolidation or significant fluid accumulation within the pleural spaces.  No pneumothorax.                                      Current Medications:     Infusions:       Scheduled:   azithromycin  500 mg Oral Daily    enoxaparin  40 mg Subcutaneous Daily    fluticasone furoate-vilanteroL  1  puff Inhalation Daily    hydrOXYchloroQUINE  200 mg Oral Daily    methylPREDNISolone sodium succinate injection  80 mg Intravenous Q8H    piperacillin-tazobactam (ZOSYN) IVPB  4.5 g Intravenous Q8H    sodium chloride 3%  4 mL Nebulization Once        PRN:  acetaminophen, albuterol, dextrose 10%, dextrose 10%, glucagon (human recombinant), glucose, glucose, naloxone, sodium chloride 0.9%    Antibiotics and Day Number of Therapy:  Day 3 of Zosyn and Azithromycin       Intake/Output Summary (Last 24 hours) at 4/11/2023 0811  Last data filed at 4/11/2023 0439  Gross per 24 hour   Intake 100 ml   Output 1575 ml   Net -1475 ml       Lines/Drains/Airways       Peripheral Intravenous Line  Duration                  Peripheral IV - Single Lumen 04/08/23 1815 18 G Anterior;Distal;Left Antecubital 2 days                    Assessment & Plan:   1) Shortness of breath, likely CAP superimposed on ILD, SIRS 3/4  -Blood cultures NG@24hr, respiratory culture negative  -DC vancomycin. Continue zosyn, and azithromycin. On day 3  -Weaned down to 4L nasal cannula.  -Pulmonary consulted.Give IV steroids 80 mg TID x5 days.   -Patient feeling well. No complaints and is ready to go home.  -Will discharge patient on oral steroids and antibiotics (Augmentin and Azithromycin) and resume Plaquenil treatment outpatient. Continue oxygen at home.     2) Anti-synthetase syndrome, ILD  -Has not been on plaquenil, been out of inhalers for the past week  -No OFEV in hospital  -Started breo qd and albuterol prn wheezing  -Follow with rheumatology outpatient.       CODE STATUS: Full Code  Access: Peripheral  Antibiotics: Zosyn, Azithromycin  Diet: Regular  DVT Prophylaxis: Lovenox  GI Prophylaxis: none needed  Fluids: none     Disposition: Day 3 of admission for CAP. On Day 3 IV antibiotics. Patient stable for discharge.     Brinda Mckinney, MS3

## 2023-04-11 NOTE — DISCHARGE SUMMARY
[facility] Discharge Summary    Admitting Physician: Fabian Blake MD  Attending Physician: Fabian Blake MD  Date of Admit: 4/8/2023  Date of Discharge: 4/11/2023    Condition: Good  Outcome: Condition has improved and patient is now ready for discharge.  DISPOSITION: Home or Self Care          Discharge Diagnoses     Patient Active Problem List   Diagnosis    Clubbing of fingers    Antisynthetase syndrome    Interstitial lung disease    Positive antinuclear antibody    Seronegative rheumatoid arthritis    Fatigue    High risk medication use    Shortness of breath    History of tobacco abuse    TB lung, latent       Principal Problem:  Shortness of breath    Consultants and Procedures     Consultants:  IP CONSULT TO HOSPITAL MEDICINE  IP CONSULT TO RHEUMATOLOGY    Procedures:   * No surgery found *     Brief Admission History       Clarence Trejo Sr. is a 67 y.o. male who with a history of antisynthetase syndrome, ILD, chronic hypoxic respiratory failure on 3L NC at home who presented to [facility] ED on 4/8/2023 with a primary complaint of shortness of breath.     Patient is a poor historian. 2 day history of fevers/chills with a non-productive cough. Reported shortness of breath because his home O2 broke earlier this week. Unknown if he had increasing O2 requirements over the past few days that coincided with his fevers/cough. Followed by rheumatology, but only taking OFEV, took himself off plaquenil. Has been out of his inhalers for the past week. Denies chest pain, dyspnea on exertion.     In the ED, initial vitals were febrile at 101.3, tachycardic 103, tachypneic 28, bp 150/91, 76% on RA. Patient was resting comfortably in bed on 10L NRB, saturating 95%. Crackles heard bilaterally and in all lung fields. Lab workup revealed no leukocytosis, normocytic anemia at 7.16, around baseline, negative lactate, negative for Flu and COVID. CXR showed mild superimposed congestive process, no focally dense  "consolidation or pneumothorax. CTA chest did not reveal PE, but showed extensive emphysematous changes and lung fibrosis, and extensive mediastinal/bilateral hilar lymphadenopathy unchanged. Reports doing better today, still on 4L NC.     Hospital Course with Pertinent Findings     Patient admitted for suspected infectious process superimposed on ILD. CXR showing emphysematous changes with severe Interstitial fibrosis, similar to imaging in the past. CTA chest with similar findings, noted to have increased ground glass opacity. Pt had been having fevers but have resolved over 24 hours. Has been getting IV abx and steroids. Breo and albuterol prn for wheezing. Blood cultures and respiratory cultures negative. Rheum consulted and myositis panel drawn, rec to start plaquenil 200mg daily. Patient stated he is feeling better today and he is ready to go home.     Discharge physical exam:  Vitals  BP: 118/75  Temp: 98.2 °F (36.8 °C)  Temp Source: Oral  Pulse: 84  Resp: 16  SpO2: (!) 93 %  Height: 5' 6" (167.6 cm)  Weight: 65.8 kg (145 lb)    Vital signs and nursing notes reviewed.  Constitutional: Patient is in NAD. Awake and alert.  3L NC, comfortable.   Head: Atraumatic. Normocephalic.  Eyes: Conjunctivae nl. No scleral icterus.  ENT: Mucous membranes are moist.  Airway patent  Neck: Supple. Full ROM.   Cardiovascular: Regular rate and rhythm. No murmurs, rubs, or gallops. Distal pulses are 2+ and symmetric .  Pulmonary/Chest: No respiratory distress. Clear to auscultation bilaterally. No wheezing, rales, or rhonchi.  Abdominal: Soft. Non-distended. No TTP. No rebound, guarding, or rigidity.   Musculoskeletal: Moves all extremities. No edema.    Skin: Warm and dry.  Neurological: Awake and alert. No acute focal neurological deficits are appreciated.      TIME SPENT ON DISCHARGE: 50 minutes    Discharge Medications        Medication List        START taking these medications      amoxicillin-clavulanate 875-125mg 875-125 " mg per tablet  Commonly known as: AUGMENTIN  Take 1 tablet by mouth 2 (two) times daily. for 3 days     hydrOXYchloroQUINE 200 mg tablet  Commonly known as: PLAQUENIL  Take 1 tablet (200 mg total) by mouth once daily.  Start taking on: April 12, 2023     levoFLOXacin 750 MG tablet  Commonly known as: LEVAQUIN  Take 1 tablet (750 mg total) by mouth once daily.  Start taking on: April 12, 2023     methylPREDNISolone 4 mg tablet  Commonly known as: MEDROL DOSEPACK  use as directed            CONTINUE taking these medications      albuterol 90 mcg/actuation inhaler  Commonly known as: PROVENTIL/VENTOLIN HFA  Inhale 1-2 puffs into the lungs every 6 (six) hours as needed for Wheezing. Rescue     albuterol-ipratropium 2.5 mg-0.5 mg/3 mL nebulizer solution  Commonly known as: DUO-NEB  Take 3 mLs by nebulization every 6 (six) hours as needed for Wheezing. Rescue     baclofen 10 MG tablet  Commonly known as: LIORESAL     budesonide-formoterol 80-4.5 mcg 80-4.5 mcg/actuation Hfaa  Commonly known as: SYMBICORT  Inhale 2 puffs into the lungs 2 (two) times a day.     HYDROcodone-acetaminophen 5-325 mg per tablet  Commonly known as: NORCO  Take 1 tablet by mouth every 6 (six) hours as needed for Pain.     meloxicam 7.5 MG tablet  Commonly known as: MOBIC     OFEV 150 mg Cap  Generic drug: nintedanib  TAKE 1 CAPSULE BY MOUTH TWICE A DAY               Where to Get Your Medications        These medications were sent to [id number] [place], [place] - [address]   [address] [address]      Phone: [phone]   amoxicillin-clavulanate 875-125mg 875-125 mg per tablet  hydrOXYchloroQUINE 200 mg tablet  levoFLOXacin 750 MG tablet  methylPREDNISolone 4 mg tablet         Discharge Information:     The case has been discussed with staff/attending physician. The patient has been seen and evaluated during rounds where the plan to discharge was discussed with pt. F/u  with rheum, PCP, post wards. RTER if any new/worsening sx. Pt/family express understanding and agree with the plan.      Follow-up Information       Emery Mcarthur MD Follow up.    Specialty: Internal Medicine  Contact information:  [address] [facility] [id number] [phone]               [facility]. Go to.    Specialty: Emergency Medicine  Why: If symptoms worsen, As needed  Contact information:  [address] [facility] [address] [address] [phone]             [facility] INTERNAL MEDICINE Follow up.               Dixie Caraballo MD Follow up on 5/23/2023.    Specialty: Rheumatology  Contact information:  [address] [facility] [id number] [phone]                               Ajay Merino DO  [facility] Internal [facility], [facility]

## 2023-04-12 ENCOUNTER — PATIENT OUTREACH (OUTPATIENT)
Dept: ADMINISTRATIVE | Facility: CLINIC | Age: 68
End: 2023-04-12

## 2023-04-12 NOTE — PROGRESS NOTES
C3 nurse attempted to contact Clarence Trejo Sr.  for a TCC post hospital discharge follow up call. No answer. No voicemail available. The patient has a scheduled HOSFU appointment with [facility] on 04/17/2023 @ 115 pm.

## 2023-04-13 NOTE — PROGRESS NOTES
C3 nurse spoke with Clarence Trejo Sr.  for a TCC post hospital discharge follow up call. While speaking with patient and verifying medications, unsure of prednisone doses and frequency. Requested call back when gets home to check prednisone bottles.   The patient has a scheduled HOSFU appointment with [facility] on 04/17/2023 @ 115 pm.

## 2023-04-13 NOTE — PROGRESS NOTES
C3 nurse spoke with Clarence Trejo Sr.  for a TCC post hospital discharge follow up call. The patient has a scheduled HOSFU appointment with [facility] on 04/17/2023 @ 115 pm.  Appointment with Dr. Nuñez on 05/23/2023 @ 1130 am.

## 2023-04-14 LAB
BACTERIA BLD CULT: NORMAL
BACTERIA BLD CULT: NORMAL

## 2023-04-17 ENCOUNTER — OFFICE VISIT (OUTPATIENT)
Dept: INTERNAL MEDICINE | Facility: CLINIC | Age: 68
End: 2023-04-17
Payer: MEDICARE

## 2023-04-17 VITALS
RESPIRATION RATE: 20 BRPM | HEART RATE: 94 BPM | SYSTOLIC BLOOD PRESSURE: 107 MMHG | DIASTOLIC BLOOD PRESSURE: 70 MMHG | TEMPERATURE: 97 F | OXYGEN SATURATION: 92 % | BODY MASS INDEX: 24.27 KG/M2 | HEIGHT: 66 IN | WEIGHT: 151 LBS

## 2023-04-17 DIAGNOSIS — J84.9 INTERSTITIAL LUNG DISEASE: ICD-10-CM

## 2023-04-17 DIAGNOSIS — R06.00 DYSPNEA: ICD-10-CM

## 2023-04-17 DIAGNOSIS — D89.89 ANTISYNTHETASE SYNDROME: Primary | ICD-10-CM

## 2023-04-17 PROCEDURE — 99214 OFFICE O/P EST MOD 30 MIN: CPT | Mod: PBBFAC | Performed by: FAMILY MEDICINE

## 2023-04-17 NOTE — LETTER
April 17, 2023      [facility]  [facility] [facility] [address]  Phone: [phone]       Date of Visit: 04/17/2023    To Whom It May Concern:    Please be advised that under state and federal laws as it relates to patient privacy and Health Insurance Accountability Act (HIPAA), we can not release our patient(s) name without authorization. Although, we can confirm that the individual listed below did accompany a person to our facility for healthcare services to be provided.    This document confirms that Clarence Trejo Sr. accompanied a patient to our facility on 04/17/2023 and requires the next 3 days off from work.    Sincerely,     Kristopher Eduardo MD

## 2023-04-17 NOTE — PROGRESS NOTES
"  [facility] INTERNAL MEDICINE  OUTPATIENT POST WARDS VISIT NOTE    SUBJECTIVE:   CC:   Chief Complaint   Patient presents with    Follow-up     Hospital follow up for Shortness of Breath.        HPI:  Clarence Trejo Sr. is a 67 y.o. male who presents to [facility] after discharge on 4/11/23 for PNA vs ILD exacerbation. He was discharged on prednisone and reports his symptoms and breathing has been significantly improved. He has started taking plaquenil again.      ROS:   A comprehensive 12 point review of systems was completed. Please see above for pertinent positives and negatives.        OBJECTIVE:     Vital signs:   /70 (BP Location: Left arm, Patient Position: Sitting)   Pulse 94   Temp 97.4 °F (36.3 °C)   Resp 20   Ht 5' 6" (1.676 m)   Wt 68.5 kg (151 lb)   SpO2 (!) 92%   BMI 24.37 kg/m²      Physical Examination:  General: Awake, alert, & oriented to person, place & time. No acute distress  Psychiatric: Mood and affect normal  HEENT: Normocephalic, atraumatic. Face symmetric. Mucous membranes moist.   Cardiovascular: Regular rate & rhythm. Normal S1 & S2 w/out murmurs, rubs or gallops.  Pulmonary: Bilateral symmetric chest rise. Non-labored, CTAB. No tachypnea  Abdominal: Soft, nontender, nondistended. Bowel sounds present  Extremities: + clubbing, no cyanosis or edema  Skin: Exposed skin is warm & dry.  Neuro: Patient moves all extremities equally       ASSESSMENT & PLAN:     1. Dyspnea  2. Antisynthetase syndrome  3. Interstitial lung disease  - continue prednisone taper  - continue f/u with pulmonology  - continue plaquenil    Return to clinic in 2 months. F/U on ILD.     Kristopher Eduardo MD - PGY2  [facility]    "

## 2023-04-17 NOTE — LETTER
[facility]  [address] [address]  Dept: [phone]  April 17, 2023     Patient: Clarence Trejo   YOB: 1955   Date of Visit: 4/17/2023       To whom it may concern,    I saw Clarence Trejo  today in clinic.He has been under my care since 04/17/2023.    He are clear to return to school/work on 4/19/23.     If you have any questions or concerns, please don't hesitate to contact my office. Thank you for accomodating Clarence Lee during this time of his treatment.        Sincerely,    Kristopher Eduardo MD

## 2023-04-29 LAB
EJ AB SER QL: NEGATIVE
ENA JO1 AB SER IA-ACNC: <20 UNITS
ENA PM/SCL AB SER-ACNC: <20 UNITS
ENA SS-A 52KD IGG SER IA-ACNC: <20 UNITS
FIBRILLARIN AB SER QL: NEGATIVE
KU AB SER QL: NEGATIVE
MDA5 AB SER LINE BLOT-ACNC: <20 UNITS
MI2 AB SER QL: NEGATIVE
MJ AB SER LINE BLOT-ACNC: <20 UNITS
OJ AB SER QL: NEGATIVE
PL12 AB SER QL: NEGATIVE
PL7 AB SER QL: NEGATIVE
SRP AB SERPL QL: NEGATIVE
TIF1-GAMMA AB SER LINE BLOT-ACNC: <20 UNITS
U1 SNRNP AB SER IA-ACNC: <20 UNITS
U2 SNRNP AB SER QL: NEGATIVE

## 2023-05-07 ENCOUNTER — HOSPITAL ENCOUNTER (EMERGENCY)
Facility: HOSPITAL | Age: 68
Discharge: HOME | End: 2023-05-07
Attending: EMERGENCY MEDICINE
Payer: MEDICARE

## 2023-05-07 VITALS
BODY MASS INDEX: 24.25 KG/M2 | DIASTOLIC BLOOD PRESSURE: 104 MMHG | RESPIRATION RATE: 14 BRPM | TEMPERATURE: 98 F | SYSTOLIC BLOOD PRESSURE: 165 MMHG | HEART RATE: 72 BPM | WEIGHT: 160 LBS | OXYGEN SATURATION: 95 % | HEIGHT: 68 IN

## 2023-05-07 DIAGNOSIS — R07.9 CHEST PAIN: ICD-10-CM

## 2023-05-07 LAB
ANION GAP SERPL CALC-SCNC: 10 MEQ/L
BASOPHILS # BLD AUTO: 0.03 X10(3)/MCL
BASOPHILS NFR BLD AUTO: 0.3 %
BNP BLD-MCNC: 20.3 PG/ML
BUN SERPL-MCNC: 16.6 MG/DL (ref 8.4–25.7)
CALCIUM SERPL-MCNC: 9 MG/DL (ref 8.8–10)
CHLORIDE SERPL-SCNC: 107 MMOL/L (ref 98–107)
CO2 SERPL-SCNC: 23 MMOL/L (ref 23–31)
CREAT SERPL-MCNC: 0.85 MG/DL (ref 0.73–1.18)
CREAT/UREA NIT SERPL: 20
EOSINOPHIL # BLD AUTO: 0.14 X10(3)/MCL (ref 0–0.9)
EOSINOPHIL NFR BLD AUTO: 1.3 %
ERYTHROCYTE [DISTWIDTH] IN BLOOD BY AUTOMATED COUNT: 21 % (ref 11.5–17)
GFR SERPLBLD CREATININE-BSD FMLA CKD-EPI: >60 MLS/MIN/1.73/M2
GLUCOSE SERPL-MCNC: 104 MG/DL (ref 82–115)
HCT VFR BLD AUTO: 57.7 % (ref 42–52)
HGB BLD-MCNC: 18 G/DL (ref 14–18)
IMM GRANULOCYTES # BLD AUTO: 0.04 X10(3)/MCL (ref 0–0.04)
IMM GRANULOCYTES NFR BLD AUTO: 0.4 %
LYMPHOCYTES # BLD AUTO: 2.55 X10(3)/MCL (ref 0.6–4.6)
LYMPHOCYTES NFR BLD AUTO: 23.2 %
MCH RBC QN AUTO: 26.4 PG (ref 27–31)
MCHC RBC AUTO-ENTMCNC: 31.2 G/DL (ref 33–36)
MCV RBC AUTO: 84.5 FL (ref 80–94)
MONOCYTES # BLD AUTO: 0.82 X10(3)/MCL (ref 0.1–1.3)
MONOCYTES NFR BLD AUTO: 7.4 %
NEUTROPHILS # BLD AUTO: 7.43 X10(3)/MCL (ref 2.1–9.2)
NEUTROPHILS NFR BLD AUTO: 67.4 %
NRBC BLD AUTO-RTO: 0 %
PLATELET # BLD AUTO: 130 X10(3)/MCL (ref 130–400)
PLATELETS.RETICULATED NFR BLD AUTO: 4.5 % (ref 0.9–11.2)
PMV BLD AUTO: 10.8 FL (ref 7.4–10.4)
POTASSIUM SERPL-SCNC: 4.1 MMOL/L (ref 3.5–5.1)
RBC # BLD AUTO: 6.83 X10(6)/MCL (ref 4.7–6.1)
SODIUM SERPL-SCNC: 140 MMOL/L (ref 136–145)
TROPONIN I SERPL-MCNC: 0.01 NG/ML (ref 0–0.04)
TROPONIN I SERPL-MCNC: 0.01 NG/ML (ref 0–0.04)
WBC # SPEC AUTO: 11.01 X10(3)/MCL (ref 4.5–11.5)

## 2023-05-07 PROCEDURE — 80048 BASIC METABOLIC PNL TOTAL CA: CPT | Performed by: EMERGENCY MEDICINE

## 2023-05-07 PROCEDURE — 85025 COMPLETE CBC W/AUTO DIFF WBC: CPT | Performed by: EMERGENCY MEDICINE

## 2023-05-07 PROCEDURE — 99285 EMERGENCY DEPT VISIT HI MDM: CPT | Mod: 25

## 2023-05-07 PROCEDURE — 83880 ASSAY OF NATRIURETIC PEPTIDE: CPT | Performed by: EMERGENCY MEDICINE

## 2023-05-07 PROCEDURE — 84484 ASSAY OF TROPONIN QUANT: CPT | Performed by: EMERGENCY MEDICINE

## 2023-05-07 PROCEDURE — 93005 ELECTROCARDIOGRAM TRACING: CPT

## 2023-05-07 RX ORDER — OMEPRAZOLE 20 MG/1
20 CAPSULE, DELAYED RELEASE ORAL DAILY
Qty: 30 CAPSULE | Refills: 0 | Status: SHIPPED | OUTPATIENT
Start: 2023-05-07 | End: 2023-08-28 | Stop reason: SDUPTHER

## 2023-05-07 NOTE — ED PROVIDER NOTES
ED PROVIDER NOTE  5/7/2023    CHIEF COMPLAINT:   Chief Complaint   Patient presents with    Chest Pain     Pt c/o chest pain x 1 hour that has resolved at present?? Pt on O2 at home, vss.        HISTORY OF PRESENT ILLNESS:   Clarence Trejo Sr. is a 67 y.o. male who presents with chief complaint Chest pain. Onset was this morning when he began having dull substernal chest pain that is relieved with drinking water but then would recur. He reports having this for years and usually when he drinks water it will go away but tonight it kept coming back. Currently pain free. States that he thought about taking some baking soda to drink but was not sure how to mix it. Denies having shortness of breath, nausea, vomiting, no exertional symptoms.     The history is provided by the patient.       REVIEW OF SYSTEMS: as noted in the HPI.  NURSING NOTES REVIEWED      PAST MEDICAL/SURGICAL HISTORY:   Past Medical History:   Diagnosis Date    SAM positive     Antisynthetase syndrome     ILD (interstitial lung disease)     Seronegative rheumatoid arthritis     TB lung, latent     Tobacco use     History reviewed. No pertinent surgical history.    FAMILY HISTORY:   Family History   Problem Relation Age of Onset    COPD Mother     COPD Father        SOCIAL HISTORY:   Social History     Tobacco Use    Smoking status: Former    Smokeless tobacco: Never   Substance Use Topics    Alcohol use: Not Currently    Drug use: Never       ALLERGIES: Review of patient's allergies indicates:  No Known Allergies    PHYSICAL EXAM:  Initial Vitals [05/07/23 0156]   BP Pulse Resp Temp SpO2   (!) 162/105 77 20 98.1 °F (36.7 °C) 95 %      MAP       --         Physical Exam    Nursing note and vitals reviewed.  Constitutional: He appears well-developed and well-nourished. No distress.   HENT:   Head: Normocephalic and atraumatic.   Nose: Nose normal.   Mouth/Throat: Oropharynx is clear and moist and mucous membranes are normal.   Eyes: Conjunctivae and EOM  are normal. Pupils are equal, round, and reactive to light.   Neck: Neck supple. No tracheal deviation present.   Cardiovascular:  Normal rate, regular rhythm, normal heart sounds, intact distal pulses and normal pulses.           Pulmonary/Chest: Effort normal. No respiratory distress.   Abdominal: Abdomen is soft. There is no abdominal tenderness. There is no rebound and no guarding.   Musculoskeletal:         General: Normal range of motion.      Cervical back: Neck supple.     Neurological: He is alert and oriented to person, place, and time. GCS score is 15.   CN II-XII intact. Moves all extremities. No gross sensory or motor deficits.   Skin: Skin is warm, dry and intact.   Psychiatric: He has a normal mood and affect. His speech is normal and behavior is normal. Judgment and thought content normal. Cognition and memory are normal.       RESULTS:  Labs Reviewed   CBC WITH DIFFERENTIAL - Abnormal; Notable for the following components:       Result Value    RBC 6.83 (*)     Hct 57.7 (*)     MCH 26.4 (*)     MCHC 31.2 (*)     RDW 21.0 (*)     MPV 10.8 (*)     All other components within normal limits   B-TYPE NATRIURETIC PEPTIDE - Normal   TROPONIN I - Normal   TROPONIN I - Normal   BASIC METABOLIC PANEL   CBC W/ AUTO DIFFERENTIAL    Narrative:     The following orders were created for panel order CBC auto differential.  Procedure                               Abnormality         Status                     ---------                               -----------         ------                     CBC with Differential[[id number]]        Abnormal            Final result                 Please view results for these tests on the individual orders.   EXTRA TUBES    Narrative:     The following orders were created for panel order EXTRA TUBES.  Procedure                               Abnormality         Status                     ---------                               -----------         ------                     Light  Blue Top Hold[[id number]]                              In process                 Gold Top Hold[[id number]]                                    In process                   Please view results for these tests on the individual orders.   LIGHT BLUE TOP HOLD   GOLD TOP HOLD     Imaging Results              X-Ray Chest AP Portable (Preliminary result)  Result time 05/07/23 02:26:51      Wet Read by Horacio Rausch DO (05/07/23 02:26:51, [facility], Emergency Medicine)    Chronic interstitial changes.  No dense lobar consolidation or pneumothorax.                                    PROCEDURES:  Procedures    ECG:  EKG Readings: (Independently Interpreted)   Initial Reading: No STEMI. Rhythm: Normal Sinus Rhythm. Heart Rate: 69. Conduction: Normal. Axis: Normal.     ED COURSE AND MEDICAL DECISION MAKING:  Medications - No data to display     Additional MDM:   Heart Score:    History:          Slightly suspicious.  ECG:             Normal  Age:               >65 years  Risk factors: 1-2 risk factors  Troponin:       Less than or equal to normal limit  Final Score: 3    Medical Decision Making  The patient presented with chest pain of uncertain etiology. Based on their history, lab analysis, EKG (which showed no evidence of ischemia or infarction), and imaging, in addition to the patient's physical exam, I see no evidence at this time for a malignant etiology for the patient's chest pain. There is no acute evidence for pulmonary embolus, acute myocardial infarction, pneumothorax, esophageal rupture, cardiac tamponade, thoracic artery dissection, or any other emergent cardiac, pulmonary or aortic pathology at this time.    Based on the nature and long duration of the patient's pain, paucity of EKG findings, and normal cardiac enzymatic blood analysis, acute coronary syndrome is exceedingly unlikely. It is highly likely that cardiac enzymes would be abnormal in chest pain of this duration if their chest  pain was attributable to ACS. The patient also has very low risk for coronary artery disease based on their risk factor profile with HEART score 3.    This patient may require cardiac stress testing on an outpatient basis and arrangements for this may be made during their follow-up visit with their primary care physician. The patient understands that at this time there is no evidence for a more malignant underlying process, but the patient also understands that early in the process of an illness, an emergency department workup can be falsely reassuring. Routine discharge counseling was given to the patient and the patient understands that worsening, changing, or persistent symptoms should prompt an immediate call or follow up with their primary physician or the emergency department immediately. The importance of close follow up was also discussed with the patient.  This patient may require cardiac stress testing on an outpatient basis and arrangements for this may be made during their follow-up visit with their primary care physician. The patient understands that at this time there is no evidence for a more malignant underlying process, but the patient also understands that early in the process of an illness, an emergency department workup can be falsely reassuring. Routine discharge counseling was given to the patient and the patient understands that worsening, changing, or persistent symptoms should prompt an immediate call or follow up with their primary physician or the emergency department immediately. The importance of close follow up was also discussed with the patient.  We will start him on omeprazole as I suspect his symptoms are due to gastroesophageal reflux.  Given strict ED return precautions. I have spoken with the patient and/or caregivers. I have explained the patient's condition, diagnoses and treatment plan based on the information available to me at this time. I have answered the patient's and/or  caregiver's questions and addressed any concerns. The patient and/or caregivers have as good an understanding of the patient's diagnosis, condition and treatment plan as can be expected at this point. The vital signs have been stable. The patient's condition is stable and appropriate for discharge from the emergency department.     The patient will pursue further outpatient evaluation with the primary care physician or other designated or consulting physician as outlined in the discharge instructions. The patient and/or caregivers are agreeable to this plan of care and follow-up instructions have been explained in detail. The patient and/or caregivers have received these instructions in written format and have expressed an understanding of the discharge instructions. The patient and/or caregivers are aware that any significant change in condition or worsening of symptoms should prompt an immediate return to this or the closest emergency department or a call to 911.    Amount and/or Complexity of Data Reviewed  External Data Reviewed: radiology and notes.     Details: The left ventricle is normal in size with mild concentric hypertrophy and normal systolic function.   The estimated ejection fraction is 60%.   Grade I left ventricular diastolic dysfunction.   Normal right ventricular size with normal right ventricular systolic function.   Intermediate central venous pressure (8 mmHg).   The estimated PA systolic pressure is 51 mmHg.   There is mild to moderate pulmonary hypertension.  Labs: ordered. Decision-making details documented in ED Course.  Radiology: ordered and independent interpretation performed. Decision-making details documented in ED Course.  ECG/medicine tests: ordered and independent interpretation performed. Decision-making details documented in ED Course.    Risk  Prescription drug management.        CLINICAL IMPRESSION:  1. Chest pain        DISPOSITION:   ED Disposition Condition    Discharge  Stable            ED Prescriptions       Medication Sig Dispense Start Date End Date Auth. Provider    omeprazole (PRILOSEC) 20 MG capsule Take 1 capsule (20 mg total) by mouth once daily. 30 capsule 5/7/2023 6/6/2023 Horacio Rausch DO          Follow-up Information       Follow up With Specialties Details Why Contact Info    Emery Mcarthur MD Internal Medicine Schedule an appointment as soon as possible for a visit   [facility] [facility] [address]  [phone]      [facility] Emergency Medicine  If symptoms worsen [facility] [facility] [address] [phone]               Horacio Rausch DO  05/07/23 0504

## 2023-05-23 ENCOUNTER — OFFICE VISIT (OUTPATIENT)
Dept: RHEUMATOLOGY | Facility: CLINIC | Age: 68
End: 2023-05-23
Payer: MEDICARE

## 2023-05-23 ENCOUNTER — OFFICE VISIT (OUTPATIENT)
Dept: PULMONOLOGY | Facility: CLINIC | Age: 68
End: 2023-05-23
Payer: MEDICARE

## 2023-05-23 VITALS
RESPIRATION RATE: 20 BRPM | HEIGHT: 68 IN | BODY MASS INDEX: 23.34 KG/M2 | OXYGEN SATURATION: 96 % | DIASTOLIC BLOOD PRESSURE: 75 MMHG | WEIGHT: 154 LBS | TEMPERATURE: 97 F | HEART RATE: 92 BPM | SYSTOLIC BLOOD PRESSURE: 114 MMHG

## 2023-05-23 VITALS
HEART RATE: 101 BPM | RESPIRATION RATE: 20 BRPM | BODY MASS INDEX: 23.36 KG/M2 | WEIGHT: 154.13 LBS | SYSTOLIC BLOOD PRESSURE: 137 MMHG | TEMPERATURE: 98 F | OXYGEN SATURATION: 92 % | DIASTOLIC BLOOD PRESSURE: 80 MMHG | HEIGHT: 68 IN

## 2023-05-23 DIAGNOSIS — D89.89 ANTISYNTHETASE SYNDROME: ICD-10-CM

## 2023-05-23 DIAGNOSIS — J84.9 INTERSTITIAL LUNG DISEASE: ICD-10-CM

## 2023-05-23 DIAGNOSIS — R76.8 POSITIVE ANTINUCLEAR ANTIBODY: ICD-10-CM

## 2023-05-23 DIAGNOSIS — R06.02 SHORTNESS OF BREATH: Primary | ICD-10-CM

## 2023-05-23 DIAGNOSIS — R76.8 HEPATITIS B CORE ANTIBODY POSITIVE: ICD-10-CM

## 2023-05-23 DIAGNOSIS — D89.89 ANTISYNTHETASE SYNDROME: Primary | ICD-10-CM

## 2023-05-23 DIAGNOSIS — Z22.7 LATENT TUBERCULOSIS: ICD-10-CM

## 2023-05-23 DIAGNOSIS — Z87.891 HISTORY OF TOBACCO ABUSE: ICD-10-CM

## 2023-05-23 DIAGNOSIS — M06.00 SERONEGATIVE RHEUMATOID ARTHRITIS: ICD-10-CM

## 2023-05-23 DIAGNOSIS — Z79.899 HIGH RISK MEDICATION USE: ICD-10-CM

## 2023-05-23 PROCEDURE — 1101F PR PT FALLS ASSESS DOC 0-1 FALLS W/OUT INJ PAST YR: ICD-10-PCS | Mod: CPTII,,, | Performed by: INTERNAL MEDICINE

## 2023-05-23 PROCEDURE — 3079F PR MOST RECENT DIASTOLIC BLOOD PRESSURE 80-89 MM HG: ICD-10-PCS | Mod: CPTII,,, | Performed by: INTERNAL MEDICINE

## 2023-05-23 PROCEDURE — 3288F FALL RISK ASSESSMENT DOCD: CPT | Mod: CPTII,,, | Performed by: INTERNAL MEDICINE

## 2023-05-23 PROCEDURE — 3074F SYST BP LT 130 MM HG: CPT | Mod: CPTII,,, | Performed by: INTERNAL MEDICINE

## 2023-05-23 PROCEDURE — 3008F BODY MASS INDEX DOCD: CPT | Mod: CPTII,,, | Performed by: INTERNAL MEDICINE

## 2023-05-23 PROCEDURE — 1125F PR PAIN SEVERITY QUANTIFIED, PAIN PRESENT: ICD-10-PCS | Mod: CPTII,,, | Performed by: INTERNAL MEDICINE

## 2023-05-23 PROCEDURE — 3078F PR MOST RECENT DIASTOLIC BLOOD PRESSURE < 80 MM HG: ICD-10-PCS | Mod: CPTII,,, | Performed by: INTERNAL MEDICINE

## 2023-05-23 PROCEDURE — 3008F PR BODY MASS INDEX (BMI) DOCUMENTED: ICD-10-PCS | Mod: CPTII,,, | Performed by: INTERNAL MEDICINE

## 2023-05-23 PROCEDURE — 3079F DIAST BP 80-89 MM HG: CPT | Mod: CPTII,,, | Performed by: INTERNAL MEDICINE

## 2023-05-23 PROCEDURE — 1125F AMNT PAIN NOTED PAIN PRSNT: CPT | Mod: CPTII,,, | Performed by: INTERNAL MEDICINE

## 2023-05-23 PROCEDURE — 3075F PR MOST RECENT SYSTOLIC BLOOD PRESS GE 130-139MM HG: ICD-10-PCS | Mod: CPTII,,, | Performed by: INTERNAL MEDICINE

## 2023-05-23 PROCEDURE — 99214 OFFICE O/P EST MOD 30 MIN: CPT | Mod: PBBFAC

## 2023-05-23 PROCEDURE — 1159F PR MEDICATION LIST DOCUMENTED IN MEDICAL RECORD: ICD-10-PCS | Mod: CPTII,,, | Performed by: INTERNAL MEDICINE

## 2023-05-23 PROCEDURE — 3288F PR FALLS RISK ASSESSMENT DOCUMENTED: ICD-10-PCS | Mod: CPTII,,, | Performed by: INTERNAL MEDICINE

## 2023-05-23 PROCEDURE — 3074F PR MOST RECENT SYSTOLIC BLOOD PRESSURE < 130 MM HG: ICD-10-PCS | Mod: CPTII,,, | Performed by: INTERNAL MEDICINE

## 2023-05-23 PROCEDURE — 3078F DIAST BP <80 MM HG: CPT | Mod: CPTII,,, | Performed by: INTERNAL MEDICINE

## 2023-05-23 PROCEDURE — 1101F PT FALLS ASSESS-DOCD LE1/YR: CPT | Mod: CPTII,,, | Performed by: INTERNAL MEDICINE

## 2023-05-23 PROCEDURE — 99214 OFFICE O/P EST MOD 30 MIN: CPT | Mod: PBBFAC,27 | Performed by: INTERNAL MEDICINE

## 2023-05-23 PROCEDURE — 99215 PR OFFICE/OUTPT VISIT, EST, LEVL V, 40-54 MIN: ICD-10-PCS | Mod: S$PBB,,, | Performed by: INTERNAL MEDICINE

## 2023-05-23 PROCEDURE — 1159F MED LIST DOCD IN RCRD: CPT | Mod: CPTII,,, | Performed by: INTERNAL MEDICINE

## 2023-05-23 PROCEDURE — 3075F SYST BP GE 130 - 139MM HG: CPT | Mod: CPTII,,, | Performed by: INTERNAL MEDICINE

## 2023-05-23 PROCEDURE — 99214 PR OFFICE/OUTPT VISIT, EST, LEVL IV, 30-39 MIN: ICD-10-PCS | Mod: S$PBB,GC,, | Performed by: INTERNAL MEDICINE

## 2023-05-23 PROCEDURE — 99214 OFFICE O/P EST MOD 30 MIN: CPT | Mod: S$PBB,GC,, | Performed by: INTERNAL MEDICINE

## 2023-05-23 PROCEDURE — 99215 OFFICE O/P EST HI 40 MIN: CPT | Mod: S$PBB,,, | Performed by: INTERNAL MEDICINE

## 2023-05-23 RX ORDER — HYDROXYCHLOROQUINE SULFATE 200 MG/1
200 TABLET, FILM COATED ORAL DAILY
Qty: 90 TABLET | Refills: 3 | Status: SHIPPED | OUTPATIENT
Start: 2023-05-23 | End: 2023-09-27 | Stop reason: SDUPTHER

## 2023-05-23 RX ORDER — NINTEDANIB 150 MG/1
1 CAPSULE ORAL 2 TIMES DAILY
Qty: 60 CAPSULE | Refills: 6 | Status: SHIPPED | OUTPATIENT
Start: 2023-05-23 | End: 2023-06-22

## 2023-05-23 RX ORDER — TOFACITINIB 11 MG/1
11 TABLET, FILM COATED, EXTENDED RELEASE ORAL DAILY
COMMUNITY
Start: 2023-05-08 | End: 2023-05-23 | Stop reason: ALTCHOICE

## 2023-05-23 RX ORDER — IPRATROPIUM BROMIDE AND ALBUTEROL SULFATE 2.5; .5 MG/3ML; MG/3ML
3 SOLUTION RESPIRATORY (INHALATION) EVERY 6 HOURS PRN
Qty: 75 ML | Refills: 5 | Status: SHIPPED | OUTPATIENT
Start: 2023-05-23 | End: 2023-08-28 | Stop reason: SDUPTHER

## 2023-05-23 RX ORDER — ALBUTEROL SULFATE 90 UG/1
1-2 AEROSOL, METERED RESPIRATORY (INHALATION) EVERY 6 HOURS PRN
Qty: 18 G | Refills: 6 | Status: SHIPPED | OUTPATIENT
Start: 2023-05-23 | End: 2023-08-28 | Stop reason: SDUPTHER

## 2023-05-23 RX ORDER — BUDESONIDE AND FORMOTEROL FUMARATE DIHYDRATE 80; 4.5 UG/1; UG/1
2 AEROSOL RESPIRATORY (INHALATION) 2 TIMES DAILY
Qty: 10.2 G | Refills: 5 | Status: SHIPPED | OUTPATIENT
Start: 2023-05-23 | End: 2024-01-29

## 2023-05-23 NOTE — PROGRESS NOTES
PULMONOLOGY RESIDENT CLINIC  CLINIC NOTE    Patient Name: Clarence Trejo Sr.  YOB: 1955    PRESENTING HISTORY       History of Present Illness:  Mr. Clarence Trejo Sr. is a 67 y.o. 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     He uses 3L O2 at night. During the day he feels OK walking around his house. 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 Ran out of his symbicort and still has his albuterol but says neither really seems to help him with his breathing. No fevers/chills, cough, sputum production    ROS  Constitutional: no fever/chills  EENT: no sore throat, ear pain, sinus pain/congestion, nasal congestion/drainage  Respiratory: as above  Cardiovascular: no chest pain, no palpitations, no edema  Gastrointestinal: no nausea, vomiting, or diarrhea. No abdominal pain  Genitourinary: no dysuria, no urinary frequency or urgency, no hematuria  Integumentary: no skin rash or abnormal lesion  Neurologic: no headache, no dizziness, no weakness or numbness      PAST HISTORY:     Past Medical History:   Diagnosis Date    SAM positive     Antisynthetase syndrome     ILD (interstitial lung disease)     Seronegative rheumatoid arthritis     TB lung, latent     Tobacco use        History reviewed. No pertinent surgical history.    Family History   Problem Relation Age of Onset    COPD Mother     COPD Father        Social History     Socioeconomic History    Marital status:    Tobacco Use    Smoking status: Former     Types: Cigarettes     Quit date: 2023     Years since quittin.3    Smokeless tobacco: Never   Substance and Sexual Activity    Alcohol use: Not Currently    Drug use: Never    Sexual activity: Not Currently     Partners: Female     Social Determinants of Health     Financial Resource Strain: Low Risk     Difficulty of Paying  Living Expenses: Not hard at all   Food Insecurity: Food Insecurity Present    Worried About Running Out of Food in the Last Year: Often true    Ran Out of Food in the Last Year: Never true   Transportation Needs: No Transportation Needs    Lack of Transportation (Medical): No    Lack of Transportation (Non-Medical): No   Physical Activity: Insufficiently Active    Days of Exercise per Week: 7 days    Minutes of Exercise per Session: 10 min   Stress: No Stress Concern Present    Feeling of Stress : Not at all   Social Connections: Socially Isolated    Frequency of Communication with Friends and Family: Three times a week    Frequency of Social Gatherings with Friends and Family: Three times a week    Attends Caodaism Services: Never    Active Member of Clubs or Organizations: No    Attends Club or Organization Meetings: Never    Marital Status:    Housing Stability: Unknown    Unable to Pay for Housing in the Last Year: No    Unstable Housing in the Last Year: No       MEDICATIONS & ALLERGIES:     Current Outpatient Medications on File Prior to Visit   Medication Sig    albuterol (PROVENTIL/VENTOLIN HFA) 90 mcg/actuation inhaler Inhale 1-2 puffs into the lungs every 6 (six) hours as needed for Wheezing. Rescue    hydrOXYchloroQUINE (PLAQUENIL) 200 mg tablet Take 1 tablet (200 mg total) by mouth once daily.    nintedanib (OFEV) 150 mg Cap Take 1 capsule (150 mg total) by mouth 2 (two) times daily.    albuterol-ipratropium (DUO-NEB) 2.5 mg-0.5 mg/3 mL nebulizer solution Take 3 mLs by nebulization every 6 (six) hours as needed for Wheezing. Rescue (Patient not taking: Reported on 5/23/2023)    baclofen (LIORESAL) 10 MG tablet Take 10 mg by mouth 2 (two) times daily.    budesonide-formoterol 80-4.5 mcg (SYMBICORT) 80-4.5 mcg/actuation HFAA Inhale 2 puffs into the lungs 2 (two) times a day.    HYDROcodone-acetaminophen (NORCO) 5-325 mg per tablet Take 1 tablet by mouth every 6 (six) hours as needed for Pain.  "(Patient not taking: Reported on 4/17/2023)    meloxicam (MOBIC) 7.5 MG tablet Take 7.5 mg by mouth 2 (two) times daily.    omeprazole (PRILOSEC) 20 MG capsule Take 1 capsule (20 mg total) by mouth once daily. (Patient not taking: Reported on 5/23/2023)    [DISCONTINUED] hydrOXYchloroQUINE (PLAQUENIL) 200 mg tablet Take 1 tablet (200 mg total) by mouth once daily.    [DISCONTINUED] levoFLOXacin (LEVAQUIN) 750 MG tablet Take 1 tablet (750 mg total) by mouth once daily. (Patient not taking: Reported on 4/17/2023)    [DISCONTINUED] OFEV 150 mg Cap TAKE 1 CAPSULE BY MOUTH TWICE A DAY    [DISCONTINUED] predniSONE (DELTASONE) 10 MG tablet Take 1.5 tablets (15 mg total) by mouth once daily for 7 days, THEN 1 tablet (10 mg total) once daily for 7 days. (Patient not taking: Reported on 4/17/2023)    [DISCONTINUED] predniSONE (DELTASONE) 20 MG tablet Take 2 tablets (40 mg total) by mouth once daily for 7 days, THEN 3 tablets (60 mg total) once daily for 7 days, THEN 2 tablets (40 mg total) once daily for 7 days, THEN 1 tablet (20 mg total) once daily for 7 days.    [DISCONTINUED] XELJANZ XR 11 mg Tb24 Take 11 mg by mouth once daily.     No current facility-administered medications on file prior to visit.       Review of patient's allergies indicates:  No Known Allergies    OBJECTIVE:   Vital Signs:  Vitals:    05/23/23 1306   BP: 137/80   Pulse: 101   Resp: 20   Temp: 97.8 °F (36.6 °C)   TempSrc: Oral   SpO2: (!) 92%   Weight: 69.9 kg (154 lb 1.6 oz)   Height: 5' 7.72" (1.72 m)       No results found for this or any previous visit (from the past 24 hour(s)).      Physical Exam  General - Appears comfortable, appropriatley conversive   Mental Status - alert and oriented x 3, speaking in logical, relevant sentences   HEENT - no rhinorrhea   Cardiac - RRR, no murmurs, rubs, or gallops; no edema in LE   Respiratory - breathing comfortably; clear to ascultation bilaterally   Abdominal - nondistended, soft, nontender to palpation "   Extremities - LE, UE, and joints are nonerythematous and nonswollen   Skin - no rashes or bruises seen on skin      Laboratory  Lab Results   Component Value Date    WBC 11.01 05/07/2023    HGB 18.0 05/07/2023    HCT 57.7 (H) 05/07/2023    MCV 84.5 05/07/2023     05/07/2023     @[id number](GLU,NA,K,Cl,CO2,BUN,Creatinine,Calcium,MG)@  Lab Results   Component Value Date    INR 1.11 11/19/2021    INR 1.07 01/07/2019    PROTIME 14.1 11/19/2021    PROTIME 13.8 01/07/2019     Lab Results   Component Value Date    HGBA1C 5.9 04/20/2021     No results for input(s): POCTGLUCOSE in the last 72 hours.        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 Grossly appears very extensive but unchanged. Repeat high resoluation CT scan ordered by Rheumatology this morning  -PFTs:  12/09/2022:  FVC 72%, FEV1 75%, ratio 78%, total lung capacity 71%, DLCO 24%.  DLCO by VA 38%  -Continue Symbicort, albuterol  -On home O2 via [place] at 3L/min at night.   -Ambulated him today in clinic: He dropped to 84% with walking, needed 3L with exertion to O2 sats above 88%. He needs oxygen conccentrator as he can't use O2 tanks due to chronic back pain  -autoimmune meds per Rheumatology. Right now is on OFEV    History of longstanding tobacco abuse, quit in 2021.  -Previous smoker, quit 1/2022; 60+ pack-year history     HX of latent TB  -s/p previous 9x month TX    RTC in 6 months with PFT    Discussed with Dr. Owens  - staff attestation to follow    Romain Townsend MD  Internal Medicine PGY-3

## 2023-05-23 NOTE — LETTER
May 23, 2023      [facility]  [address] [address]  Phone: [phone]       Patient: Clarence Trejo   YOB: 1955  Date of Visit: 05/23/2023    To Whom It May Concern:    Debi Trejo  was at [facility] on 05/23/2023. The patient may return to work on 05/24/2023 with restrictions.     Patient with Osteoarthritis to lower back causing significant amount of back pain. Work restrictions are as follows;   No standing for long periods of time or heavy lifting  20-25 hours of work per week  Part-time work schedule    If you have any questions or concerns, or if I can be of further assistance, please do not hesitate to contact me.    Sincerely,    Dr. Dixie Caraballo

## 2023-05-23 NOTE — PROGRESS NOTES
"  Patient ID: [id number]     Chief Complaint: Back Pain      HPI:     Clarence Trejo Sr. is a 67 y.o. male here today for a follow up of antisynthetase syndrome.     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 Diagnosed in 2021. He was treated with Actemra and OFEV in the past.  CPK was normal in the past.  He does not like taking injection and stopped taking it.     He started taking OFEV 150 mg BID. Stopped taking Plaquenil, reason not clear, poor historian.  C/o back pain and asking refills on Lortab and baclofen. Chronic DJD and osteoarthritis, advised to follow up with PCP.  Denies bowel/bladder incontinence or saddle anesthesia. He could not afford to pay for pain management.  He is asking for letter that he can work part time because of back pain.   He stopped taking Actemra"I am done with it and do not want to take it no more".     C/o SOB and cough, SOB is stable. He is supposed to have oxygen continuously, but he only using at home, 3 L and he is not using oxygen when he goes to work.  Follows with pulm.    Denies history of fevers, rashes, photosensitivity, oral or nasal ulcers, h/o MI, stroke, seizures, h/o PE or DVT, Raynaud's phenomenon, uveitis, malignancies.   Family history of autoimmune disease: none.   Smokin pack years. Quit smoking in 2022.    Social History     Tobacco Use   Smoking Status Former    Types: Cigarettes    Quit date: 2023    Years since quittin.3   Smokeless Tobacco Never         ----------------------------  SAM positive  Antisynthetase syndrome  ILD (interstitial lung disease)  Seronegative rheumatoid arthritis  TB lung, latent  Tobacco use     History reviewed. No pertinent surgical history.    Review of patient's allergies indicates:  No Known Allergies    Outpatient Medications Marked as Taking for the 23 encounter (Office Visit) with Dixie Caraballo, " MD   Medication Sig Dispense Refill    baclofen (LIORESAL) 10 MG tablet Take 10 mg by mouth 2 (two) times daily.      meloxicam (MOBIC) 7.5 MG tablet Take 7.5 mg by mouth 2 (two) times daily.      [DISCONTINUED] albuterol (PROVENTIL/VENTOLIN HFA) 90 mcg/actuation inhaler Inhale 1-2 puffs into the lungs every 6 (six) hours as needed for Wheezing. Rescue 18 g 6    [DISCONTINUED] hydrOXYchloroQUINE (PLAQUENIL) 200 mg tablet Take 1 tablet (200 mg total) by mouth once daily. 90 tablet 0    [DISCONTINUED] OFEV 150 mg Cap TAKE 1 CAPSULE BY MOUTH TWICE A DAY 60 capsule 6    [DISCONTINUED] XELJANZ XR 11 mg Tb24 Take 11 mg by mouth once daily.         Social History     Socioeconomic History    Marital status:    Tobacco Use    Smoking status: Former     Types: Cigarettes     Quit date: 2023     Years since quittin.3    Smokeless tobacco: Never   Substance and Sexual Activity    Alcohol use: Not Currently    Drug use: Never    Sexual activity: Not Currently     Partners: Female     Social Determinants of Health     Financial Resource Strain: Low Risk     Difficulty of Paying Living Expenses: Not hard at all   Food Insecurity: Food Insecurity Present    Worried About Running Out of Food in the Last Year: Often true    Ran Out of Food in the Last Year: Never true   Transportation Needs: No Transportation Needs    Lack of Transportation (Medical): No    Lack of Transportation (Non-Medical): No   Physical Activity: Insufficiently Active    Days of Exercise per Week: 7 days    Minutes of Exercise per Session: 10 min   Stress: No Stress Concern Present    Feeling of Stress : Not at all   Social Connections: Socially Isolated    Frequency of Communication with Friends and Family: Three times a week    Frequency of Social Gatherings with Friends and Family: Three times a week    Attends Christian Services: Never    Active Member of Clubs or Organizations: No    Attends Club or Organization Meetings: Never    Marital  Status:    Housing Stability: Unknown    Unable to Pay for Housing in the Last Year: No    Unstable Housing in the Last Year: No        Family History   Problem Relation Age of Onset    COPD Mother     COPD Father         Immunization History   Administered Date(s) Administered    COVID-19, MRNA, LN-S, PF (MODERNA FULL 0.5 ML DOSE) 03/07/2021, 04/07/2021    Pneumococcal Polysaccharide - 23 Valent 07/06/2021       Patient Care Team:  Emery Mcarthur MD as PCP - General (Internal Medicine)     Subjective:     ROS    Constitutional:  Denies chills. Denies fever. Denies night sweats. Denies weight loss. Denies sleep disturbance.   Ophthalmology: Denies blurred vision. Denies diminished visual acuity. Denies dry eyes. Denies eye pain. Denies Itching and redness. Denies red eye.   ENT: Denies decreased hearing. Denies oral ulcers. Denies epistaxis. Denies swelling of ears or throat. Denies dry mouth. Denies swollen glands.   Endocrine: Denies diabetes. Denies thyroid Problems.   Respiratory: Admits cough. Admits shortness of breath. Denies shortness of breath with exertion. Denies hemoptysis.   Cardiovascular: Denies chest pain at rest. Denies chest pain with exertion. Denies Irregular heartbeat. Denies palpitations. Denies edema. Denies orthopnea.   Gastrointestinal: Denies abdominal pain. Denies diarrhea. Denies nausea. Denies vomiting. Denies hematemesis or hematochezia. Denies change in appetite. Denies heartburn.  Genitourinary: Denies dysuria. Denies urinary frequency. Denies urinary urgency. Denies blood in urine.  Musculoskeletal: See HPI for details  Integumentary: Denies rash. Denies Itching. Denies dry skin. Denies photosensitivity.   Peripheral Vascular: Denies Ulcers of hands and/or feet. Denies Cold extremities.   Neurologic: Denies dizziness. Denies headache. Denies difficulty speaking. Denies loss of strength. Denies seizures. Denies history of stroke. Denies numbness or tingling.   Psychiatric: Denies  "depression. Denies anxiety. Denies suicidal/homicidal ideations.      Objective:     /75 (BP Location: Left arm, Patient Position: Sitting, BP Method: Medium (Automatic))   Pulse 92   Temp 97.3 °F (36.3 °C) (Oral)   Resp 20   Ht 5' 8" (1.727 m)   Wt 69.9 kg (154 lb)   SpO2 96%   BMI 23.42 kg/m²     Physical Exam    General Appearance: alert, pleasant, in no acute distress.  Skin: Skin color, texture, turgor normal. No rashes or lesions.  Club in bilateral hands.  Eyes:  extraocular movement intact (EOMI), pupils equal, round, reactive to light and accommodation, conjunctiva clear.  ENT: No oral or nasal ulcers.  Neck:  Neck supple. No adenopathy.   Lungs:  Crackles on exam.  Heart: RRR w/o murmurs.  No JVD or edema.  Abdomen: Soft, non-tender, no masses, rebound or guarding.  Neuro: Alert, oriented, CN II-XII GI, sensory and motor innervation intact.  Musculoskeletal:  Decreased range of motion of bilateral wrists, worse on right.  DJD changes in bilateral hands.  Crepitus in bilateral knees.  No overt synovitis on exam.  Psych: Alert, oriented, normal eye contact.        Labs:     Lab Results   Component Value Date    WBC 11.01 05/07/2023    HGB 18.0 05/07/2023    HCT 57.7 (H) 05/07/2023     05/07/2023    ALT 9 04/11/2023    AST 12 04/11/2023    BUN 16.6 05/07/2023    CREATININE 0.85 05/07/2023     05/07/2023    K 4.1 05/07/2023    CO2 23 05/07/2023    CRP 1.26 (H) 07/15/2021    SEDRATE 6 07/15/2021    No results found for: NEUTROABS     11/22/22: quanteiferon tb positive, he was treated for it in the past. Aldolase and CPK normal. Hep B core positive, surface ag and ab negative. Hep C, HIV negative.    LAB 6/11/2021: SAM by IFA (RDL) A Positive.  PL 7 antibody weak positive.  Other myositis panel negative.  Negative centromere, PM/Scl, RNA polymerase 3, Scl 70, SRP, SSA, RNP.  SAM 1:320 homogeneous and 1:160 speckle  6/21/21:  C3-C4 okay.  Anticardiolipin negative.  Negative anti TPO and " chromatin antibody.  6/2021: Negative dsDNA, Guzman. Rheumatoid factor and anti CCP negative.  11/2021: Negative SSA, SSB, Sonia 1. Myositis panel including PL 7 was negative. Hep B core reactive, surface ag negative. Quantiferon tb positive. Hep C negative. HIV negative.   1/12/23: Hg 18.4. CMP ok.   4/2023: myomarker panel 2 negative at [facility]. 4/11/23: CMP ok.   5/7/23: CBC and BMP ok. Elevated hg/Hct.     Imaging:   ECHO 11/21:  LV EF 64%. Cavity normal size, no hypertrophy. Normal RV structure and systolic function. No effusion noted PASP unable to estimate.    4/8/23: CT chest: There is extensive mediastinal and bilateral hilar lymphadenopathy.   These lymph nodes are without significant interval change.   Lungs are remarkable for extensive emphysematous changes with cystic formations and ground-glass attenuation due to small airway disease.  No overt superimposed pulmonary edema, dense consolidation, cavitary abnormality or fluid within the pleural or the pericardial spaces.  12/9/2022: CT Chest HR: FINDINGS: Advanced emphysema with mild upper lung predominance.  Overall similar appearance since April.  No significant bronchiectasis or air trapping.   Normal heart size.  Mild coronary artery and aortic calcification.  No significant pleural or pericardial fluid.  Mildly enlarged mediastinal lymph nodes stable since prior exam.  No acute or aggressive osseous findings.  Impression: Emphysema and mediastinal lymphadenopathy show no significant change since April.  April 2022:  CT chest showed advanced emphysema, grossly stable.  Slightly enlarged mediastinal lymph nodes unchanged.  Centrilobular emphysema.  08/17/2021:  High-resolution chest CT showed prominent mediastinal and hilar lymph nodes.  Severe emphysema with subpleural scarring.  No diffuse groundglass or septal thickening. No convincing Honeycombing. Severe emphysema with mild bronchiectasis.     PFTs August 2021:  FVC 71%, ratio 71%, total lung capacity  82%, DLCO by VA 57%.  10/7/2022 shows severe diffusion defect.  FVC 64%, FEV1 64%, ratio 100%, total lung capacity 62%, DLCO 18%, DLCO by VA 36%.  PFTs:  12/09/2022:  FVC 72%, FEV1 75%, ratio 78%, total lung capacity 71%, DLCO 24%.  DLCO by VA 38%    Assessment:       ICD-10-CM ICD-9-CM   1. Antisynthetase syndrome  D89.89 279.49   2. Positive antinuclear antibody  R76.8 795.79   3. Interstitial lung disease  J84.9 515   4. Seronegative rheumatoid arthritis  M06.00 714.0   5. High risk medication use  Z79.899 V58.69   6. History of tobacco abuse  Z87.891 V15.82   7. Hepatitis B core antibody positive  R76.8 795.79   8. Latent tuberculosis  Z22.7 795.51          Plan:     1. 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 Diagnosed in 4/2021. He was treated with Actemra and OFEV in the past.  CPK was normal in the past.  He does not like taking injection and stopped taking Actemra.   - Restarted OFEV. Taking 150 mg BID. C/o SOB mild 30 min after taking OFEV, is not side effect of OFEV, he can continue to take it.  - He stopped taking Plaquenil, reason not known.  - Xeljanz was prescribed in the past, but he never started it, will watch him off of immunosuppressive medications and follow up closely.  - labs today.     2. Interstitial lung disease:  Fibrotic changes, diagnosed in April 2021.  Using 3 L of oxygen at home. PFTs:  12/09/2022:  FVC 72%, FEV1 75%, ratio 78%, total lung capacity 71%, DLCO 24%.  DLCO by VA 38%. 12/2022 HR Chest CT: Emphysema and mediastinal lymphadenopathy show no significant change since April 2022.  - I reviewed films with radiologist, he has some ground glass opacities in 8/2021 CT and it resolved after that, which were not seen on CT done in 4/2022 and in 12/2022. CT chest on 12/2022 showed more emphysema and UIP pattern. No progression since 4/2022. Will not  start him on any immunosuppressively medication now as ILD is not progressing and also because there are no inflammatory signs of ILD on CT.  - Will monitor him close will do CT chest, ordered it and will reevaluate.   - Severe emphysematous changes and bronchiectasis on CT chest, the secondary to smoking.  - Continue OFEV.       3. Previous history of seroneg RA: Patient self discontinued Actemra.   6/21/21: RF and anti CCP negative.  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 MCP's  normal, no erosions, I doubt the diagnosis of RA.      4. High risk medication use: Advised to stay up-to-date on age appropriate vaccinations and malignancy screening.       5. History of tobacco abuse:  60 pack year smoking history, quit smoking in 1/2023.     6. TB lung, latent:  He was treated for latent TB in the past, years back.  QuantiFERON TB positive, 11/23/2022.     7. Persons with rheumatoid arthritis, lupus, psoriatic arthritis and other autoimmune diseases are at increased risk of cardiovascular disease including heart attack and stroke. We recommend that all patients with these conditions have annual health maintenance exams including lipid measurements, blood pressure measurements, and smoking cessation counseling when applicable at their primary care provider's office.   - Advised to stay up-to-date on age appropriate vaccinations and malignancy screening, including yearly skin exams.       8. His major complaint is back pain and requesting for narcotics, reviewed x-rays of lumbar spine, x-ray showed osteoarthritis.  Advised to follow up with his PCP for further management of osteoarthritis.  Recommend doing stretches and exercises as tolerated.  Recommend heat therapy.  - Scheduled to see pain management in 2/2023. Could not see them, as he has to pay for the visits.      9. Hep B core positive: will check hep B RNA in the future. Surface ag negative. Likely old infection, will check  labs periodically.           Follow up in about 4 months (around 9/23/2023). In addition to their scheduled follow up, the patient has also been instructed to follow up on as needed basis.      Total time spent with patient and documentation is more than 40 minute. All questions were answered to patient's satisfaction and patient verbalized understanding.

## 2023-05-24 ENCOUNTER — TELEPHONE (OUTPATIENT)
Dept: RHEUMATOLOGY | Facility: CLINIC | Age: 68
End: 2023-05-24

## 2023-05-30 NOTE — PROGRESS NOTES
I have reviewed the notes, assessments, and/or procedures performed by resident physician, I concur with her/his documentation of Clarence Trejo Sr.. Dropped with O2 ambulatory testing today. Treatment of ILD/antisynthetase syndrome per rheum team. Return in 6 months with PFT to compare to Dec 2022. OK to continue Symbicort and Albuterol.

## 2023-05-31 ENCOUNTER — TELEPHONE (OUTPATIENT)
Dept: PULMONOLOGY | Facility: CLINIC | Age: 68
End: 2023-05-31

## 2023-05-31 NOTE — TELEPHONE ENCOUNTER
Returned Jasmyne's call. She stated patient is set up with [facility] for his oxygen needs and they will pay for him to have a B tank if patient wants. Jasmyne also stated that patient's info has also been sent to [employer] for review on the portable oxygen concentrator. Will await their response.    Called and spoke with patient to update him. He stated he does not want to do the B tank at this time. Informed him that I am waiting for [employer] to make a decision to see if it is approved. Patient voiced understanding.

## 2023-05-31 NOTE — TELEPHONE ENCOUNTER
----- Message from Gwen Britt sent at 5/30/2023  9:56 AM CDT -----  Regarding: Please call ins co  Jasmyne W/ [employer] [phone] called in regards to pt's oxygen. Pt is currently set up with [employer] and they are saying pt does not meet requirement for portable oxygen. Please call Ms. Jasmyne back. Pt's contact info is  [phone]          Thank You  Pat COREY

## 2023-06-06 NOTE — TELEPHONE ENCOUNTER
Called and spoke with Jasmyne at [facility] to check on the status of [facility] decision for the patient's portable oxygen concentrator. Jasmyne stated [facility] approved the request yesterday. Jasmyne to fax approval to the clinic. Once approval is received, will contact [facility] to get patient set up for his portable oxygen concentrator.    Called and spoke with patient to inform on the status of his portable oxygen concentrator and that it is approved. Informed him that I am waiting for his insurance to fax the approval to the clinic so it can be sent to [facility]. Patient voiced understanding.

## 2023-06-08 NOTE — TELEPHONE ENCOUNTER
Received fax from [facility] for portable O2 concentrator approval. Scanned into the chart.    Faxed the approval to [facility] so they can get patient set up. Fax confirmation successful.

## 2023-06-22 NOTE — TELEPHONE ENCOUNTER
Nohemy with [facility] called stating that patient will need to have a new walk test completed in order to know O2 sat on room air at rest, O2 sat on room air during exertion, and O2 sat on oxygen with exertion if O2 sat below 88%. She also stated a new order will need to be completed same day as the walk test.     Attempted to contact patient to schedule him for a nurse visit to complete the walk test. No answer. Not able to leave voicemail due to mailbox is not set up.

## 2023-06-26 NOTE — TELEPHONE ENCOUNTER
Attempted to contact patient to set up nurse visit to complete 6 minute walk test. Phone does not ring. Not able to leave voicemail due to mailbox is not set up.

## 2023-06-29 NOTE — TELEPHONE ENCOUNTER
Attempted to contact patient. No answer. Not able to leave voicemail due to mailbox is not setup. Letter mailed to patient to contact the clinic.    Called [facility] to inform them that I have been unsuccessful in trying to reach patient. Understanding voiced.

## 2023-07-07 NOTE — TELEPHONE ENCOUNTER
"Patient presented to clinic stating "I got a letter from y'all." Patient is scheduled for a nurse visit on 07/12/2023 at 8am for a 6 minute walk test.  "

## 2023-07-12 ENCOUNTER — CLINICAL SUPPORT (OUTPATIENT)
Dept: PULMONOLOGY | Facility: CLINIC | Age: 68
End: 2023-07-12
Payer: MEDICARE

## 2023-07-12 VITALS — OXYGEN SATURATION: 90 %

## 2023-07-12 DIAGNOSIS — R06.02 SHORTNESS OF BREATH: Primary | ICD-10-CM

## 2023-07-12 PROCEDURE — 99212 OFFICE O/P EST SF 10 MIN: CPT | Mod: PBBFAC

## 2023-07-12 NOTE — PROGRESS NOTES
Patient came in for 6 minute walk test in order to submit requested documentation to [employer] to see if he qualifies for a portable O2 concentrator. Patient's O2 sat on room air at rest was 90%. Patient began walking and his O2 sat on room air with exertion immediately dropped to 85%. Patient was then placed on 2L O2 via nasal cannula to see if his O2 would recover, O2 sat was between 85%-88% with exertion. O2 increased to 3L via nasal cannula and patient's O2 recovered to 92% with exertion. Patient had no complaints during the walk test. O2 sat on room air at rest before leaving the clinic was 90%. No signs of distress noted when patient left the clinic.

## 2023-07-17 NOTE — TELEPHONE ENCOUNTER
Called and spoke to Bishop at [facility] to verify that Oxygen order/clinicals were received. Bishop voiced understanding and stated that since patient is already established with [facility] they would not be able to pick him as a patient. Bishop also stated that she was on a 3 way call with patient and [employer] on Friday to discuss this. She stated [facility] is to help patient find another provider that will pick him up as a patient. Understanding voiced.

## 2023-07-31 ENCOUNTER — HOSPITAL ENCOUNTER (OUTPATIENT)
Dept: RADIOLOGY | Facility: HOSPITAL | Age: 68
Discharge: HOME | End: 2023-07-31
Attending: INTERNAL MEDICINE
Payer: MEDICARE

## 2023-07-31 DIAGNOSIS — R76.8 POSITIVE ANTINUCLEAR ANTIBODY: ICD-10-CM

## 2023-07-31 DIAGNOSIS — D89.89 ANTISYNTHETASE SYNDROME: ICD-10-CM

## 2023-07-31 DIAGNOSIS — J84.9 INTERSTITIAL LUNG DISEASE: ICD-10-CM

## 2023-07-31 PROCEDURE — 71250 CT THORAX DX C-: CPT | Mod: TC

## 2023-08-22 RX ORDER — TOFACITINIB 11 MG/1
11 TABLET, FILM COATED, EXTENDED RELEASE ORAL DAILY
COMMUNITY
Start: 2023-07-10 | End: 2023-09-27 | Stop reason: ALTCHOICE

## 2023-08-22 RX ORDER — NINTEDANIB 150 MG/1
1 CAPSULE ORAL 2 TIMES DAILY
COMMUNITY
Start: 2023-07-10 | End: 2023-09-27 | Stop reason: SDUPTHER

## 2023-08-28 ENCOUNTER — OFFICE VISIT (OUTPATIENT)
Dept: INTERNAL MEDICINE | Facility: CLINIC | Age: 68
End: 2023-08-28
Payer: MEDICARE

## 2023-08-28 VITALS
TEMPERATURE: 98 F | SYSTOLIC BLOOD PRESSURE: 138 MMHG | OXYGEN SATURATION: 91 % | BODY MASS INDEX: 23.21 KG/M2 | RESPIRATION RATE: 20 BRPM | HEART RATE: 83 BPM | WEIGHT: 151.38 LBS | DIASTOLIC BLOOD PRESSURE: 89 MMHG

## 2023-08-28 DIAGNOSIS — R30.0 DYSURIA: ICD-10-CM

## 2023-08-28 DIAGNOSIS — D89.89 ANTISYNTHETASE SYNDROME: ICD-10-CM

## 2023-08-28 DIAGNOSIS — I10 HYPERTENSION, UNSPECIFIED TYPE: ICD-10-CM

## 2023-08-28 DIAGNOSIS — K21.9 GASTROESOPHAGEAL REFLUX DISEASE, UNSPECIFIED WHETHER ESOPHAGITIS PRESENT: ICD-10-CM

## 2023-08-28 DIAGNOSIS — R06.02 SHORTNESS OF BREATH: ICD-10-CM

## 2023-08-28 DIAGNOSIS — M54.9 BACK PAIN, UNSPECIFIED BACK LOCATION, UNSPECIFIED BACK PAIN LATERALITY, UNSPECIFIED CHRONICITY: Primary | ICD-10-CM

## 2023-08-28 DIAGNOSIS — Z23 NEED FOR VACCINATION: ICD-10-CM

## 2023-08-28 DIAGNOSIS — N52.9 ERECTILE DYSFUNCTION, UNSPECIFIED ERECTILE DYSFUNCTION TYPE: ICD-10-CM

## 2023-08-28 PROCEDURE — 99213 OFFICE O/P EST LOW 20 MIN: CPT | Mod: PBBFAC

## 2023-08-28 PROCEDURE — 90677 PCV20 VACCINE IM: CPT | Mod: PBBFAC

## 2023-08-28 RX ORDER — TADALAFIL 5 MG/1
5 TABLET ORAL DAILY PRN
Qty: 30 TABLET | Refills: 0 | Status: SHIPPED | OUTPATIENT
Start: 2023-08-28 | End: 2024-08-27

## 2023-08-28 RX ORDER — OMEPRAZOLE 20 MG/1
20 CAPSULE, DELAYED RELEASE ORAL DAILY
Qty: 30 CAPSULE | Refills: 0 | Status: SHIPPED | OUTPATIENT
Start: 2023-08-28 | End: 2023-09-27

## 2023-08-28 RX ORDER — MELOXICAM 7.5 MG/1
7.5 TABLET ORAL 2 TIMES DAILY
Qty: 60 TABLET | Refills: 6 | Status: SHIPPED | OUTPATIENT
Start: 2023-08-28

## 2023-08-28 RX ORDER — ALBUTEROL SULFATE 90 UG/1
1-2 AEROSOL, METERED RESPIRATORY (INHALATION) EVERY 6 HOURS PRN
Qty: 18 G | Refills: 6 | Status: SHIPPED | OUTPATIENT
Start: 2023-08-28

## 2023-08-28 RX ORDER — LISINOPRIL 5 MG/1
5 TABLET ORAL DAILY
Qty: 90 TABLET | Refills: 3 | Status: SHIPPED | OUTPATIENT
Start: 2023-08-28 | End: 2024-08-27

## 2023-08-28 RX ORDER — IPRATROPIUM BROMIDE AND ALBUTEROL SULFATE 2.5; .5 MG/3ML; MG/3ML
3 SOLUTION RESPIRATORY (INHALATION) EVERY 6 HOURS PRN
Qty: 75 ML | Refills: 5 | Status: SHIPPED | OUTPATIENT
Start: 2023-08-28 | End: 2024-08-27

## 2023-08-28 NOTE — PROGRESS NOTES
[facility] INTERNAL MEDICINE  OUTPATIENT OFFICE VISIT NOTE    SUBJECTIVE:      Chief Complaint: No chief complaint on file.       HPI: Clarence Trejo Sr. is a 67 y.o. yo male w/ PMH of  has a past medical history of SAM positive, Antisynthetase syndrome, ILD (interstitial lung disease), Seronegative rheumatoid arthritis, TB lung, latent, and Tobacco use., who presents for establishment with myself    Patient reports he is having back pain.  Previous x-rays suggestive of degenerative age-related changes.  Patient also complaining of pain with urination.  He says this is due in started last week.  He is had these symptoms in the past and reports previously.  Taking antibiotics which made him feel better.  Patient also reporting erectile dysfunction.  He says he is taken Cialis from his son which helped greatly.  No coronary history on file.  Most recent echo on file is from April 10, 2023.   Patient is being followed by pulmonology and Rheumatology for his interstitial lung disease and antisynthetase syndrome.  We will defer treatment to rheumatology and pulmonology.  Patient agreeable to pneumonia vaccine today.  Patient also states he would like to go back to work.  His.  PCP did not recommend him working given the patient does not have a mobile oxygen unit and he becomes desaturated when not on oxygen.  Patient is on 3 L oxygen at home.  The nurse outpatient getting contact with the oxygen compressor company.  The company tried to reach him many times but the patient could not be retested phone was not working.  Patient was instructed if he is unable to obtain oxygen mobile oxygen compressor that it would not be recommended for him to continue working.  Only if the patient is able to obtain oxygen compressor he can consider working 1 day a week at the moment.  It will be discussed at his next appointment how he is able to tolerate work with use of a mobile oxygen compressor.       Past Medical History:   has a past  medical history of SAM positive, Antisynthetase syndrome, ILD (interstitial lung disease), Seronegative rheumatoid arthritis, TB lung, latent, and Tobacco use.     Past Surgical History:   has no past surgical history on file.     Family History:  family history includes COPD in his father and mother.     Social History:   reports that he quit smoking about 7 months ago. His smoking use included cigarettes. He has never used smokeless tobacco. He reports that he does not currently use alcohol. He reports that he does not use drugs.     Allergies:  has No Known Allergies.     Home Medications:  Current Outpatient Medications   Medication Instructions    albuterol (PROVENTIL/VENTOLIN HFA) 90 mcg/actuation inhaler 1-2 puffs, Inhalation, Every 6 hours PRN, Rescue    albuterol-ipratropium (DUO-NEB) 2.5 mg-0.5 mg/3 mL nebulizer solution 3 mLs, Nebulization, Every 6 hours PRN, Rescue    baclofen (LIORESAL) 10 mg, Oral, 2 times daily    budesonide-formoterol 80-4.5 mcg (SYMBICORT) 80-4.5 mcg/actuation HFAA 2 puffs, Inhalation, 2 times daily    HYDROcodone-acetaminophen (NORCO) 5-325 mg per tablet 1 tablet, Oral, Every 6 hours PRN    hydrOXYchloroQUINE (PLAQUENIL) 200 mg, Oral, Daily    meloxicam (MOBIC) 7.5 mg, Oral, 2 times daily    OFEV 150 mg Cap 1 capsule, Oral, 2 times daily    omeprazole (PRILOSEC) 20 mg, Oral, Daily    XELJANZ XR 11 mg, Oral, Daily        ROS:  Negative for all symptoms other than those listed in HPI         OBJECTIVE:     Vital signs:   Vitals:    08/28/23 1244   BP: 138/89   Pulse: 83   Resp: 20   Temp: 97.9 °F (36.6 °C)        Physical Examination:    General - Appears comfortable, appropriatley conversive   Mental Status - alert and oriented x 3, speaking being slow biologically logical, relevant sentences.  Patient is a bad historian  HEENT - no rhinorrhea   Cardiac - RRR, no murmurs, rubs, or gallops; no edema in LE   Respiratory - breathing comfortably; some crackles on ascultation bilaterally  "  Abdominal - nondistended, soft, nontender to palpation   Extremities - LE, UE, and joints are nonerythematous and non swollen.   Skin - no rashes or bruises seen on skin.  Clubbing noted in bilateral hands      Labs:  BMP:   Lab Results   Component Value Date    CHLORIDE 107 05/07/2023    CO2 23 05/07/2023    BUN 16.6 05/07/2023    CREATININE 0.85 05/07/2023    GLUCOSE 104 05/07/2023    CALCIUM 9.0 05/07/2023     CBC:   Lab Results   Component Value Date    WBC 11.01 05/07/2023    HGB 18.0 05/07/2023    HCT 57.7 (H) 05/07/2023    MCV 84.5 05/07/2023    RDW 21.0 (H) 05/07/2023     LFTs:   Lab Results   Component Value Date    LABPROT 7.3 04/11/2023    ALBUMIN 2.8 (L) 04/11/2023    AST 12 04/11/2023    ALT 9 04/11/2023    ALKPHOS 68 04/11/2023     FLP:   Lab Results   Component Value Date    CHOL 110 07/15/2021    HDL 33 (L) 07/15/2021    LDL 56.00 07/15/2021    TRIG 104 07/15/2021    TOTALCHOLEST 3 07/15/2021     DM:   Lab Results   Component Value Date    HGBA1C 5.9 04/20/2021    .6 04/20/2021    CREATININE 0.85 05/07/2023    CREATRANDUR 45.3 (L) 07/15/2021    PROTEINURINE <6.8 07/15/2021     Thyroid:   Lab Results   Component Value Date    TSH 0.7013 11/19/2021    [id number] 0.93 04/20/2021     Anemia: No results found for: "IRON", "TIBC", "FERRITIN", "[id number]", "FOLATE"            ASSESSMENT & PLAN:        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  Advanced pulmonary emphysema versus cystic lung disease most pronounced at the lung apices.  -PFTs:  12/09/2022:  FVC 72%, FEV1 75%, ratio 78%, total lung capacity 71%, DLCO 24%.  DLCO by [facility] 38%  -Continue Symbicort, albuterol  -On home O2 via NC at 3L/min at night.   - followed by rheumatology and pulmonology.   -was " treated with Actemra and OFEV. Patient stopped taking actemra and plaquenil. Currently taking OFEV.   - patient also prescribed hydroxychloroquine and xeljanz xr by his rheumatologist.  -patient short of breath as he is not using a mobile oxygen compressor.  Patient is on 3 L oxygen at home    TB lung, latent  -was treated for latent TB in the past.  Year unknown.  Patient states he was treated for 1 year while he was incarcerated in [facility].  -patient not sure where certificate for latent TB treatment is  -we will check her health department records for treatment of latent TB    Osteoarthritis  - previous x-ray of lumbar spine showed  Multilevel mild degenerative disc and hypertrophic facet changes.  No evidence of acute injury.  -patient educated on exercise regimen, stretches, and advised to take Mobic 7.5 b.i.d. with severe pain. Patient can use diclofenac cream for mild to moderate pain     HTN  -patient blood pressure today is 138/89.  Previous blood pressure was 137/80.  -we will start lisinopril 5mg daily.  We will continue to monitor patient's blood pressure    Erectile dysfunction  -patient reports erectile dysfunction.  He is previously taken Cialis and says it helps.  Patient received echo not suggestive of cardiomyopathy.  No history of coronary artery disease.  - Cialis 5 mg once p.r.n. given    Dysuria  -patient reports pain with urination.  He reports he is had these symptoms in the past and was given antibiotic the aminophylline better.  -UA ordered to evaluate for urinary tract infection.      Health Maintenance  Vaccinations  Immunization History   Administered Date(s) Administered    COVID-19, MRNA, LN-S, PF (MODERNA FULL 0.5 ML DOSE) 03/07/2021, 04/07/2021    Pneumococcal Polysaccharide - 23 Valent 07/06/2021       -Flu:  not due      -Pneumonia: Done today 08/28/2023     -Shingles: Refused   -Tdap: Refused   -COVID: Refused   Screening     -Osteoporosis:  not indicated     -Lung  Ca. Screening: done on 7/13/2023   -Colon Ca. Screening: will address at next visit   -Hep C, HIV: done on 11/23/2022  Social   -EtOH:  reports that he does not currently use alcohol.   -Tobacco:  reports that he quit smoking about 7 months ago. His smoking use included cigarettes. He has never used smokeless tobacco.   -Drugs:  reports no history of drug use.    -Depression:   Depression: Low Risk  (5/23/2023)    Depression     Last PHQ-4: Flowsheet Data: 0                Return to clinic in 2 month(s).    Yara Spear M.D  [facility] Internal Medicine PGY-1

## 2023-08-30 NOTE — PROGRESS NOTES
I have reviewed the notes, assessments, and management plan performed by resident, I concur with the documentation of Clarence Trejo Sr..

## 2023-09-13 LAB — NONINV COLON CA DNA+OCC BLD SCRN STL QL: NORMAL

## 2023-09-15 ENCOUNTER — TELEPHONE (OUTPATIENT)
Dept: RHEUMATOLOGY | Facility: CLINIC | Age: 68
End: 2023-09-15
Payer: MEDICARE

## 2023-09-19 DIAGNOSIS — K21.9 GASTROESOPHAGEAL REFLUX DISEASE, UNSPECIFIED WHETHER ESOPHAGITIS PRESENT: ICD-10-CM

## 2023-09-19 RX ORDER — OMEPRAZOLE 20 MG/1
20 CAPSULE, DELAYED RELEASE ORAL DAILY
Qty: 30 CAPSULE | Refills: 0 | Status: CANCELLED | OUTPATIENT
Start: 2023-09-19 | End: 2023-10-19

## 2023-09-27 ENCOUNTER — TELEPHONE (OUTPATIENT)
Dept: RHEUMATOLOGY | Facility: CLINIC | Age: 68
End: 2023-09-27

## 2023-09-27 ENCOUNTER — OFFICE VISIT (OUTPATIENT)
Dept: RHEUMATOLOGY | Facility: CLINIC | Age: 68
End: 2023-09-27
Payer: MEDICARE

## 2023-09-27 ENCOUNTER — LAB VISIT (OUTPATIENT)
Dept: LAB | Facility: HOSPITAL | Age: 68
End: 2023-09-27
Attending: INTERNAL MEDICINE
Payer: MEDICARE

## 2023-09-27 VITALS
RESPIRATION RATE: 20 BRPM | WEIGHT: 152.81 LBS | HEART RATE: 85 BPM | OXYGEN SATURATION: 88 % | BODY MASS INDEX: 23.98 KG/M2 | TEMPERATURE: 98 F | HEIGHT: 67 IN | SYSTOLIC BLOOD PRESSURE: 158 MMHG | DIASTOLIC BLOOD PRESSURE: 95 MMHG

## 2023-09-27 DIAGNOSIS — D89.89 ANTISYNTHETASE SYNDROME: Primary | ICD-10-CM

## 2023-09-27 DIAGNOSIS — J84.9 INTERSTITIAL LUNG DISEASE: ICD-10-CM

## 2023-09-27 DIAGNOSIS — D75.1 POLYCYTHEMIA: Primary | ICD-10-CM

## 2023-09-27 DIAGNOSIS — R76.8 POSITIVE ANTINUCLEAR ANTIBODY: ICD-10-CM

## 2023-09-27 DIAGNOSIS — M06.00 SERONEGATIVE RHEUMATOID ARTHRITIS: ICD-10-CM

## 2023-09-27 DIAGNOSIS — Z22.7 TB LUNG, LATENT: ICD-10-CM

## 2023-09-27 DIAGNOSIS — D89.89 ANTISYNTHETASE SYNDROME: ICD-10-CM

## 2023-09-27 DIAGNOSIS — Z79.899 HIGH RISK MEDICATION USE: ICD-10-CM

## 2023-09-27 DIAGNOSIS — Z87.891 HISTORY OF TOBACCO ABUSE: ICD-10-CM

## 2023-09-27 LAB
ALBUMIN SERPL-MCNC: 4 G/DL (ref 3.4–4.8)
ALBUMIN/GLOB SERPL: 0.9 RATIO (ref 1.1–2)
ALP SERPL-CCNC: 108 UNIT/L (ref 40–150)
ALT SERPL-CCNC: 9 UNIT/L (ref 0–55)
ANISOCYTOSIS BLD QL SMEAR: ABNORMAL
AST SERPL-CCNC: 17 UNIT/L (ref 5–34)
BASOPHILS # BLD AUTO: 0.1 X10(3)/MCL
BASOPHILS NFR BLD AUTO: 1.2 %
BILIRUB SERPL-MCNC: 0.7 MG/DL
BUN SERPL-MCNC: 8.5 MG/DL (ref 8.4–25.7)
C3 SERPL-MCNC: 152 MG/DL (ref 80–173)
C4 SERPL-MCNC: 26 MG/DL (ref 13–46)
CALCIUM SERPL-MCNC: 10.1 MG/DL (ref 8.8–10)
CHLORIDE SERPL-SCNC: 107 MMOL/L (ref 98–107)
CO2 SERPL-SCNC: 29 MMOL/L (ref 23–31)
CREAT SERPL-MCNC: 0.98 MG/DL (ref 0.73–1.18)
CRP SERPL-MCNC: 11.8 MG/L
EOSINOPHIL # BLD AUTO: 0.22 X10(3)/MCL (ref 0–0.9)
EOSINOPHIL NFR BLD AUTO: 2.7 %
ERYTHROCYTE [DISTWIDTH] IN BLOOD BY AUTOMATED COUNT: 17 % (ref 11.5–17)
ERYTHROCYTE [SEDIMENTATION RATE] IN BLOOD: 9 MM/HR (ref 0–15)
GFR SERPLBLD CREATININE-BSD FMLA CKD-EPI: >60 MLS/MIN/1.73/M2
GLOBULIN SER-MCNC: 4.3 GM/DL (ref 2.4–3.5)
GLUCOSE SERPL-MCNC: 56 MG/DL (ref 82–115)
HCT VFR BLD AUTO: 62.9 % (ref 42–52)
HGB BLD-MCNC: 19.3 G/DL (ref 14–18)
HYPOCHROMIA BLD QL SMEAR: ABNORMAL
IMM GRANULOCYTES # BLD AUTO: 0.02 X10(3)/MCL (ref 0–0.04)
IMM GRANULOCYTES NFR BLD AUTO: 0.2 %
LYMPHOCYTES # BLD AUTO: 2.03 X10(3)/MCL (ref 0.6–4.6)
LYMPHOCYTES NFR BLD AUTO: 25.2 %
MCH RBC QN AUTO: 27 PG (ref 27–31)
MCHC RBC AUTO-ENTMCNC: 30.7 G/DL (ref 33–36)
MCV RBC AUTO: 88 FL (ref 80–94)
MICROCYTES BLD QL SMEAR: ABNORMAL
MONOCYTES # BLD AUTO: 0.55 X10(3)/MCL (ref 0.1–1.3)
MONOCYTES NFR BLD AUTO: 6.8 %
NEUTROPHILS # BLD AUTO: 5.13 X10(3)/MCL (ref 2.1–9.2)
NEUTROPHILS NFR BLD AUTO: 63.9 %
NRBC BLD AUTO-RTO: 0 %
PLATELET # BLD AUTO: 199 X10(3)/MCL (ref 130–400)
PLATELET # BLD EST: ADEQUATE 10*3/UL
PMV BLD AUTO: 10.8 FL (ref 7.4–10.4)
POLYCHROMASIA BLD QL SMEAR: SLIGHT
POTASSIUM SERPL-SCNC: 4 MMOL/L (ref 3.5–5.1)
PROT SERPL-MCNC: 8.3 GM/DL (ref 5.8–7.6)
RBC # BLD AUTO: 7.15 X10(6)/MCL (ref 4.7–6.1)
RBC MORPH BLD: ABNORMAL
SODIUM SERPL-SCNC: 143 MMOL/L (ref 136–145)
WBC # SPEC AUTO: 8.05 X10(3)/MCL (ref 4.5–11.5)

## 2023-09-27 PROCEDURE — 85025 COMPLETE CBC W/AUTO DIFF WBC: CPT

## 2023-09-27 PROCEDURE — 87517 HEPATITIS B DNA QUANT: CPT

## 2023-09-27 PROCEDURE — 4010F ACE/ARB THERAPY RXD/TAKEN: CPT | Mod: CPTII,,, | Performed by: INTERNAL MEDICINE

## 2023-09-27 PROCEDURE — 1101F PT FALLS ASSESS-DOCD LE1/YR: CPT | Mod: CPTII,,, | Performed by: INTERNAL MEDICINE

## 2023-09-27 PROCEDURE — 36415 COLL VENOUS BLD VENIPUNCTURE: CPT

## 2023-09-27 PROCEDURE — 85652 RBC SED RATE AUTOMATED: CPT

## 2023-09-27 PROCEDURE — 99215 OFFICE O/P EST HI 40 MIN: CPT | Mod: S$PBB,,, | Performed by: INTERNAL MEDICINE

## 2023-09-27 PROCEDURE — 99215 PR OFFICE/OUTPT VISIT, EST, LEVL V, 40-54 MIN: ICD-10-PCS | Mod: S$PBB,,, | Performed by: INTERNAL MEDICINE

## 2023-09-27 PROCEDURE — 3008F PR BODY MASS INDEX (BMI) DOCUMENTED: ICD-10-PCS | Mod: CPTII,,, | Performed by: INTERNAL MEDICINE

## 2023-09-27 PROCEDURE — 4010F PR ACE/ARB THEARPY RXD/TAKEN: ICD-10-PCS | Mod: CPTII,,, | Performed by: INTERNAL MEDICINE

## 2023-09-27 PROCEDURE — 3008F BODY MASS INDEX DOCD: CPT | Mod: CPTII,,, | Performed by: INTERNAL MEDICINE

## 2023-09-27 PROCEDURE — 3080F PR MOST RECENT DIASTOLIC BLOOD PRESSURE >= 90 MM HG: ICD-10-PCS | Mod: CPTII,,, | Performed by: INTERNAL MEDICINE

## 2023-09-27 PROCEDURE — 80053 COMPREHEN METABOLIC PANEL: CPT

## 2023-09-27 PROCEDURE — 3288F FALL RISK ASSESSMENT DOCD: CPT | Mod: CPTII,,, | Performed by: INTERNAL MEDICINE

## 2023-09-27 PROCEDURE — 3080F DIAST BP >= 90 MM HG: CPT | Mod: CPTII,,, | Performed by: INTERNAL MEDICINE

## 2023-09-27 PROCEDURE — 86160 COMPLEMENT ANTIGEN: CPT

## 2023-09-27 PROCEDURE — 1125F PR PAIN SEVERITY QUANTIFIED, PAIN PRESENT: ICD-10-PCS | Mod: CPTII,,, | Performed by: INTERNAL MEDICINE

## 2023-09-27 PROCEDURE — 3288F PR FALLS RISK ASSESSMENT DOCUMENTED: ICD-10-PCS | Mod: CPTII,,, | Performed by: INTERNAL MEDICINE

## 2023-09-27 PROCEDURE — 1101F PR PT FALLS ASSESS DOC 0-1 FALLS W/OUT INJ PAST YR: ICD-10-PCS | Mod: CPTII,,, | Performed by: INTERNAL MEDICINE

## 2023-09-27 PROCEDURE — 1159F MED LIST DOCD IN RCRD: CPT | Mod: CPTII,,, | Performed by: INTERNAL MEDICINE

## 2023-09-27 PROCEDURE — 3077F SYST BP >= 140 MM HG: CPT | Mod: CPTII,,, | Performed by: INTERNAL MEDICINE

## 2023-09-27 PROCEDURE — 86140 C-REACTIVE PROTEIN: CPT

## 2023-09-27 PROCEDURE — 99215 OFFICE O/P EST HI 40 MIN: CPT | Mod: PBBFAC | Performed by: INTERNAL MEDICINE

## 2023-09-27 PROCEDURE — 3077F PR MOST RECENT SYSTOLIC BLOOD PRESSURE >= 140 MM HG: ICD-10-PCS | Mod: CPTII,,, | Performed by: INTERNAL MEDICINE

## 2023-09-27 PROCEDURE — 1125F AMNT PAIN NOTED PAIN PRSNT: CPT | Mod: CPTII,,, | Performed by: INTERNAL MEDICINE

## 2023-09-27 PROCEDURE — 1159F PR MEDICATION LIST DOCUMENTED IN MEDICAL RECORD: ICD-10-PCS | Mod: CPTII,,, | Performed by: INTERNAL MEDICINE

## 2023-09-27 RX ORDER — NINTEDANIB 150 MG/1
1 CAPSULE ORAL 2 TIMES DAILY
Qty: 60 CAPSULE | Refills: 6 | Status: SHIPPED | OUTPATIENT
Start: 2023-09-27 | End: 2023-10-27

## 2023-09-27 RX ORDER — HYDROXYCHLOROQUINE SULFATE 200 MG/1
200 TABLET, FILM COATED ORAL DAILY
Qty: 90 TABLET | Refills: 3 | Status: SHIPPED | OUTPATIENT
Start: 2023-09-27 | End: 2024-01-29 | Stop reason: SDUPTHER

## 2023-09-27 NOTE — PROGRESS NOTES
09/27/2023; trace blood and no protein in urine.  Upc okay.  Hemoglobin 19.3, hematocrit 62.  CMP okay.  CRP 11.8.  ESR normal.      Elevated hemoglobin likely due to severe COPD.  Asked him to follow-up with his PCP for that.  Also sent referral to Hematology to rule out other etiologies.

## 2023-09-27 NOTE — PROGRESS NOTES
"  Patient ID: [id number]     Chief Complaint: No chief complaint on file.      HPI:     Clarence Trejo Sr. is a 67 y.o. male here today for a follow up of antisynthetase syndrome.     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 Diagnosed in 2021. He was treated with Actemra and OFEV in the past.  CPK was normal in the past.  He does not like taking injection and stopped taking it.     He started taking OFEV 150 mg BID. During last visit he said he was not taking Plaquenil but today he verbalized that he restarted Plaquenil.  He also restarted taking Xeljanz.   C/o back pain and asking refills on percocet, advised to talk to his PCP. Chronic DJD and osteoarthritis, advised to follow up with PCP.  Denies bowel/bladder incontinence or saddle anesthesia. He could not afford to pay for pain management.  He stopped working because of chronic SOB and back pain.   He stopped taking Actemra"I am done with it and do not want to take it no more".   C/o SOB and cough, SOB is stable. He is supposed to have oxygen continuously, but he only using at home, 3 L and he is not using oxygen outside of house(reservoir battery not lasting longer than 5 min). Follows with pulm.    Denies history of fevers, rashes, photosensitivity, oral or nasal ulcers, h/o MI, stroke, seizures, h/o PE or DVT, Raynaud's phenomenon, uveitis, malignancies.   Family history of autoimmune disease: none.   Smokin pack years. Quit smoking in 2022.    Social History     Tobacco Use   Smoking Status Former    Current packs/day: 0.00    Types: Cigarettes    Quit date: 2023    Years since quittin.7   Smokeless Tobacco Never         ----------------------------  SAM positive  Antisynthetase syndrome  ILD (interstitial lung disease)  Seronegative rheumatoid arthritis  TB lung, latent  Tobacco use     History reviewed. No pertinent surgical " history.    Review of patient's allergies indicates:  No Known Allergies    Outpatient Medications Marked as Taking for the 23 encounter (Office Visit) with Dixie Caraballo MD   Medication Sig Dispense Refill    albuterol (PROVENTIL/VENTOLIN HFA) 90 mcg/actuation inhaler Inhale 1-2 puffs into the lungs every 6 (six) hours as needed for Wheezing. Rescue 18 g 6    albuterol-ipratropium (DUO-NEB) 2.5 mg-0.5 mg/3 mL nebulizer solution Take 3 mLs by nebulization every 6 (six) hours as needed for Wheezing. Rescue 75 mL 5    baclofen (LIORESAL) 10 MG tablet Take 10 mg by mouth 2 (two) times daily.      budesonide-formoterol 80-4.5 mcg (SYMBICORT) 80-4.5 mcg/actuation HFAA Inhale 2 puffs into the lungs 2 (two) times a day. 10.2 g 5    lisinopriL (PRINIVIL,ZESTRIL) 5 MG tablet Take 1 tablet (5 mg total) by mouth once daily. (Patient taking differently: Take 5 mg by mouth once daily. PT TAKES 1/2 TABLET STATES IT MAKES HIM DIZZY) 90 tablet 3    meloxicam (MOBIC) 7.5 MG tablet Take 1 tablet (7.5 mg total) by mouth 2 (two) times daily. 60 tablet 6    omeprazole (PRILOSEC) 20 MG capsule Take 1 capsule (20 mg total) by mouth once daily. 30 capsule 0    tadalafiL (CIALIS) 5 MG tablet Take 1 tablet (5 mg total) by mouth daily as needed for Erectile Dysfunction. 30 tablet 0    [DISCONTINUED] hydrOXYchloroQUINE (PLAQUENIL) 200 mg tablet Take 1 tablet (200 mg total) by mouth once daily. 90 tablet 3    [DISCONTINUED] OFEV 150 mg Cap Take 1 capsule by mouth 2 (two) times daily.      [DISCONTINUED] XELJANZ XR 11 mg Tb24 Take 11 mg by mouth once daily.         Social History     Socioeconomic History    Marital status:    Tobacco Use    Smoking status: Former     Current packs/day: 0.00     Types: Cigarettes     Quit date: 2023     Years since quittin.7    Smokeless tobacco: Never   Substance and Sexual Activity    Alcohol use: Not Currently    Drug use: Never    Sexual activity: Not Currently     Partners: Female      Social Determinants of Health     Financial Resource Strain: Low Risk  (4/10/2023)    Overall Financial Resource Strain (CARDIA)     Difficulty of Paying Living Expenses: Not hard at all   Recent Concern: Financial Resource Strain - High Risk (1/25/2023)    Overall Financial Resource Strain (CARDIA)     Difficulty of Paying Living Expenses: Very hard   Food Insecurity: Food Insecurity Present (4/10/2023)    Hunger Vital Sign     Worried About Running Out of Food in the Last Year: Often true     Ran Out of Food in the Last Year: Never true   Transportation Needs: No Transportation Needs (4/10/2023)    PRAPARE - Transportation     Lack of Transportation (Medical): No     Lack of Transportation (Non-Medical): No   Physical Activity: Insufficiently Active (4/10/2023)    Exercise Vital Sign     Days of Exercise per Week: 7 days     Minutes of Exercise per Session: 10 min   Stress: No Stress Concern Present (4/10/2023)    [place] [employer] - Occupational Stress Questionnaire     Feeling of Stress : Not at all   Social Connections: Socially Isolated (4/10/2023)    Social Connection and Isolation Panel [NHANES]     Frequency of Communication with Friends and Family: Three times a week     Frequency of Social Gatherings with Friends and Family: Three times a week     Attends Denominational Services: Never     Active Member of Clubs or Organizations: No     Attends Club or Organization Meetings: Never     Marital Status:    Housing Stability: Unknown (4/10/2023)    Housing Stability Vital Sign     Unable to Pay for Housing in the Last Year: No     Unstable Housing in the Last Year: No        Family History   Problem Relation Age of Onset    COPD Mother     COPD Father         Immunization History   Administered Date(s) Administered    COVID-19, MRNA, LN-S, PF (MODERNA FULL 0.5 ML DOSE) 03/07/2021, 04/07/2021    Pneumococcal Conjugate - 20 Valent 08/28/2023    Pneumococcal Polysaccharide - 23  "Lazarus 07/06/2021       Patient Care Team:  Yara Spear MD as PCP - General (Internal Medicine)     Subjective:     ROS    Constitutional:  Denies chills. Denies fever. Denies night sweats. Denies weight loss. Denies sleep disturbance.   Ophthalmology: Denies blurred vision. Denies diminished visual acuity. Denies dry eyes. Denies eye pain. Denies Itching and redness. Denies red eye.   ENT: Denies decreased hearing. Denies oral ulcers. Denies epistaxis. Denies swelling of ears or throat. Denies dry mouth. Denies swollen glands.   Endocrine: Denies diabetes. Denies thyroid Problems.   Respiratory: Admits cough. Admits shortness of breath. Denies shortness of breath with exertion. Denies hemoptysis.   Cardiovascular: Denies chest pain at rest. Denies chest pain with exertion. Denies Irregular heartbeat. Denies palpitations. Denies edema. Denies orthopnea.   Gastrointestinal: Denies abdominal pain. Denies diarrhea. Denies nausea. Denies vomiting. Denies hematemesis or hematochezia. Denies change in appetite. Denies heartburn.  Genitourinary: Denies dysuria. Denies urinary frequency. Denies urinary urgency. Denies blood in urine.  Musculoskeletal: See HPI for details  Integumentary: Denies rash. Denies Itching. Denies dry skin. Denies photosensitivity.   Peripheral Vascular: Denies Ulcers of hands and/or feet. Denies Cold extremities.   Neurologic: Denies dizziness. Denies headache. Denies difficulty speaking. Denies loss of strength. Denies seizures. Denies history of stroke. Denies numbness or tingling.   Psychiatric: Denies depression. Denies anxiety. Denies suicidal/homicidal ideations.      Objective:     BP (!) 158/95 (BP Location: Right arm, Patient Position: Sitting, BP Method: Small (Automatic))   Pulse 85   Temp 97.5 °F (36.4 °C) (Oral)   Resp 20   Ht 5' 7" (1.702 m)   Wt 69.3 kg (152 lb 12.8 oz)   SpO2 (!) 88% Comment: Pt denies distress at this time. States he does wear oxygen but does not need it " right now.  BMI 23.93 kg/m²     Physical Exam    General Appearance: alert, pleasant, in no acute distress.  Skin: Skin color, texture, turgor normal. No rashes or lesions.  Club in bilateral hands.  Eyes:  extraocular movement intact (EOMI), pupils equal, round, reactive to light and accommodation, conjunctiva clear.  ENT: No oral or nasal ulcers.  Neck:  Neck supple. No adenopathy.   Lungs:  Crackles on exam.  Heart: RRR w/o murmurs.  No JVD or edema.  Abdomen: Soft, non-tender, no masses, rebound or guarding.  Neuro: Alert, oriented, CN II-XII GI, sensory and motor innervation intact.  Musculoskeletal:  Decreased range of motion of bilateral wrists, worse on right.  DJD changes in bilateral hands.  Crepitus in bilateral knees.  No overt synovitis on exam.  Psych: Alert, oriented, normal eye contact.        Labs:   11/22/22: quantiferon tb positive, he was treated for it in the past. Aldolase and CPK normal. Hep B core positive, surface ag and ab negative. Hep C, HIV negative.  6/11/2021: SAM by IFA (RDL) A Positive.  PL 7 antibody weak positive.  Other myositis panel negative.  Negative centromere, PM/Scl, RNA polymerase 3, Scl 70, SRP, SSA, RNP.  SAM 1:320 homogeneous and 1:160 speckle  6/21/21:  C3-C4 okay.  Anticardiolipin negative.  Negative anti TPO and chromatin antibody.  6/2021: Negative dsDNA, Guzman. Rheumatoid factor and anti CCP negative.  11/2021: Negative SSA, SSB, Sonia 1. Myositis panel including PL 7 was negative. Hep B core reactive, surface ag negative. Quantiferon tb positive. Hep C negative. HIV negative.   1/12/23: Hg 18.4. CMP ok.   4/2023: myomarker panel 2 negative at [facility]. 4/11/23: CMP ok.   5/7/23: CBC and BMP ok. Elevated hg/Hct.     Imaging:   ECHO 11/21:  LV EF 64%. Cavity normal size, no hypertrophy. Normal RV structure and systolic function. No effusion noted PASP unable to estimate.    CT chest 08/02/2023 showed advanced pulmonary emphysema versus cystic lung disease more pronounced  at lung apices.  No traction bronchiectasis, no ground-glass opacities.  No significant change compared to December 2022.  4/8/23: CT chest: There is extensive mediastinal and bilateral hilar lymphadenopathy.   These lymph nodes are without significant interval change.   Lungs are remarkable for extensive emphysematous changes with cystic formations and ground-glass attenuation due to small airway disease.  No overt superimposed pulmonary edema, dense consolidation, cavitary abnormality or fluid within the pleural or the pericardial spaces.  12/9/2022: CT Chest HR: FINDINGS: Advanced emphysema with mild upper lung predominance.  Overall similar appearance since April.  No significant bronchiectasis or air trapping.   Normal heart size.  Mild coronary artery and aortic calcification.  No significant pleural or pericardial fluid.  Mildly enlarged mediastinal lymph nodes stable since prior exam.  No acute or aggressive osseous findings.  Impression: Emphysema and mediastinal lymphadenopathy show no significant change since April.  April 2022:  CT chest showed advanced emphysema, grossly stable.  Slightly enlarged mediastinal lymph nodes unchanged.  Centrilobular emphysema.  08/17/2021:  High-resolution chest CT showed prominent mediastinal and hilar lymph nodes.  Severe emphysema with subpleural scarring.  No diffuse groundglass or septal thickening. No convincing Honeycombing. Severe emphysema with mild bronchiectasis.       PFTs August 2021:  FVC 71%, ratio 71%, total lung capacity 82%, DLCO by [facility] 57%.  10/7/2022 shows severe diffusion defect.  FVC 64%, FEV1 64%, ratio 100%, total lung capacity 62%, DLCO 18%, DLCO by VA 36%.  PFTs:  12/09/2022:  FVC 72%, FEV1 75%, ratio 78%, total lung capacity 71%, DLCO 24%.  DLCO by VA 38%    Assessment:       ICD-10-CM ICD-9-CM   1. Antisynthetase syndrome  D89.89 279.49   2. Positive antinuclear antibody  R76.8 795.79   3. Interstitial lung disease  J84.9 515   4. Seronegative  rheumatoid arthritis  M06.00 714.0   5. High risk medication use  Z79.899 V58.69   6. History of tobacco abuse  Z87.891 V15.82   7. TB lung, latent  Z22.7 795.51            Plan:     1. 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 Diagnosed in 4/2021. He was treated with Actemra and OFEV in the past.  CPK was normal in the past.  He does not like taking injection and stopped taking Actemra.   - Restarted OFEV. Taking OFEV 150 mg BID, continue with that.  - He stopped taking Plaquenil in the past but restarted again. Ok to c/w plaquenil 200 mg daily. Sent referral to [facility].    - Patient restarted Xeljanz, advised to stop it as ILD is stable and also because there is no inflammatory component on CT chest. Called pharmacy to let them know not to fill it.   - labs today.     2. Interstitial lung disease:  Fibrotic changes, diagnosed in April 2021.  Using 3 L of oxygen at home. PFTs:  12/09/2022:  FVC 72%, FEV1 75%, ratio 78%, total lung capacity 71%, DLCO 24%.  DLCO by VA 38%. 12/2022 HR Chest CT: Emphysema and mediastinal lymphadenopathy show no significant change since April 2022.  - I reviewed films with radiologist, he has some ground glass opacities in 8/2021 CT and it resolved after that, which were not seen on CT done in 4/2022 and in 12/2022. CT chest on 12/2022 showed more emphysema and UIP pattern. No progression since 4/2022. Will not start him on any immunosuppressively medication now as ILD is not progressing and also because there are no inflammatory signs of ILD on CT.  - Will monitor him closely. CT chest 08/02/2023 showed advanced pulmonary emphysema versus cystic lung disease more pronounced at lung apices.  No traction bronchiectasis, no ground-glass opacities.  No significant change compared to December 2022.  - Severe emphysematous changes and bronchiectasis on CT  chest, the secondary to smoking.  - Continue OFEV.       3. Previous history of seroneg RA: Patient self discontinued Actemra.   6/21/21: RF and anti CCP negative.  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 MCP's  normal, no erosions, I doubt the diagnosis of RA.      4. High risk medication use: Advised to stay up-to-date on age appropriate vaccinations and malignancy screening.       5. History of tobacco abuse:  60 pack year smoking history, quit smoking in 1/2023.     6. TB lung, latent:  He was treated for latent TB in the past, years back.  QuantiFERON TB positive, 11/23/2022.     7. Persons with rheumatoid arthritis, lupus, psoriatic arthritis and other autoimmune diseases are at increased risk of cardiovascular disease including heart attack and stroke. We recommend that all patients with these conditions have annual health maintenance exams including lipid measurements, blood pressure measurements, and smoking cessation counseling when applicable at their primary care provider's office.   - Advised to stay up-to-date on age appropriate vaccinations and malignancy screening, including yearly skin exams.       8. His major complaint is back pain and requesting for narcotics, reviewed x-rays of lumbar spine, x-ray showed osteoarthritis.  Advised to follow up with his PCP for further management of osteoarthritis.  Recommend doing stretches and exercises as tolerated.  Recommend heat therapy.  - Scheduled to see pain management in 2/2023. Could not see them, as he has to pay for the visits.      9. Hep B core positive: will check hep B RNA in the future. Surface ag negative. Likely old infection, will check labs periodically.           Follow up for 4-5 months with me. In addition to their scheduled follow up, the patient has also been instructed to follow up on as needed basis.      Total time spent with patient and documentation is more than 40 minute. All questions were  answered to patient's satisfaction and patient verbalized understanding.

## 2023-09-29 LAB — HBV DNA SERPL NAA+PROBE-ACNC: NORMAL IU/ML

## 2023-09-29 NOTE — TELEPHONE ENCOUNTER
----- Message from Dixie Caraballo MD sent at 9/27/2023  3:48 PM CDT -----  09/27/2023; trace blood and no protein in urine.  Upc okay.  Hemoglobin 19.3, hematocrit 62.  CMP okay.  CRP 11.8.  ESR normal.      Elevated hemoglobin likely due to severe COPD.  Asked him to follow-up with his PCP for that.  Also sent referral to Hematology to rule out other etiologies.  
Called and spoke with patient. Encouraged him to call [employer] ([employer]) to let them know that he is having issues with his battery pack. Patient voiced understanding.   
Pt notified and voiced understanding.   
Pt states his portable oxygen machine battery not working for him and keeps dying.   
Pts portable oxygen machine battery not working-PULM  D/C XELJANZ    
Spoke with Saskia from [facility] spec  
EOMI; PERRL; no drainage or redness

## 2023-10-16 ENCOUNTER — HOSPITAL ENCOUNTER (OUTPATIENT)
Dept: PULMONOLOGY | Facility: HOSPITAL | Age: 68
Discharge: HOME | End: 2023-10-16
Attending: STUDENT IN AN ORGANIZED HEALTH CARE EDUCATION/TRAINING PROGRAM
Payer: MEDICARE

## 2023-10-16 DIAGNOSIS — D89.89 ANTISYNTHETASE SYNDROME: ICD-10-CM

## 2023-10-16 DIAGNOSIS — R06.02 SHORTNESS OF BREATH: ICD-10-CM

## 2024-01-01 ENCOUNTER — HOSPITAL ENCOUNTER (INPATIENT)
Facility: HOSPITAL | Age: 69
LOS: 13 days | DRG: 870 | End: 2024-12-16
Attending: STUDENT IN AN ORGANIZED HEALTH CARE EDUCATION/TRAINING PROGRAM | Admitting: INTERNAL MEDICINE
Payer: MEDICARE

## 2024-01-01 VITALS
WEIGHT: 130.06 LBS | DIASTOLIC BLOOD PRESSURE: 76 MMHG | BODY MASS INDEX: 20.9 KG/M2 | HEIGHT: 66 IN | RESPIRATION RATE: 45 BRPM | SYSTOLIC BLOOD PRESSURE: 127 MMHG | TEMPERATURE: 98 F | OXYGEN SATURATION: 59 % | HEART RATE: 82 BPM

## 2024-01-01 DIAGNOSIS — R06.03 RESPIRATORY DISTRESS: ICD-10-CM

## 2024-01-01 DIAGNOSIS — R06.02 SHORTNESS OF BREATH: ICD-10-CM

## 2024-01-01 DIAGNOSIS — J96.01 ACUTE RESPIRATORY FAILURE WITH HYPOXIA: Primary | ICD-10-CM

## 2024-01-01 DIAGNOSIS — R00.0 TACHYCARDIA: ICD-10-CM

## 2024-01-01 LAB
ABS NEUT (OLG): 20.8 X10(3)/MCL (ref 2.1–9.2)
ALBUMIN SERPL-MCNC: 2.1 G/DL (ref 3.4–4.8)
ALBUMIN SERPL-MCNC: 2.2 G/DL (ref 3.4–4.8)
ALBUMIN SERPL-MCNC: 2.2 G/DL (ref 3.4–4.8)
ALBUMIN SERPL-MCNC: 2.4 G/DL (ref 3.4–4.8)
ALBUMIN SERPL-MCNC: 2.5 G/DL (ref 3.4–4.8)
ALBUMIN SERPL-MCNC: 2.5 G/DL (ref 3.4–4.8)
ALBUMIN SERPL-MCNC: 2.6 G/DL (ref 3.4–4.8)
ALBUMIN SERPL-MCNC: 2.7 G/DL (ref 3.4–4.8)
ALBUMIN SERPL-MCNC: 3 G/DL (ref 3.4–4.8)
ALBUMIN/GLOB SERPL: 0.6 RATIO (ref 1.1–2)
ALBUMIN/GLOB SERPL: 0.7 RATIO (ref 1.1–2)
ALBUMIN/GLOB SERPL: 0.7 RATIO (ref 1.1–2)
ALBUMIN/GLOB SERPL: 0.8 RATIO (ref 1.1–2)
ALBUMIN/GLOB SERPL: 0.9 RATIO (ref 1.1–2)
ALBUMIN/GLOB SERPL: 1.2 RATIO (ref 1.1–2)
ALLENS TEST BLOOD GAS (OHS): YES
ALP SERPL-CCNC: 70 UNIT/L (ref 40–150)
ALP SERPL-CCNC: 70 UNIT/L (ref 40–150)
ALP SERPL-CCNC: 77 UNIT/L (ref 40–150)
ALP SERPL-CCNC: 77 UNIT/L (ref 40–150)
ALP SERPL-CCNC: 78 UNIT/L (ref 40–150)
ALP SERPL-CCNC: 79 UNIT/L (ref 40–150)
ALP SERPL-CCNC: 82 UNIT/L (ref 40–150)
ALP SERPL-CCNC: 83 UNIT/L (ref 40–150)
ALP SERPL-CCNC: 86 UNIT/L (ref 40–150)
ALP SERPL-CCNC: 89 UNIT/L (ref 40–150)
ALP SERPL-CCNC: 96 UNIT/L (ref 40–150)
ALT SERPL-CCNC: 24 UNIT/L (ref 0–55)
ALT SERPL-CCNC: 25 UNIT/L (ref 0–55)
ALT SERPL-CCNC: 27 UNIT/L (ref 0–55)
ALT SERPL-CCNC: 28 UNIT/L (ref 0–55)
ALT SERPL-CCNC: 32 UNIT/L (ref 0–55)
ALT SERPL-CCNC: 39 UNIT/L (ref 0–55)
ALT SERPL-CCNC: 42 UNIT/L (ref 0–55)
ALT SERPL-CCNC: 55 UNIT/L (ref 0–55)
ALT SERPL-CCNC: 63 UNIT/L (ref 0–55)
AMPHET UR QL SCN: NEGATIVE
ANION GAP SERPL CALC-SCNC: 14 MEQ/L
ANION GAP SERPL CALC-SCNC: 23 MEQ/L
ANION GAP SERPL CALC-SCNC: 4 MEQ/L
ANION GAP SERPL CALC-SCNC: 4 MEQ/L
ANION GAP SERPL CALC-SCNC: 5 MEQ/L
ANION GAP SERPL CALC-SCNC: 5 MEQ/L
ANION GAP SERPL CALC-SCNC: 6 MEQ/L
ANION GAP SERPL CALC-SCNC: 7 MEQ/L
ANION GAP SERPL CALC-SCNC: 7 MEQ/L
ANION GAP SERPL CALC-SCNC: 8 MEQ/L
ANION GAP SERPL CALC-SCNC: 9 MEQ/L
APTT PPP: 29.6 SECONDS (ref 23.2–33.7)
AST SERPL-CCNC: 13 UNIT/L (ref 5–34)
AST SERPL-CCNC: 14 UNIT/L (ref 5–34)
AST SERPL-CCNC: 21 UNIT/L (ref 5–34)
AST SERPL-CCNC: 22 UNIT/L (ref 5–34)
AST SERPL-CCNC: 23 UNIT/L (ref 5–34)
AST SERPL-CCNC: 23 UNIT/L (ref 5–34)
AST SERPL-CCNC: 24 UNIT/L (ref 5–34)
AST SERPL-CCNC: 25 UNIT/L (ref 5–34)
AST SERPL-CCNC: 35 UNIT/L (ref 5–34)
AST SERPL-CCNC: 56 UNIT/L (ref 5–34)
AST SERPL-CCNC: 61 UNIT/L (ref 5–34)
B-LACTAMASE USUAL SUSC ISLT: NEGATIVE
BACTERIA BLD CULT: NORMAL
BACTERIA BLD CULT: NORMAL
BACTERIA SPT CULT: ABNORMAL
BARBITURATE SCN PRESENT UR: NEGATIVE
BASE EXCESS BLD CALC-SCNC: -1.2 MMOL/L
BASE EXCESS BLD CALC-SCNC: -3.2 MMOL/L (ref -2–2)
BASE EXCESS BLD CALC-SCNC: -5.8 MMOL/L (ref -2–2)
BASE EXCESS BLD CALC-SCNC: -8.3 MMOL/L (ref -2–2)
BASE EXCESS BLD CALC-SCNC: -9 MMOL/L (ref -2–2)
BASE EXCESS BLD CALC-SCNC: 11.8 MMOL/L
BASE EXCESS BLD CALC-SCNC: 12 MMOL/L (ref -2–2)
BASE EXCESS BLD CALC-SCNC: 13.7 MMOL/L
BASE EXCESS BLD CALC-SCNC: 14.8 MMOL/L
BASE EXCESS BLD CALC-SCNC: 2.8 MMOL/L (ref -2–2)
BASE EXCESS BLD CALC-SCNC: 8.6 MMOL/L
BASE EXCESS BLD CALC-SCNC: 8.9 MMOL/L (ref -2–2)
BASOPHILS # BLD AUTO: 0.02 X10(3)/MCL
BASOPHILS # BLD AUTO: 0.03 X10(3)/MCL
BASOPHILS # BLD AUTO: 0.04 X10(3)/MCL
BASOPHILS # BLD AUTO: 0.05 X10(3)/MCL
BASOPHILS # BLD AUTO: 0.05 X10(3)/MCL
BASOPHILS # BLD AUTO: 0.07 X10(3)/MCL
BASOPHILS # BLD AUTO: 0.1 X10(3)/MCL
BASOPHILS NFR BLD AUTO: 0.1 %
BASOPHILS NFR BLD AUTO: 0.2 %
BASOPHILS NFR BLD AUTO: 0.2 %
BASOPHILS NFR BLD AUTO: 0.4 %
BASOPHILS NFR BLD AUTO: 0.4 %
BASOPHILS NFR BLD AUTO: 0.5 %
BASOPHILS NFR BLD AUTO: 0.6 %
BASOPHILS NFR BLD AUTO: 0.7 %
BENZODIAZ UR QL SCN: NEGATIVE
BILIRUB DIRECT SERPL-MCNC: 0.2 MG/DL (ref 0–?)
BILIRUB SERPL-MCNC: 0.3 MG/DL
BILIRUB SERPL-MCNC: 0.4 MG/DL
BILIRUB SERPL-MCNC: 0.7 MG/DL
BILIRUB SERPL-MCNC: 0.9 MG/DL
BILIRUBIN DIRECT+TOT PNL SERPL-MCNC: 0.1 MG/DL (ref 0–0.8)
BLOOD GAS SAMPLE TYPE (OHS): ABNORMAL
BNP BLD-MCNC: 796.6 PG/ML
BUN SERPL-MCNC: 14.6 MG/DL (ref 8.4–25.7)
BUN SERPL-MCNC: 15.7 MG/DL (ref 8.4–25.7)
BUN SERPL-MCNC: 17 MG/DL (ref 8.4–25.7)
BUN SERPL-MCNC: 17.1 MG/DL (ref 8.4–25.7)
BUN SERPL-MCNC: 17.4 MG/DL (ref 8.4–25.7)
BUN SERPL-MCNC: 18.6 MG/DL (ref 8.4–25.7)
BUN SERPL-MCNC: 18.7 MG/DL (ref 8.4–25.7)
BUN SERPL-MCNC: 19.1 MG/DL (ref 8.4–25.7)
BUN SERPL-MCNC: 19.6 MG/DL (ref 8.4–25.7)
BUN SERPL-MCNC: 19.6 MG/DL (ref 8.4–25.7)
BUN SERPL-MCNC: 20.1 MG/DL (ref 8.4–25.7)
BUN SERPL-MCNC: 20.6 MG/DL (ref 8.4–25.7)
BUN SERPL-MCNC: 21.2 MG/DL (ref 8.4–25.7)
BUN SERPL-MCNC: 21.4 MG/DL (ref 8.4–25.7)
BUN SERPL-MCNC: 22.6 MG/DL (ref 8.4–25.7)
CA-I BLD-SCNC: 1.05 MMOL/L (ref 1.12–1.23)
CA-I BLD-SCNC: 1.06 MMOL/L (ref 1.12–1.23)
CA-I BLD-SCNC: 1.09 MMOL/L (ref 1.12–1.23)
CA-I BLD-SCNC: 1.11 MMOL/L (ref 1.12–1.23)
CA-I BLD-SCNC: 1.12 MMOL/L (ref 1.12–1.23)
CA-I BLD-SCNC: 1.12 MMOL/L (ref 1.12–1.23)
CA-I BLD-SCNC: 1.14 MMOL/L (ref 1.12–1.23)
CA-I BLD-SCNC: 1.15 MMOL/L (ref 1.12–1.23)
CA-I BLD-SCNC: 1.15 MMOL/L (ref 1.12–1.23)
CA-I BLD-SCNC: 1.16 MMOL/L (ref 1.12–1.23)
CA-I BLD-SCNC: 1.17 MMOL/L (ref 1.12–1.23)
CA-I BLD-SCNC: 1.2 MMOL/L (ref 1.12–1.23)
CALCIUM SERPL-MCNC: 6.6 MG/DL (ref 8.8–10)
CALCIUM SERPL-MCNC: 8 MG/DL (ref 8.8–10)
CALCIUM SERPL-MCNC: 8.1 MG/DL (ref 8.8–10)
CALCIUM SERPL-MCNC: 8.2 MG/DL (ref 8.8–10)
CALCIUM SERPL-MCNC: 8.3 MG/DL (ref 8.8–10)
CALCIUM SERPL-MCNC: 8.3 MG/DL (ref 8.8–10)
CALCIUM SERPL-MCNC: 8.4 MG/DL (ref 8.8–10)
CALCIUM SERPL-MCNC: 8.4 MG/DL (ref 8.8–10)
CALCIUM SERPL-MCNC: 8.6 MG/DL (ref 8.8–10)
CALCIUM SERPL-MCNC: 8.6 MG/DL (ref 8.8–10)
CALCIUM SERPL-MCNC: 8.7 MG/DL (ref 8.8–10)
CALCIUM SERPL-MCNC: 8.7 MG/DL (ref 8.8–10)
CALCIUM SERPL-MCNC: 8.9 MG/DL (ref 8.8–10)
CALCIUM SERPL-MCNC: 8.9 MG/DL (ref 8.8–10)
CALCIUM SERPL-MCNC: 9.2 MG/DL (ref 8.8–10)
CANNABINOIDS UR QL SCN: NEGATIVE
CHLORIDE SERPL-SCNC: 100 MMOL/L (ref 98–107)
CHLORIDE SERPL-SCNC: 101 MMOL/L (ref 98–107)
CHLORIDE SERPL-SCNC: 103 MMOL/L (ref 98–107)
CHLORIDE SERPL-SCNC: 104 MMOL/L (ref 98–107)
CHLORIDE SERPL-SCNC: 106 MMOL/L (ref 98–107)
CHLORIDE SERPL-SCNC: 107 MMOL/L (ref 98–107)
CHLORIDE SERPL-SCNC: 107 MMOL/L (ref 98–107)
CHLORIDE SERPL-SCNC: 108 MMOL/L (ref 98–107)
CHLORIDE SERPL-SCNC: 109 MMOL/L (ref 98–107)
CHLORIDE SERPL-SCNC: 111 MMOL/L (ref 98–107)
CHLORIDE SERPL-SCNC: 112 MMOL/L (ref 98–107)
CO2 BLDA-SCNC: 26.8 MMOL/L
CO2 BLDA-SCNC: 27.1 MMOL/L
CO2 BLDA-SCNC: 27.4 MMOL/L
CO2 BLDA-SCNC: 28.4 MMOL/L
CO2 BLDA-SCNC: 28.9 MMOL/L
CO2 BLDA-SCNC: 32.7 MMOL/L
CO2 BLDA-SCNC: 36.3 MMOL/L
CO2 BLDA-SCNC: 38.8 MMOL/L
CO2 BLDA-SCNC: 41.7 MMOL/L
CO2 BLDA-SCNC: 43.7 MMOL/L
CO2 BLDA-SCNC: 44.7 MMOL/L
CO2 BLDA-SCNC: 45.4 MMOL/L
CO2 SERPL-SCNC: 14 MMOL/L (ref 23–31)
CO2 SERPL-SCNC: 20 MMOL/L (ref 23–31)
CO2 SERPL-SCNC: 23 MMOL/L (ref 23–31)
CO2 SERPL-SCNC: 24 MMOL/L (ref 23–31)
CO2 SERPL-SCNC: 24 MMOL/L (ref 23–31)
CO2 SERPL-SCNC: 26 MMOL/L (ref 23–31)
CO2 SERPL-SCNC: 28 MMOL/L (ref 23–31)
CO2 SERPL-SCNC: 28 MMOL/L (ref 23–31)
CO2 SERPL-SCNC: 30 MMOL/L (ref 23–31)
CO2 SERPL-SCNC: 30 MMOL/L (ref 23–31)
CO2 SERPL-SCNC: 33 MMOL/L (ref 23–31)
CO2 SERPL-SCNC: 33 MMOL/L (ref 23–31)
CO2 SERPL-SCNC: 34 MMOL/L (ref 23–31)
CO2 SERPL-SCNC: 34 MMOL/L (ref 23–31)
CO2 SERPL-SCNC: 35 MMOL/L (ref 23–31)
COCAINE UR QL SCN: NEGATIVE
COHGB MFR BLDA: 1.8 % (ref 0.5–1.5)
COHGB MFR BLDA: 2.1 % (ref 0.5–1.5)
COHGB MFR BLDA: 2.4 % (ref 0.5–1.5)
COHGB MFR BLDA: 2.8 % (ref 0.5–1.5)
COHGB MFR BLDA: 2.9 % (ref 0.5–1.5)
COHGB MFR BLDA: 3.1 % (ref 0.5–1.5)
COHGB MFR BLDA: 3.4 % (ref 0.5–1.5)
CREAT SERPL-MCNC: 0.6 MG/DL (ref 0.72–1.25)
CREAT SERPL-MCNC: 0.6 MG/DL (ref 0.72–1.25)
CREAT SERPL-MCNC: 0.62 MG/DL (ref 0.72–1.25)
CREAT SERPL-MCNC: 0.63 MG/DL (ref 0.72–1.25)
CREAT SERPL-MCNC: 0.64 MG/DL (ref 0.72–1.25)
CREAT SERPL-MCNC: 0.67 MG/DL (ref 0.72–1.25)
CREAT SERPL-MCNC: 0.7 MG/DL (ref 0.72–1.25)
CREAT SERPL-MCNC: 0.76 MG/DL (ref 0.72–1.25)
CREAT SERPL-MCNC: 0.97 MG/DL (ref 0.72–1.25)
CREAT SERPL-MCNC: 1.02 MG/DL (ref 0.72–1.25)
CREAT SERPL-MCNC: 1.07 MG/DL (ref 0.72–1.25)
CREAT SERPL-MCNC: 1.16 MG/DL (ref 0.72–1.25)
CREAT SERPL-MCNC: 1.53 MG/DL (ref 0.72–1.25)
CREAT/UREA NIT SERPL: 10
CREAT/UREA NIT SERPL: 18
CREAT/UREA NIT SERPL: 19
CREAT/UREA NIT SERPL: 20
CREAT/UREA NIT SERPL: 20
CREAT/UREA NIT SERPL: 21
CREAT/UREA NIT SERPL: 22
CREAT/UREA NIT SERPL: 28
CREAT/UREA NIT SERPL: 28
CREAT/UREA NIT SERPL: 29
CREAT/UREA NIT SERPL: 30
CREAT/UREA NIT SERPL: 31
CREAT/UREA NIT SERPL: 32
CREAT/UREA NIT SERPL: 33
CREAT/UREA NIT SERPL: 38
DRAWN BY BLOOD GAS (OHS): ABNORMAL
EOSINOPHIL # BLD AUTO: 0 X10(3)/MCL (ref 0–0.9)
EOSINOPHIL # BLD AUTO: 0.01 X10(3)/MCL (ref 0–0.9)
EOSINOPHIL # BLD AUTO: 0.03 X10(3)/MCL (ref 0–0.9)
EOSINOPHIL # BLD AUTO: 0.07 X10(3)/MCL (ref 0–0.9)
EOSINOPHIL # BLD AUTO: 0.12 X10(3)/MCL (ref 0–0.9)
EOSINOPHIL # BLD AUTO: 0.25 X10(3)/MCL (ref 0–0.9)
EOSINOPHIL # BLD AUTO: 0.27 X10(3)/MCL (ref 0–0.9)
EOSINOPHIL # BLD AUTO: 0.3 X10(3)/MCL (ref 0–0.9)
EOSINOPHIL # BLD AUTO: 0.3 X10(3)/MCL (ref 0–0.9)
EOSINOPHIL # BLD AUTO: 0.31 X10(3)/MCL (ref 0–0.9)
EOSINOPHIL NFR BLD AUTO: 0 %
EOSINOPHIL NFR BLD AUTO: 0.1 %
EOSINOPHIL NFR BLD AUTO: 0.2 %
EOSINOPHIL NFR BLD AUTO: 0.3 %
EOSINOPHIL NFR BLD AUTO: 1.1 %
EOSINOPHIL NFR BLD AUTO: 2.7 %
EOSINOPHIL NFR BLD AUTO: 3.2 %
EOSINOPHIL NFR BLD AUTO: 3.3 %
EOSINOPHIL NFR BLD AUTO: 3.8 %
EOSINOPHIL NFR BLD AUTO: 3.9 %
ERYTHROCYTE [DISTWIDTH] IN BLOOD BY AUTOMATED COUNT: 15.9 % (ref 11.5–17)
ERYTHROCYTE [DISTWIDTH] IN BLOOD BY AUTOMATED COUNT: 15.9 % (ref 11.5–17)
ERYTHROCYTE [DISTWIDTH] IN BLOOD BY AUTOMATED COUNT: 16.3 % (ref 11.5–17)
ERYTHROCYTE [DISTWIDTH] IN BLOOD BY AUTOMATED COUNT: 16.4 % (ref 11.5–17)
ERYTHROCYTE [DISTWIDTH] IN BLOOD BY AUTOMATED COUNT: 16.4 % (ref 11.5–17)
ERYTHROCYTE [DISTWIDTH] IN BLOOD BY AUTOMATED COUNT: 16.5 % (ref 11.5–17)
ERYTHROCYTE [DISTWIDTH] IN BLOOD BY AUTOMATED COUNT: 16.6 % (ref 11.5–17)
ERYTHROCYTE [DISTWIDTH] IN BLOOD BY AUTOMATED COUNT: 16.7 % (ref 11.5–17)
ERYTHROCYTE [DISTWIDTH] IN BLOOD BY AUTOMATED COUNT: 17.2 % (ref 11.5–17)
ERYTHROCYTE [DISTWIDTH] IN BLOOD BY AUTOMATED COUNT: 17.6 % (ref 11.5–17)
ERYTHROCYTE [DISTWIDTH] IN BLOOD BY AUTOMATED COUNT: 18.3 % (ref 11.5–17)
FENTANYL UR QL SCN: NEGATIVE
FIBRINOGEN PPP-MCNC: 627 MG/DL (ref 210–463)
FLUAV AG UPPER RESP QL IA.RAPID: NOT DETECTED
FLUBV AG UPPER RESP QL IA.RAPID: NOT DETECTED
GFR SERPLBLD CREATININE-BSD FMLA CKD-EPI: 49 ML/MIN/1.73/M2
GFR SERPLBLD CREATININE-BSD FMLA CKD-EPI: >60 ML/MIN/1.73/M2
GLOBULIN SER-MCNC: 2.5 GM/DL (ref 2.4–3.5)
GLOBULIN SER-MCNC: 2.7 GM/DL (ref 2.4–3.5)
GLOBULIN SER-MCNC: 3.3 GM/DL (ref 2.4–3.5)
GLOBULIN SER-MCNC: 3.5 GM/DL (ref 2.4–3.5)
GLOBULIN SER-MCNC: 4 GM/DL (ref 2.4–3.5)
GLOBULIN SER-MCNC: 4 GM/DL (ref 2.4–3.5)
GLOBULIN SER-MCNC: 4.2 GM/DL (ref 2.4–3.5)
GLOBULIN SER-MCNC: 4.3 GM/DL (ref 2.4–3.5)
GLOBULIN SER-MCNC: 4.3 GM/DL (ref 2.4–3.5)
GLUCOSE SERPL-MCNC: 108 MG/DL (ref 82–115)
GLUCOSE SERPL-MCNC: 111 MG/DL (ref 82–115)
GLUCOSE SERPL-MCNC: 114 MG/DL (ref 82–115)
GLUCOSE SERPL-MCNC: 117 MG/DL (ref 82–115)
GLUCOSE SERPL-MCNC: 133 MG/DL (ref 82–115)
GLUCOSE SERPL-MCNC: 138 MG/DL (ref 82–115)
GLUCOSE SERPL-MCNC: 146 MG/DL (ref 82–115)
GLUCOSE SERPL-MCNC: 150 MG/DL (ref 82–115)
GLUCOSE SERPL-MCNC: 165 MG/DL (ref 82–115)
GLUCOSE SERPL-MCNC: 169 MG/DL (ref 82–115)
GLUCOSE SERPL-MCNC: 185 MG/DL (ref 82–115)
GLUCOSE SERPL-MCNC: 214 MG/DL (ref 82–115)
GLUCOSE SERPL-MCNC: 220 MG/DL (ref 82–115)
GLUCOSE SERPL-MCNC: 275 MG/DL (ref 82–115)
GLUCOSE SERPL-MCNC: 70 MG/DL (ref 82–115)
GRAM STN SPEC: ABNORMAL
GRAM STN SPEC: ABNORMAL
HAPTOGLOB SERPL-MCNC: 205 MG/DL (ref 40–368)
HCO3 BLDA-SCNC: 24.3 MMOL/L (ref 22–26)
HCO3 BLDA-SCNC: 24.6 MMOL/L (ref 22–26)
HCO3 BLDA-SCNC: 25 MMOL/L (ref 22–26)
HCO3 BLDA-SCNC: 26.6 MMOL/L (ref 22–26)
HCO3 BLDA-SCNC: 26.8 MMOL/L (ref 22–26)
HCO3 BLDA-SCNC: 30.9 MMOL/L (ref 22–26)
HCO3 BLDA-SCNC: 34.8 MMOL/L (ref 22–26)
HCO3 BLDA-SCNC: 36.7 MMOL/L (ref 22–26)
HCO3 BLDA-SCNC: 39.6 MMOL/L (ref 22–26)
HCO3 BLDA-SCNC: 41.4 MMOL/L (ref 22–26)
HCO3 BLDA-SCNC: 42.4 MMOL/L (ref 22–26)
HCO3 BLDA-SCNC: 43.2 MMOL/L (ref 22–26)
HCT VFR BLD AUTO: 48.9 % (ref 42–52)
HCT VFR BLD AUTO: 49.1 % (ref 42–52)
HCT VFR BLD AUTO: 49.6 % (ref 42–52)
HCT VFR BLD AUTO: 50.8 % (ref 42–52)
HCT VFR BLD AUTO: 51.4 % (ref 42–52)
HCT VFR BLD AUTO: 53.1 % (ref 42–52)
HCT VFR BLD AUTO: 53.2 % (ref 42–52)
HCT VFR BLD AUTO: 53.3 % (ref 42–52)
HCT VFR BLD AUTO: 53.5 % (ref 42–52)
HCT VFR BLD AUTO: 55.1 % (ref 42–52)
HCT VFR BLD AUTO: 57.9 % (ref 42–52)
HEMATOLOGIST REVIEW: NORMAL
HGB BLD-MCNC: 14.6 G/DL (ref 14–18)
HGB BLD-MCNC: 14.6 G/DL (ref 14–18)
HGB BLD-MCNC: 15 G/DL (ref 14–18)
HGB BLD-MCNC: 15.6 G/DL (ref 14–18)
HGB BLD-MCNC: 16 G/DL (ref 14–18)
HGB BLD-MCNC: 16.2 G/DL (ref 14–18)
HGB BLD-MCNC: 16.2 G/DL (ref 14–18)
HGB BLD-MCNC: 16.7 G/DL (ref 14–18)
HGB BLD-MCNC: 17.4 G/DL (ref 14–18)
IMM GRANULOCYTES # BLD AUTO: 0.04 X10(3)/MCL (ref 0–0.04)
IMM GRANULOCYTES # BLD AUTO: 0.05 X10(3)/MCL (ref 0–0.04)
IMM GRANULOCYTES # BLD AUTO: 0.06 X10(3)/MCL (ref 0–0.04)
IMM GRANULOCYTES # BLD AUTO: 0.07 X10(3)/MCL (ref 0–0.04)
IMM GRANULOCYTES # BLD AUTO: 0.08 X10(3)/MCL (ref 0–0.04)
IMM GRANULOCYTES # BLD AUTO: 0.16 X10(3)/MCL (ref 0–0.04)
IMM GRANULOCYTES # BLD AUTO: 0.27 X10(3)/MCL (ref 0–0.04)
IMM GRANULOCYTES NFR BLD AUTO: 0.4 %
IMM GRANULOCYTES NFR BLD AUTO: 0.5 %
IMM GRANULOCYTES NFR BLD AUTO: 0.7 %
IMM GRANULOCYTES NFR BLD AUTO: 0.7 %
IMM GRANULOCYTES NFR BLD AUTO: 0.8 %
IMM GRANULOCYTES NFR BLD AUTO: 1.1 %
INHALED O2 CONCENTRATION: 100 %
INHALED O2 CONCENTRATION: 40 %
INHALED O2 CONCENTRATION: 50 %
INHALED O2 CONCENTRATION: 60 %
INHALED O2 CONCENTRATION: 65 %
INR PPP: 1
INSTRUMENT WBC (OLG): 21.89 X10(3)/MCL
ITIME (SEC) (OHS): 0.8 SEC
LACTATE SERPL-SCNC: 1.5 MMOL/L (ref 0.5–2.2)
LYMPHOCYTES # BLD AUTO: 0.76 X10(3)/MCL (ref 0.6–4.6)
LYMPHOCYTES # BLD AUTO: 1.03 X10(3)/MCL (ref 0.6–4.6)
LYMPHOCYTES # BLD AUTO: 1.31 X10(3)/MCL (ref 0.6–4.6)
LYMPHOCYTES # BLD AUTO: 1.47 X10(3)/MCL (ref 0.6–4.6)
LYMPHOCYTES # BLD AUTO: 1.59 X10(3)/MCL (ref 0.6–4.6)
LYMPHOCYTES # BLD AUTO: 1.65 X10(3)/MCL (ref 0.6–4.6)
LYMPHOCYTES # BLD AUTO: 1.65 X10(3)/MCL (ref 0.6–4.6)
LYMPHOCYTES # BLD AUTO: 1.69 X10(3)/MCL (ref 0.6–4.6)
LYMPHOCYTES # BLD AUTO: 1.81 X10(3)/MCL (ref 0.6–4.6)
LYMPHOCYTES # BLD AUTO: 1.85 X10(3)/MCL (ref 0.6–4.6)
LYMPHOCYTES NFR BLD AUTO: 14.8 %
LYMPHOCYTES NFR BLD AUTO: 15.8 %
LYMPHOCYTES NFR BLD AUTO: 18 %
LYMPHOCYTES NFR BLD AUTO: 20.5 %
LYMPHOCYTES NFR BLD AUTO: 22.4 %
LYMPHOCYTES NFR BLD AUTO: 23.6 %
LYMPHOCYTES NFR BLD AUTO: 3 %
LYMPHOCYTES NFR BLD AUTO: 6.5 %
LYMPHOCYTES NFR BLD AUTO: 7.8 %
LYMPHOCYTES NFR BLD AUTO: 9.7 %
LYMPHOCYTES NFR BLD MANUAL: 0.66 X10(3)/MCL
LYMPHOCYTES NFR BLD MANUAL: 3 %
MACROCYTES BLD QL SMEAR: ABNORMAL
MAGNESIUM SERPL-MCNC: 1.9 MG/DL (ref 1.6–2.6)
MAGNESIUM SERPL-MCNC: 1.9 MG/DL (ref 1.6–2.6)
MAGNESIUM SERPL-MCNC: 2.2 MG/DL (ref 1.6–2.6)
MAGNESIUM SERPL-MCNC: 2.2 MG/DL (ref 1.6–2.6)
MAGNESIUM SERPL-MCNC: 2.4 MG/DL (ref 1.6–2.6)
MAGNESIUM SERPL-MCNC: 2.4 MG/DL (ref 1.6–2.6)
MCH RBC QN AUTO: 26.7 PG (ref 27–31)
MCH RBC QN AUTO: 26.8 PG (ref 27–31)
MCH RBC QN AUTO: 26.8 PG (ref 27–31)
MCH RBC QN AUTO: 26.9 PG (ref 27–31)
MCH RBC QN AUTO: 26.9 PG (ref 27–31)
MCH RBC QN AUTO: 27 PG (ref 27–31)
MCH RBC QN AUTO: 27.1 PG (ref 27–31)
MCH RBC QN AUTO: 27.4 PG (ref 27–31)
MCH RBC QN AUTO: 27.4 PG (ref 27–31)
MCH RBC QN AUTO: 27.5 PG (ref 27–31)
MCH RBC QN AUTO: 27.8 PG (ref 27–31)
MCHC RBC AUTO-ENTMCNC: 29.3 G/DL (ref 33–36)
MCHC RBC AUTO-ENTMCNC: 29.4 G/DL (ref 33–36)
MCHC RBC AUTO-ENTMCNC: 29.4 G/DL (ref 33–36)
MCHC RBC AUTO-ENTMCNC: 29.9 G/DL (ref 33–36)
MCHC RBC AUTO-ENTMCNC: 29.9 G/DL (ref 33–36)
MCHC RBC AUTO-ENTMCNC: 30.1 G/DL (ref 33–36)
MCHC RBC AUTO-ENTMCNC: 30.4 G/DL (ref 33–36)
MCHC RBC AUTO-ENTMCNC: 30.4 G/DL (ref 33–36)
MCHC RBC AUTO-ENTMCNC: 30.5 G/DL (ref 33–36)
MCHC RBC AUTO-ENTMCNC: 30.7 G/DL (ref 33–36)
MCHC RBC AUTO-ENTMCNC: 31.5 G/DL (ref 33–36)
MCV RBC AUTO: 87.6 FL (ref 80–94)
MCV RBC AUTO: 88.5 FL (ref 80–94)
MCV RBC AUTO: 88.6 FL (ref 80–94)
MCV RBC AUTO: 89.6 FL (ref 80–94)
MCV RBC AUTO: 89.6 FL (ref 80–94)
MCV RBC AUTO: 90 FL (ref 80–94)
MCV RBC AUTO: 90.8 FL (ref 80–94)
MCV RBC AUTO: 90.9 FL (ref 80–94)
MCV RBC AUTO: 91.2 FL (ref 80–94)
MCV RBC AUTO: 91.5 FL (ref 80–94)
MCV RBC AUTO: 92.2 FL (ref 80–94)
MDMA UR QL SCN: NEGATIVE
MECH RR (OHS): 24 B/MIN
MECH RR (OHS): 26 B/MIN
MECH RR (OHS): 26 B/MIN
MECH RR (OHS): 28 B/MIN
MECH RR (OHS): 31 B/MIN
MECH RR (OHS): 32 B/MIN
METHGB MFR BLDA: 0.1 % (ref 0.4–1.5)
METHGB MFR BLDA: 0.4 % (ref 0.4–1.5)
METHGB MFR BLDA: 0.5 % (ref 0.4–1.5)
METHGB MFR BLDA: 0.5 % (ref 0.4–1.5)
METHGB MFR BLDA: 0.6 % (ref 0.4–1.5)
METHGB MFR BLDA: 0.6 % (ref 0.4–1.5)
METHGB MFR BLDA: 0.8 % (ref 0.4–1.5)
MODE (OHS): ABNORMAL
MODE (OHS): AC
MONOCYTES # BLD AUTO: 0.71 X10(3)/MCL (ref 0.1–1.3)
MONOCYTES # BLD AUTO: 0.76 X10(3)/MCL (ref 0.1–1.3)
MONOCYTES # BLD AUTO: 0.8 X10(3)/MCL (ref 0.1–1.3)
MONOCYTES # BLD AUTO: 0.89 X10(3)/MCL (ref 0.1–1.3)
MONOCYTES # BLD AUTO: 1 X10(3)/MCL (ref 0.1–1.3)
MONOCYTES # BLD AUTO: 1.05 X10(3)/MCL (ref 0.1–1.3)
MONOCYTES # BLD AUTO: 1.13 X10(3)/MCL (ref 0.1–1.3)
MONOCYTES # BLD AUTO: 1.3 X10(3)/MCL (ref 0.1–1.3)
MONOCYTES # BLD AUTO: 1.47 X10(3)/MCL (ref 0.1–1.3)
MONOCYTES # BLD AUTO: 2.45 X10(3)/MCL (ref 0.1–1.3)
MONOCYTES NFR BLD AUTO: 10.3 %
MONOCYTES NFR BLD AUTO: 10.6 %
MONOCYTES NFR BLD AUTO: 11.5 %
MONOCYTES NFR BLD AUTO: 6.3 %
MONOCYTES NFR BLD AUTO: 7 %
MONOCYTES NFR BLD AUTO: 9 %
MONOCYTES NFR BLD AUTO: 9.2 %
MONOCYTES NFR BLD AUTO: 9.2 %
MONOCYTES NFR BLD AUTO: 9.6 %
MONOCYTES NFR BLD AUTO: 9.7 %
MONOCYTES NFR BLD MANUAL: 0.66 X10(3)/MCL (ref 0.1–1.3)
MONOCYTES NFR BLD MANUAL: 3 %
MRSA PCR SCRN (OHS): NOT DETECTED
NEUTROPHILS # BLD AUTO: 10.75 X10(3)/MCL (ref 2.1–9.2)
NEUTROPHILS # BLD AUTO: 13.62 X10(3)/MCL (ref 2.1–9.2)
NEUTROPHILS # BLD AUTO: 17.59 X10(3)/MCL (ref 2.1–9.2)
NEUTROPHILS # BLD AUTO: 21.8 X10(3)/MCL (ref 2.1–9.2)
NEUTROPHILS # BLD AUTO: 4.77 X10(3)/MCL (ref 2.1–9.2)
NEUTROPHILS # BLD AUTO: 5.02 X10(3)/MCL (ref 2.1–9.2)
NEUTROPHILS # BLD AUTO: 5.23 X10(3)/MCL (ref 2.1–9.2)
NEUTROPHILS # BLD AUTO: 6.09 X10(3)/MCL (ref 2.1–9.2)
NEUTROPHILS # BLD AUTO: 7.2 X10(3)/MCL (ref 2.1–9.2)
NEUTROPHILS # BLD AUTO: 7.65 X10(3)/MCL (ref 2.1–9.2)
NEUTROPHILS NFR BLD AUTO: 62.1 %
NEUTROPHILS NFR BLD AUTO: 63.4 %
NEUTROPHILS NFR BLD AUTO: 64.9 %
NEUTROPHILS NFR BLD AUTO: 66.4 %
NEUTROPHILS NFR BLD AUTO: 71.7 %
NEUTROPHILS NFR BLD AUTO: 72.4 %
NEUTROPHILS NFR BLD AUTO: 79.9 %
NEUTROPHILS NFR BLD AUTO: 83.6 %
NEUTROPHILS NFR BLD AUTO: 86 %
NEUTROPHILS NFR BLD AUTO: 86.4 %
NEUTROPHILS NFR BLD MANUAL: 95 %
NRBC BLD AUTO-RTO: 0 %
NRBC BLD AUTO-RTO: 0.1 %
NRBC BLD AUTO-RTO: 0.1 %
NRBC BLD AUTO-RTO: 0.2 %
O2 HB BLOOD GAS (OHS): 93.4 % (ref 94–97)
O2 HB BLOOD GAS (OHS): 93.4 % (ref 94–97)
O2 HB BLOOD GAS (OHS): 95.1 % (ref 94–97)
O2 HB BLOOD GAS (OHS): 95.6 % (ref 94–97)
O2 HB BLOOD GAS (OHS): 95.8 % (ref 94–97)
O2 HB BLOOD GAS (OHS): 95.8 % (ref 94–97)
O2 HB BLOOD GAS (OHS): 96.2 % (ref 94–97)
OHS QRS DURATION: 72 MS
OHS QTC CALCULATION: 554 MS
OPIATES UR QL SCN: NEGATIVE
OXYGEN DEVICE BLOOD GAS (OHS): ABNORMAL
OXYHGB MFR BLDA: 15 G/DL (ref 12–16)
OXYHGB MFR BLDA: 16.2 G/DL (ref 12–16)
OXYHGB MFR BLDA: 16.6 G/DL (ref 12–16)
OXYHGB MFR BLDA: 17.3 G/DL (ref 12–16)
OXYHGB MFR BLDA: 17.4 G/DL (ref 12–16)
OXYHGB MFR BLDA: 17.4 G/DL (ref 12–16)
OXYHGB MFR BLDA: 17.5 G/DL (ref 12–16)
PAW @ PEAK INSP FLOW SETTING VENT: 25 CMH20
PCO2 BLDA: 50 MMHG (ref 35–45)
PCO2 BLDA: 58 MMHG (ref 35–45)
PCO2 BLDA: 60 MMHG (ref 35–45)
PCO2 BLDA: 67 MMHG (ref 35–45)
PCO2 BLDA: 67 MMHG (ref 35–45)
PCO2 BLDA: 68 MMHG (ref 35–45)
PCO2 BLDA: 70 MMHG (ref 35–45)
PCO2 BLDA: 73 MMHG (ref 35–45)
PCO2 BLDA: 75 MMHG (ref 35–45)
PCO2 BLDA: 75 MMHG (ref 35–45)
PCO2 BLDA: 82 MMHG (ref 35–45)
PCO2 BLDA: 91 MMHG (ref 35–45)
PCP UR QL: NEGATIVE
PEEP RESPIRATORY: 10 CMH2O
PEEP RESPIRATORY: 5 CMH2O
PEEP RESPIRATORY: 8 CMH2O
PH BLDA: 7.04 [PH] (ref 7.35–7.45)
PH BLDA: 7.08 [PH] (ref 7.35–7.45)
PH BLDA: 7.16 [PH] (ref 7.35–7.45)
PH BLDA: 7.21 [PH] (ref 7.35–7.45)
PH BLDA: 7.27 [PH] (ref 7.35–7.45)
PH BLDA: 7.32 [PH] (ref 7.35–7.45)
PH BLDA: 7.34 [PH] (ref 7.35–7.45)
PH BLDA: 7.35 [PH] (ref 7.35–7.45)
PH BLDA: 7.36 [PH] (ref 7.35–7.45)
PH BLDA: 7.38 [PH] (ref 7.35–7.45)
PH BLDA: 7.38 [PH] (ref 7.35–7.45)
PH BLDA: 7.45 [PH] (ref 7.35–7.45)
PH UR: 6 [PH] (ref 3–11)
PHOSPHATE SERPL-MCNC: 2.8 MG/DL (ref 2.3–4.7)
PHOSPHATE SERPL-MCNC: 2.9 MG/DL (ref 2.3–4.7)
PHOSPHATE SERPL-MCNC: 3 MG/DL (ref 2.3–4.7)
PHOSPHATE SERPL-MCNC: 3.2 MG/DL (ref 2.3–4.7)
PHOSPHATE SERPL-MCNC: 3.2 MG/DL (ref 2.3–4.7)
PHOSPHATE SERPL-MCNC: 6.1 MG/DL (ref 2.3–4.7)
PLATELET # BLD AUTO: 125 X10(3)/MCL (ref 130–400)
PLATELET # BLD AUTO: 156 X10(3)/MCL (ref 130–400)
PLATELET # BLD AUTO: 157 X10(3)/MCL (ref 130–400)
PLATELET # BLD AUTO: 179 X10(3)/MCL (ref 130–400)
PLATELET # BLD AUTO: 180 X10(3)/MCL (ref 130–400)
PLATELET # BLD AUTO: 184 X10(3)/MCL (ref 130–400)
PLATELET # BLD AUTO: 193 X10(3)/MCL (ref 130–400)
PLATELET # BLD AUTO: 195 X10(3)/MCL (ref 130–400)
PLATELET # BLD AUTO: 195 X10(3)/MCL (ref 130–400)
PLATELET # BLD AUTO: 209 X10(3)/MCL (ref 130–400)
PLATELET # BLD AUTO: 213 X10(3)/MCL (ref 130–400)
PLATELET # BLD EST: NORMAL 10*3/UL
PLATELETS.RETICULATED NFR BLD AUTO: 5.1 % (ref 0.9–11.2)
PMV BLD AUTO: 10.3 FL (ref 7.4–10.4)
PMV BLD AUTO: 10.3 FL (ref 7.4–10.4)
PMV BLD AUTO: 10.6 FL (ref 7.4–10.4)
PMV BLD AUTO: 11.2 FL (ref 7.4–10.4)
PMV BLD AUTO: 11.3 FL (ref 7.4–10.4)
PMV BLD AUTO: 11.3 FL (ref 7.4–10.4)
PMV BLD AUTO: 11.5 FL (ref 7.4–10.4)
PMV BLD AUTO: 11.8 FL (ref 7.4–10.4)
PMV BLD AUTO: 11.8 FL (ref 7.4–10.4)
PO2 BLDA: 101 MMHG (ref 80–100)
PO2 BLDA: 118 MMHG (ref 80–100)
PO2 BLDA: 126 MMHG (ref 80–100)
PO2 BLDA: 142 MMHG (ref 80–100)
PO2 BLDA: 231 MMHG (ref 80–100)
PO2 BLDA: 61 MMHG (ref 80–100)
PO2 BLDA: 73 MMHG (ref 80–100)
PO2 BLDA: 78 MMHG (ref 80–100)
PO2 BLDA: 86 MMHG (ref 80–100)
PO2 BLDA: 90 MMHG (ref 80–100)
PO2 BLDA: 93 MMHG (ref 80–100)
PO2 BLDA: 96 MMHG (ref 80–100)
POCT GLUCOSE: 104 MG/DL (ref 70–110)
POCT GLUCOSE: 105 MG/DL (ref 70–110)
POCT GLUCOSE: 106 MG/DL (ref 70–110)
POCT GLUCOSE: 108 MG/DL (ref 70–110)
POCT GLUCOSE: 108 MG/DL (ref 70–110)
POCT GLUCOSE: 114 MG/DL (ref 70–110)
POCT GLUCOSE: 115 MG/DL (ref 70–110)
POCT GLUCOSE: 116 MG/DL (ref 70–110)
POCT GLUCOSE: 116 MG/DL (ref 70–110)
POCT GLUCOSE: 117 MG/DL (ref 70–110)
POCT GLUCOSE: 121 MG/DL (ref 70–110)
POCT GLUCOSE: 124 MG/DL (ref 70–110)
POCT GLUCOSE: 124 MG/DL (ref 70–110)
POCT GLUCOSE: 126 MG/DL (ref 70–110)
POCT GLUCOSE: 127 MG/DL (ref 70–110)
POCT GLUCOSE: 131 MG/DL (ref 70–110)
POCT GLUCOSE: 135 MG/DL (ref 70–110)
POCT GLUCOSE: 135 MG/DL (ref 70–110)
POCT GLUCOSE: 137 MG/DL (ref 70–110)
POCT GLUCOSE: 139 MG/DL (ref 70–110)
POCT GLUCOSE: 140 MG/DL (ref 70–110)
POCT GLUCOSE: 142 MG/DL (ref 70–110)
POCT GLUCOSE: 142 MG/DL (ref 70–110)
POCT GLUCOSE: 143 MG/DL (ref 70–110)
POCT GLUCOSE: 143 MG/DL (ref 70–110)
POCT GLUCOSE: 145 MG/DL (ref 70–110)
POCT GLUCOSE: 145 MG/DL (ref 70–110)
POCT GLUCOSE: 154 MG/DL (ref 70–110)
POCT GLUCOSE: 163 MG/DL (ref 70–110)
POCT GLUCOSE: 166 MG/DL (ref 70–110)
POCT GLUCOSE: 240 MG/DL (ref 70–110)
POCT GLUCOSE: 268 MG/DL (ref 70–110)
POCT GLUCOSE: 282 MG/DL (ref 70–110)
POCT GLUCOSE: 80 MG/DL (ref 70–110)
POCT GLUCOSE: 94 MG/DL (ref 70–110)
POTASSIUM BLOOD GAS (OHS): 3.8 MMOL/L (ref 3.5–5)
POTASSIUM BLOOD GAS (OHS): 4.2 MMOL/L (ref 3.5–5)
POTASSIUM BLOOD GAS (OHS): 4.2 MMOL/L (ref 3.5–5)
POTASSIUM BLOOD GAS (OHS): 4.4 MMOL/L (ref 3.5–5)
POTASSIUM BLOOD GAS (OHS): 4.4 MMOL/L (ref 3.5–5)
POTASSIUM BLOOD GAS (OHS): 4.7 MMOL/L (ref 3.5–5)
POTASSIUM BLOOD GAS (OHS): 5 MMOL/L (ref 3.5–5)
POTASSIUM BLOOD GAS (OHS): 5.1 MMOL/L (ref 3.5–5)
POTASSIUM BLOOD GAS (OHS): 5.2 MMOL/L (ref 3.5–5)
POTASSIUM BLOOD GAS (OHS): 5.4 MMOL/L (ref 3.5–5)
POTASSIUM BLOOD GAS (OHS): 5.6 MMOL/L (ref 3.5–5)
POTASSIUM BLOOD GAS (OHS): 6.1 MMOL/L (ref 3.5–5)
POTASSIUM SERPL-SCNC: 4.4 MMOL/L (ref 3.5–5.1)
POTASSIUM SERPL-SCNC: 4.5 MMOL/L (ref 3.5–5.1)
POTASSIUM SERPL-SCNC: 4.7 MMOL/L (ref 3.5–5.1)
POTASSIUM SERPL-SCNC: 4.8 MMOL/L (ref 3.5–5.1)
POTASSIUM SERPL-SCNC: 4.8 MMOL/L (ref 3.5–5.1)
POTASSIUM SERPL-SCNC: 4.9 MMOL/L (ref 3.5–5.1)
POTASSIUM SERPL-SCNC: 5.3 MMOL/L (ref 3.5–5.1)
POTASSIUM SERPL-SCNC: 5.3 MMOL/L (ref 3.5–5.1)
POTASSIUM SERPL-SCNC: 5.4 MMOL/L (ref 3.5–5.1)
POTASSIUM SERPL-SCNC: 5.7 MMOL/L (ref 3.5–5.1)
POTASSIUM SERPL-SCNC: 5.7 MMOL/L (ref 3.5–5.1)
POTASSIUM SERPL-SCNC: 5.8 MMOL/L (ref 3.5–5.1)
POTASSIUM SERPL-SCNC: 5.9 MMOL/L (ref 3.5–5.1)
PROT SERPL-MCNC: 5.2 GM/DL (ref 5.8–7.6)
PROT SERPL-MCNC: 5.5 GM/DL (ref 5.8–7.6)
PROT SERPL-MCNC: 5.6 GM/DL (ref 5.8–7.6)
PROT SERPL-MCNC: 6 GM/DL (ref 5.8–7.6)
PROT SERPL-MCNC: 6.4 GM/DL (ref 5.8–7.6)
PROT SERPL-MCNC: 6.4 GM/DL (ref 5.8–7.6)
PROT SERPL-MCNC: 6.6 GM/DL (ref 5.8–7.6)
PROT SERPL-MCNC: 6.8 GM/DL (ref 5.8–7.6)
PROT SERPL-MCNC: 6.9 GM/DL (ref 5.8–7.6)
PROTHROMBIN TIME: 13.3 SECONDS (ref 12.5–14.5)
RBC # BLD AUTO: 5.38 X10(6)/MCL (ref 4.7–6.1)
RBC # BLD AUTO: 5.44 X10(6)/MCL (ref 4.7–6.1)
RBC # BLD AUTO: 5.48 X10(6)/MCL (ref 4.7–6.1)
RBC # BLD AUTO: 5.77 X10(6)/MCL (ref 4.7–6.1)
RBC # BLD AUTO: 5.8 X10(6)/MCL (ref 4.7–6.1)
RBC # BLD AUTO: 5.8 X10(6)/MCL (ref 4.7–6.1)
RBC # BLD AUTO: 5.92 X10(6)/MCL (ref 4.7–6.1)
RBC # BLD AUTO: 5.97 X10(6)/MCL (ref 4.7–6.1)
RBC # BLD AUTO: 6 X10(6)/MCL (ref 4.7–6.1)
RBC # BLD AUTO: 6.07 X10(6)/MCL (ref 4.7–6.1)
RBC # BLD AUTO: 6.33 X10(6)/MCL (ref 4.7–6.1)
RBC MORPH BLD: ABNORMAL
RSV A 5' UTR RNA NPH QL NAA+PROBE: NOT DETECTED
SAMPLE SITE BLOOD GAS (OHS): ABNORMAL
SAO2 % BLDA: 90 %
SAO2 % BLDA: 94 %
SAO2 % BLDA: 94.3 %
SAO2 % BLDA: 94.8 %
SAO2 % BLDA: 96 %
SAO2 % BLDA: 96.8 %
SAO2 % BLDA: 97 %
SAO2 % BLDA: 97 %
SAO2 % BLDA: 98.1 %
SAO2 % BLDA: 98.5 %
SAO2 % BLDA: 99 %
SAO2 % BLDA: 99.6 %
SARS-COV-2 RNA RESP QL NAA+PROBE: NOT DETECTED
SODIUM BLOOD GAS (OHS): 131 MMOL/L (ref 137–145)
SODIUM BLOOD GAS (OHS): 131 MMOL/L (ref 137–145)
SODIUM BLOOD GAS (OHS): 133 MMOL/L (ref 137–145)
SODIUM BLOOD GAS (OHS): 134 MMOL/L (ref 137–145)
SODIUM BLOOD GAS (OHS): 135 MMOL/L (ref 137–145)
SODIUM BLOOD GAS (OHS): 136 MMOL/L (ref 137–145)
SODIUM BLOOD GAS (OHS): 137 MMOL/L (ref 137–145)
SODIUM BLOOD GAS (OHS): 137 MMOL/L (ref 137–145)
SODIUM BLOOD GAS (OHS): 140 MMOL/L (ref 137–145)
SODIUM BLOOD GAS (OHS): 141 MMOL/L (ref 137–145)
SODIUM SERPL-SCNC: 140 MMOL/L (ref 136–145)
SODIUM SERPL-SCNC: 141 MMOL/L (ref 136–145)
SODIUM SERPL-SCNC: 142 MMOL/L (ref 136–145)
SODIUM SERPL-SCNC: 143 MMOL/L (ref 136–145)
SODIUM SERPL-SCNC: 144 MMOL/L (ref 136–145)
SODIUM SERPL-SCNC: 145 MMOL/L (ref 136–145)
SODIUM SERPL-SCNC: 149 MMOL/L (ref 136–145)
SPECIFIC GRAVITY, URINE AUTO (.000) (OHS): 1.04 (ref 1–1.03)
SPONT+MECH VT ON VENT: 450 ML
SPONT+MECH VT ON VENT: 480 ML
TROPONIN I SERPL-MCNC: 0.03 NG/ML (ref 0–0.04)
WBC # BLD AUTO: 10.67 X10(3)/MCL (ref 4.5–11.5)
WBC # BLD AUTO: 13.48 X10(3)/MCL (ref 4.5–11.5)
WBC # BLD AUTO: 15.77 X10(3)/MCL (ref 4.5–11.5)
WBC # BLD AUTO: 21.04 X10(3)/MCL (ref 4.5–11.5)
WBC # BLD AUTO: 21.89 X10(3)/MCL (ref 4.5–11.5)
WBC # BLD AUTO: 25.33 X10(3)/MCL (ref 4.5–11.5)
WBC # BLD AUTO: 7.68 X10(3)/MCL (ref 4.5–11.5)
WBC # BLD AUTO: 7.75 X10(3)/MCL (ref 4.5–11.5)
WBC # BLD AUTO: 8.25 X10(3)/MCL (ref 4.5–11.5)
WBC # BLD AUTO: 9.17 X10(3)/MCL (ref 4.5–11.5)
WBC # BLD AUTO: 9.94 X10(3)/MCL (ref 4.5–11.5)

## 2024-01-01 PROCEDURE — 80053 COMPREHEN METABOLIC PANEL: CPT | Performed by: STUDENT IN AN ORGANIZED HEALTH CARE EDUCATION/TRAINING PROGRAM

## 2024-01-01 PROCEDURE — 27200966 HC CLOSED SUCTION SYSTEM

## 2024-01-01 PROCEDURE — 99900026 HC AIRWAY MAINTENANCE (STAT)

## 2024-01-01 PROCEDURE — 27100171 HC OXYGEN HIGH FLOW UP TO 24 HOURS

## 2024-01-01 PROCEDURE — 63600175 PHARM REV CODE 636 W HCPCS

## 2024-01-01 PROCEDURE — 99900031 HC PATIENT EDUCATION (STAT)

## 2024-01-01 PROCEDURE — 36600 WITHDRAWAL OF ARTERIAL BLOOD: CPT

## 2024-01-01 PROCEDURE — 87641 MR-STAPH DNA AMP PROBE: CPT | Performed by: INTERNAL MEDICINE

## 2024-01-01 PROCEDURE — 25000003 PHARM REV CODE 250

## 2024-01-01 PROCEDURE — 25000003 PHARM REV CODE 250: Performed by: INTERNAL MEDICINE

## 2024-01-01 PROCEDURE — 63600175 PHARM REV CODE 636 W HCPCS: Performed by: INTERNAL MEDICINE

## 2024-01-01 PROCEDURE — 87185 SC STD ENZYME DETCJ PER NZM: CPT | Performed by: INTERNAL MEDICINE

## 2024-01-01 PROCEDURE — 25000003 PHARM REV CODE 250: Performed by: NURSE PRACTITIONER

## 2024-01-01 PROCEDURE — 85025 COMPLETE CBC W/AUTO DIFF WBC: CPT

## 2024-01-01 PROCEDURE — 96375 TX/PRO/DX INJ NEW DRUG ADDON: CPT

## 2024-01-01 PROCEDURE — 63600175 PHARM REV CODE 636 W HCPCS: Performed by: NURSE PRACTITIONER

## 2024-01-01 PROCEDURE — 94003 VENT MGMT INPAT SUBQ DAY: CPT

## 2024-01-01 PROCEDURE — 20000000 HC ICU ROOM

## 2024-01-01 PROCEDURE — 36415 COLL VENOUS BLD VENIPUNCTURE: CPT

## 2024-01-01 PROCEDURE — 0241U COVID/RSV/FLU A&B PCR: CPT | Performed by: STUDENT IN AN ORGANIZED HEALTH CARE EDUCATION/TRAINING PROGRAM

## 2024-01-01 PROCEDURE — 84100 ASSAY OF PHOSPHORUS: CPT

## 2024-01-01 PROCEDURE — 25000003 PHARM REV CODE 250: Mod: JZ,JG

## 2024-01-01 PROCEDURE — 99900035 HC TECH TIME PER 15 MIN (STAT)

## 2024-01-01 PROCEDURE — 80053 COMPREHEN METABOLIC PANEL: CPT

## 2024-01-01 PROCEDURE — 82803 BLOOD GASES ANY COMBINATION: CPT

## 2024-01-01 PROCEDURE — 94760 N-INVAS EAR/PLS OXIMETRY 1: CPT | Mod: XB

## 2024-01-01 PROCEDURE — 94799 UNLISTED PULMONARY SVC/PX: CPT

## 2024-01-01 PROCEDURE — 63600175 PHARM REV CODE 636 W HCPCS: Performed by: STUDENT IN AN ORGANIZED HEALTH CARE EDUCATION/TRAINING PROGRAM

## 2024-01-01 PROCEDURE — 85384 FIBRINOGEN ACTIVITY: CPT

## 2024-01-01 PROCEDURE — 84484 ASSAY OF TROPONIN QUANT: CPT | Performed by: STUDENT IN AN ORGANIZED HEALTH CARE EDUCATION/TRAINING PROGRAM

## 2024-01-01 PROCEDURE — 0BH17EZ INSERTION OF ENDOTRACHEAL AIRWAY INTO TRACHEA, VIA NATURAL OR ARTIFICIAL OPENING: ICD-10-PCS | Performed by: STUDENT IN AN ORGANIZED HEALTH CARE EDUCATION/TRAINING PROGRAM

## 2024-01-01 PROCEDURE — 94644 CONT INHLJ TX 1ST HOUR: CPT

## 2024-01-01 PROCEDURE — 25000242 PHARM REV CODE 250 ALT 637 W/ HCPCS

## 2024-01-01 PROCEDURE — 99222 1ST HOSP IP/OBS MODERATE 55: CPT | Mod: ,,, | Performed by: NURSE PRACTITIONER

## 2024-01-01 PROCEDURE — 94760 N-INVAS EAR/PLS OXIMETRY 1: CPT

## 2024-01-01 PROCEDURE — 31500 INSERT EMERGENCY AIRWAY: CPT

## 2024-01-01 PROCEDURE — 82247 BILIRUBIN TOTAL: CPT

## 2024-01-01 PROCEDURE — C1751 CATH, INF, PER/CENT/MIDLINE: HCPCS

## 2024-01-01 PROCEDURE — 83735 ASSAY OF MAGNESIUM: CPT

## 2024-01-01 PROCEDURE — 85730 THROMBOPLASTIN TIME PARTIAL: CPT

## 2024-01-01 PROCEDURE — 87040 BLOOD CULTURE FOR BACTERIA: CPT

## 2024-01-01 PROCEDURE — 94640 AIRWAY INHALATION TREATMENT: CPT

## 2024-01-01 PROCEDURE — 94761 N-INVAS EAR/PLS OXIMETRY MLT: CPT | Mod: XB

## 2024-01-01 PROCEDURE — 94002 VENT MGMT INPAT INIT DAY: CPT

## 2024-01-01 PROCEDURE — 85025 COMPLETE CBC W/AUTO DIFF WBC: CPT | Performed by: STUDENT IN AN ORGANIZED HEALTH CARE EDUCATION/TRAINING PROGRAM

## 2024-01-01 PROCEDURE — 02HV33Z INSERTION OF INFUSION DEVICE INTO SUPERIOR VENA CAVA, PERCUTANEOUS APPROACH: ICD-10-PCS | Performed by: INTERNAL MEDICINE

## 2024-01-01 PROCEDURE — 83880 ASSAY OF NATRIURETIC PEPTIDE: CPT | Performed by: STUDENT IN AN ORGANIZED HEALTH CARE EDUCATION/TRAINING PROGRAM

## 2024-01-01 PROCEDURE — 25500020 PHARM REV CODE 255: Performed by: INTERNAL MEDICINE

## 2024-01-01 PROCEDURE — 99233 SBSQ HOSP IP/OBS HIGH 50: CPT | Mod: ,,, | Performed by: NURSE PRACTITIONER

## 2024-01-01 PROCEDURE — 84100 ASSAY OF PHOSPHORUS: CPT | Performed by: INTERNAL MEDICINE

## 2024-01-01 PROCEDURE — 93010 ELECTROCARDIOGRAM REPORT: CPT | Mod: ,,, | Performed by: STUDENT IN AN ORGANIZED HEALTH CARE EDUCATION/TRAINING PROGRAM

## 2024-01-01 PROCEDURE — 80048 BASIC METABOLIC PNL TOTAL CA: CPT

## 2024-01-01 PROCEDURE — 83605 ASSAY OF LACTIC ACID: CPT

## 2024-01-01 PROCEDURE — 96374 THER/PROPH/DIAG INJ IV PUSH: CPT | Mod: 59

## 2024-01-01 PROCEDURE — 80307 DRUG TEST PRSMV CHEM ANLYZR: CPT | Performed by: STUDENT IN AN ORGANIZED HEALTH CARE EDUCATION/TRAINING PROGRAM

## 2024-01-01 PROCEDURE — 25000003 PHARM REV CODE 250: Performed by: STUDENT IN AN ORGANIZED HEALTH CARE EDUCATION/TRAINING PROGRAM

## 2024-01-01 PROCEDURE — 11000001 HC ACUTE MED/SURG PRIVATE ROOM

## 2024-01-01 PROCEDURE — 83735 ASSAY OF MAGNESIUM: CPT | Performed by: INTERNAL MEDICINE

## 2024-01-01 PROCEDURE — 93005 ELECTROCARDIOGRAM TRACING: CPT

## 2024-01-01 PROCEDURE — 36569 INSJ PICC 5 YR+ W/O IMAGING: CPT

## 2024-01-01 PROCEDURE — 99291 CRITICAL CARE FIRST HOUR: CPT

## 2024-01-01 PROCEDURE — 85610 PROTHROMBIN TIME: CPT

## 2024-01-01 PROCEDURE — 5A1955Z RESPIRATORY VENTILATION, GREATER THAN 96 CONSECUTIVE HOURS: ICD-10-PCS | Performed by: STUDENT IN AN ORGANIZED HEALTH CARE EDUCATION/TRAINING PROGRAM

## 2024-01-01 PROCEDURE — 83010 ASSAY OF HAPTOGLOBIN QUANT: CPT

## 2024-01-01 RX ORDER — POLYETHYLENE GLYCOL 3350 17 G/17G
17 POWDER, FOR SOLUTION ORAL DAILY
Status: DISCONTINUED | OUTPATIENT
Start: 2024-01-01 | End: 2024-01-01

## 2024-01-01 RX ORDER — HALOPERIDOL 5 MG/ML
1 INJECTION INTRAMUSCULAR EVERY 4 HOURS PRN
Status: DISCONTINUED | OUTPATIENT
Start: 2024-01-01 | End: 2024-01-01 | Stop reason: HOSPADM

## 2024-01-01 RX ORDER — PANTOPRAZOLE SODIUM 40 MG/10ML
40 INJECTION, POWDER, LYOPHILIZED, FOR SOLUTION INTRAVENOUS
Status: DISCONTINUED | OUTPATIENT
Start: 2024-01-01 | End: 2024-01-01

## 2024-01-01 RX ORDER — MORPHINE SULFATE 4 MG/ML
4 INJECTION, SOLUTION INTRAMUSCULAR; INTRAVENOUS
Status: COMPLETED | OUTPATIENT
Start: 2024-01-01 | End: 2024-01-01

## 2024-01-01 RX ORDER — SODIUM CHLORIDE 0.9 % (FLUSH) 0.9 %
10 SYRINGE (ML) INJECTION EVERY 12 HOURS PRN
Status: DISCONTINUED | OUTPATIENT
Start: 2024-01-01 | End: 2024-01-01 | Stop reason: HOSPADM

## 2024-01-01 RX ORDER — METHYLPREDNISOLONE SOD SUCC 125 MG
125 VIAL (EA) INJECTION
Status: COMPLETED | OUTPATIENT
Start: 2024-01-01 | End: 2024-01-01

## 2024-01-01 RX ORDER — MORPHINE SULFATE 4 MG/ML
4 INJECTION, SOLUTION INTRAMUSCULAR; INTRAVENOUS
Status: DISCONTINUED | OUTPATIENT
Start: 2024-01-01 | End: 2024-01-01 | Stop reason: HOSPADM

## 2024-01-01 RX ORDER — PROPOFOL 10 MG/ML
INJECTION, EMULSION INTRAVENOUS
Status: DISPENSED
Start: 2024-01-01 | End: 2024-01-01

## 2024-01-01 RX ORDER — METHYLPREDNISOLONE SOD SUCC 125 MG
125 VIAL (EA) INJECTION 3 TIMES DAILY
Status: DISCONTINUED | OUTPATIENT
Start: 2024-01-01 | End: 2024-01-01

## 2024-01-01 RX ORDER — SILDENAFIL CITRATE 20 MG/1
20 TABLET ORAL 3 TIMES DAILY
Status: DISCONTINUED | OUTPATIENT
Start: 2024-01-01 | End: 2024-01-01

## 2024-01-01 RX ORDER — NOREPINEPHRINE BITARTRATE/D5W 8 MG/250ML
PLASTIC BAG, INJECTION (ML) INTRAVENOUS
Status: COMPLETED
Start: 2024-01-01 | End: 2024-01-01

## 2024-01-01 RX ORDER — PROPOFOL 10 MG/ML
0-50 INJECTION, EMULSION INTRAVENOUS CONTINUOUS
Status: DISCONTINUED | OUTPATIENT
Start: 2024-01-01 | End: 2024-01-01

## 2024-01-01 RX ORDER — MAGNESIUM SULFATE HEPTAHYDRATE 40 MG/ML
2 INJECTION, SOLUTION INTRAVENOUS ONCE
Status: COMPLETED | OUTPATIENT
Start: 2024-01-01 | End: 2024-01-01

## 2024-01-01 RX ORDER — ALBUTEROL SULFATE 0.83 MG/ML
10 SOLUTION RESPIRATORY (INHALATION) ONCE
Status: COMPLETED | OUTPATIENT
Start: 2024-01-01 | End: 2024-01-01

## 2024-01-01 RX ORDER — METHYLPREDNISOLONE SOD SUCC 125 MG
VIAL (EA) INJECTION
Status: COMPLETED
Start: 2024-01-01 | End: 2024-01-01

## 2024-01-01 RX ORDER — ALBUTEROL SULFATE 0.83 MG/ML
SOLUTION RESPIRATORY (INHALATION)
Status: COMPLETED
Start: 2024-01-01 | End: 2024-01-01

## 2024-01-01 RX ORDER — LORAZEPAM 2 MG/ML
1 INJECTION INTRAMUSCULAR
Status: DISCONTINUED | OUTPATIENT
Start: 2024-01-01 | End: 2024-01-01

## 2024-01-01 RX ORDER — FENTANYL CITRATE 50 UG/ML
100 INJECTION, SOLUTION INTRAMUSCULAR; INTRAVENOUS
Status: DISCONTINUED | OUTPATIENT
Start: 2024-01-01 | End: 2024-01-01 | Stop reason: HOSPADM

## 2024-01-01 RX ORDER — MUPIROCIN 20 MG/G
OINTMENT TOPICAL 2 TIMES DAILY
Status: COMPLETED | OUTPATIENT
Start: 2024-01-01 | End: 2024-01-01

## 2024-01-01 RX ORDER — MORPHINE SULFATE 4 MG/ML
3 INJECTION, SOLUTION INTRAMUSCULAR; INTRAVENOUS ONCE
Status: DISCONTINUED | OUTPATIENT
Start: 2024-01-01 | End: 2024-01-01

## 2024-01-01 RX ORDER — FENTANYL CITRATE-0.9 % NACL/PF 10 MCG/ML
0-250 PLASTIC BAG, INJECTION (ML) INTRAVENOUS CONTINUOUS
Status: DISCONTINUED | OUTPATIENT
Start: 2024-01-01 | End: 2024-01-01

## 2024-01-01 RX ORDER — KETAMINE HCL IN 0.9 % NACL 50 MG/5 ML
SYRINGE (ML) INTRAVENOUS
Status: DISPENSED
Start: 2024-01-01 | End: 2024-01-01

## 2024-01-01 RX ORDER — WATER FOR INJECTION,STERILE
VIAL (ML) INJECTION
Status: DISPENSED
Start: 2024-01-01 | End: 2024-01-01

## 2024-01-01 RX ORDER — GLYCOPYRROLATE 0.2 MG/ML
0.2 INJECTION INTRAMUSCULAR; INTRAVENOUS 4 TIMES DAILY
Status: DISCONTINUED | OUTPATIENT
Start: 2024-01-01 | End: 2024-01-01 | Stop reason: HOSPADM

## 2024-01-01 RX ORDER — ROCURONIUM BROMIDE 10 MG/ML
INJECTION, SOLUTION INTRAVENOUS CODE/TRAUMA/SEDATION MEDICATION
Status: COMPLETED | OUTPATIENT
Start: 2024-01-01 | End: 2024-01-01

## 2024-01-01 RX ORDER — NOREPINEPHRINE BITARTRATE/D5W 8 MG/250ML
0-3 PLASTIC BAG, INJECTION (ML) INTRAVENOUS CONTINUOUS
Status: DISCONTINUED | OUTPATIENT
Start: 2024-01-01 | End: 2024-01-01

## 2024-01-01 RX ORDER — PREDNISONE 20 MG/1
40 TABLET ORAL DAILY
Status: COMPLETED | OUTPATIENT
Start: 2024-01-01 | End: 2024-01-01

## 2024-01-01 RX ORDER — PANTOPRAZOLE SODIUM 40 MG/1
40 TABLET, DELAYED RELEASE ORAL
Status: DISCONTINUED | OUTPATIENT
Start: 2024-01-01 | End: 2024-01-01

## 2024-01-01 RX ORDER — ACETAMINOPHEN 10 MG/ML
1000 INJECTION, SOLUTION INTRAVENOUS ONCE
Status: COMPLETED | OUTPATIENT
Start: 2024-01-01 | End: 2024-01-01

## 2024-01-01 RX ORDER — SODIUM CHLORIDE, SODIUM LACTATE, POTASSIUM CHLORIDE, CALCIUM CHLORIDE 600; 310; 30; 20 MG/100ML; MG/100ML; MG/100ML; MG/100ML
INJECTION, SOLUTION INTRAVENOUS CONTINUOUS
Status: DISCONTINUED | OUTPATIENT
Start: 2024-01-01 | End: 2024-01-01

## 2024-01-01 RX ORDER — ALBUTEROL SULFATE 0.83 MG/ML
15 SOLUTION RESPIRATORY (INHALATION) CONTINUOUS
Status: DISCONTINUED | OUTPATIENT
Start: 2024-01-01 | End: 2024-01-01

## 2024-01-01 RX ORDER — DEXMEDETOMIDINE HYDROCHLORIDE 4 UG/ML
0-1.4 INJECTION, SOLUTION INTRAVENOUS CONTINUOUS
Status: DISCONTINUED | OUTPATIENT
Start: 2024-01-01 | End: 2024-01-01

## 2024-01-01 RX ORDER — LORAZEPAM 2 MG/ML
INJECTION INTRAMUSCULAR
Status: COMPLETED
Start: 2024-01-01 | End: 2024-01-01

## 2024-01-01 RX ORDER — PANTOPRAZOLE SODIUM 40 MG/10ML
40 INJECTION, POWDER, LYOPHILIZED, FOR SOLUTION INTRAVENOUS DAILY
Status: DISCONTINUED | OUTPATIENT
Start: 2024-01-01 | End: 2024-01-01

## 2024-01-01 RX ORDER — INSULIN ASPART 100 [IU]/ML
0-10 INJECTION, SOLUTION INTRAVENOUS; SUBCUTANEOUS EVERY 6 HOURS PRN
Status: DISCONTINUED | OUTPATIENT
Start: 2024-01-01 | End: 2024-01-01

## 2024-01-01 RX ORDER — KETAMINE HCL IN 0.9 % NACL 50 MG/5 ML
150 SYRINGE (ML) INTRAVENOUS ONCE
Status: DISCONTINUED | OUTPATIENT
Start: 2024-01-01 | End: 2024-01-01

## 2024-01-01 RX ORDER — LORAZEPAM 2 MG/ML
2 INJECTION INTRAMUSCULAR
Status: DISCONTINUED | OUTPATIENT
Start: 2024-01-01 | End: 2024-01-01 | Stop reason: HOSPADM

## 2024-01-01 RX ORDER — GLUCAGON 1 MG
1 KIT INJECTION
Status: DISCONTINUED | OUTPATIENT
Start: 2024-01-01 | End: 2024-01-01

## 2024-01-01 RX ORDER — METOCLOPRAMIDE HYDROCHLORIDE 5 MG/ML
5 INJECTION INTRAMUSCULAR; INTRAVENOUS EVERY 6 HOURS PRN
Status: DISCONTINUED | OUTPATIENT
Start: 2024-01-01 | End: 2024-01-01 | Stop reason: HOSPADM

## 2024-01-01 RX ORDER — GLYCOPYRROLATE 0.2 MG/ML
0.1 INJECTION INTRAMUSCULAR; INTRAVENOUS 3 TIMES DAILY PRN
Status: DISCONTINUED | OUTPATIENT
Start: 2024-01-01 | End: 2024-01-01

## 2024-01-01 RX ORDER — ALBUTEROL SULFATE 0.83 MG/ML
2.5 SOLUTION RESPIRATORY (INHALATION)
Status: CANCELLED | OUTPATIENT
Start: 2024-01-01 | End: 2024-01-01

## 2024-01-01 RX ORDER — MORPHINE SULFATE 4 MG/ML
2 INJECTION, SOLUTION INTRAMUSCULAR; INTRAVENOUS EVERY 4 HOURS PRN
Status: DISCONTINUED | OUTPATIENT
Start: 2024-01-01 | End: 2024-01-01

## 2024-01-01 RX ORDER — AMOXICILLIN 250 MG
1 CAPSULE ORAL DAILY PRN
Status: DISCONTINUED | OUTPATIENT
Start: 2024-01-01 | End: 2024-01-01 | Stop reason: HOSPADM

## 2024-01-01 RX ORDER — ONDANSETRON HYDROCHLORIDE 2 MG/ML
8 INJECTION, SOLUTION INTRAVENOUS EVERY 8 HOURS PRN
Status: DISCONTINUED | OUTPATIENT
Start: 2024-01-01 | End: 2024-01-01 | Stop reason: HOSPADM

## 2024-01-01 RX ORDER — SCOLOPAMINE TRANSDERMAL SYSTEM 1 MG/1
1 PATCH, EXTENDED RELEASE TRANSDERMAL
Status: DISCONTINUED | OUTPATIENT
Start: 2024-01-01 | End: 2024-01-01 | Stop reason: HOSPADM

## 2024-01-01 RX ORDER — CALCIUM GLUCONATE 20 MG/ML
1 INJECTION, SOLUTION INTRAVENOUS EVERY 10 MIN PRN
Status: DISCONTINUED | OUTPATIENT
Start: 2024-01-01 | End: 2024-01-01 | Stop reason: HOSPADM

## 2024-01-01 RX ORDER — HYDROMORPHONE HYDROCHLORIDE 2 MG/1
2 TABLET ORAL
Status: DISCONTINUED | OUTPATIENT
Start: 2024-01-01 | End: 2024-01-01 | Stop reason: HOSPADM

## 2024-01-01 RX ORDER — ENOXAPARIN SODIUM 100 MG/ML
40 INJECTION SUBCUTANEOUS EVERY 24 HOURS
Status: DISCONTINUED | OUTPATIENT
Start: 2024-01-01 | End: 2024-01-01

## 2024-01-01 RX ORDER — KETAMINE HYDROCHLORIDE 100 MG/ML
150 INJECTION, SOLUTION INTRAMUSCULAR; INTRAVENOUS
Status: DISCONTINUED | OUTPATIENT
Start: 2024-01-01 | End: 2024-01-01

## 2024-01-01 RX ORDER — LORAZEPAM 2 MG/ML
2 INJECTION INTRAMUSCULAR
Status: COMPLETED | OUTPATIENT
Start: 2024-01-01 | End: 2024-01-01

## 2024-01-01 RX ORDER — CALCIUM GLUCONATE 20 MG/ML
1 INJECTION, SOLUTION INTRAVENOUS ONCE
Status: COMPLETED | OUTPATIENT
Start: 2024-01-01 | End: 2024-01-01

## 2024-01-01 RX ORDER — CEFEPIME HYDROCHLORIDE 1 G/1
1 INJECTION, POWDER, FOR SOLUTION INTRAMUSCULAR; INTRAVENOUS
Status: COMPLETED | OUTPATIENT
Start: 2024-01-01 | End: 2024-01-01

## 2024-01-01 RX ADMIN — Medication 75 MCG/HR: at 07:12

## 2024-01-01 RX ADMIN — Medication 200 MCG/HR: at 02:12

## 2024-01-01 RX ADMIN — PROPOFOL 50 MCG/KG/MIN: 10 INJECTION, EMULSION INTRAVENOUS at 10:12

## 2024-01-01 RX ADMIN — MORPHINE SULFATE 4 MG: 4 INJECTION, SOLUTION INTRAMUSCULAR; INTRAVENOUS at 01:12

## 2024-01-01 RX ADMIN — LORAZEPAM 1 MG: 2 INJECTION INTRAMUSCULAR; INTRAVENOUS at 01:12

## 2024-01-01 RX ADMIN — DEXMEDETOMIDINE HYDROCHLORIDE 0.4 MCG/KG/HR: 400 INJECTION INTRAVENOUS at 01:12

## 2024-01-01 RX ADMIN — Medication 125 MCG/HR: at 05:12

## 2024-01-01 RX ADMIN — LORAZEPAM 2 MG: 2 INJECTION INTRAMUSCULAR; INTRAVENOUS at 06:12

## 2024-01-01 RX ADMIN — ENOXAPARIN SODIUM 40 MG: 40 INJECTION SUBCUTANEOUS at 04:12

## 2024-01-01 RX ADMIN — PREDNISONE 40 MG: 20 TABLET ORAL at 08:12

## 2024-01-01 RX ADMIN — Medication 225 MCG/HR: at 11:12

## 2024-01-01 RX ADMIN — CALCIUM GLUCONATE 1 G: 20 INJECTION, SOLUTION INTRAVENOUS at 06:12

## 2024-01-01 RX ADMIN — SILDENAFIL CITRATE 20 MG: 20 TABLET ORAL at 08:12

## 2024-01-01 RX ADMIN — PROPOFOL 50 MCG/KG/MIN: 10 INJECTION, EMULSION INTRAVENOUS at 01:12

## 2024-01-01 RX ADMIN — PROPOFOL 50 MCG/KG/MIN: 10 INJECTION, EMULSION INTRAVENOUS at 04:12

## 2024-01-01 RX ADMIN — PROPOFOL 50 MCG/KG/MIN: 10 INJECTION, EMULSION INTRAVENOUS at 03:12

## 2024-01-01 RX ADMIN — CEFEPIME 1 G: 1 INJECTION, POWDER, FOR SOLUTION INTRAMUSCULAR; INTRAVENOUS at 06:12

## 2024-01-01 RX ADMIN — SODIUM BICARBONATE: 84 INJECTION, SOLUTION INTRAVENOUS at 11:12

## 2024-01-01 RX ADMIN — MUPIROCIN: 20 OINTMENT TOPICAL at 08:12

## 2024-01-01 RX ADMIN — SILDENAFIL CITRATE 20 MG: 20 TABLET ORAL at 09:12

## 2024-01-01 RX ADMIN — CEFEPIME 1 G: 1 INJECTION, POWDER, FOR SOLUTION INTRAMUSCULAR; INTRAVENOUS at 02:12

## 2024-01-01 RX ADMIN — ENOXAPARIN SODIUM 40 MG: 40 INJECTION SUBCUTANEOUS at 05:12

## 2024-01-01 RX ADMIN — PROPOFOL 45 MCG/KG/MIN: 10 INJECTION, EMULSION INTRAVENOUS at 09:12

## 2024-01-01 RX ADMIN — SODIUM CHLORIDE, POTASSIUM CHLORIDE, SODIUM LACTATE AND CALCIUM CHLORIDE 1000 ML: 600; 310; 30; 20 INJECTION, SOLUTION INTRAVENOUS at 09:12

## 2024-01-01 RX ADMIN — ALBUTEROL SULFATE 15 MG: 0.83 SOLUTION RESPIRATORY (INHALATION) at 07:12

## 2024-01-01 RX ADMIN — Medication 200 MCG/HR: at 08:12

## 2024-01-01 RX ADMIN — LORAZEPAM 1 MG: 2 INJECTION INTRAMUSCULAR; INTRAVENOUS at 08:12

## 2024-01-01 RX ADMIN — PANTOPRAZOLE SODIUM 40 MG: 40 INJECTION, POWDER, LYOPHILIZED, FOR SOLUTION INTRAVENOUS at 08:12

## 2024-01-01 RX ADMIN — NOREPINEPHRINE BITARTRATE 0.05 MCG/KG/MIN: 8 INJECTION, SOLUTION INTRAVENOUS at 03:12

## 2024-01-01 RX ADMIN — PROPOFOL 40 MCG/KG/MIN: 10 INJECTION, EMULSION INTRAVENOUS at 03:12

## 2024-01-01 RX ADMIN — PROPOFOL 35 MCG/KG/MIN: 10 INJECTION, EMULSION INTRAVENOUS at 12:12

## 2024-01-01 RX ADMIN — NOREPINEPHRINE BITARTRATE 0.02 MCG/KG/MIN: 8 INJECTION, SOLUTION INTRAVENOUS at 12:12

## 2024-01-01 RX ADMIN — INSULIN ASPART 2 UNITS: 100 INJECTION, SOLUTION INTRAVENOUS; SUBCUTANEOUS at 06:12

## 2024-01-01 RX ADMIN — ENOXAPARIN SODIUM 40 MG: 40 INJECTION SUBCUTANEOUS at 09:12

## 2024-01-01 RX ADMIN — Medication 125 MG: at 07:12

## 2024-01-01 RX ADMIN — NOREPINEPHRINE BITARTRATE 0.26 MCG/KG/MIN: 8 INJECTION, SOLUTION INTRAVENOUS at 08:12

## 2024-01-01 RX ADMIN — Medication 200 MCG/HR: at 04:12

## 2024-01-01 RX ADMIN — PANTOPRAZOLE SODIUM 40 MG: 40 INJECTION, POWDER, LYOPHILIZED, FOR SOLUTION INTRAVENOUS at 09:12

## 2024-01-01 RX ADMIN — LORAZEPAM 1 MG: 2 INJECTION INTRAMUSCULAR; INTRAVENOUS at 09:12

## 2024-01-01 RX ADMIN — METHYLPREDNISOLONE SODIUM SUCCINATE 125 MG: 125 INJECTION, POWDER, FOR SOLUTION INTRAMUSCULAR; INTRAVENOUS at 07:12

## 2024-01-01 RX ADMIN — Medication 225 MCG/HR: at 03:12

## 2024-01-01 RX ADMIN — NOREPINEPHRINE BITARTRATE 0.15 MCG/KG/MIN: 8 INJECTION, SOLUTION INTRAVENOUS at 03:12

## 2024-01-01 RX ADMIN — DEXMEDETOMIDINE HYDROCHLORIDE 1.2 MCG/KG/HR: 400 INJECTION INTRAVENOUS at 04:12

## 2024-01-01 RX ADMIN — MORPHINE SULFATE 2 MG: 4 INJECTION, SOLUTION INTRAMUSCULAR; INTRAVENOUS at 03:12

## 2024-01-01 RX ADMIN — NOREPINEPHRINE BITARTRATE 0.3 MCG/KG/MIN: 8 INJECTION, SOLUTION INTRAVENOUS at 12:12

## 2024-01-01 RX ADMIN — CEFEPIME 1 G: 1 INJECTION, POWDER, FOR SOLUTION INTRAMUSCULAR; INTRAVENOUS at 10:12

## 2024-01-01 RX ADMIN — NOREPINEPHRINE BITARTRATE 0.15 MCG/KG/MIN: 8 INJECTION, SOLUTION INTRAVENOUS at 11:12

## 2024-01-01 RX ADMIN — PROPOFOL 50 MCG/KG/MIN: 10 INJECTION, EMULSION INTRAVENOUS at 06:12

## 2024-01-01 RX ADMIN — LORAZEPAM 1 MG: 2 INJECTION INTRAMUSCULAR; INTRAVENOUS at 11:12

## 2024-01-01 RX ADMIN — NOREPINEPHRINE BITARTRATE 0.28 MCG/KG/MIN: 8 INJECTION, SOLUTION INTRAVENOUS at 07:12

## 2024-01-01 RX ADMIN — POLYETHYLENE GLYCOL 3350 17 G: 17 POWDER, FOR SOLUTION ORAL at 08:12

## 2024-01-01 RX ADMIN — LORAZEPAM 1 MG: 2 INJECTION INTRAMUSCULAR; INTRAVENOUS at 12:12

## 2024-01-01 RX ADMIN — KETAMINE HYDROCHLORIDE 50 MG: 100 INJECTION, SOLUTION, CONCENTRATE INTRAMUSCULAR; INTRAVENOUS at 07:12

## 2024-01-01 RX ADMIN — LORAZEPAM 2 MG: 2 INJECTION INTRAMUSCULAR; INTRAVENOUS at 10:12

## 2024-01-01 RX ADMIN — LORAZEPAM 1 MG: 2 INJECTION INTRAMUSCULAR; INTRAVENOUS at 04:12

## 2024-01-01 RX ADMIN — Medication 175 MCG/HR: at 01:12

## 2024-01-01 RX ADMIN — PROPOFOL 40 MCG/KG/MIN: 10 INJECTION, EMULSION INTRAVENOUS at 04:12

## 2024-01-01 RX ADMIN — CEFEPIME 1 G: 1 INJECTION, POWDER, FOR SOLUTION INTRAMUSCULAR; INTRAVENOUS at 11:12

## 2024-01-01 RX ADMIN — ROCURONIUM BROMIDE 100 MG: 10 SOLUTION INTRAVENOUS at 07:12

## 2024-01-01 RX ADMIN — PROPOFOL 50 MCG/KG/MIN: 10 INJECTION, EMULSION INTRAVENOUS at 09:12

## 2024-01-01 RX ADMIN — NOREPINEPHRINE BITARTRATE 0.14 MCG/KG/MIN: 8 INJECTION, SOLUTION INTRAVENOUS at 10:12

## 2024-01-01 RX ADMIN — PROPOFOL 35 MCG/KG/MIN: 10 INJECTION, EMULSION INTRAVENOUS at 09:12

## 2024-01-01 RX ADMIN — PROPOFOL 45 MCG/KG/MIN: 10 INJECTION, EMULSION INTRAVENOUS at 08:12

## 2024-01-01 RX ADMIN — METHYLPREDNISOLONE SODIUM SUCCINATE 125 MG: 125 INJECTION, POWDER, FOR SOLUTION INTRAMUSCULAR; INTRAVENOUS at 09:12

## 2024-01-01 RX ADMIN — Medication 200 MCG/HR: at 03:12

## 2024-01-01 RX ADMIN — SODIUM CHLORIDE 1000 ML: 9 INJECTION, SOLUTION INTRAVENOUS at 07:12

## 2024-01-01 RX ADMIN — ALBUTEROL SULFATE 15 MG: 2.5 SOLUTION RESPIRATORY (INHALATION) at 07:12

## 2024-01-01 RX ADMIN — LORAZEPAM 2 MG: 2 INJECTION INTRAMUSCULAR at 06:12

## 2024-01-01 RX ADMIN — LORAZEPAM 1 MG: 2 INJECTION INTRAMUSCULAR; INTRAVENOUS at 06:12

## 2024-01-01 RX ADMIN — LORAZEPAM 1 MG: 2 INJECTION INTRAMUSCULAR; INTRAVENOUS at 03:12

## 2024-01-01 RX ADMIN — PROPOFOL 50 MCG/KG/MIN: 10 INJECTION, EMULSION INTRAVENOUS at 07:12

## 2024-01-01 RX ADMIN — MAGNESIUM SULFATE HEPTAHYDRATE 2 G: 40 INJECTION, SOLUTION INTRAVENOUS at 07:12

## 2024-01-01 RX ADMIN — POLYETHYLENE GLYCOL 3350 17 G: 17 POWDER, FOR SOLUTION ORAL at 09:12

## 2024-01-01 RX ADMIN — SODIUM ZIRCONIUM CYCLOSILICATE 10 G: 5 POWDER, FOR SUSPENSION ORAL at 02:12

## 2024-01-01 RX ADMIN — PROPOFOL 50 MCG/KG/MIN: 10 INJECTION, EMULSION INTRAVENOUS at 05:12

## 2024-01-01 RX ADMIN — SODIUM ZIRCONIUM CYCLOSILICATE 10 G: 5 POWDER, FOR SUSPENSION ORAL at 05:12

## 2024-01-01 RX ADMIN — LORAZEPAM 1 MG: 2 INJECTION INTRAMUSCULAR; INTRAVENOUS at 05:12

## 2024-01-01 RX ADMIN — MORPHINE SULFATE 4 MG: 4 INJECTION, SOLUTION INTRAMUSCULAR; INTRAVENOUS at 12:12

## 2024-01-01 RX ADMIN — MUPIROCIN: 20 OINTMENT TOPICAL at 09:12

## 2024-01-01 RX ADMIN — PROPOFOL 20 MCG/KG/MIN: 10 INJECTION, EMULSION INTRAVENOUS at 10:12

## 2024-01-01 RX ADMIN — GLYCOPYRROLATE 0.1 MG: 0.2 INJECTION INTRAMUSCULAR; INTRAVENOUS at 04:12

## 2024-01-01 RX ADMIN — NOREPINEPHRINE BITARTRATE 0.15 MCG/KG/MIN: 8 INJECTION, SOLUTION INTRAVENOUS at 12:12

## 2024-01-01 RX ADMIN — POLYETHYLENE GLYCOL 3350 17 G: 17 POWDER, FOR SOLUTION ORAL at 07:12

## 2024-01-01 RX ADMIN — ENOXAPARIN SODIUM 40 MG: 40 INJECTION SUBCUTANEOUS at 06:12

## 2024-01-01 RX ADMIN — SCOPOLAMINE 1 PATCH: 1 PATCH TRANSDERMAL at 06:12

## 2024-01-01 RX ADMIN — IOHEXOL 70 ML: 350 INJECTION, SOLUTION INTRAVENOUS at 08:12

## 2024-01-01 RX ADMIN — PROPOFOL 45 MCG/KG/MIN: 10 INJECTION, EMULSION INTRAVENOUS at 04:12

## 2024-01-01 RX ADMIN — PROPOFOL 30 MCG/KG/MIN: 10 INJECTION, EMULSION INTRAVENOUS at 02:12

## 2024-01-01 RX ADMIN — CEFEPIME 1 G: 1 INJECTION, POWDER, FOR SOLUTION INTRAMUSCULAR; INTRAVENOUS at 08:12

## 2024-01-01 RX ADMIN — SODIUM ZIRCONIUM CYCLOSILICATE 10 G: 5 POWDER, FOR SUSPENSION ORAL at 08:12

## 2024-01-01 RX ADMIN — NOREPINEPHRINE BITARTRATE 0.04 MCG/KG/MIN: 8 INJECTION, SOLUTION INTRAVENOUS at 02:12

## 2024-01-01 RX ADMIN — Medication 225 MCG/HR: at 12:12

## 2024-01-01 RX ADMIN — PANTOPRAZOLE SODIUM 40 MG: 40 INJECTION, POWDER, LYOPHILIZED, FOR SOLUTION INTRAVENOUS at 07:12

## 2024-01-01 RX ADMIN — HYDROMORPHONE HYDROCHLORIDE 2 MG: 2 TABLET ORAL at 11:12

## 2024-01-01 RX ADMIN — PROPOFOL 50 MCG/KG/MIN: 10 INJECTION, EMULSION INTRAVENOUS at 11:12

## 2024-01-01 RX ADMIN — PROPOFOL 45 MCG/KG/MIN: 10 INJECTION, EMULSION INTRAVENOUS at 11:12

## 2024-01-01 RX ADMIN — INSULIN HUMAN 10 UNITS: 100 INJECTION, SOLUTION PARENTERAL at 07:12

## 2024-01-01 RX ADMIN — PROPOFOL 50 MCG/KG/MIN: 10 INJECTION, EMULSION INTRAVENOUS at 02:12

## 2024-01-01 RX ADMIN — CEFEPIME 1 G: 1 INJECTION, POWDER, FOR SOLUTION INTRAMUSCULAR; INTRAVENOUS at 12:12

## 2024-01-01 RX ADMIN — MORPHINE SULFATE 4 MG: 4 INJECTION, SOLUTION INTRAMUSCULAR; INTRAVENOUS at 04:12

## 2024-01-01 RX ADMIN — PROPOFOL 25 MCG/KG/MIN: 10 INJECTION, EMULSION INTRAVENOUS at 03:12

## 2024-01-01 RX ADMIN — PROPOFOL 35 MCG/KG/MIN: 10 INJECTION, EMULSION INTRAVENOUS at 10:12

## 2024-01-01 RX ADMIN — POLYETHYLENE GLYCOL 3350 17 G: 17 POWDER, FOR SOLUTION ORAL at 03:12

## 2024-01-01 RX ADMIN — MORPHINE SULFATE 2 MG: 4 INJECTION, SOLUTION INTRAMUSCULAR; INTRAVENOUS at 11:12

## 2024-01-01 RX ADMIN — MORPHINE SULFATE 4 MG: 4 INJECTION, SOLUTION INTRAMUSCULAR; INTRAVENOUS at 03:12

## 2024-01-01 RX ADMIN — Medication 100 MCG/HR: at 10:12

## 2024-01-01 RX ADMIN — PROPOFOL 5 MCG/KG/MIN: 10 INJECTION, EMULSION INTRAVENOUS at 07:12

## 2024-01-01 RX ADMIN — NOREPINEPHRINE BITARTRATE 0.06 MCG/KG/MIN: 8 INJECTION, SOLUTION INTRAVENOUS at 08:12

## 2024-01-01 RX ADMIN — NOREPINEPHRINE BITARTRATE 0.16 MCG/KG/MIN: 8 INJECTION, SOLUTION INTRAVENOUS at 11:12

## 2024-01-01 RX ADMIN — CEFEPIME 1 G: 1 INJECTION, POWDER, FOR SOLUTION INTRAMUSCULAR; INTRAVENOUS at 05:12

## 2024-01-01 RX ADMIN — Medication 25 MCG/HR: at 10:12

## 2024-01-01 RX ADMIN — CEFEPIME 1 G: 1 INJECTION, POWDER, FOR SOLUTION INTRAMUSCULAR; INTRAVENOUS at 04:12

## 2024-01-01 RX ADMIN — MUPIROCIN: 20 OINTMENT TOPICAL at 12:12

## 2024-01-01 RX ADMIN — CEFEPIME 1 G: 1 INJECTION, POWDER, FOR SOLUTION INTRAMUSCULAR; INTRAVENOUS at 03:12

## 2024-01-01 RX ADMIN — SODIUM ZIRCONIUM CYCLOSILICATE 10 G: 5 POWDER, FOR SUSPENSION ORAL at 03:12

## 2024-01-01 RX ADMIN — NOREPINEPHRINE BITARTRATE 0.28 MCG/KG/MIN: 8 INJECTION, SOLUTION INTRAVENOUS at 04:12

## 2024-01-01 RX ADMIN — PROPOFOL 30 MCG/KG/MIN: 10 INJECTION, EMULSION INTRAVENOUS at 11:12

## 2024-01-01 RX ADMIN — CEFEPIME 1 G: 1 INJECTION, POWDER, FOR SOLUTION INTRAMUSCULAR; INTRAVENOUS at 09:12

## 2024-01-01 RX ADMIN — NOREPINEPHRINE BITARTRATE 0.12 MCG/KG/MIN: 8 INJECTION, SOLUTION INTRAVENOUS at 04:12

## 2024-01-01 RX ADMIN — PANTOPRAZOLE SODIUM 40 MG: 40 INJECTION, POWDER, LYOPHILIZED, FOR SOLUTION INTRAVENOUS at 05:12

## 2024-01-01 RX ADMIN — Medication 200 MCG/HR: at 07:12

## 2024-01-01 RX ADMIN — Medication 125 MCG/HR: at 01:12

## 2024-01-01 RX ADMIN — PROPOFOL 50 MCG/KG/MIN: 10 INJECTION, EMULSION INTRAVENOUS at 08:12

## 2024-01-01 RX ADMIN — PROPOFOL 35 MCG/KG/MIN: 10 INJECTION, EMULSION INTRAVENOUS at 06:12

## 2024-01-01 RX ADMIN — MORPHINE SULFATE 2 MG: 4 INJECTION, SOLUTION INTRAMUSCULAR; INTRAVENOUS at 09:12

## 2024-01-01 RX ADMIN — PROPOFOL 35 MCG/KG/MIN: 10 INJECTION, EMULSION INTRAVENOUS at 04:12

## 2024-01-01 RX ADMIN — PROPOFOL 50 MCG/KG/MIN: 10 INJECTION, EMULSION INTRAVENOUS at 12:12

## 2024-01-01 RX ADMIN — Medication 225 MCG/HR: at 02:12

## 2024-01-01 RX ADMIN — ACETAMINOPHEN 1000 MG: 10 INJECTION, SOLUTION INTRAVENOUS at 07:12

## 2024-01-01 RX ADMIN — SILDENAFIL CITRATE 20 MG: 20 TABLET ORAL at 02:12

## 2024-01-01 RX ADMIN — LORAZEPAM 1 MG: 2 INJECTION INTRAMUSCULAR; INTRAVENOUS at 10:12

## 2024-01-01 RX ADMIN — MORPHINE SULFATE 2 MG: 4 INJECTION, SOLUTION INTRAMUSCULAR; INTRAVENOUS at 07:12

## 2024-01-01 RX ADMIN — SODIUM ZIRCONIUM CYCLOSILICATE 10 G: 5 POWDER, FOR SUSPENSION ORAL at 06:12

## 2024-01-01 RX ADMIN — NOREPINEPHRINE BITARTRATE 0.15 MCG/KG/MIN: 8 INJECTION, SOLUTION INTRAVENOUS at 07:12

## 2024-01-01 RX ADMIN — DEXTROSE MONOHYDRATE 500 ML: 100 INJECTION, SOLUTION INTRAVENOUS at 06:12

## 2024-01-01 RX ADMIN — ALBUTEROL SULFATE 10 MG: 2.5 SOLUTION RESPIRATORY (INHALATION) at 05:12

## 2024-01-01 RX ADMIN — PROPOFOL 45 MCG/KG/MIN: 10 INJECTION, EMULSION INTRAVENOUS at 03:12

## 2024-01-01 RX ADMIN — SODIUM ZIRCONIUM CYCLOSILICATE 10 G: 5 POWDER, FOR SUSPENSION ORAL at 07:12

## 2024-01-01 RX ADMIN — Medication 125 MCG/HR: at 07:12

## 2024-01-01 RX ADMIN — PANTOPRAZOLE SODIUM 40 MG: 40 INJECTION, POWDER, LYOPHILIZED, FOR SOLUTION INTRAVENOUS at 06:12

## 2024-01-05 ENCOUNTER — OFFICE VISIT (OUTPATIENT)
Dept: OPHTHALMOLOGY | Facility: CLINIC | Age: 69
End: 2024-01-05
Payer: MEDICARE

## 2024-01-05 VITALS — WEIGHT: 152 LBS | HEIGHT: 67 IN | BODY MASS INDEX: 23.86 KG/M2

## 2024-01-05 DIAGNOSIS — R76.8 POSITIVE ANTINUCLEAR ANTIBODY: ICD-10-CM

## 2024-01-05 DIAGNOSIS — D89.89 ANTISYNTHETASE SYNDROME: ICD-10-CM

## 2024-01-05 DIAGNOSIS — Z79.899 LONG-TERM USE OF PLAQUENIL: Primary | ICD-10-CM

## 2024-01-05 PROCEDURE — 92133 CPTRZD OPH DX IMG PST SGM ON: CPT | Mod: PBBFAC,PN | Performed by: STUDENT IN AN ORGANIZED HEALTH CARE EDUCATION/TRAINING PROGRAM

## 2024-01-05 PROCEDURE — 92134 CPTRZ OPH DX IMG PST SGM RTA: CPT | Mod: PBBFAC,PN | Performed by: OPHTHALMOLOGY

## 2024-01-05 PROCEDURE — 92083 EXTENDED VISUAL FIELD XM: CPT | Mod: PBBFAC,PN | Performed by: STUDENT IN AN ORGANIZED HEALTH CARE EDUCATION/TRAINING PROGRAM

## 2024-01-05 PROCEDURE — 99213 OFFICE O/P EST LOW 20 MIN: CPT | Mod: PBBFAC,PN | Performed by: STUDENT IN AN ORGANIZED HEALTH CARE EDUCATION/TRAINING PROGRAM

## 2024-01-05 PROCEDURE — 92083 EXTENDED VISUAL FIELD XM: CPT | Mod: PBBFAC,PN | Performed by: OPHTHALMOLOGY

## 2024-01-05 PROCEDURE — 92134 CPTRZ OPH DX IMG PST SGM RTA: CPT | Mod: PBBFAC,PN | Performed by: STUDENT IN AN ORGANIZED HEALTH CARE EDUCATION/TRAINING PROGRAM

## 2024-01-05 NOTE — PROGRESS NOTES
HPI    DM Marivelal  Last edited by Yaz Hamilton LPN on 1/5/2024  1:17 PM.      OCT RNFL 1/5/24  OD: 79, red T  OS: 80, yellow T    OCT 5 line raster 1/5/24 - NFC, no IS/OS disruption     VF 1/5/24  OD: grossly full, borderline reliability  OS: temporal defects, nonspecific, borderline reliability    Assessment /Plan     For exam results, see Encounter Report.    Antisynthetase syndrome  -     Ambulatory referral/consult to Ophthalmology    Positive antinuclear antibody  -     Ambulatory referral/consult to Ophthalmology      1) Plaquenil Usage  - diagnosed with antisynthetase syndrome with positive SAM, symptoms of inflammatory arthritis   - Rheum plans to restart Plaquenil again @ 200 mg daily (2.94 mg/kg/day)  - Baseline testing all WNL 1/5/24  - Okay to initiate Plaquenil therapy, RTC 1 year    2) NSC, OU  - NVS  - CTM

## 2024-01-11 PROBLEM — D75.1 ERYTHROCYTOSIS DUE TO HYPOXEMIA: Status: ACTIVE | Noted: 2024-01-11

## 2024-01-11 PROBLEM — J43.9 PULMONARY EMPHYSEMA WITH FIBROSIS OF LUNG: Status: ACTIVE | Noted: 2024-01-11

## 2024-01-11 PROBLEM — J84.10 PULMONARY EMPHYSEMA WITH FIBROSIS OF LUNG: Status: ACTIVE | Noted: 2024-01-11

## 2024-01-29 ENCOUNTER — OFFICE VISIT (OUTPATIENT)
Dept: RHEUMATOLOGY | Facility: CLINIC | Age: 69
End: 2024-01-29
Payer: MEDICARE

## 2024-01-29 VITALS
BODY MASS INDEX: 22.32 KG/M2 | DIASTOLIC BLOOD PRESSURE: 67 MMHG | OXYGEN SATURATION: 91 % | SYSTOLIC BLOOD PRESSURE: 95 MMHG | TEMPERATURE: 97 F | HEIGHT: 67 IN | HEART RATE: 94 BPM | WEIGHT: 142.19 LBS | RESPIRATION RATE: 18 BRPM

## 2024-01-29 DIAGNOSIS — D89.89 ANTISYNTHETASE SYNDROME: Primary | ICD-10-CM

## 2024-01-29 DIAGNOSIS — Z79.899 HIGH RISK MEDICATION USE: ICD-10-CM

## 2024-01-29 DIAGNOSIS — R76.8 POSITIVE ANTINUCLEAR ANTIBODY: ICD-10-CM

## 2024-01-29 DIAGNOSIS — M06.00 SERONEGATIVE RHEUMATOID ARTHRITIS: ICD-10-CM

## 2024-01-29 DIAGNOSIS — J84.9 INTERSTITIAL LUNG DISEASE: ICD-10-CM

## 2024-01-29 PROCEDURE — 99215 OFFICE O/P EST HI 40 MIN: CPT | Mod: PBBFAC | Performed by: INTERNAL MEDICINE

## 2024-01-29 PROCEDURE — 99215 OFFICE O/P EST HI 40 MIN: CPT | Mod: S$PBB,,, | Performed by: INTERNAL MEDICINE

## 2024-01-29 RX ORDER — HYDROXYCHLOROQUINE SULFATE 200 MG/1
200 TABLET, FILM COATED ORAL DAILY
Qty: 90 TABLET | Refills: 3 | Status: SHIPPED | OUTPATIENT
Start: 2024-01-29

## 2024-01-29 RX ORDER — NINTEDANIB 150 MG/1
1 CAPSULE ORAL 2 TIMES DAILY
Qty: 60 CAPSULE | Refills: 6 | Status: SHIPPED | OUTPATIENT
Start: 2024-01-29

## 2024-01-29 NOTE — PROGRESS NOTES
"  Patient ID: [id number]     Chief Complaint: Antisynthetase syndrome (Patient states he has been having back pain. )      HPI:     Clarence Trejo Sr. is a 68 y.o. male here today for a follow up of antisynthetase syndrome.     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 Diagnosed in 2021. He was treated with Actemra and OFEV in the past.  CPK was normal in the past.  He does not like taking injection and stopped taking it.     He is taking OFEV 150 mg BID. He is taking Plaquenil 200 mg daily.   Stopped taking Xeljanz.   C/o back pain and asking refills on percocet, advised to talk to his PCP. Chronic DJD and osteoarthritis, advised to follow up with PCP.  Denies bowel/bladder incontinence or saddle anesthesia. He could not afford to pay for pain management. He has peoples health insurance, will try sending another referral to pain management, may be he will have better luck with this time.   He stopped working because of chronic SOB and back pain.   He stopped taking Actemra"I am done with it and do not want to take it no more".  Stopped Actemra in summer 2023.  C/o SOB and cough, SOB is stable. He is supposed to have oxygen continuously, but he only using at home, 3 L and he is not using oxygen outside of house(reservoir battery not lasting longer than 5 min). Follows with pulm. Noncompliant with oxygen.     Denies history of fevers, rashes, photosensitivity, oral or nasal ulcers, h/o MI, stroke, seizures, h/o PE or DVT, Raynaud's phenomenon, uveitis, malignancies.   Family history of autoimmune disease: none.   Smokin pack years. Quit smoking in 2022.    Social History     Tobacco Use   Smoking Status Former    Current packs/day: 0.00    Types: Cigarettes    Quit date: 2023    Years since quittin.0   Smokeless Tobacco Never         ----------------------------  SAM positive  Antisynthetase " syndrome  ILD (interstitial lung disease)  Pulmonary embolism  Seronegative rheumatoid arthritis  TB lung, latent  Tobacco use     History reviewed. No pertinent surgical history.    Review of patient's allergies indicates:  No Known Allergies    Outpatient Medications Marked as Taking for the 24 encounter (Office Visit) with Dixie Caraballo MD   Medication Sig Dispense Refill    albuterol (PROVENTIL/VENTOLIN HFA) 90 mcg/actuation inhaler Inhale 1-2 puffs into the lungs every 6 (six) hours as needed for Wheezing. Rescue 18 g 6    baclofen (LIORESAL) 10 MG tablet Take 10 mg by mouth 2 (two) times daily.      lisinopriL (PRINIVIL,ZESTRIL) 5 MG tablet Take 1 tablet (5 mg total) by mouth once daily. (Patient taking differently: Take 5 mg by mouth once daily. PT TAKES 1/2 TABLET STATES IT MAKES HIM DIZZY) 90 tablet 3    [DISCONTINUED] hydroxychloroquine (PLAQUENIL) 200 mg tablet Take 1 tablet (200 mg total) by mouth once daily. 90 tablet 3    [DISCONTINUED] OFEV 150 mg Cap Take 1 capsule by mouth 2 (two) times daily.         Social History     Socioeconomic History    Marital status:    Tobacco Use    Smoking status: Former     Current packs/day: 0.00     Types: Cigarettes     Quit date: 2023     Years since quittin.0    Smokeless tobacco: Never   Substance and Sexual Activity    Alcohol use: Not Currently    Drug use: Never    Sexual activity: Not Currently     Partners: Female     Social Determinants of Health     Financial Resource Strain: Low Risk  (4/10/2023)    Overall Financial Resource Strain (CARDIA)     Difficulty of Paying Living Expenses: Not hard at all   Recent Concern: Financial Resource Strain - High Risk (2023)    Overall Financial Resource Strain (CARDIA)     Difficulty of Paying Living Expenses: Very hard   Food Insecurity: Unknown (4/10/2023)    Hunger Vital Sign     Ran Out of Food in the Last Year: Never true   Recent Concern: Food Insecurity - Food Insecurity Present  (4/10/2023)    Hunger Vital Sign     Worried About Running Out of Food in the Last Year: Often true     Ran Out of Food in the Last Year: Never true   Transportation Needs: No Transportation Needs (4/10/2023)    PRAPARE - Transportation     Lack of Transportation (Medical): No     Lack of Transportation (Non-Medical): No   Physical Activity: Insufficiently Active (4/10/2023)    Exercise Vital Sign     Days of Exercise per Week: 7 days     Minutes of Exercise per Session: 10 min   Stress: No Stress Concern Present (4/10/2023)    [place] [place] [employer] - Occupational Stress Questionnaire     Feeling of Stress : Not at all   Social Connections: Socially Isolated (4/10/2023)    Social Connection and Isolation Panel [NHANES]     Frequency of Communication with Friends and Family: Three times a week     Frequency of Social Gatherings with Friends and Family: Three times a week     Attends Muslim Services: Never     Active Member of Clubs or Organizations: No     Attends Club or Organization Meetings: Never     Marital Status:    Housing Stability: Unknown (4/10/2023)    Housing Stability Vital Sign     Unable to Pay for Housing in the Last Year: No     Unstable Housing in the Last Year: No        Family History   Problem Relation Age of Onset    COPD Mother     COPD Father         Immunization History   Administered Date(s) Administered    COVID-19, MRNA, LN-S, PF (MODERNA FULL 0.5 ML DOSE) 03/07/2021, 04/07/2021    Pneumococcal Conjugate - 20 Valent 08/28/2023    Pneumococcal Polysaccharide - 23 Valent 07/06/2021       Patient Care Team:  Yara Spear MD as PCP - General (Internal Medicine)     Subjective:     ROS    Constitutional:  Denies chills. Denies fever. Denies night sweats. Denies weight loss. Denies sleep disturbance.   Ophthalmology: Denies blurred vision. Denies diminished visual acuity. Denies dry eyes. Denies eye pain. Denies Itching and redness. Denies red eye.   ENT: Denies  "decreased hearing. Denies oral ulcers. Denies epistaxis. Denies swelling of ears or throat. Denies dry mouth. Denies swollen glands.   Endocrine: Denies diabetes. Denies thyroid Problems.   Respiratory: Admits cough. Admits shortness of breath. Denies shortness of breath with exertion. Denies hemoptysis.   Cardiovascular: Denies chest pain at rest. Denies chest pain with exertion. Denies Irregular heartbeat. Denies palpitations. Denies edema. Denies orthopnea.   Gastrointestinal: Denies abdominal pain. Denies diarrhea. Denies nausea. Denies vomiting. Denies hematemesis or hematochezia. Denies change in appetite. Denies heartburn.  Genitourinary: Denies dysuria. Denies urinary frequency. Denies urinary urgency. Denies blood in urine.  Musculoskeletal: See HPI for details  Integumentary: Denies rash. Denies Itching. Denies dry skin. Denies photosensitivity.   Peripheral Vascular: Denies Ulcers of hands and/or feet. Denies Cold extremities.   Neurologic: Denies dizziness. Denies headache. Denies difficulty speaking. Denies loss of strength. Denies seizures. Denies history of stroke. Denies numbness or tingling.   Psychiatric: Denies depression. Denies anxiety. Denies suicidal/homicidal ideations.      Objective:     BP 95/67 (BP Location: Left arm, Patient Position: Sitting, BP Method: Small (Automatic))   Pulse 94   Temp 97.4 °F (36.3 °C) (Oral)   Resp 18   Ht 5' 7" (1.702 m)   Wt 64.5 kg (142 lb 3.2 oz)   SpO2 (!) 91% Comment: pt denies O2 states he does not need it  BMI 22.27 kg/m²     Physical Exam    General Appearance: alert, pleasant, in no acute distress.  Skin: Skin color, texture, turgor normal. No rashes or lesions.  Club in bilateral hands.  Eyes:  extraocular movement intact (EOMI), pupils equal, round, reactive to light and accommodation, conjunctiva clear.  ENT: No oral or nasal ulcers.  Neck:  Neck supple. No adenopathy.   Lungs:  Crackles on exam.  Heart: RRR w/o murmurs.  No JVD or " edema.  Abdomen: Soft, non-tender, no masses, rebound or guarding.  Neuro: Alert, oriented, CN II-XII GI, sensory and motor innervation intact.  Musculoskeletal:  Decreased range of motion of bilateral wrists, worse on right.  DJD changes in bilateral hands.  Crepitus in bilateral knees.  No overt synovitis on exam.  Psych: Alert, oriented, normal eye contact.    Labs:   11/22/22: quantiferon tb positive, he was treated for it in the past. Aldolase and CPK normal. Hep B core positive, surface ag and ab negative. Hep C, HIV negative.  6/11/2021: SAM by IFA (RDL) A Positive.  PL 7 antibody weak positive.  Other myositis panel negative.  Negative centromere, PM/Scl, RNA polymerase 3, Scl 70, SRP, SSA, RNP.  SAM 1:320 homogeneous and 1:160 speckle  6/21/21:  C3-C4 okay.  Anticardiolipin negative.  Negative anti TPO and chromatin antibody.  6/2021: Negative dsDNA, Guzman. Rheumatoid factor and anti CCP negative.  11/2021: Negative SSA, SSB, Sonia 1. Myositis panel including PL 7 was negative. Hep B core reactive, surface ag negative. Quantiferon tb positive. Hep C negative. HIV negative.   1/12/23: Hg 18.4. CMP ok.   4/2023: myomarker panel 2 negative at [facility]. 4/11/23: CMP ok.   5/7/23: CBC and BMP ok. Elevated hg/Hct.   09/27/2023; trace blood and no protein in urine.  Upc okay.  Hemoglobin 19.3, hematocrit 62.  CMP okay.  CRP 11.8.  ESR normal. Hepatitis-B DNA undetected.  C3, C4 okay.    Imaging:   ECHO 11/21:  LV EF 64%. Cavity normal size, no hypertrophy. Normal RV structure and systolic function. No effusion noted PASP unable to estimate.    CT chest 08/02/2023 showed advanced pulmonary emphysema versus cystic lung disease more pronounced at lung apices.  No traction bronchiectasis, no ground-glass opacities.  No significant change compared to December 2022.  4/8/23: CT chest: There is extensive mediastinal and bilateral hilar lymphadenopathy.   These lymph nodes are without significant interval change.   Lungs are  remarkable for extensive emphysematous changes with cystic formations and ground-glass attenuation due to small airway disease.  No overt superimposed pulmonary edema, dense consolidation, cavitary abnormality or fluid within the pleural or the pericardial spaces.  12/9/2022: CT Chest HR: Advanced emphysema with mild upper lung predominance.  Overall similar appearance since April.  No significant bronchiectasis or air trapping.   Normal heart size.  Mild coronary artery and aortic calcification.  No significant pleural or pericardial fluid.  Mildly enlarged mediastinal lymph nodes stable since prior exam.  No acute or aggressive osseous findings.  Impression: Emphysema and mediastinal lymphadenopathy show no significant change since April.  April 2022:  CT chest showed advanced emphysema, grossly stable.  Slightly enlarged mediastinal lymph nodes unchanged.  Centrilobular emphysema.  08/17/2021:  High-resolution chest CT showed prominent mediastinal and hilar lymph nodes.  Severe emphysema with subpleural scarring.  No diffuse groundglass or septal thickening. No convincing Honeycombing. Severe emphysema with mild bronchiectasis.    9/2021: X-ray of lumbar spine: 1.  Multilevel mild degenerative disc and hypertrophic facet changes.  No evidence of acute injury. 2.  Suspected 7 mm right kidney stone.     PFTs August 2021:  FVC 71%, ratio 71%, total lung capacity 82%, DLCO by [facility] 57%.  10/7/2022 shows severe diffusion defect.  FVC 64%, FEV1 64%, ratio 100%, total lung capacity 62%, DLCO 18%, DLCO by VA 36%.  PFTs:  12/09/2022:  FVC 72%, FEV1 75%, ratio 78%, total lung capacity 71%, DLCO 24%.  DLCO by VA 38%  Unable to do PFT's because of back pain, back pain gets worse when he tries to blow.     Assessment:       ICD-10-CM ICD-9-CM   1. Antisynthetase syndrome  D89.89 279.49   2. Positive antinuclear antibody  R76.8 795.79   3. Interstitial lung disease  J84.9 515   4. Seronegative rheumatoid arthritis  M06.00 714.0    5. High risk medication use  Z79.899 V58.69              Plan:     1. 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 Diagnosed in 4/2021. He was treated with Actemra and OFEV in the past.  CPK was normal in the past.  He does not like taking injection and stopped taking Actemra.   - Restarted OFEV. Taking OFEV 150 mg BID, continue with that.  - C/w plaquenil 200 mg daily. Saw optho on 1/5/24.   - Discontinued Xeljanz, as ILD is stable and also because there is no inflammatory component on CT chest.   - labs today.     2. Interstitial lung disease:  Fibrotic changes, diagnosed in April 2021.  Using 3 L of oxygen at home. PFTs:  12/09/2022:  FVC 72%, FEV1 75%, ratio 78%, total lung capacity 71%, DLCO 24%.  DLCO by VA 38%. 12/2022 HR Chest CT: Emphysema and mediastinal lymphadenopathy show no significant change since April 2022.  - I reviewed films with radiologist, he has some ground glass opacities in 8/2021 CT and it resolved after that, which were not seen on CT done in 4/2022 and in 12/2022. CT chest on 12/2022 showed more emphysema and UIP pattern. No progression since 4/2022. Will not start him on any immunosuppressively medication now as ILD is not progressing and also because there are no inflammatory signs of ILD on CT.  - Will monitor him closely. CT chest 08/02/2023 showed advanced pulmonary emphysema versus cystic lung disease more pronounced at lung apices.  No traction bronchiectasis, no ground-glass opacities.  No significant change compared to December 2022.  - Severe emphysematous changes and bronchiectasis on CT chest, the secondary to smoking.  - Continue OFEV.    - Repeat CT chest.      3. Previous history of seroneg RA: Patient self discontinued Actemra.   6/21/21: RF and anti CCP negative.  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  MCP's  normal, no erosions, I doubt the diagnosis of RA.      4. High risk medication use: Advised to stay up-to-date on age appropriate vaccinations and malignancy screening.       5. History of tobacco abuse:  60 pack year smoking history, quit smoking in 1/2023.     6. TB lung, latent:  He was treated for latent TB in the past, years back.  QuantiFERON TB positive, 11/23/2022.     7. Persons with rheumatoid arthritis, lupus, psoriatic arthritis and other autoimmune diseases are at increased risk of cardiovascular disease including heart attack and stroke. We recommend that all patients with these conditions have annual health maintenance exams including lipid measurements, blood pressure measurements, and smoking cessation counseling when applicable at their primary care provider's office.   - Advised to stay up-to-date on age appropriate vaccinations and malignancy screening, including yearly skin exams.       8. His major complaint is back pain again today too and requesting for narcotics, reviewed x-rays of lumbar spine, x-ray showed osteoarthritis.  Advised to follow up with his PCP for further management of osteoarthritis.  Recommend doing stretches and exercises as tolerated.  Recommend heat therapy.  - Scheduled to see pain management in 2/2023. Could not see them, as he has to pay for the visits. Sent another referral to pain management.      9. Hep B core positive: Hep B DNA negative in 9/2023. Surface ag negative. Likely old infection, will check labs periodically.      10.  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  -  He no showed to his visit.           Follow up in about 4 months (around 5/29/2024) for with me. In addition to their scheduled follow up, the patient has also been instructed to follow up on as needed basis.      Total time spent with patient and documentation is 40 minute. All questions were answered to patient's satisfaction and  patient verbalized understanding.

## 2024-03-27 ENCOUNTER — TELEPHONE (OUTPATIENT)
Dept: RHEUMATOLOGY | Facility: CLINIC | Age: 69
End: 2024-03-27
Payer: MEDICARE

## 2024-03-27 NOTE — TELEPHONE ENCOUNTER
Spoke with pt and informed him to give his health insurance company a call to see who is within network with them and to give our office a call back. Once information is received referral will be sent out. Pt verbalized understanding.

## 2024-03-28 ENCOUNTER — HOSPITAL ENCOUNTER (OUTPATIENT)
Dept: RADIOLOGY | Facility: HOSPITAL | Age: 69
Discharge: HOME | End: 2024-03-28
Attending: INTERNAL MEDICINE
Payer: MEDICARE

## 2024-03-28 DIAGNOSIS — J84.9 INTERSTITIAL LUNG DISEASE: ICD-10-CM

## 2024-03-28 DIAGNOSIS — R76.8 POSITIVE ANTINUCLEAR ANTIBODY: ICD-10-CM

## 2024-03-28 DIAGNOSIS — D89.89 ANTISYNTHETASE SYNDROME: ICD-10-CM

## 2024-03-28 PROCEDURE — 71250 CT THORAX DX C-: CPT | Mod: TC

## 2024-04-27 ENCOUNTER — OFFICE VISIT (OUTPATIENT)
Dept: INTERNAL MEDICINE | Facility: CLINIC | Age: 69
End: 2024-04-27
Payer: MEDICARE

## 2024-04-27 VITALS
DIASTOLIC BLOOD PRESSURE: 83 MMHG | BODY MASS INDEX: 21.72 KG/M2 | HEIGHT: 67 IN | WEIGHT: 138.38 LBS | RESPIRATION RATE: 20 BRPM | HEART RATE: 80 BPM | OXYGEN SATURATION: 90 % | TEMPERATURE: 97 F | SYSTOLIC BLOOD PRESSURE: 139 MMHG

## 2024-04-27 DIAGNOSIS — Z79.52 LONG TERM (CURRENT) USE OF SYSTEMIC STEROIDS: ICD-10-CM

## 2024-04-27 DIAGNOSIS — Z00.00 ENCOUNTER FOR PREVENTIVE HEALTH EXAMINATION: ICD-10-CM

## 2024-04-27 DIAGNOSIS — Z78.9 DIFFICULTY WITH ACTIVITIES OF DAILY LIVING: ICD-10-CM

## 2024-04-27 DIAGNOSIS — Z12.12 SCREENING FOR COLORECTAL CANCER: Primary | ICD-10-CM

## 2024-04-27 DIAGNOSIS — Z13.6 ENCOUNTER FOR SCREENING FOR ABDOMINAL AORTIC ANEURYSM (AAA) IN PATIENT 50 YEARS OF AGE OR OLDER WITHOUT OTHER RISK FACTORS FOR AAA: ICD-10-CM

## 2024-04-27 DIAGNOSIS — Z12.11 SCREENING FOR COLORECTAL CANCER: Primary | ICD-10-CM

## 2024-04-27 PROCEDURE — 99215 OFFICE O/P EST HI 40 MIN: CPT | Mod: PBBFAC | Performed by: STUDENT IN AN ORGANIZED HEALTH CARE EDUCATION/TRAINING PROGRAM

## 2024-04-27 NOTE — PROGRESS NOTES
"  Clarence Trejo presented for an initial Medicare AWV today. The following components were reviewed and updated:    Medical history  Family History  Social history  Allergies and Current Medications  Health Risk Assessment  Health Maintenance  Care Team    **See Completed Assessments for Annual Wellness visit with in the encounter summary    The following assessments were completed:  Depression Screening  Cognitive function Screening  Timed Get Up Test  Whisper Test      Opioid documentation:      Patient does not have a current opioid prescription.          Vitals:    04/27/24 0949   BP: 139/83   BP Location: Right arm   Patient Position: Sitting   BP Method: Medium (Automatic)   Pulse: 80   Resp: 20   Temp: 97.4 °F (36.3 °C)   TempSrc: Oral   SpO2: (!) 90%   Weight: 62.8 kg (138 lb 6.4 oz)   Height: 5' 7" (1.702 m)     Body mass index is 21.68 kg/m².       General:  Decreased BMI  Head: Normocephalic, atraumatic  Eyes: PERRL, anicteric sclera  Throat: No posterior pharyngeal erythema or exudate, no tonsillar exudate  Neck: supple, normal ROM, no JVD  CVS:  RRR, S1 and S2 normal, no murmurs, no added heart sounds, rubs, gallops, regular peripheral pulses, and no peripheral edema  Resp: Decrease airflow, some crackles lower base  GI:  Abdomen soft, non-tender, non-distended, normoactive bowel sounds  MSK:  clubbing, paraspinal tenderness.  Skin:  No rashes, ulcers, erythema  Neuro:  Alert and oriented x3, No focal neuro deficits, CNII-XII grossly intact  Psych:  Appropriate mood and affect       Diagnoses and health risks identified today and associated recommendations/orders:  1. Screening for colorectal cancer  - Cologuard Screening (Multitarget Stool DNA); Future  - Cologuard Screening (Multitarget Stool DNA)    2. Encounter for screening for abdominal aortic aneurysm (AAA) in patient 50 years of age or older without other risk factors for AAA  - CV Ultrasound Mesenteric Arterial (Cupid Only); Future  - DXA Bone " Density Axial Skeleton 1 or more sites; Future    3. Long term (current) use of systemic steroids  - DXA Bone Density Axial Skeleton 1 or more sites; Future    4. Encounter for preventive health examination    5. Difficulty with activities of daily living      ILD, Anti synthetase syndrome:  Continue OFEV, continue inhalers. 3L NC at home patient would benefit from mobile inhalers.     Back pain:  Colicky pain, vs lumbar radiculopathy  Offered ED, does not to go    HCM:  Cologuard last collected was not usable resend  AAA screening  Dexa  Does not want vaccines.           Provided Clarence with a 5-10 year written screening schedule and personal prevention plan. Recommendations were developed using the USPSTF age appropriate recommendations. Education, counseling, and referrals were provided as needed.  After Visit Summary printed and given to patient which includes a list of additional screenings\tests needed.    No follow-ups on file.      Re Barr MD

## 2024-04-27 NOTE — PATIENT INSTRUCTIONS
Counseling and Referral of Other Preventative  (Italic type indicates deductible and co-insurance are waived)    Patient Name: Clarence Trejo  Today's Date: 4/27/2024    Health Maintenance       Date Due Completion Date    TETANUS VACCINE Never done ---    Colorectal Cancer Screening Never done ---    Shingles Vaccine (1 of 2) Never done ---    RSV Vaccine (Age 60+ and Pregnant patients) (1 - 1-dose 60+ series) Never done ---    Abdominal Aortic Aneurysm Screening Never done ---    COVID-19 Vaccine (3 - 2023-24 season) 09/01/2023 4/7/2021    Lipid Panel 07/15/2026 7/15/2021        Orders Placed This Encounter   Procedures    Cologuard Screening (Multitarget Stool DNA)    DXA Bone Density Axial Skeleton 1 or more sites    CV Ultrasound Mesenteric Arterial (Cupid Only)       The following information is provided to all patients.  This information is to help you find resources for any of the problems found today that may be affecting your health:                  Living healthy guide: www.[email or web address].[place].[facility]      Understanding Diabetes: www.diabetes.org      Eating healthy: www.cdc.gov/healthyweight      CDC home safety checklist: www.cdc.gov/steadi/patient.html      Agency on Aging: www.goea.[place].gov      Alcoholics anonymous (AA): www.aa.org      Physical Activity: www.yuliya.nih.gov/dy6hdyp      Tobacco use: www.quitwithusla.org

## 2024-05-02 DIAGNOSIS — Z87.891 HISTORY OF TOBACCO ABUSE: Primary | ICD-10-CM

## 2024-06-04 ENCOUNTER — OFFICE VISIT (OUTPATIENT)
Dept: PULMONOLOGY | Facility: CLINIC | Age: 69
End: 2024-06-04
Payer: MEDICARE

## 2024-06-04 VITALS
TEMPERATURE: 97 F | BODY MASS INDEX: 21.07 KG/M2 | SYSTOLIC BLOOD PRESSURE: 146 MMHG | HEIGHT: 67 IN | RESPIRATION RATE: 20 BRPM | WEIGHT: 134.25 LBS | HEART RATE: 80 BPM | OXYGEN SATURATION: 90 % | DIASTOLIC BLOOD PRESSURE: 92 MMHG

## 2024-06-04 DIAGNOSIS — D89.89 ANTISYNTHETASE SYNDROME: ICD-10-CM

## 2024-06-04 DIAGNOSIS — R06.02 SHORTNESS OF BREATH: ICD-10-CM

## 2024-06-04 PROCEDURE — 3008F BODY MASS INDEX DOCD: CPT | Mod: CPTII,,, | Performed by: INTERNAL MEDICINE

## 2024-06-04 PROCEDURE — 3288F FALL RISK ASSESSMENT DOCD: CPT | Mod: CPTII,,, | Performed by: INTERNAL MEDICINE

## 2024-06-04 PROCEDURE — 99214 OFFICE O/P EST MOD 30 MIN: CPT | Mod: PBBFAC

## 2024-06-04 PROCEDURE — 99213 OFFICE O/P EST LOW 20 MIN: CPT | Mod: S$PBB,,, | Performed by: INTERNAL MEDICINE

## 2024-06-04 PROCEDURE — 3080F DIAST BP >= 90 MM HG: CPT | Mod: CPTII,,, | Performed by: INTERNAL MEDICINE

## 2024-06-04 PROCEDURE — 1125F AMNT PAIN NOTED PAIN PRSNT: CPT | Mod: CPTII,,, | Performed by: INTERNAL MEDICINE

## 2024-06-04 PROCEDURE — 1101F PT FALLS ASSESS-DOCD LE1/YR: CPT | Mod: CPTII,,, | Performed by: INTERNAL MEDICINE

## 2024-06-04 PROCEDURE — 3077F SYST BP >= 140 MM HG: CPT | Mod: CPTII,,, | Performed by: INTERNAL MEDICINE

## 2024-06-04 PROCEDURE — 1159F MED LIST DOCD IN RCRD: CPT | Mod: CPTII,,, | Performed by: INTERNAL MEDICINE

## 2024-06-04 RX ORDER — BUDESONIDE AND FORMOTEROL FUMARATE DIHYDRATE 80; 4.5 UG/1; UG/1
2 AEROSOL RESPIRATORY (INHALATION) 2 TIMES DAILY
Qty: 10.2 G | Refills: 5 | Status: SHIPPED | OUTPATIENT
Start: 2024-06-04 | End: 2024-12-01

## 2024-06-04 RX ORDER — IPRATROPIUM BROMIDE AND ALBUTEROL SULFATE 2.5; .5 MG/3ML; MG/3ML
3 SOLUTION RESPIRATORY (INHALATION) EVERY 6 HOURS PRN
Qty: 75 ML | Refills: 5 | Status: SHIPPED | OUTPATIENT
Start: 2024-06-04 | End: 2025-06-04

## 2024-06-04 NOTE — PROGRESS NOTES
[facility] INTERNAL MEDICINE  OUTPATIENT OFFICE VISIT NOTE    SUBJECTIVE:      Chief Complaint: Shortness of Breath (Follow up)       HPI: Clarence Trejo Sr. is a 68 y.o. yo male w/ PMH of  has a past medical history of SAM positive, Antisynthetase syndrome, ILD (interstitial lung disease), Pulmonary embolism, Seronegative rheumatoid arthritis, TB lung, latent, and Tobacco use., who presents for  6 month(s) follow up. States he has been doing well since last visit - no ED/hospitalizations for COPD. Compliant with ILD medications and appointments with rheumatology. Using symbicort as prescribed and rescue inhaler 1-2 per week. States he is unable to do PFT's 2/2 to pain with spirometry. Previously on continuous O2 but take away from company. Now patient apparently has O2 concentrator which he only using at night. Requesting abx for foot ulcer and chronic back pain - will have to reach out to PCP. No other pains/complaints tody.     Past Medical History:   has a past medical history of SAM positive, Antisynthetase syndrome, ILD (interstitial lung disease), Pulmonary embolism, Seronegative rheumatoid arthritis, TB lung, latent, and Tobacco use.     Past Surgical History:   has no past surgical history on file.     Family History:  family history includes COPD in his father and mother.     Social History:   reports that he quit smoking about 16 months ago. His smoking use included cigarettes. He has never used smokeless tobacco. He reports that he does not currently use alcohol. He reports that he does not use drugs.     Allergies:  has No Known Allergies.     Home Medications:  Current Outpatient Medications   Medication Instructions    albuterol (PROVENTIL/VENTOLIN HFA) 90 mcg/actuation inhaler 1-2 puffs, Inhalation, Every 6 hours PRN, Rescue    albuterol-ipratropium (DUO-NEB) 2.5 mg-0.5 mg/3 mL nebulizer solution 3 mLs, Nebulization, Every 6 hours PRN, Rescue    baclofen (LIORESAL) 10 mg, Oral, 2 times daily     "budesonide-formoterol 80-4.5 mcg (SYMBICORT) 80-4.5 mcg/actuation HFAA 2 puffs, Inhalation, 2 times daily    hydroxychloroquine (PLAQUENIL) 200 mg, Oral, Daily    lisinopriL (PRINIVIL,ZESTRIL) 5 mg, Oral, Daily    meloxicam (MOBIC) 7.5 mg, Oral, 2 times daily    OFEV 150 mg, Oral, 2 times daily    omeprazole (PRILOSEC) 20 mg, Oral, Daily    tadalafiL (CIALIS) 5 mg, Oral, Daily PRN        ROS:  Review of Systems   Constitutional:  Negative for chills and fever.   Respiratory:  Negative for cough, hemoptysis, sputum production, shortness of breath and wheezing.    Cardiovascular:  Negative for chest pain and palpitations.            OBJECTIVE:     Vital signs:   BP (!) 146/92 (BP Location: Left arm) Comment: manual bp  Pulse 80   Temp 97.4 °F (36.3 °C) (Oral)   Resp 20   Ht 5' 7" (1.702 m)   Wt 60.9 kg (134 lb 4.2 oz)   SpO2 (!) 90%   BMI 21.03 kg/m²      Physical Examination:  General: Well nourished w/o distress, thin appearance   Neck: Full ROM; no lymphadenopathy  Pulm: CTA bilaterally, normal work of breathing  CV: S1, S2 w/o murmurs or gallops; no edema noted  GI: Soft with normal bowel sounds in all quadrants, no masses on palpation  MSK: Full ROM of all extremities and spine w/o limitation or discomfort  Derm: No rashes, abnormal bruising, or skin lesions  Neuro: AAOx4; CN II-XII intact; motor/sensory function intact      Labs:  CMP:   Recent Labs   Lab 11/23/21  0410 11/24/21  0443 11/25/21  0357 10/13/22  0927 04/11/23  0411 05/07/23  0211 09/27/23  1230 01/29/24  1228   Sodium 140 140 141   < > 138 140 143 145   Potassium 4.0 4.5 3.9   < > 4.4 4.1 4.0 3.6   CO2 22 L 25 29   < > 26 23 29 26   Blood Urea Nitrogen 13.5 13.4 15.7   < > 9.0 16.6 8.5 11.6   Creatinine 0.80 0.75 0.70 L   < > 0.71 L 0.85 0.98 0.99   Glucose 148 H 159 H 96   < > 144 H 104 56 L 55 L   Calcium 8.5 L 8.7 8.8   < > 8.9 9.0 10.1 H 9.4   Albumin 3.3 L 3.3 L 3.1 L   < > 2.8 L  --  4.0 3.8   Bilirubin Total 0.6 0.6 0.4   < > 0.3  --  " 0.7 0.7   Bilirubin Direct 0.3 0.3 0.2  --   --   --   --   --    Bilirubin Indirect 0.30 0.30 0.20  --   --   --   --   --    AST 14 14 12   < > 12  --  17 20   ALT 32 41 35   < > 9  --  9 17   ALP 89 84 78   < > 68  --  108 102    < > = values in this interval not displayed.     CBC:   Recent Labs   Lab 05/07/23  0211 09/27/23  1230 01/29/24  1228   WBC 11.01 8.05 8.86   Neut # 7.43 5.13 6.41   RBC 6.83 H 7.15 H 6.94 H   Hgb 18.0 19.3 H 19.2 H   Hct 57.7 H 62.9 H 61.7 H   Platelet 130 199 177   MCV 84.5 88.0 88.9   RDW 21.0 H 17.0 17.1 H     FLP:   Recent Labs   Lab 07/15/21  1200   Cholesterol Total 110   HDL Cholesterol 33 L   LDL Cholesterol 56.00   Triglyceride 104     DM:   Recent Labs   Lab 05/07/23  0211 09/27/23  1230 09/27/23  1235 01/29/24  1228 01/29/24  1307   Urine Creatinine  --   --    < >  --  202.2 H   Urine Protein Level  --   --    < >  --  24.1   Creatinine 0.85 0.98  --  0.99  --     < > = values in this interval not displayed.     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 DLCO by [facility] 38%   - pt unable to do PFT's due to pain with spirometry - will repeat if requested per rheumatology   - using O2 concentrator at night   - continue OFEV and plaquenil as prescribed   - CT Chest on 3/28/24 - Advanced pulmonary emphysema, most pronounced at the lung apices. No lobar consolidation. Biapical pleuroparenchymal scarring. Similar to previous. Mild increased attenuation of the lung parenchyma notable at the lung bases. This area was obscured by respiratory motion artifact previously.     History of longstanding tobacco  abuse, quit in 2021.  -Previous smoker, quit 1/2022; 60+ pack-year history     HX of latent TB  -s/p previous 9x month TX        Return to clinic in 6 month(s).    Reagan Cordero DO  [facility] Internal Medicine PGY-2    Orders Placed This Encounter    albuterol-ipratropium (DUO-NEB) 2.5 mg-0.5 mg/3 mL nebulizer solution    budesonide-formoterol 80-4.5 mcg (SYMBICORT) 80-4.5 mcg/actuation HFAA

## 2024-06-06 LAB — NONINV COLON CA DNA+OCC BLD SCRN STL QL: NEGATIVE

## 2024-06-12 ENCOUNTER — TELEPHONE (OUTPATIENT)
Dept: PULMONOLOGY | Facility: CLINIC | Age: 69
End: 2024-06-12
Payer: MEDICARE

## 2024-06-12 NOTE — TELEPHONE ENCOUNTER
Patient came to the window stating that his oxygen needs to be renewed. Spoke with the patient. Reviewed with patient that he was seen last week in Pulmonology clinic and that per the notes- his oxygen was picked up and only uses his concentrator at night. Patient stated he received a call from [employer] stating that it needs to be renewed. Informed patient that I would have to reach out to [employer] to see what is needed for the oxygen. Patient voiced understanding.    Called and spoke to Marybeth at [employer]. She stated that patient has a portable oxygen concentrator and oxygen concentrator for at night. She stated patient's equipment was never picked up from them. He changed insurances and a new order is needed as well as updated notes. Informed Marybeth that the Pulm MD will have to sign the new order once he returns to clinic and once that is done will fax over requested documentation to get it re-certified. Marybeth voiced understanding.

## 2024-06-13 NOTE — TELEPHONE ENCOUNTER
Dr. Chowdhury signed oxygen order.     Oxygen order and clinicals faxed to [facility].    Called and spoke with patient to inform that the requested information was faxed to [facility]. Patient voiced understanding and is appreciative of the help.

## 2024-07-15 DIAGNOSIS — M54.9 BACK PAIN, UNSPECIFIED BACK LOCATION, UNSPECIFIED BACK PAIN LATERALITY, UNSPECIFIED CHRONICITY: ICD-10-CM

## 2024-07-15 RX ORDER — MELOXICAM 7.5 MG/1
7.5 TABLET ORAL 2 TIMES DAILY
Qty: 60 TABLET | Refills: 6 | Status: CANCELLED | OUTPATIENT
Start: 2024-07-15

## 2024-08-01 ENCOUNTER — OFFICE VISIT (OUTPATIENT)
Dept: INTERNAL MEDICINE | Facility: CLINIC | Age: 69
End: 2024-08-01
Payer: MEDICARE

## 2024-08-01 VITALS
BODY MASS INDEX: 20.2 KG/M2 | RESPIRATION RATE: 20 BRPM | SYSTOLIC BLOOD PRESSURE: 146 MMHG | HEART RATE: 88 BPM | TEMPERATURE: 98 F | OXYGEN SATURATION: 90 % | WEIGHT: 129 LBS | DIASTOLIC BLOOD PRESSURE: 78 MMHG

## 2024-08-01 DIAGNOSIS — Z87.891 HISTORY OF TOBACCO ABUSE: Primary | ICD-10-CM

## 2024-08-01 DIAGNOSIS — N52.9 ERECTILE DYSFUNCTION, UNSPECIFIED ERECTILE DYSFUNCTION TYPE: ICD-10-CM

## 2024-08-01 DIAGNOSIS — I10 HYPERTENSION, UNSPECIFIED TYPE: ICD-10-CM

## 2024-08-01 PROCEDURE — 99214 OFFICE O/P EST MOD 30 MIN: CPT | Mod: PBBFAC

## 2024-08-01 RX ORDER — LISINOPRIL 5 MG/1
10 TABLET ORAL DAILY
Qty: 180 TABLET | Refills: 3 | Status: CANCELLED | OUTPATIENT
Start: 2024-08-01 | End: 2025-08-01

## 2024-08-01 RX ORDER — TADALAFIL 5 MG/1
5 TABLET ORAL DAILY PRN
Qty: 30 TABLET | Refills: 3 | Status: SHIPPED | OUTPATIENT
Start: 2024-08-01 | End: 2025-08-01

## 2024-08-01 RX ORDER — LISINOPRIL 5 MG/1
5 TABLET ORAL DAILY
Qty: 90 TABLET | Refills: 3 | Status: SHIPPED | OUTPATIENT
Start: 2024-08-01 | End: 2025-08-01

## 2024-08-01 NOTE — PROGRESS NOTES
[facility] INTERNAL MEDICINE  OUTPATIENT OFFICE VISIT NOTE    SUBJECTIVE:      Chief Complaint: Follow-up (Medication Refills)       HPI: Clarence Trejo Sr. is a 68 y.o. yo male w/ PMH of  has a past medical history of SAM positive, Antisynthetase syndrome, ILD (interstitial lung disease), Pulmonary embolism, Seronegative rheumatoid arthritis, TB lung, latent, and Tobacco use., who presents for establishment with myself    Patient reporting he was very forgetful.  Per dispensed report it looks like patient was not picked up his blood pressure medications since last July.  He said he did not realize that he had refills.  Patient was certainly has refills.  We will do closer follow up starting showed that he was taking his blood pressure medication.  Currently his blood pressure today is 148/82.  He also reports continued back pain.  Patient is followed by Rheumatology for several different arthritis that he has.  Patient has a compressor and portable oxygen at home that he does not utilize during the day.  He reports he only utilizes at night.  He was visibly winded during the interview.  I spent an extensive amount of time with the patient explaining his pathology as well as the importance of compliance with medication in order to reduce further harm.    Past Medical History:   has a past medical history of SAM positive, Antisynthetase syndrome, ILD (interstitial lung disease), Pulmonary embolism, Seronegative rheumatoid arthritis, TB lung, latent, and Tobacco use.     Past Surgical History:   has no past surgical history on file.     Family History:  family history includes COPD in his father and mother.     Social History:   reports that he quit smoking about 18 months ago. His smoking use included cigarettes. He has never used smokeless tobacco. He reports that he does not currently use alcohol. He reports that he does not use drugs.     Allergies:  has No Known Allergies.     Home Medications:  Current Outpatient  Medications   Medication Instructions    albuterol (PROVENTIL/VENTOLIN HFA) 90 mcg/actuation inhaler 1-2 puffs, Inhalation, Every 6 hours PRN, Rescue    albuterol-ipratropium (DUO-NEB) 2.5 mg-0.5 mg/3 mL nebulizer solution 3 mLs, Nebulization, Every 6 hours PRN, Rescue    baclofen (LIORESAL) 10 mg, Oral, 2 times daily    budesonide-formoterol 80-4.5 mcg (SYMBICORT) 80-4.5 mcg/actuation HFAA 2 puffs, Inhalation, 2 times daily    hydroxychloroquine (PLAQUENIL) 200 mg, Oral, Daily    lisinopriL (PRINIVIL,ZESTRIL) 5 mg, Oral, Daily    meloxicam (MOBIC) 7.5 mg, Oral, 2 times daily    OFEV 150 mg, Oral, 2 times daily    omeprazole (PRILOSEC) 20 mg, Oral, Daily    tadalafiL (CIALIS) 5 mg, Oral, Daily PRN        ROS:  Negative for all symptoms other than those listed in HPI         OBJECTIVE:     Vital signs:   There were no vitals filed for this visit.       Physical Examination:    General - Appears comfortable, appropriatley conversive   Mental Status - alert and oriented x 3, speaking being slow biologically logical, relevant sentences.  Patient is a bad historian  HEENT - no rhinorrhea   Cardiac - RRR, no murmurs, rubs, or gallops; no edema in LE   Respiratory -increased effort of breathing but in no distress.  some crackles on ascultation bilaterally, coarse breath sounds  Abdominal - nondistended, soft, nontender to palpation   Extremities - clubbing noted in all digits of the hands.  LE, UE, and joints are nonerythematous and non swollen.   MSK:  Tenderness to palpation upper middle and lower back.  Skin - no rashes or bruises seen on skin.  Clubbing noted in bilateral hands      Labs:  BMP:   Lab Results   Component Value Date    CO2 26 01/29/2024    BUN 11.6 01/29/2024    CREATININE 0.99 01/29/2024    GLUCOSE 55 (L) 01/29/2024    CALCIUM 9.4 01/29/2024     CBC:   Lab Results   Component Value Date    WBC 8.86 01/29/2024    HGB 19.2 (H) 01/29/2024    HCT 61.7 (H) 01/29/2024    MCV 88.9 01/29/2024    RDW 17.1 (H)  "01/29/2024     LFTs:   Lab Results   Component Value Date    LABPROT 7.9 (H) 01/29/2024    ALBUMIN 3.8 01/29/2024    AST 20 01/29/2024    ALT 17 01/29/2024    ALKPHOS 102 01/29/2024     FLP:   Lab Results   Component Value Date    CHOL 110 07/15/2021    HDL 33 (L) 07/15/2021    LDL 56.00 07/15/2021    TRIG 104 07/15/2021    TOTALCHOLEST 3 07/15/2021     DM:   Lab Results   Component Value Date    HGBA1C 5.9 04/20/2021    .6 04/20/2021    CREATININE 0.99 01/29/2024    CREATRANDUR 202.2 (H) 01/29/2024    PROTEINURINE 24.1 01/29/2024     Thyroid:   Lab Results   Component Value Date    TSH 0.7013 11/19/2021    [id number] 0.93 04/20/2021     Anemia: No results found for: "IRON", "TIBC", "FERRITIN", "CPXOABVI62", "FOLATE"            ASSESSMENT & PLAN:        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  Advanced pulmonary emphysema versus cystic lung disease most pronounced at the lung apices.  -PFTs:  12/09/2022:  FVC 72%, FEV1 75%, ratio 78%, total lung capacity 71%, DLCO 24%.  DLCO by [facility] 38%  -Continue Symbicort, albuterol  -On home O2 via [place] at 3L/min at night.   - followed by rheumatology and pulmonology.   -was treated with Actemra and OFEV. Patient stopped taking actemra and plaquenil. Currently taking plaquenil only.   -patient short of breath as he is not using a mobile oxygen compressor.  Patient is on 3 L oxygen at home    TB lung, latent  -was treated for latent TB in the past.  Year unknown.  Patient states he was treated for 1 year while he was incarcerated in [facility] for over ten years.   -patient not sure where certificate for latent TB treatment is  -we will check her health department records for treatment of latent TB    Osteoarthritis  - " previous x-ray of lumbar spine showed  Multilevel mild degenerative disc and hypertrophic facet changes.  No evidence of acute injury.  -patient educated on exercise regimen, stretches, and advised to take Mobic 7.5 b.i.d. with severe pain. Patient can use diclofenac cream for mild to moderate pain     HTN  -patient blood pressure today is 138/89.  Previous blood pressure was 137/80.  -we will start lisinopril 5mg daily.  We will continue to monitor patient's blood pressure    Erectile dysfunction  -patient reports erectile dysfunction.  He is previously taken Cialis and says it helps.  Patient received echo not suggestive of cardiomyopathy.  No history of coronary artery disease.  - Cialis 5 mg once p.r.n. given    Dysuria  -patient reports pain with urination.  He reports he is had these symptoms in the past and was given antibiotic and felt better.  -UA ordered to evaluate for urinary tract infection.    Antisynthetase syndrome  -Other features of disease other than ILD such as Raynauds, mechanics hand, fevers not present.   -arthritis in several joints is present.        Health Maintenance  Vaccinations  Immunization History   Administered Date(s) Administered    COVID-19, MRNA, LN-S, PF (MODERNA FULL 0.5 ML DOSE) 2021, 2021    Pneumococcal Conjugate - 20 Valent 2023    Pneumococcal Polysaccharide - 23 Valent 2021       -Flu:  not due      -Pneumonia: Done today 2024     -Shingles: Refused   -Tdap: Refused   -COVID: Refused   Screening  -AAA: ordered   -Osteoporosis:  not indicated     -Lung Ca. Screening: done on 2023   -Colon Ca. Screenin24   -Hep C, HIV: done on 2022  Social   -EtOH:  reports that he does not currently use alcohol.   -Tobacco:  reports that he quit smoking about 18 months ago. His smoking use included cigarettes. He has never used smokeless tobacco.   -Drugs:  reports no history of drug use.    -Depression:   Depression: Low Risk  (2024)     Depression     Last PHQ-4: Flowsheet Data: 0                Return to clinic in 2 month(s).    Yara Spear M.D  [facility] Internal Medicine PGY-1

## 2024-08-01 NOTE — PROGRESS NOTES
I have reviewed and concur with the resident's history, physical, assessment, and plan.  I have discussed with him all issues related to the diagnosis, workup and treatment plan.Care provided as reasonable and necessary.

## 2024-08-02 ENCOUNTER — TELEPHONE (OUTPATIENT)
Dept: INTERNAL MEDICINE | Facility: CLINIC | Age: 69
End: 2024-08-02
Payer: MEDICARE

## 2024-08-02 NOTE — TELEPHONE ENCOUNTER
Called, Kristen , pt's name and  verified. Kristen reported she was not able to pull any records on this patient regarding this matter. Kristen reported she will reach out to her contact at [facility] and see what they can find. Kristen reported that it could be due to patient didn't have the active TB infection. Kristen reported she will call me back next week after she do some further investigation.Call ended.

## 2024-08-05 ENCOUNTER — OFFICE VISIT (OUTPATIENT)
Dept: RHEUMATOLOGY | Facility: CLINIC | Age: 69
End: 2024-08-05
Payer: MEDICARE

## 2024-08-05 ENCOUNTER — LAB VISIT (OUTPATIENT)
Dept: LAB | Facility: HOSPITAL | Age: 69
End: 2024-08-05
Attending: INTERNAL MEDICINE
Payer: MEDICARE

## 2024-08-05 VITALS
RESPIRATION RATE: 16 BRPM | TEMPERATURE: 97 F | BODY MASS INDEX: 20.35 KG/M2 | WEIGHT: 129.63 LBS | SYSTOLIC BLOOD PRESSURE: 118 MMHG | HEIGHT: 67 IN | OXYGEN SATURATION: 95 % | HEART RATE: 76 BPM | DIASTOLIC BLOOD PRESSURE: 76 MMHG

## 2024-08-05 DIAGNOSIS — J84.9 INTERSTITIAL LUNG DISEASE: ICD-10-CM

## 2024-08-05 DIAGNOSIS — M54.9 BACK PAIN, UNSPECIFIED BACK LOCATION, UNSPECIFIED BACK PAIN LATERALITY, UNSPECIFIED CHRONICITY: ICD-10-CM

## 2024-08-05 DIAGNOSIS — D89.89 ANTISYNTHETASE SYNDROME: ICD-10-CM

## 2024-08-05 DIAGNOSIS — R68.3 CLUBBING OF FINGERS: ICD-10-CM

## 2024-08-05 DIAGNOSIS — G89.29 CHRONIC BILATERAL LOW BACK PAIN, UNSPECIFIED WHETHER SCIATICA PRESENT: ICD-10-CM

## 2024-08-05 DIAGNOSIS — R63.4 UNINTENTIONAL WEIGHT LOSS: ICD-10-CM

## 2024-08-05 DIAGNOSIS — Z22.7 LATENT TUBERCULOSIS: ICD-10-CM

## 2024-08-05 DIAGNOSIS — M54.50 CHRONIC BILATERAL LOW BACK PAIN, UNSPECIFIED WHETHER SCIATICA PRESENT: ICD-10-CM

## 2024-08-05 DIAGNOSIS — Z87.39 H/O RHEUMATOID ARTHRITIS: ICD-10-CM

## 2024-08-05 DIAGNOSIS — I27.20 PULMONARY HYPERTENSION: ICD-10-CM

## 2024-08-05 DIAGNOSIS — D89.89 ANTISYNTHETASE SYNDROME: Primary | ICD-10-CM

## 2024-08-05 DIAGNOSIS — Z79.899 HIGH RISK MEDICATION USE: ICD-10-CM

## 2024-08-05 DIAGNOSIS — R76.8 POSITIVE ANTINUCLEAR ANTIBODY: ICD-10-CM

## 2024-08-05 DIAGNOSIS — Z87.891 HISTORY OF TOBACCO ABUSE: ICD-10-CM

## 2024-08-05 LAB
ALBUMIN SERPL-MCNC: 3.5 G/DL (ref 3.4–4.8)
ALBUMIN/GLOB SERPL: 0.9 RATIO (ref 1.1–2)
ALP SERPL-CCNC: 82 UNIT/L (ref 40–150)
ALT SERPL-CCNC: 17 UNIT/L (ref 0–55)
ANION GAP SERPL CALC-SCNC: 8 MEQ/L
AST SERPL-CCNC: 17 UNIT/L (ref 5–34)
BASOPHILS # BLD AUTO: 0.08 X10(3)/MCL
BASOPHILS NFR BLD AUTO: 1.1 %
BILIRUB SERPL-MCNC: 0.7 MG/DL
BUN SERPL-MCNC: 5.3 MG/DL (ref 8.4–25.7)
CALCIUM SERPL-MCNC: 9.6 MG/DL (ref 8.8–10)
CHLORIDE SERPL-SCNC: 108 MMOL/L (ref 98–107)
CK SERPL-CCNC: 128 U/L (ref 30–200)
CO2 SERPL-SCNC: 26 MMOL/L (ref 23–31)
CREAT SERPL-MCNC: 0.99 MG/DL (ref 0.73–1.18)
CREAT/UREA NIT SERPL: 5
CRP SERPL-MCNC: 17.1 MG/L
EOSINOPHIL # BLD AUTO: 0.16 X10(3)/MCL (ref 0–0.9)
EOSINOPHIL NFR BLD AUTO: 2.2 %
ERYTHROCYTE [DISTWIDTH] IN BLOOD BY AUTOMATED COUNT: 16.6 % (ref 11.5–17)
ERYTHROCYTE [SEDIMENTATION RATE] IN BLOOD: 2 MM/HR (ref 0–15)
GFR SERPLBLD CREATININE-BSD FMLA CKD-EPI: >60 ML/MIN/1.73/M2
GLOBULIN SER-MCNC: 3.9 GM/DL (ref 2.4–3.5)
GLUCOSE SERPL-MCNC: 86 MG/DL (ref 82–115)
HCT VFR BLD AUTO: 55.2 % (ref 42–52)
HGB BLD-MCNC: 18 G/DL (ref 14–18)
IMM GRANULOCYTES # BLD AUTO: 0.02 X10(3)/MCL (ref 0–0.04)
IMM GRANULOCYTES NFR BLD AUTO: 0.3 %
LYMPHOCYTES # BLD AUTO: 1.41 X10(3)/MCL (ref 0.6–4.6)
LYMPHOCYTES NFR BLD AUTO: 19.4 %
MCH RBC QN AUTO: 29.2 PG (ref 27–31)
MCHC RBC AUTO-ENTMCNC: 32.6 G/DL (ref 33–36)
MCV RBC AUTO: 89.6 FL (ref 80–94)
MONOCYTES # BLD AUTO: 0.55 X10(3)/MCL (ref 0.1–1.3)
MONOCYTES NFR BLD AUTO: 7.6 %
NEUTROPHILS # BLD AUTO: 5.04 X10(3)/MCL (ref 2.1–9.2)
NEUTROPHILS NFR BLD AUTO: 69.4 %
NRBC BLD AUTO-RTO: 0 %
PLATELET # BLD AUTO: 166 X10(3)/MCL (ref 130–400)
PMV BLD AUTO: 11.2 FL (ref 7.4–10.4)
POTASSIUM SERPL-SCNC: 3.6 MMOL/L (ref 3.5–5.1)
PROT SERPL-MCNC: 7.4 GM/DL (ref 5.8–7.6)
RBC # BLD AUTO: 6.16 X10(6)/MCL (ref 4.7–6.1)
SODIUM SERPL-SCNC: 142 MMOL/L (ref 136–145)
WBC # BLD AUTO: 7.26 X10(3)/MCL (ref 4.5–11.5)

## 2024-08-05 PROCEDURE — 99215 OFFICE O/P EST HI 40 MIN: CPT | Mod: S$PBB,,, | Performed by: INTERNAL MEDICINE

## 2024-08-05 PROCEDURE — 85652 RBC SED RATE AUTOMATED: CPT

## 2024-08-05 PROCEDURE — 3008F BODY MASS INDEX DOCD: CPT | Mod: CPTII,,, | Performed by: INTERNAL MEDICINE

## 2024-08-05 PROCEDURE — 36415 COLL VENOUS BLD VENIPUNCTURE: CPT

## 2024-08-05 PROCEDURE — 82085 ASSAY OF ALDOLASE: CPT

## 2024-08-05 PROCEDURE — 1159F MED LIST DOCD IN RCRD: CPT | Mod: CPTII,,, | Performed by: INTERNAL MEDICINE

## 2024-08-05 PROCEDURE — 85025 COMPLETE CBC W/AUTO DIFF WBC: CPT

## 2024-08-05 PROCEDURE — 82550 ASSAY OF CK (CPK): CPT

## 2024-08-05 PROCEDURE — G2211 COMPLEX E/M VISIT ADD ON: HCPCS | Mod: S$PBB,,, | Performed by: INTERNAL MEDICINE

## 2024-08-05 PROCEDURE — 3074F SYST BP LT 130 MM HG: CPT | Mod: CPTII,,, | Performed by: INTERNAL MEDICINE

## 2024-08-05 PROCEDURE — 3078F DIAST BP <80 MM HG: CPT | Mod: CPTII,,, | Performed by: INTERNAL MEDICINE

## 2024-08-05 PROCEDURE — 4010F ACE/ARB THERAPY RXD/TAKEN: CPT | Mod: CPTII,,, | Performed by: INTERNAL MEDICINE

## 2024-08-05 PROCEDURE — 1125F AMNT PAIN NOTED PAIN PRSNT: CPT | Mod: CPTII,,, | Performed by: INTERNAL MEDICINE

## 2024-08-05 PROCEDURE — 1101F PT FALLS ASSESS-DOCD LE1/YR: CPT | Mod: CPTII,,, | Performed by: INTERNAL MEDICINE

## 2024-08-05 PROCEDURE — 99214 OFFICE O/P EST MOD 30 MIN: CPT | Mod: PBBFAC | Performed by: INTERNAL MEDICINE

## 2024-08-05 PROCEDURE — 86140 C-REACTIVE PROTEIN: CPT

## 2024-08-05 PROCEDURE — 80053 COMPREHEN METABOLIC PANEL: CPT

## 2024-08-05 PROCEDURE — 3288F FALL RISK ASSESSMENT DOCD: CPT | Mod: CPTII,,, | Performed by: INTERNAL MEDICINE

## 2024-08-05 RX ORDER — HYDROXYCHLOROQUINE SULFATE 200 MG/1
200 TABLET, FILM COATED ORAL DAILY
Qty: 90 TABLET | Refills: 3 | Status: SHIPPED | OUTPATIENT
Start: 2024-08-05

## 2024-08-05 RX ORDER — MELOXICAM 7.5 MG/1
7.5 TABLET ORAL 2 TIMES DAILY
Qty: 60 TABLET | Refills: 6 | Status: SHIPPED | OUTPATIENT
Start: 2024-08-05

## 2024-08-05 RX ORDER — NINTEDANIB 150 MG/1
1 CAPSULE ORAL 2 TIMES DAILY
Qty: 60 CAPSULE | Refills: 6 | Status: SHIPPED | OUTPATIENT
Start: 2024-08-05

## 2024-08-07 LAB — ALDOLASE SERPL-CCNC: 6.4 U/L

## 2024-08-12 ENCOUNTER — HOSPITAL ENCOUNTER (OUTPATIENT)
Dept: RADIOLOGY | Facility: HOSPITAL | Age: 69
Discharge: HOME | End: 2024-08-12
Attending: STUDENT IN AN ORGANIZED HEALTH CARE EDUCATION/TRAINING PROGRAM
Payer: MEDICARE

## 2024-08-12 ENCOUNTER — HOSPITAL ENCOUNTER (OUTPATIENT)
Dept: RADIOLOGY | Facility: HOSPITAL | Age: 69
Discharge: HOME | End: 2024-08-12
Payer: MEDICARE

## 2024-08-12 DIAGNOSIS — Z13.6 ENCOUNTER FOR SCREENING FOR ABDOMINAL AORTIC ANEURYSM (AAA) IN PATIENT 50 YEARS OF AGE OR OLDER WITHOUT OTHER RISK FACTORS FOR AAA: ICD-10-CM

## 2024-08-12 DIAGNOSIS — Z79.52 LONG TERM (CURRENT) USE OF SYSTEMIC STEROIDS: ICD-10-CM

## 2024-08-12 DIAGNOSIS — Z87.891 HISTORY OF TOBACCO ABUSE: ICD-10-CM

## 2024-08-12 PROCEDURE — 77080 DXA BONE DENSITY AXIAL: CPT | Mod: TC

## 2024-08-12 PROCEDURE — 76706 US ABDL AORTA SCREEN AAA: CPT | Mod: TC

## 2024-10-03 ENCOUNTER — TELEPHONE (OUTPATIENT)
Dept: INTERNAL MEDICINE | Facility: CLINIC | Age: 69
End: 2024-10-03
Payer: MEDICARE

## 2024-10-10 ENCOUNTER — TELEPHONE (OUTPATIENT)
Dept: HEMATOLOGY/ONCOLOGY | Facility: CLINIC | Age: 69
End: 2024-10-10
Payer: MEDICARE

## 2024-10-11 ENCOUNTER — OFFICE VISIT (OUTPATIENT)
Dept: HEMATOLOGY/ONCOLOGY | Facility: CLINIC | Age: 69
End: 2024-10-11
Attending: INTERNAL MEDICINE
Payer: MEDICARE

## 2024-10-11 VITALS
RESPIRATION RATE: 20 BRPM | TEMPERATURE: 98 F | HEART RATE: 91 BPM | BODY MASS INDEX: 19.99 KG/M2 | DIASTOLIC BLOOD PRESSURE: 84 MMHG | HEIGHT: 67 IN | SYSTOLIC BLOOD PRESSURE: 146 MMHG | OXYGEN SATURATION: 90 % | WEIGHT: 127.38 LBS

## 2024-10-11 DIAGNOSIS — J96.11 CHRONIC HYPOXIC RESPIRATORY FAILURE: ICD-10-CM

## 2024-10-11 DIAGNOSIS — M06.00 SERONEGATIVE RHEUMATOID ARTHRITIS: ICD-10-CM

## 2024-10-11 DIAGNOSIS — D89.89 ANTISYNTHETASE SYNDROME: ICD-10-CM

## 2024-10-11 DIAGNOSIS — G47.34 NOCTURNAL OXYGEN DESATURATION: ICD-10-CM

## 2024-10-11 DIAGNOSIS — R76.0 ANTINUCLEAR ANTIBODY (ANA) TITER GREATER THAN 1:80: ICD-10-CM

## 2024-10-11 DIAGNOSIS — D75.1 SECONDARY ERYTHROCYTOSIS: Primary | ICD-10-CM

## 2024-10-11 DIAGNOSIS — D75.1 POLYCYTHEMIA: ICD-10-CM

## 2024-10-11 DIAGNOSIS — D75.1 POLYCYTHEMIA: Primary | ICD-10-CM

## 2024-10-11 LAB
ALBUMIN SERPL-MCNC: 3.7 G/DL (ref 3.4–4.8)
ALBUMIN/GLOB SERPL: 0.9 RATIO (ref 1.1–2)
ALP SERPL-CCNC: 88 UNIT/L (ref 40–150)
ALT SERPL-CCNC: 15 UNIT/L (ref 0–55)
ANION GAP SERPL CALC-SCNC: 9 MEQ/L
AST SERPL-CCNC: 21 UNIT/L (ref 5–34)
BASOPHILS # BLD AUTO: 0.1 X10(3)/MCL
BASOPHILS NFR BLD AUTO: 0.9 %
BILIRUB SERPL-MCNC: 0.5 MG/DL
BUN SERPL-MCNC: 10.4 MG/DL (ref 8.4–25.7)
CALCIUM SERPL-MCNC: 9.5 MG/DL (ref 8.8–10)
CHLORIDE SERPL-SCNC: 108 MMOL/L (ref 98–107)
CO2 SERPL-SCNC: 23 MMOL/L (ref 23–31)
CREAT SERPL-MCNC: 0.96 MG/DL (ref 0.73–1.18)
CREAT/UREA NIT SERPL: 11
EOSINOPHIL # BLD AUTO: 0.18 X10(3)/MCL (ref 0–0.9)
EOSINOPHIL NFR BLD AUTO: 1.6 %
ERYTHROCYTE [DISTWIDTH] IN BLOOD BY AUTOMATED COUNT: 15.1 % (ref 11.5–17)
GFR SERPLBLD CREATININE-BSD FMLA CKD-EPI: >60 ML/MIN/1.73/M2
GLOBULIN SER-MCNC: 4.2 GM/DL (ref 2.4–3.5)
GLUCOSE SERPL-MCNC: 107 MG/DL (ref 82–115)
HCT VFR BLD AUTO: 58.6 % (ref 42–52)
HGB BLD-MCNC: 18.8 G/DL (ref 14–18)
IMM GRANULOCYTES # BLD AUTO: 0.04 X10(3)/MCL (ref 0–0.04)
IMM GRANULOCYTES NFR BLD AUTO: 0.4 %
LYMPHOCYTES # BLD AUTO: 1.41 X10(3)/MCL (ref 0.6–4.6)
LYMPHOCYTES NFR BLD AUTO: 12.8 %
MCH RBC QN AUTO: 28.7 PG (ref 27–31)
MCHC RBC AUTO-ENTMCNC: 32.1 G/DL (ref 33–36)
MCV RBC AUTO: 89.6 FL (ref 80–94)
MONOCYTES # BLD AUTO: 0.66 X10(3)/MCL (ref 0.1–1.3)
MONOCYTES NFR BLD AUTO: 6 %
NEUTROPHILS # BLD AUTO: 8.65 X10(3)/MCL (ref 2.1–9.2)
NEUTROPHILS NFR BLD AUTO: 78.3 %
NRBC BLD AUTO-RTO: 0 %
PLATELET # BLD AUTO: 184 X10(3)/MCL (ref 130–400)
PMV BLD AUTO: 10.5 FL (ref 7.4–10.4)
POTASSIUM SERPL-SCNC: 3.7 MMOL/L (ref 3.5–5.1)
PROT SERPL-MCNC: 7.9 GM/DL (ref 5.8–7.6)
RBC # BLD AUTO: 6.54 X10(6)/MCL (ref 4.7–6.1)
SODIUM SERPL-SCNC: 140 MMOL/L (ref 136–145)
WBC # BLD AUTO: 11.04 X10(3)/MCL (ref 4.5–11.5)

## 2024-10-11 PROCEDURE — 80053 COMPREHEN METABOLIC PANEL: CPT | Performed by: INTERNAL MEDICINE

## 2024-10-11 PROCEDURE — 99214 OFFICE O/P EST MOD 30 MIN: CPT | Mod: PBBFAC | Performed by: INTERNAL MEDICINE

## 2024-10-11 PROCEDURE — 36415 COLL VENOUS BLD VENIPUNCTURE: CPT | Performed by: INTERNAL MEDICINE

## 2024-10-11 PROCEDURE — 85025 COMPLETE CBC W/AUTO DIFF WBC: CPT | Performed by: INTERNAL MEDICINE

## 2024-10-11 NOTE — Clinical Note
Check JAK2 mutation, erythropoietin level  Check CBC, CMP  Check pulse oximetry on room air and after physical activity Follow-up in 3 weeks

## 2024-10-11 NOTE — PROGRESS NOTES
History:  Past Medical History:   Diagnosis Date    SAM positive     Antisynthetase syndrome     ILD (interstitial lung disease)     Pulmonary embolism     Seronegative rheumatoid arthritis     TB lung, latent     Tobacco use    History reviewed. No pertinent surgical history.   Social History     Socioeconomic History    Marital status:    Tobacco Use    Smoking status: Former     Current packs/day: 0.00     Types: Cigarettes     Quit date: 2023     Years since quittin.7    Smokeless tobacco: Never   Substance and Sexual Activity    Alcohol use: Not Currently    Drug use: Never    Sexual activity: Not Currently     Partners: Female     Social Drivers of Health     Financial Resource Strain: Medium Risk (2024)    Overall Financial Resource Strain (CARDIA)     Difficulty of Paying Living Expenses: Somewhat hard   Food Insecurity: No Food Insecurity (2024)    Hunger Vital Sign     Worried About Running Out of Food in the Last Year: Never true     Ran Out of Food in the Last Year: Never true   Transportation Needs: No Transportation Needs (2024)    TRANSPORTATION NEEDS     Transportation : No   Physical Activity: Inactive (2024)    Exercise Vital Sign     Days of Exercise per Week: 0 days     Minutes of Exercise per Session: 0 min   Stress: Stress Concern Present (2024)    [employer] [place] [employer] - Occupational Stress Questionnaire     Feeling of Stress : To some extent   Housing Stability: Low Risk  (2024)    Housing Stability Vital Sign     Unable to Pay for Housing in the Last Year: No     Homeless in the Last Year: No      Family History   Problem Relation Name Age of Onset    COPD Mother      COPD Father       Reason for Follow-up:  Erythrocytosis   Chronic hypoxic respiratory failure, on nocturnal oxygen  Interstitial lung disease, anti synthetase syndrome, seronegative rheumatoid arthritis, tobacco abuse, advanced emphysema     History of Present  Illness:   Polycythemia        Oncologic/Hematologic History:  Oncology History    No history exists.   Past medical history: SAM positive; anti synthetase syndrome; interstitial lung disease; pulmonary embolism; seronegative rheumatoid arthritis; latent pulmonary tuberculosis; tobacco abuse; dependent upon nocturnal oxygen since the beginning of 2024.    Social history: Single.  Has 4 children.  Lives in [place].  Lives with a girlfriend.  Smoked 2 packs daily for 40 years; quit January 2024.  Used to drink 3 X half pt of wine daily; drank for 40 years; quit 20 years ago; smoked marijuana daily for 30 years, quit 5 years ago.  Dependent on nocturnal oxygen since the beginning of 2024.    Family history: Father experienced some kind of cancer at age 73; used to smoke.  Mother experienced some kind of cancer at age 77.  Maternal uncle experienced some kind of cancer at age 80.    Health maintenance: PCP at [facility].  ===========================================================================================      68-year-old gentleman, referred by Dr. Dixie Caraballo, Rheumatology, for evaluation of polycythemia.        09/27/2023: Rheumatology note:  Antisynthetase syndrome.    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   Low titer PL-7, ILD and deformities of wrists d/t inflammatory arthritis. Diagnosed in 4/2021.   He was treated with Actemra and OFEV in the past.  CPK was normal in the past.  He does not like taking injection and stopped taking it.   He started taking OFEV 150 mg BID. During last visit he said he was not taking Plaquenil but today he verbalized that he restarted Plaquenil.  He also restarted taking Xeljanz.   C/o back pain and asking refills on percocet, advised to talk to his PCP. Chronic DJD and osteoarthritis, advised to follow up with PCP.  Denies bowel/bladder incontinence or saddle anesthesia. He could not afford to pay for pain management.  He stopped working  "because of chronic SOB and back pain.   He stopped taking Actemra"I am done with it and do not want to take it no more".   C/O SOB and cough, SOB is stable. He is supposed to have oxygen continuously, but he only using at home, 3 L and he is not using oxygen outside of house(reservoir battery not lasting longer than 5 min). Follows with pulm.   Denies history of fevers, rashes, photosensitivity, oral or nasal ulcers, h/o MI, stroke, seizures, h/o PE or DVT, Raynaud's phenomenon, uveitis, malignancies.   Family history of autoimmune disease: none.   Smokin pack years. Quit smoking in 2022.     Investigations reviewed:   -2024: CT chest high-resolution without contrast (comparison: CT chest 2023): No significant change from previous exam, 2023.  Advanced pulmonary emphysema most pronounced at the lung apices.  TTE 04/10/2023:  LVEF 60%; normal RV size, normal RV systolic function, mild-to-moderate pulmonary hypertension, etc.  CT chest 2023:  Advanced pulmonary emphysema versus cystic lung disease, etc.  CT chest 2023:  Extensive mediastinal and bilateral hilar lymphadenopathy without significant interval enlargement; extensive emphysematous changes in lungs with cystic formations and ground-glass attenuation due to small airway disease, etc.   CT chest 2022:  Advanced emphysema with mild upper lobe predominance, etc.   CT chest 2022: Advanced emphysema, etc.   HR CT 2021:  Prominent mediastinal and hilar lymphadenopathy, severe emphysema with subpleural scarring, etc.  PFTs 10/07/2022:  Severe diffusion defect, etc.     Labs reviewed:   -Chronic erythrocytosis:  Starting 2021:  H/H:  19.3/62.9 on 2023; 18.0/57.7 on 2023; 15.9/52.2 on 04/10/2023; 18.6/59.2 on 2023; 18.0/59.3 on 10/30/2022; 16.2/50.9 on 2021; 15.6/48.2 on 2021; 17.6/57.3 on 2021; 14.9/48.0 on 2018  -WBC, differential, platelets normal     10/11/2024: "   [place] gentleman who presents for initial hematology consultation.  In no acute discomfort.    Chronically short of breath.  Gets severely short of breath with physical activity.  Has been using nocturnal oxygen at 3 liters/minutes since the beginning of 2024.  No chest pain, hemoptysis, recurrent bouts of pneumonia or bronchitis, unusual headaches, focal neurological symptoms, any lumps or lymphadenopathy, fevers or chills, anorexia, unintentional weight loss, abdominal pain, nausea, vomiting, GI bleeding, any urinary problems, etc..  He is essentially referred to us for evaluation of erythrocytosis risk, almost certainly, is secondary to chronic hypoxia    Interval History:  [No matching plan found]   [No matching plan found]       Medications:  Current Outpatient Medications on File Prior to Visit   Medication Sig Dispense Refill    albuterol (PROVENTIL/VENTOLIN HFA) 90 mcg/actuation inhaler Inhale 1-2 puffs into the lungs every 6 (six) hours as needed for Wheezing. Rescue 18 g 6    budesonide-formoterol 80-4.5 mcg (SYMBICORT) 80-4.5 mcg/actuation HFAA Inhale 2 puffs into the lungs 2 (two) times a day. 10.2 g 5    hydroxychloroquine (PLAQUENIL) 200 mg tablet Take 1 tablet (200 mg total) by mouth once daily. 90 tablet 3    meloxicam (MOBIC) 7.5 MG tablet Take 1 tablet (7.5 mg total) by mouth 2 (two) times daily. 60 tablet 6    nintedanib (OFEV) 150 mg Cap Take 1 capsule (150 mg total) by mouth 2 (two) times daily. 60 capsule 6    tadalafiL (CIALIS) 5 MG tablet Take 1 tablet (5 mg total) by mouth daily as needed for Erectile Dysfunction. 30 tablet 3    albuterol-ipratropium (DUO-NEB) 2.5 mg-0.5 mg/3 mL nebulizer solution Take 3 mLs by nebulization every 6 (six) hours as needed for Wheezing. Rescue (Patient not taking: Reported on 10/11/2024) 75 mL 5    baclofen (LIORESAL) 10 MG tablet Take 10 mg by mouth 2 (two) times daily. (Patient not taking: Reported on 10/11/2024)      lisinopriL (PRINIVIL,ZESTRIL) 5 MG  tablet Take 1 tablet (5 mg total) by mouth once daily. (Patient not taking: Reported on 10/11/2024) 90 tablet 3    omeprazole (PRILOSEC) 20 MG capsule Take 1 capsule (20 mg total) by mouth once daily. (Patient not taking: Reported on 1/29/2024) 30 capsule 0     No current facility-administered medications on file prior to visit.       Review of Systems:   All systems reviewed and found to be negative except for the symptoms detailed above    Physical Examination:   VITAL SIGNS:   Vitals:    10/11/24 0951   BP: (!) 146/84   Pulse: 91   Resp: 20   Temp: 97.7 °F (36.5 °C)     GENERAL:  In no apparent distress.    HEAD:  No signs of head trauma.  EYES:  Pupils are equal.  Extraocular motions intact.    EARS:  Hearing grossly intact.  MOUTH:  Oropharynx is normal.   NECK:  No adenopathy, no JVD.     CHEST:  Chest with clear breath sounds bilaterally.  No wheezes, rales, rhonchi.    CARDIAC:  Regular rate and rhythm.  S1 and S2, without murmurs, gallops, rubs.  VASCULAR:  No Edema.  Peripheral pulses normal and equal in all extremities.  ABDOMEN:  Soft, without detectable tenderness.  No sign of distention.  No   rebound or guarding, and no masses palpated.   Bowel Sounds normal.  MUSCULOSKELETAL:  Good range of motion of all major joints. Extremities without clubbing, cyanosis or edema.    NEUROLOGIC EXAM:  Alert and oriented x 3.  No focal sensory or strength deficits.   Speech normal.  Follows commands.  PSYCHIATRIC:  Mood normal.  10/11/2024: In no acute discomfort.  Room air pulse oximetry:  At rest, 90%; after physical activity, down to 88%      Results for orders placed or performed in visit on 08/05/24   CBC Auto Differential    Narrative    The following orders were created for panel order CBC Auto Differential.  Procedure                               Abnormality         Status                     ---------                               -----------         ------                     CBC with  Differential[[id number]]       Abnormal            Final result                 Please view results for these tests on the individual orders.   CBC with Differential   Result Value Ref Range    WBC 7.26 4.50 - 11.50 x10(3)/mcL    RBC 6.16 (H) 4.70 - 6.10 x10(6)/mcL    Hgb 18.0 14.0 - 18.0 g/dL    Hct 55.2 (H) 42.0 - 52.0 %    MCV 89.6 80.0 - 94.0 fL    MCH 29.2 27.0 - 31.0 pg    MCHC 32.6 (L) 33.0 - 36.0 g/dL    RDW 16.6 11.5 - 17.0 %    Platelet 166 130 - 400 x10(3)/mcL    MPV 11.2 (H) 7.4 - 10.4 fL    Neut % 69.4 %    Lymph % 19.4 %    Mono % 7.6 %    Eos % 2.2 %    Basophil % 1.1 %    Lymph # 1.41 0.6 - 4.6 x10(3)/mcL    Neut # 5.04 2.1 - 9.2 x10(3)/mcL    Mono # 0.55 0.1 - 1.3 x10(3)/mcL    Eos # 0.16 0 - 0.9 x10(3)/mcL    Baso # 0.08 <=0.2 x10(3)/mcL    IG# 0.02 0 - 0.04 x10(3)/mcL    IG% 0.3 %    NRBC% 0.0 %     Results for orders placed or performed in visit on 08/05/24   Comprehensive Metabolic Panel   Result Value Ref Range    Sodium 142 136 - 145 mmol/L    Potassium 3.6 3.5 - 5.1 mmol/L    Chloride 108 (H) 98 - 107 mmol/L    CO2 26 23 - 31 mmol/L    Glucose 86 82 - 115 mg/dL    Blood Urea Nitrogen 5.3 (L) 8.4 - 25.7 mg/dL    Creatinine 0.99 0.73 - 1.18 mg/dL    Calcium 9.6 8.8 - 10.0 mg/dL    Protein Total 7.4 5.8 - 7.6 gm/dL    Albumin 3.5 3.4 - 4.8 g/dL    Globulin 3.9 (H) 2.4 - 3.5 gm/dL    Albumin/Globulin Ratio 0.9 (L) 1.1 - 2.0 ratio    Bilirubin Total 0.7 <=1.5 mg/dL    ALP 82 40 - 150 unit/L    ALT 17 0 - 55 unit/L    AST 17 5 - 34 unit/L    eGFR >60 mL/min/1.73/m2    Anion Gap 8.0 mEq/L    BUN/Creatinine Ratio 5        Assessment:  Problem List Items Addressed This Visit          Pulmonary    Chronic hypoxic respiratory failure       Immunology/Multi System    Antisynthetase syndrome    Overview                    Seronegative rheumatoid arthritis    Overview                Antinuclear antibody (SAM) titer greater than 1:80       Oncology    Secondary erythrocytosis - Primary       Other     Nocturnal oxygen desaturation     Other Visit Diagnoses       Polycythemia                Orders for 10/11/2024:    Check JAK2 mutation, erythropoietin level   Check CBC, CMP   Check pulse oximetry on room air and after physical activity  Follow-up in 3 weeks     Above discussed with the patient.  All questions answered.  Discussed labs and scans and plan of management   He understands and agrees with this plan.  ===================================      Chronic erythrocytosis:  -Chronic erythrocytosis:  Starting 03/2021:    H/H:    19.3/62.9 on 09/27/2023; 18.0/57.7 on 05/07/2023;   15.9/52.2 on 04/10/2023; 18.6/59.2 on 04/08/2023;   18.0/59.3 on 10/30/2022; 16.2/50.9 on 11/25/2021;   15.6/48.2 on 11/23/2021; 17.6/57.3 on 06/11/2021;   14.9/48.0 on 03/24/2018  -WBC, differential, platelets normal   -10/11/2024: In no acute discomfort.  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        Plan:   -almost certainly, patient has secondary erythrocytosis due to chronic hypoxic failure  -we will check JAK2 mutation, serum erythropoietin  level  -check CBC and CMP   -check room air pulse oximetry at rest and after physical activity  -continue nocturnal oxygen as prescribed by his PCP/pulmonary    Follow-up in 3 weeks   Above discussed with the patient.  All questions answered.  Discussed labs and scans and plan of management in detail.  He understands and agrees with this plan.  =================================       Discussion:  Secondary polycythemia:  # Management of secondary polycythemia is generally directed at ameliorating the underlying cause and contributing factors, alleviating symptoms, and/or reducing the risk of thrombosis  -in this case, optimal oxygenation for chronic hypoxic respiratory failure     # symptom relief:    -Therapeutic phlebotomy is indicated only if it ameliorates symptoms of hyperviscosity for example, fatigue, headache, dizziness, mental slowing, visual changes, paresthesias, atypical chest pain, dyspnea, palpitations), therapeutic phlebotomy is indicated only if this ameliorates symptoms.    -target hematocrit is based on the threshold that provides symptom relief; cautious phlebotomy (eg, removal of 250 mL blood, replaced by an equal volume of crystalloid) to modestly reduce the Hb may relieve these symptoms. However, reduction of Hct to <55 percent range is likely to exacerbate dyspnea or other hypoxic symptoms.   -Cytoreductive agents are not indicated     #Thrombosis prophylaxis:   -unlike in polycythemia vera, in secondary polycythemia, prophylactic phlebotomy or cytoreduction does not reduce the risk of thrombosis  -Low-dose aspirin can be offered to patients with cardiovascular risk factors, twice-daily low-dose aspirin for patients with a history of arterial thrombosis, and systemic anticoagulation for those with a history of venous thromboembolism.        Follow-up:  No follow-ups on file.

## 2024-10-14 RX ORDER — LIDOCAINE HYDROCHLORIDE 10 MG/ML
1 INJECTION, SOLUTION EPIDURAL; INFILTRATION; INTRACAUDAL; PERINEURAL ONCE
Status: CANCELLED | OUTPATIENT
Start: 2024-10-15 | End: 2024-10-15

## 2024-10-15 ENCOUNTER — INFUSION (OUTPATIENT)
Dept: INFUSION THERAPY | Facility: HOSPITAL | Age: 69
End: 2024-10-15
Attending: INTERNAL MEDICINE
Payer: MEDICARE

## 2024-10-15 VITALS
TEMPERATURE: 97 F | OXYGEN SATURATION: 95 % | WEIGHT: 129.19 LBS | SYSTOLIC BLOOD PRESSURE: 138 MMHG | DIASTOLIC BLOOD PRESSURE: 95 MMHG | HEART RATE: 70 BPM | BODY MASS INDEX: 20.28 KG/M2 | HEIGHT: 67 IN | RESPIRATION RATE: 20 BRPM

## 2024-10-15 DIAGNOSIS — D75.1 SECONDARY ERYTHROCYTOSIS: Primary | ICD-10-CM

## 2024-10-15 PROCEDURE — 99195 PHLEBOTOMY: CPT

## 2024-10-15 RX ORDER — LIDOCAINE HYDROCHLORIDE 10 MG/ML
1 INJECTION, SOLUTION EPIDURAL; INFILTRATION; INTRACAUDAL; PERINEURAL ONCE
Status: DISCONTINUED | OUTPATIENT
Start: 2024-10-15 | End: 2024-10-15 | Stop reason: HOSPADM

## 2024-10-15 RX ORDER — LIDOCAINE HYDROCHLORIDE 10 MG/ML
1 INJECTION, SOLUTION EPIDURAL; INFILTRATION; INTRACAUDAL; PERINEURAL ONCE
OUTPATIENT
Start: 2024-10-22 | End: 2024-10-22

## 2024-10-15 NOTE — NURSING
"Pt ambulated to [facility], no complaints O2 sat 83% on room air, O2 Sat increased to 95% on oxygen at 2L per nasal cannula; pt stated he uses his home O2 at night and does not use it during the day because he does "not want to be dependent on it"; explained need to keep his oxygen saturation in the 90s, but he continued to state and said he told his doctor he only uses his oxygen at night.    Therapeutic phlebotomy performed in left arm with 2 needle sticks, 45 ml removed and 295 ml for total of 249 ml, which took approximately 50 minutes.  Pt drank three 8 oz bottles of water during phlebotomy.    AVS given, pt returns for repeat lab work on 10/18 and sees Dr Mccullough again on 11/10/24.  "

## 2024-10-18 ENCOUNTER — LAB VISIT (OUTPATIENT)
Dept: HEMATOLOGY/ONCOLOGY | Facility: CLINIC | Age: 69
End: 2024-10-18
Payer: MEDICARE

## 2024-10-18 DIAGNOSIS — D75.1 POLYCYTHEMIA: Primary | ICD-10-CM

## 2024-10-18 LAB
ALBUMIN SERPL-MCNC: 3.4 G/DL (ref 3.4–4.8)
ALBUMIN/GLOB SERPL: 0.9 RATIO (ref 1.1–2)
ALP SERPL-CCNC: 87 UNIT/L (ref 40–150)
ALT SERPL-CCNC: 19 UNIT/L (ref 0–55)
ANION GAP SERPL CALC-SCNC: 7 MEQ/L
AST SERPL-CCNC: 27 UNIT/L (ref 5–34)
BASOPHILS # BLD AUTO: 0.09 X10(3)/MCL
BASOPHILS NFR BLD AUTO: 1 %
BILIRUB SERPL-MCNC: 0.4 MG/DL
BUN SERPL-MCNC: 11.2 MG/DL (ref 8.4–25.7)
CALCIUM SERPL-MCNC: 9.1 MG/DL (ref 8.8–10)
CHLORIDE SERPL-SCNC: 108 MMOL/L (ref 98–107)
CO2 SERPL-SCNC: 28 MMOL/L (ref 23–31)
CREAT SERPL-MCNC: 0.88 MG/DL (ref 0.72–1.25)
CREAT/UREA NIT SERPL: 13
EOSINOPHIL # BLD AUTO: 0.28 X10(3)/MCL (ref 0–0.9)
EOSINOPHIL NFR BLD AUTO: 3.2 %
ERYTHROCYTE [DISTWIDTH] IN BLOOD BY AUTOMATED COUNT: 15 % (ref 11.5–17)
GFR SERPLBLD CREATININE-BSD FMLA CKD-EPI: >60 ML/MIN/1.73/M2
GLOBULIN SER-MCNC: 3.7 GM/DL (ref 2.4–3.5)
GLUCOSE SERPL-MCNC: 90 MG/DL (ref 82–115)
HCT VFR BLD AUTO: 55.2 % (ref 42–52)
HGB BLD-MCNC: 17.7 G/DL (ref 14–18)
IMM GRANULOCYTES # BLD AUTO: 0.03 X10(3)/MCL (ref 0–0.04)
IMM GRANULOCYTES NFR BLD AUTO: 0.3 %
LYMPHOCYTES # BLD AUTO: 1.34 X10(3)/MCL (ref 0.6–4.6)
LYMPHOCYTES NFR BLD AUTO: 15.5 %
MAGNESIUM SERPL-MCNC: 2.2 MG/DL (ref 1.6–2.6)
MCH RBC QN AUTO: 28.9 PG (ref 27–31)
MCHC RBC AUTO-ENTMCNC: 32.1 G/DL (ref 33–36)
MCV RBC AUTO: 90.2 FL (ref 80–94)
MONOCYTES # BLD AUTO: 0.6 X10(3)/MCL (ref 0.1–1.3)
MONOCYTES NFR BLD AUTO: 7 %
NEUTROPHILS # BLD AUTO: 6.29 X10(3)/MCL (ref 2.1–9.2)
NEUTROPHILS NFR BLD AUTO: 73 %
NRBC BLD AUTO-RTO: 0 %
PLATELET # BLD AUTO: 174 X10(3)/MCL (ref 130–400)
PMV BLD AUTO: 11.1 FL (ref 7.4–10.4)
POTASSIUM SERPL-SCNC: 4 MMOL/L (ref 3.5–5.1)
PROT SERPL-MCNC: 7.1 GM/DL (ref 5.8–7.6)
RBC # BLD AUTO: 6.12 X10(6)/MCL (ref 4.7–6.1)
SODIUM SERPL-SCNC: 143 MMOL/L (ref 136–145)
WBC # BLD AUTO: 8.63 X10(3)/MCL (ref 4.5–11.5)

## 2024-10-18 PROCEDURE — 80053 COMPREHEN METABOLIC PANEL: CPT

## 2024-10-18 PROCEDURE — 36415 COLL VENOUS BLD VENIPUNCTURE: CPT

## 2024-10-18 PROCEDURE — 85025 COMPLETE CBC W/AUTO DIFF WBC: CPT

## 2024-10-18 PROCEDURE — 83735 ASSAY OF MAGNESIUM: CPT

## 2024-10-28 ENCOUNTER — LAB VISIT (OUTPATIENT)
Dept: HEMATOLOGY/ONCOLOGY | Facility: CLINIC | Age: 69
End: 2024-10-28
Payer: MEDICARE

## 2024-10-28 DIAGNOSIS — D75.1 POLYCYTHEMIA: ICD-10-CM

## 2024-10-28 PROCEDURE — 36415 COLL VENOUS BLD VENIPUNCTURE: CPT

## 2024-11-08 ENCOUNTER — TELEPHONE (OUTPATIENT)
Dept: HEMATOLOGY/ONCOLOGY | Facility: CLINIC | Age: 69
End: 2024-11-08
Payer: MEDICARE

## 2024-11-10 NOTE — PROGRESS NOTES
History:  Past Medical History:   Diagnosis Date    SAM positive     Antisynthetase syndrome     ILD (interstitial lung disease)     Pulmonary embolism     Seronegative rheumatoid arthritis     TB lung, latent     Tobacco use    History reviewed. No pertinent surgical history.   Social History     Socioeconomic History    Marital status:    Tobacco Use    Smoking status: Former     Current packs/day: 0.00     Types: Cigarettes     Quit date: 2023     Years since quittin.8    Smokeless tobacco: Never   Substance and Sexual Activity    Alcohol use: Not Currently    Drug use: Never    Sexual activity: Not Currently     Partners: Female     Social Drivers of Health     Financial Resource Strain: Medium Risk (2024)    Overall Financial Resource Strain (CARDIA)     Difficulty of Paying Living Expenses: Somewhat hard   Food Insecurity: No Food Insecurity (2024)    Hunger Vital Sign     Worried About Running Out of Food in the Last Year: Never true     Ran Out of Food in the Last Year: Never true   Transportation Needs: No Transportation Needs (2024)    TRANSPORTATION NEEDS     Transportation : No   Physical Activity: Inactive (2024)    Exercise Vital Sign     Days of Exercise per Week: 0 days     Minutes of Exercise per Session: 0 min   Stress: Stress Concern Present (2024)    [employer] - Occupational Stress Questionnaire     Feeling of Stress : To some extent   Housing Stability: Low Risk  (2024)    Housing Stability Vital Sign     Unable to Pay for Housing in the Last Year: No     Homeless in the Last Year: No      Family History   Problem Relation Name Age of Onset    COPD Mother      COPD Father       Reason for Follow-up:  Secondary erythrocytosis  Chronic hypoxic respiratory failure, on nocturnal oxygen  Interstitial lung disease, anti synthetase syndrome, seronegative rheumatoid arthritis, tobacco abuse, advanced emphysema     History of  Present Illness:   Secondary erythrocytosis        Oncologic/Hematologic History:  Oncology History    No history exists.   Past medical history: SAM positive; anti synthetase syndrome; interstitial lung disease; pulmonary embolism; seronegative rheumatoid arthritis; latent pulmonary tuberculosis; tobacco abuse; dependent upon nocturnal oxygen since the beginning of 2024.  Social history: Single.  Has 4 children.  Lives in [place].  Lives with a girlfriend.  Smoked 2 packs daily for 40 years; quit January 2024.  Used to drink 3 X half pt of wine daily; drank for 40 years; quit 20 years ago; smoked marijuana daily for 30 years, quit 5 years ago.  Dependent on nocturnal oxygen since the beginning of 2024.  Family history: Father experienced some kind of cancer at age 73; used to smoke.  Mother experienced some kind of cancer at age 77.  Maternal uncle experienced some kind of cancer at age 80.  Health maintenance: PCP at [facility].  ===========================================================================      68-year-old gentleman, referred by Dr. Dixie Caraballo, Rheumatology, for evaluation of polycythemia.        09/27/2023: Rheumatology note:  Antisynthetase syndrome.    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   Low titer PL-7, ILD and deformities of wrists d/t inflammatory arthritis. Diagnosed in 4/2021.   He was treated with Actemra and OFEV in the past.  CPK was normal in the past.  He does not like taking injection and stopped taking it.   He started taking OFEV 150 mg BID. During last visit he said he was not taking Plaquenil but today he verbalized that he restarted Plaquenil.  He also restarted taking Xeljanz.   C/o back pain and asking refills on percocet, advised to talk to his PCP. Chronic DJD and osteoarthritis, advised to follow up with PCP.  Denies bowel/bladder incontinence or saddle anesthesia. He could not afford to pay for pain management.  He stopped working  "because of chronic SOB and back pain.   He stopped taking Actemra"I am done with it and do not want to take it no more".   C/O SOB and cough, SOB is stable. He is supposed to have oxygen continuously, but he only using at home, 3 L and he is not using oxygen outside of house(reservoir battery not lasting longer than 5 min). Follows with pulm.   Denies history of fevers, rashes, photosensitivity, oral or nasal ulcers, h/o MI, stroke, seizures, h/o PE or DVT, Raynaud's phenomenon, uveitis, malignancies.   Family history of autoimmune disease: none.   Smokin pack years. Quit smoking in 2022.     Investigations reviewed:   -2024: CT chest high-resolution without contrast (comparison: CT chest 2023): No significant change from previous exam, 2023.  Advanced pulmonary emphysema most pronounced at the lung apices.  TTE 04/10/2023:  LVEF 60%; normal RV size, normal RV systolic function, mild-to-moderate pulmonary hypertension, etc.  CT chest 2023:  Advanced pulmonary emphysema versus cystic lung disease, etc.  CT chest 2023:  Extensive mediastinal and bilateral hilar lymphadenopathy without significant interval enlargement; extensive emphysematous changes in lungs with cystic formations and ground-glass attenuation due to small airway disease, etc.   CT chest 2022:  Advanced emphysema with mild upper lobe predominance, etc.   CT chest 2022: Advanced emphysema, etc.   HR CT 2021:  Prominent mediastinal and hilar lymphadenopathy, severe emphysema with subpleural scarring, etc.  PFTs 10/07/2022:  Severe diffusion defect, etc.     Labs reviewed:   -Chronic erythrocytosis:  Starting 2021:  H/H:  19.3/62.9 on 2023; 18.0/57.7 on 2023; 15.9/52.2 on 04/10/2023; 18.6/59.2 on 2023; 18.0/59.3 on 10/30/2022; 16.2/50.9 on 2021; 15.6/48.2 on 2021; 17.6/57.3 on 2021; 14.9/48.0 on 2018  -WBC, differential, platelets normal     10/11/2024: "   Pleasant gentleman who presents for initial hematology consultation.  In no acute discomfort.    Chronically short of breath.  Gets severely short of breath with physical activity.  Has been using nocturnal oxygen at 3 liters/minutes since the beginning of 2024.  No chest pain, hemoptysis, recurrent bouts of pneumonia or bronchitis, unusual headaches, focal neurological symptoms, any lumps or lymphadenopathy, fevers or chills, anorexia, unintentional weight loss, abdominal pain, nausea, vomiting, GI bleeding, any urinary problems, etc..  He is essentially referred to us for evaluation of erythrocytosis risk, almost certainly, is secondary to chronic hypoxia    Interval History:  THERAPEUTIC PHLEBOTOMY   [No matching plan found]     11/11/2024:  -on nocturnal oxygen at 3 liters/minute since the beginning of 2024  -10/11/2024: H/H 18.8/58.6  -10/15/2024:  Room air pulse oximetry 83%, increased to 95% on oxygen 2 liters/minute  -10/15/2024: Phlebotomy 249 cc (for H/H 10.8/58.6 on 10/11/2024)  -10/18/2024:  H/H 17.7/55 (S/P phlebotomy 150 cc on 10/15/2024)  -10/28/2024: JAK2 exons 12-15 mutations negative  Presents for a follow-up visit.  In no acute discomfort.  On supplemental oxygen.  After therapeutic phlebotomy, he might have felt a little bit better.  Did not feel dizzy or weak.      Medications:  Current Outpatient Medications on File Prior to Visit   Medication Sig Dispense Refill    albuterol (PROVENTIL/VENTOLIN HFA) 90 mcg/actuation inhaler Inhale 1-2 puffs into the lungs every 6 (six) hours as needed for Wheezing. Rescue 18 g 6    budesonide-formoterol 80-4.5 mcg (SYMBICORT) 80-4.5 mcg/actuation HFAA Inhale 2 puffs into the lungs 2 (two) times a day. 10.2 g 5    hydroxychloroquine (PLAQUENIL) 200 mg tablet Take 1 tablet (200 mg total) by mouth once daily. 90 tablet 3    meloxicam (MOBIC) 7.5 MG tablet Take 1 tablet (7.5 mg total) by mouth 2 (two) times daily. 60 tablet 6    nintedanib (OFEV) 150 mg Cap Take  1 capsule (150 mg total) by mouth 2 (two) times daily. 60 capsule 6    tadalafiL (CIALIS) 5 MG tablet Take 1 tablet (5 mg total) by mouth daily as needed for Erectile Dysfunction. 30 tablet 3    albuterol-ipratropium (DUO-NEB) 2.5 mg-0.5 mg/3 mL nebulizer solution Take 3 mLs by nebulization every 6 (six) hours as needed for Wheezing. Rescue (Patient not taking: Reported on 8/5/2024) 75 mL 5    baclofen (LIORESAL) 10 MG tablet Take 10 mg by mouth 2 (two) times daily. (Patient not taking: Reported on 8/5/2024)      lisinopriL (PRINIVIL,ZESTRIL) 5 MG tablet Take 1 tablet (5 mg total) by mouth once daily. (Patient not taking: Reported on 8/5/2024) 90 tablet 3    omeprazole (PRILOSEC) 20 MG capsule Take 1 capsule (20 mg total) by mouth once daily. (Patient not taking: Reported on 1/29/2024) 30 capsule 0     No current facility-administered medications on file prior to visit.       Review of Systems:   All systems reviewed and found to be negative except for the symptoms detailed above    Physical Examination:   VITAL SIGNS:   Vitals:    11/11/24 0846   BP: 124/82   Pulse: 106   Resp: 20   Temp: 97.7 °F (36.5 °C)       GENERAL:  In no apparent distress.    HEAD:  No signs of head trauma.  EYES:  Pupils are equal.  Extraocular motions intact.    EARS:  Hearing grossly intact.  MOUTH:  Oropharynx is normal.   NECK:  No adenopathy, no JVD.     CHEST:  Chest with clear breath sounds bilaterally.  No wheezes, rales, rhonchi.    CARDIAC:  Regular rate and rhythm.  S1 and S2, without murmurs, gallops, rubs.  VASCULAR:  No Edema.  Peripheral pulses normal and equal in all extremities.  ABDOMEN:  Soft, without detectable tenderness.  No sign of distention.  No   rebound or guarding, and no masses palpated.   Bowel Sounds normal.  MUSCULOSKELETAL:  Good range of motion of all major joints. Extremities without clubbing, cyanosis or edema.    NEUROLOGIC EXAM:  Alert and oriented x 3.  No focal sensory or strength deficits.   Speech  normal.  Follows commands.  PSYCHIATRIC:  Mood normal.  10/11/2024: In no acute discomfort.  Room air pulse oximetry:  At rest, 90%; after physical activity, down to 88%      Results for orders placed or performed in visit on 08/05/24   CBC Auto Differential    Narrative    The following orders were created for panel order CBC Auto Differential.  Procedure                               Abnormality         Status                     ---------                               -----------         ------                     CBC with Differential[[id number]]       Abnormal            Final result                 Please view results for these tests on the individual orders.   CBC with Differential   Result Value Ref Range    WBC 7.26 4.50 - 11.50 x10(3)/mcL    RBC 6.16 (H) 4.70 - 6.10 x10(6)/mcL    Hgb 18.0 14.0 - 18.0 g/dL    Hct 55.2 (H) 42.0 - 52.0 %    MCV 89.6 80.0 - 94.0 fL    MCH 29.2 27.0 - 31.0 pg    MCHC 32.6 (L) 33.0 - 36.0 g/dL    RDW 16.6 11.5 - 17.0 %    Platelet 166 130 - 400 x10(3)/mcL    MPV 11.2 (H) 7.4 - 10.4 fL    Neut % 69.4 %    Lymph % 19.4 %    Mono % 7.6 %    Eos % 2.2 %    Basophil % 1.1 %    Lymph # 1.41 0.6 - 4.6 x10(3)/mcL    Neut # 5.04 2.1 - 9.2 x10(3)/mcL    Mono # 0.55 0.1 - 1.3 x10(3)/mcL    Eos # 0.16 0 - 0.9 x10(3)/mcL    Baso # 0.08 <=0.2 x10(3)/mcL    IG# 0.02 0 - 0.04 x10(3)/mcL    IG% 0.3 %    NRBC% 0.0 %     Results for orders placed or performed in visit on 08/05/24   Comprehensive Metabolic Panel   Result Value Ref Range    Sodium 142 136 - 145 mmol/L    Potassium 3.6 3.5 - 5.1 mmol/L    Chloride 108 (H) 98 - 107 mmol/L    CO2 26 23 - 31 mmol/L    Glucose 86 82 - 115 mg/dL    Blood Urea Nitrogen 5.3 (L) 8.4 - 25.7 mg/dL    Creatinine 0.99 0.73 - 1.18 mg/dL    Calcium 9.6 8.8 - 10.0 mg/dL    Protein Total 7.4 5.8 - 7.6 gm/dL    Albumin 3.5 3.4 - 4.8 g/dL    Globulin 3.9 (H) 2.4 - 3.5 gm/dL    Albumin/Globulin Ratio 0.9 (L) 1.1 - 2.0 ratio    Bilirubin Total 0.7 <=1.5 mg/dL    ALP 82  40 - 150 unit/L    ALT 17 0 - 55 unit/L    AST 17 5 - 34 unit/L    eGFR >60 mL/min/1.73/m2    Anion Gap 8.0 mEq/L    BUN/Creatinine Ratio 5        Assessment:  Problem List Items Addressed This Visit          Pulmonary    Interstitial lung disease    Pulmonary emphysema with fibrosis of lung    Chronic hypoxic respiratory failure - Primary       Immunology/Multi System    Seronegative rheumatoid arthritis    Overview                Antinuclear antibody (SAM) titer greater than 1:80       Oncology    Erythrocytosis due to hypoxemia    Secondary erythrocytosis       Other    History of tobacco abuse    Overview     1. History of 1.5-2 ppd x 40+ years. Quit April 2021  2. Enc continued cessation            Nocturnal oxygen desaturation         Orders for 11/11/2024:  Check erythropoietin level  Check CBC every month  Therapeutic phlebotomy 250 cc if hematocrit> 55  Baby aspirin 81 mg daily  Continue supplemental oxygen per PCP/pulmonary  Follow-up with NP in 1 month    Above discussed with the patient.  All questions answered.  Discussed labs and scans and plan of management   He understands and agrees with this plan.  ===================================      Chronic erythrocytosis:  -Chronic erythrocytosis:  Starting 03/2021:    H/H:    19.3/62.9 on 09/27/2023; 18.0/57.7 on 05/07/2023;   15.9/52.2 on 04/10/2023; 18.6/59.2 on 04/08/2023;   18.0/59.3 on 10/30/2022; 16.2/50.9 on 11/25/2021;   15.6/48.2 on 11/23/2021; 17.6/57.3 on 06/11/2021;   14.9/48.0 on 03/24/2018  -WBC, differential, platelets normal   -10/11/2024: In no acute discomfort.  Room air pulse oximetry:  At rest, 90%; after physical activity, down to 88%  -on nocturnal oxygen at 3 liters/minute since the beginning of 2024  -10/11/2024: H/H 18.8/58.6  -10/15/2024:  Room air pulse oximetry 83%, increased to 95% on oxygen 2 liters/minute  -10/15/2024: Phlebotomy 249 cc (for H/H 10.8/58.6 on 10/11/2024)  -10/18/2024:  H/H 17.7/55 (S/P phlebotomy 150 cc on  10/15/2024)  -10/28/2024: JAK2 exons 12-15 mutations negative  >>>  -hypoxic  -erythrocytosis is almost certainly secondary to chronic hypoxic respiratory failure due to following underlying conditions:  Interstitial lung disease secondary to anti synthetase syndrome  History of tobacco abuse  Advanced pulmonary emphysema on multiple CTs of chest     Hypoxic respiratory failure  History of work as a sandblaster, 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  >>>  Plan:   -chronic erythrocytosis, secondary to chronic hypoxia  -JAK2 mutation negative; erythropoietin level pending  -therapeutic phlebotomy PRN only for symptoms of hyperviscosity  -cautious phlebotomy 250 cc PRN; avoid reduction of hematocrit to <55  -low-dose aspirin 81 mg daily  -continue nocturnal oxygen as prescribed by PCP/pulmonary  -we will check CBC once a month and performed therapeutic phlebotomy 150 cc if hematocrit> 55        Other underlying conditions:  Anti synthetase syndrome  Positive antinuclear antibody   Interstitial lung disease secondary to anti synthetase syndrome  Seronegative rheumatoid arthritis   History of tobacco abuse  Advanced pulmonary emphysema on multiple CTs of chest     Hypoxic respiratory failure  History of work as a sandblaster, 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  History of latent pulmonary tuberculosis; [facility] for 1 year in the past [facility]  =================================================================       Discussion:  Secondary polycythemia:  # Management of secondary polycythemia is generally directed at ameliorating the underlying cause and contributing factors, alleviating symptoms, and/or reducing the risk of thrombosis  -in this case, optimal oxygenation for chronic hypoxic respiratory failure     # symptom relief:    -Therapeutic phlebotomy is indicated only if it ameliorates symptoms of hyperviscosity for example, fatigue, headache, dizziness, mental slowing, visual changes, paresthesias,  atypical chest pain, dyspnea, palpitations), therapeutic phlebotomy is indicated only if this ameliorates symptoms.    -target hematocrit is based on the threshold that provides symptom relief; cautious phlebotomy (eg, removal of 250 mL blood, replaced by an equal volume of crystalloid) to modestly reduce the Hb may relieve these symptoms. However, reduction of Hct to <55 percent range is likely to exacerbate dyspnea or other hypoxic symptoms.   -Cytoreductive agents are not indicated     #Thrombosis prophylaxis:   -unlike in polycythemia vera, in secondary polycythemia, prophylactic phlebotomy or cytoreduction does not reduce the risk of thrombosis  -Low-dose aspirin can be offered to patients with cardiovascular risk factors, twice-daily low-dose aspirin for patients with a history of arterial thrombosis, and systemic anticoagulation for those with a history of venous thromboembolism.        Follow-up:  No follow-ups on file.

## 2024-11-11 ENCOUNTER — OFFICE VISIT (OUTPATIENT)
Dept: HEMATOLOGY/ONCOLOGY | Facility: CLINIC | Age: 69
End: 2024-11-11
Attending: INTERNAL MEDICINE
Payer: MEDICARE

## 2024-11-11 VITALS
HEIGHT: 67 IN | HEART RATE: 106 BPM | RESPIRATION RATE: 20 BRPM | DIASTOLIC BLOOD PRESSURE: 82 MMHG | SYSTOLIC BLOOD PRESSURE: 124 MMHG | WEIGHT: 132.81 LBS | TEMPERATURE: 98 F | OXYGEN SATURATION: 78 % | BODY MASS INDEX: 20.84 KG/M2

## 2024-11-11 DIAGNOSIS — J96.11 CHRONIC HYPOXIC RESPIRATORY FAILURE: Primary | ICD-10-CM

## 2024-11-11 DIAGNOSIS — R76.0 ANTINUCLEAR ANTIBODY (ANA) TITER GREATER THAN 1:80: ICD-10-CM

## 2024-11-11 DIAGNOSIS — M06.00 SERONEGATIVE RHEUMATOID ARTHRITIS: ICD-10-CM

## 2024-11-11 DIAGNOSIS — Z87.891 HISTORY OF TOBACCO ABUSE: ICD-10-CM

## 2024-11-11 DIAGNOSIS — N52.9 ERECTILE DYSFUNCTION, UNSPECIFIED ERECTILE DYSFUNCTION TYPE: ICD-10-CM

## 2024-11-11 DIAGNOSIS — D75.1 SECONDARY ERYTHROCYTOSIS: ICD-10-CM

## 2024-11-11 DIAGNOSIS — D75.1 ERYTHROCYTOSIS DUE TO HYPOXEMIA: ICD-10-CM

## 2024-11-11 DIAGNOSIS — J43.9 PULMONARY EMPHYSEMA WITH FIBROSIS OF LUNG: ICD-10-CM

## 2024-11-11 DIAGNOSIS — J84.9 INTERSTITIAL LUNG DISEASE: ICD-10-CM

## 2024-11-11 DIAGNOSIS — J84.10 PULMONARY EMPHYSEMA WITH FIBROSIS OF LUNG: ICD-10-CM

## 2024-11-11 DIAGNOSIS — G47.34 NOCTURNAL OXYGEN DESATURATION: ICD-10-CM

## 2024-11-11 LAB
BASOPHILS # BLD AUTO: 0.07 X10(3)/MCL
BASOPHILS NFR BLD AUTO: 0.8 %
EOSINOPHIL # BLD AUTO: 0.14 X10(3)/MCL (ref 0–0.9)
EOSINOPHIL NFR BLD AUTO: 1.6 %
ERYTHROCYTE [DISTWIDTH] IN BLOOD BY AUTOMATED COUNT: 16.1 % (ref 11.5–17)
HCT VFR BLD AUTO: 53.8 % (ref 42–52)
HGB BLD-MCNC: 17.5 G/DL (ref 14–18)
IMM GRANULOCYTES # BLD AUTO: 0.02 X10(3)/MCL (ref 0–0.04)
IMM GRANULOCYTES NFR BLD AUTO: 0.2 %
LYMPHOCYTES # BLD AUTO: 1.23 X10(3)/MCL (ref 0.6–4.6)
LYMPHOCYTES NFR BLD AUTO: 13.7 %
MCH RBC QN AUTO: 28.6 PG (ref 27–31)
MCHC RBC AUTO-ENTMCNC: 32.5 G/DL (ref 33–36)
MCV RBC AUTO: 88.1 FL (ref 80–94)
MONOCYTES # BLD AUTO: 0.61 X10(3)/MCL (ref 0.1–1.3)
MONOCYTES NFR BLD AUTO: 6.8 %
NEUTROPHILS # BLD AUTO: 6.88 X10(3)/MCL (ref 2.1–9.2)
NEUTROPHILS NFR BLD AUTO: 76.9 %
NRBC BLD AUTO-RTO: 0 %
PLATELET # BLD AUTO: 221 X10(3)/MCL (ref 130–400)
PMV BLD AUTO: 10.6 FL (ref 7.4–10.4)
RBC # BLD AUTO: 6.11 X10(6)/MCL (ref 4.7–6.1)
WBC # BLD AUTO: 8.95 X10(3)/MCL (ref 4.5–11.5)

## 2024-11-11 PROCEDURE — 99214 OFFICE O/P EST MOD 30 MIN: CPT | Mod: PBBFAC | Performed by: INTERNAL MEDICINE

## 2024-11-11 PROCEDURE — 36415 COLL VENOUS BLD VENIPUNCTURE: CPT | Performed by: INTERNAL MEDICINE

## 2024-11-11 PROCEDURE — 4010F ACE/ARB THERAPY RXD/TAKEN: CPT | Mod: CPTII,,, | Performed by: INTERNAL MEDICINE

## 2024-11-11 PROCEDURE — 3008F BODY MASS INDEX DOCD: CPT | Mod: CPTII,,, | Performed by: INTERNAL MEDICINE

## 2024-11-11 PROCEDURE — 85025 COMPLETE CBC W/AUTO DIFF WBC: CPT | Performed by: INTERNAL MEDICINE

## 2024-11-11 PROCEDURE — 3288F FALL RISK ASSESSMENT DOCD: CPT | Mod: CPTII,,, | Performed by: INTERNAL MEDICINE

## 2024-11-11 PROCEDURE — 3074F SYST BP LT 130 MM HG: CPT | Mod: CPTII,,, | Performed by: INTERNAL MEDICINE

## 2024-11-11 PROCEDURE — 1101F PT FALLS ASSESS-DOCD LE1/YR: CPT | Mod: CPTII,,, | Performed by: INTERNAL MEDICINE

## 2024-11-11 PROCEDURE — 82668 ASSAY OF ERYTHROPOIETIN: CPT | Performed by: INTERNAL MEDICINE

## 2024-11-11 PROCEDURE — 1160F RVW MEDS BY RX/DR IN RCRD: CPT | Mod: CPTII,,, | Performed by: INTERNAL MEDICINE

## 2024-11-11 PROCEDURE — 3079F DIAST BP 80-89 MM HG: CPT | Mod: CPTII,,, | Performed by: INTERNAL MEDICINE

## 2024-11-11 PROCEDURE — 99213 OFFICE O/P EST LOW 20 MIN: CPT | Mod: S$PBB,,, | Performed by: INTERNAL MEDICINE

## 2024-11-11 PROCEDURE — 1159F MED LIST DOCD IN RCRD: CPT | Mod: CPTII,,, | Performed by: INTERNAL MEDICINE

## 2024-11-11 RX ORDER — TADALAFIL 5 MG/1
5 TABLET ORAL DAILY PRN
Qty: 30 TABLET | Refills: 3 | Status: ON HOLD | OUTPATIENT
Start: 2024-11-11 | End: 2025-11-11

## 2024-11-11 NOTE — Clinical Note
Orders for 11/11/2024: Check erythropoietin level Check CBC every month Therapeutic phlebotomy 250 cc if hematocrit> 55 Baby aspirin 81 mg daily Continue supplemental oxygen per PCP/pulmonary Follow-up with NP in 1 month

## 2024-11-11 NOTE — TELEPHONE ENCOUNTER
----- Message from [place] sent at 11/11/2024  9:48 AM CST -----  Regarding: Ahmed/med refill  Patient requesting 10mg Cialis, be sent to [facility]. He will pay for medication out of pocket

## 2024-11-12 LAB — EPO SERPL-ACNC: 42.7 MIU/ML (ref 2.6–18.5)

## 2024-11-16 ENCOUNTER — HOSPITAL ENCOUNTER (INPATIENT)
Facility: HOSPITAL | Age: 69
LOS: 4 days | Discharge: LEFT BEFORE DISCHARGE | DRG: 189 | End: 2024-11-20
Attending: STUDENT IN AN ORGANIZED HEALTH CARE EDUCATION/TRAINING PROGRAM | Admitting: STUDENT IN AN ORGANIZED HEALTH CARE EDUCATION/TRAINING PROGRAM
Payer: MEDICARE

## 2024-11-16 DIAGNOSIS — R06.02 SOB (SHORTNESS OF BREATH): ICD-10-CM

## 2024-11-16 DIAGNOSIS — J84.9 INTERSTITIAL LUNG DISEASE: ICD-10-CM

## 2024-11-16 DIAGNOSIS — J96.21 ACUTE ON CHRONIC HYPOXIC RESPIRATORY FAILURE: Primary | ICD-10-CM

## 2024-11-16 DIAGNOSIS — J18.9 PNEUMONIA OF BOTH LUNGS DUE TO INFECTIOUS ORGANISM, UNSPECIFIED PART OF LUNG: ICD-10-CM

## 2024-11-16 DIAGNOSIS — R06.02 SHORTNESS OF BREATH: ICD-10-CM

## 2024-11-16 LAB
A-ADO2 BLOOD GAS (OHS): 477 MMHG
A-ADO2 BLOOD GAS (OHS): 529 MMHG
ALBUMIN SERPL-MCNC: 3.3 G/DL (ref 3.4–4.8)
ALBUMIN/GLOB SERPL: 0.8 RATIO (ref 1.1–2)
ALLENS TEST BLOOD GAS (OHS): NORMAL
ALLENS TEST BLOOD GAS (OHS): YES
ALLENS TEST BLOOD GAS (OHS): YES
ALP SERPL-CCNC: 94 UNIT/L (ref 40–150)
ALT SERPL-CCNC: 28 UNIT/L (ref 0–55)
ANION GAP SERPL CALC-SCNC: 11 MEQ/L
AST SERPL-CCNC: 27 UNIT/L (ref 5–34)
B PERT.PT PRMT NPH QL NAA+NON-PROBE: NOT DETECTED
BASE EXCESS BLD CALC-SCNC: -0.8 MMOL/L (ref -2–2)
BASE EXCESS BLD CALC-SCNC: 0.2 MMOL/L (ref -2–2)
BASE EXCESS BLD CALC-SCNC: 1.3 MMOL/L
BASOPHILS # BLD AUTO: 0.11 X10(3)/MCL
BASOPHILS NFR BLD AUTO: 1 %
BILIRUB SERPL-MCNC: 0.6 MG/DL
BLOOD GAS SAMPLE TYPE (OHS): ABNORMAL
BLOOD GAS SAMPLE TYPE (OHS): ABNORMAL
BLOOD GAS SAMPLE TYPE (OHS): NORMAL
BNP BLD-MCNC: 815.3 PG/ML
BUN SERPL-MCNC: 12.1 MG/DL (ref 8.4–25.7)
C PNEUM DNA NPH QL NAA+NON-PROBE: NOT DETECTED
CALCIUM SERPL-MCNC: 8.8 MG/DL (ref 8.8–10)
CHLORIDE SERPL-SCNC: 106 MMOL/L (ref 98–107)
CO2 BLDA-SCNC: 23.4 MMOL/L (ref 22–26)
CO2 BLDA-SCNC: 26.7 MMOL/L (ref 22–26)
CO2 BLDA-SCNC: 28.6 MMOL/L
CO2 SERPL-SCNC: 24 MMOL/L (ref 23–31)
COHGB MFR BLDA: 3.1 % (ref 0.5–1.5)
COHGB MFR BLDA: 3.2 % (ref 0.5–1.5)
COHGB MFR BLDA: 4.3 %
CREAT SERPL-MCNC: 0.78 MG/DL (ref 0.72–1.25)
CREAT/UREA NIT SERPL: 16
DRAWN BY BLOOD GAS (OHS): ABNORMAL
DRAWN BY BLOOD GAS (OHS): ABNORMAL
DRAWN BY BLOOD GAS (OHS): NORMAL
EOSINOPHIL # BLD AUTO: 0.05 X10(3)/MCL (ref 0–0.9)
EOSINOPHIL NFR BLD AUTO: 0.5 %
ERYTHROCYTE [DISTWIDTH] IN BLOOD BY AUTOMATED COUNT: 16 % (ref 11.5–17)
FLUAV AG UPPER RESP QL IA.RAPID: NOT DETECTED
FLUBV AG UPPER RESP QL IA.RAPID: NOT DETECTED
GAS PNL BLD: 529 MMHG
GAS PNL BLD: 661 MMHG
GFR SERPLBLD CREATININE-BSD FMLA CKD-EPI: >60 ML/MIN/1.73/M2
GLOBULIN SER-MCNC: 3.9 GM/DL (ref 2.4–3.5)
GLUCOSE SERPL-MCNC: 115 MG/DL (ref 82–115)
HADV DNA NPH QL NAA+NON-PROBE: NOT DETECTED
HCO3 BLDA-SCNC: 22.4 MMOL/L (ref 22–26)
HCO3 BLDA-SCNC: 25.4 MMOL/L (ref 22–26)
HCO3 BLDA-SCNC: 27.2 MMOL/L
HCOV 229E RNA NPH QL NAA+NON-PROBE: NOT DETECTED
HCOV HKU1 RNA NPH QL NAA+NON-PROBE: NOT DETECTED
HCOV NL63 RNA NPH QL NAA+NON-PROBE: NOT DETECTED
HCOV OC43 RNA NPH QL NAA+NON-PROBE: NOT DETECTED
HCT VFR BLD AUTO: 57.1 % (ref 42–52)
HGB BLD-MCNC: 18.7 G/DL (ref 14–18)
HMPV RNA NPH QL NAA+NON-PROBE: NOT DETECTED
HOLD SPECIMEN: NORMAL
HPIV1 RNA NPH QL NAA+NON-PROBE: NOT DETECTED
HPIV2 RNA NPH QL NAA+NON-PROBE: NOT DETECTED
HPIV3 RNA NPH QL NAA+NON-PROBE: NOT DETECTED
HPIV4 RNA NPH QL NAA+NON-PROBE: NOT DETECTED
IMM GRANULOCYTES # BLD AUTO: 0.03 X10(3)/MCL (ref 0–0.04)
IMM GRANULOCYTES NFR BLD AUTO: 0.3 %
INHALED O2 CONCENTRATION: 100 %
INHALED O2 CONCENTRATION: 40 %
INHALED O2 CONCENTRATION: 80 %
INR PPP: 1.1
LACTATE SERPL-SCNC: 2.6 MMOL/L (ref 0.5–2.2)
LACTATE SERPL-SCNC: 2.7 MMOL/L (ref 0.5–2.2)
LACTATE SERPL-SCNC: 3.6 MMOL/L (ref 0.5–2.2)
LACTATE SERPL-SCNC: 5.1 MMOL/L (ref 0.5–2.2)
LPM (OHS): 5
LYMPHOCYTES # BLD AUTO: 1.67 X10(3)/MCL (ref 0.6–4.6)
LYMPHOCYTES NFR BLD AUTO: 15.8 %
M PNEUMO DNA NPH QL NAA+NON-PROBE: NOT DETECTED
MAGNESIUM SERPL-MCNC: 1.9 MG/DL (ref 1.6–2.6)
MCH RBC QN AUTO: 29 PG (ref 27–31)
MCHC RBC AUTO-ENTMCNC: 32.7 G/DL (ref 33–36)
MCV RBC AUTO: 88.5 FL (ref 80–94)
METHGB MFR BLDA: 0.5 %
METHGB MFR BLDA: 0.6 % (ref 0–1.5)
METHGB MFR BLDA: 0.7 % (ref 0–1.5)
MONOCYTES # BLD AUTO: 0.76 X10(3)/MCL (ref 0.1–1.3)
MONOCYTES NFR BLD AUTO: 7.2 %
MRSA PCR SCRN (OHS): NOT DETECTED
NEUTROPHILS # BLD AUTO: 7.97 X10(3)/MCL (ref 2.1–9.2)
NEUTROPHILS NFR BLD AUTO: 75.2 %
NRBC BLD AUTO-RTO: 0 %
O2 HB BLOOD GAS (OHS): 62 %
O2 HB BLOOD GAS (OHS): 80.3 % (ref 94–100)
O2 HB BLOOD GAS (OHS): 95.2 % (ref 94–100)
OXYGEN DEVICE BLOOD GAS (OHS): ABNORMAL
OXYGEN DEVICE BLOOD GAS (OHS): NORMAL
OXYHGB MFR BLDA: 16.8 G/DL (ref 12–18)
OXYHGB MFR BLDA: 17.8 G/DL
OXYHGB MFR BLDA: 18.1 G/DL (ref 12–18)
PCO2 BLDA: 33 MMHG (ref 35–45)
PCO2 BLDA: 42 MMHG (ref 35–45)
PCO2 BLDA: 46 MMHG (ref 40–50)
PH BLDA: 7.38 [PH]
PH BLDA: 7.39 [PH] (ref 7.35–7.45)
PH BLDA: 7.44 [PH] (ref 7.35–7.45)
PLATELET # BLD AUTO: 230 X10(3)/MCL (ref 130–400)
PMV BLD AUTO: 11.1 FL (ref 7.4–10.4)
PO2 BLDA: 132 MMHG (ref 75–100)
PO2 BLDA: 34 MMHG (ref 30–40)
PO2 BLDA: 52 MMHG (ref 75–100)
POTASSIUM SERPL-SCNC: 3.5 MMOL/L (ref 3.5–5.1)
PROT SERPL-MCNC: 7.2 GM/DL (ref 5.8–7.6)
PROTHROMBIN TIME: 14 SECONDS (ref 11.4–14)
RBC # BLD AUTO: 6.45 X10(6)/MCL (ref 4.7–6.1)
RSV A 5' UTR RNA NPH QL NAA+PROBE: NOT DETECTED
RSV RNA NPH QL NAA+NON-PROBE: NOT DETECTED
RV+EV RNA NPH QL NAA+NON-PROBE: NOT DETECTED
SAMPLE SITE BLOOD GAS (OHS): ABNORMAL
SAMPLE SITE BLOOD GAS (OHS): ABNORMAL
SAO2 % BLDA: 65.2 %
SAO2 % BLDA: 83.5 %
SAO2 % BLDA: 99 %
SARS-COV-2 RNA RESP QL NAA+PROBE: NOT DETECTED
SODIUM SERPL-SCNC: 141 MMOL/L (ref 136–145)
TROPONIN I SERPL-MCNC: <0.01 NG/ML (ref 0–0.04)
WBC # BLD AUTO: 10.59 X10(3)/MCL (ref 4.5–11.5)

## 2024-11-16 PROCEDURE — 25000003 PHARM REV CODE 250

## 2024-11-16 PROCEDURE — 63600175 PHARM REV CODE 636 W HCPCS

## 2024-11-16 PROCEDURE — 36415 COLL VENOUS BLD VENIPUNCTURE: CPT | Performed by: STUDENT IN AN ORGANIZED HEALTH CARE EDUCATION/TRAINING PROGRAM

## 2024-11-16 PROCEDURE — 83605 ASSAY OF LACTIC ACID: CPT

## 2024-11-16 PROCEDURE — 25500020 PHARM REV CODE 255

## 2024-11-16 PROCEDURE — 63600175 PHARM REV CODE 636 W HCPCS: Performed by: STUDENT IN AN ORGANIZED HEALTH CARE EDUCATION/TRAINING PROGRAM

## 2024-11-16 PROCEDURE — 84484 ASSAY OF TROPONIN QUANT: CPT | Performed by: STUDENT IN AN ORGANIZED HEALTH CARE EDUCATION/TRAINING PROGRAM

## 2024-11-16 PROCEDURE — 82803 BLOOD GASES ANY COMBINATION: CPT

## 2024-11-16 PROCEDURE — 80053 COMPREHEN METABOLIC PANEL: CPT | Performed by: STUDENT IN AN ORGANIZED HEALTH CARE EDUCATION/TRAINING PROGRAM

## 2024-11-16 PROCEDURE — 85025 COMPLETE CBC W/AUTO DIFF WBC: CPT | Performed by: STUDENT IN AN ORGANIZED HEALTH CARE EDUCATION/TRAINING PROGRAM

## 2024-11-16 PROCEDURE — 94640 AIRWAY INHALATION TREATMENT: CPT | Mod: XB

## 2024-11-16 PROCEDURE — 25000003 PHARM REV CODE 250: Performed by: STUDENT IN AN ORGANIZED HEALTH CARE EDUCATION/TRAINING PROGRAM

## 2024-11-16 PROCEDURE — 94799 UNLISTED PULMONARY SVC/PX: CPT

## 2024-11-16 PROCEDURE — 87641 MR-STAPH DNA AMP PROBE: CPT

## 2024-11-16 PROCEDURE — 99900035 HC TECH TIME PER 15 MIN (STAT)

## 2024-11-16 PROCEDURE — 99285 EMERGENCY DEPT VISIT HI MDM: CPT | Mod: 25

## 2024-11-16 PROCEDURE — 83880 ASSAY OF NATRIURETIC PEPTIDE: CPT | Performed by: STUDENT IN AN ORGANIZED HEALTH CARE EDUCATION/TRAINING PROGRAM

## 2024-11-16 PROCEDURE — 94640 AIRWAY INHALATION TREATMENT: CPT

## 2024-11-16 PROCEDURE — 27000190 HC CPAP FULL FACE MASK W/VALVE

## 2024-11-16 PROCEDURE — 25000242 PHARM REV CODE 250 ALT 637 W/ HCPCS

## 2024-11-16 PROCEDURE — 94660 CPAP INITIATION&MGMT: CPT

## 2024-11-16 PROCEDURE — 11000001 HC ACUTE MED/SURG PRIVATE ROOM

## 2024-11-16 PROCEDURE — 83735 ASSAY OF MAGNESIUM: CPT | Performed by: STUDENT IN AN ORGANIZED HEALTH CARE EDUCATION/TRAINING PROGRAM

## 2024-11-16 PROCEDURE — 96375 TX/PRO/DX INJ NEW DRUG ADDON: CPT

## 2024-11-16 PROCEDURE — 96374 THER/PROPH/DIAG INJ IV PUSH: CPT

## 2024-11-16 PROCEDURE — 87040 BLOOD CULTURE FOR BACTERIA: CPT | Performed by: STUDENT IN AN ORGANIZED HEALTH CARE EDUCATION/TRAINING PROGRAM

## 2024-11-16 PROCEDURE — 83605 ASSAY OF LACTIC ACID: CPT | Performed by: STUDENT IN AN ORGANIZED HEALTH CARE EDUCATION/TRAINING PROGRAM

## 2024-11-16 PROCEDURE — 87581 M.PNEUMON DNA AMP PROBE: CPT

## 2024-11-16 PROCEDURE — 36600 WITHDRAWAL OF ARTERIAL BLOOD: CPT

## 2024-11-16 PROCEDURE — 36415 COLL VENOUS BLD VENIPUNCTURE: CPT

## 2024-11-16 PROCEDURE — 21400001 HC TELEMETRY ROOM

## 2024-11-16 PROCEDURE — 25000242 PHARM REV CODE 250 ALT 637 W/ HCPCS: Performed by: STUDENT IN AN ORGANIZED HEALTH CARE EDUCATION/TRAINING PROGRAM

## 2024-11-16 PROCEDURE — 93005 ELECTROCARDIOGRAM TRACING: CPT

## 2024-11-16 PROCEDURE — 5A09357 ASSISTANCE WITH RESPIRATORY VENTILATION, LESS THAN 24 CONSECUTIVE HOURS, CONTINUOUS POSITIVE AIRWAY PRESSURE: ICD-10-PCS | Performed by: STUDENT IN AN ORGANIZED HEALTH CARE EDUCATION/TRAINING PROGRAM

## 2024-11-16 PROCEDURE — 0241U COVID/RSV/FLU A&B PCR: CPT | Performed by: STUDENT IN AN ORGANIZED HEALTH CARE EDUCATION/TRAINING PROGRAM

## 2024-11-16 PROCEDURE — 85610 PROTHROMBIN TIME: CPT | Performed by: STUDENT IN AN ORGANIZED HEALTH CARE EDUCATION/TRAINING PROGRAM

## 2024-11-16 RX ORDER — HYDROXYZINE PAMOATE 25 MG/1
25 CAPSULE ORAL ONCE
Status: COMPLETED | OUTPATIENT
Start: 2024-11-16 | End: 2024-11-16

## 2024-11-16 RX ORDER — GLUCAGON 1 MG
1 KIT INJECTION
Status: DISCONTINUED | OUTPATIENT
Start: 2024-11-16 | End: 2024-11-20 | Stop reason: HOSPADM

## 2024-11-16 RX ORDER — FLUTICASONE FUROATE AND VILANTEROL 200; 25 UG/1; UG/1
1 POWDER RESPIRATORY (INHALATION) DAILY
Status: DISCONTINUED | OUTPATIENT
Start: 2024-11-16 | End: 2024-11-20 | Stop reason: HOSPADM

## 2024-11-16 RX ORDER — HYDROXYCHLOROQUINE SULFATE 200 MG/1
200 TABLET, FILM COATED ORAL DAILY
Status: DISCONTINUED | OUTPATIENT
Start: 2024-11-16 | End: 2024-11-20 | Stop reason: HOSPADM

## 2024-11-16 RX ORDER — METHYLPREDNISOLONE SOD SUCC 125 MG
125 VIAL (EA) INJECTION
Status: COMPLETED | OUTPATIENT
Start: 2024-11-16 | End: 2024-11-16

## 2024-11-16 RX ORDER — IPRATROPIUM BROMIDE AND ALBUTEROL SULFATE 2.5; .5 MG/3ML; MG/3ML
3 SOLUTION RESPIRATORY (INHALATION) EVERY 6 HOURS
Status: DISCONTINUED | OUTPATIENT
Start: 2024-11-16 | End: 2024-11-16

## 2024-11-16 RX ORDER — ENOXAPARIN SODIUM 100 MG/ML
40 INJECTION SUBCUTANEOUS EVERY 24 HOURS
Status: DISCONTINUED | OUTPATIENT
Start: 2024-11-16 | End: 2024-11-20 | Stop reason: HOSPADM

## 2024-11-16 RX ORDER — PANTOPRAZOLE SODIUM 40 MG/1
40 TABLET, DELAYED RELEASE ORAL DAILY
Status: DISCONTINUED | OUTPATIENT
Start: 2024-11-16 | End: 2024-11-20 | Stop reason: HOSPADM

## 2024-11-16 RX ORDER — ACETAMINOPHEN 325 MG/1
650 TABLET ORAL EVERY 4 HOURS PRN
Status: DISCONTINUED | OUTPATIENT
Start: 2024-11-16 | End: 2024-11-20 | Stop reason: HOSPADM

## 2024-11-16 RX ORDER — NALOXONE HCL 0.4 MG/ML
0.02 VIAL (ML) INJECTION
Status: DISCONTINUED | OUTPATIENT
Start: 2024-11-16 | End: 2024-11-20 | Stop reason: HOSPADM

## 2024-11-16 RX ORDER — CEFEPIME HYDROCHLORIDE 2 G/1
2 INJECTION, POWDER, FOR SOLUTION INTRAVENOUS
Status: COMPLETED | OUTPATIENT
Start: 2024-11-16 | End: 2024-11-16

## 2024-11-16 RX ORDER — IBUPROFEN 200 MG
24 TABLET ORAL
Status: DISCONTINUED | OUTPATIENT
Start: 2024-11-16 | End: 2024-11-20 | Stop reason: HOSPADM

## 2024-11-16 RX ORDER — IPRATROPIUM BROMIDE AND ALBUTEROL SULFATE 2.5; .5 MG/3ML; MG/3ML
3 SOLUTION RESPIRATORY (INHALATION)
Status: COMPLETED | OUTPATIENT
Start: 2024-11-16 | End: 2024-11-16

## 2024-11-16 RX ORDER — SODIUM CHLORIDE, SODIUM LACTATE, POTASSIUM CHLORIDE, CALCIUM CHLORIDE 600; 310; 30; 20 MG/100ML; MG/100ML; MG/100ML; MG/100ML
INJECTION, SOLUTION INTRAVENOUS CONTINUOUS
Status: DISCONTINUED | OUTPATIENT
Start: 2024-11-16 | End: 2024-11-19

## 2024-11-16 RX ORDER — SODIUM CHLORIDE 0.9 % (FLUSH) 0.9 %
10 SYRINGE (ML) INJECTION EVERY 12 HOURS PRN
Status: DISCONTINUED | OUTPATIENT
Start: 2024-11-16 | End: 2024-11-20 | Stop reason: HOSPADM

## 2024-11-16 RX ORDER — TALC
6 POWDER (GRAM) TOPICAL NIGHTLY PRN
Status: DISCONTINUED | OUTPATIENT
Start: 2024-11-16 | End: 2024-11-20 | Stop reason: HOSPADM

## 2024-11-16 RX ORDER — IBUPROFEN 200 MG
16 TABLET ORAL
Status: DISCONTINUED | OUTPATIENT
Start: 2024-11-16 | End: 2024-11-20 | Stop reason: HOSPADM

## 2024-11-16 RX ADMIN — IOHEXOL 100 ML: 350 INJECTION, SOLUTION INTRAVENOUS at 09:11

## 2024-11-16 RX ADMIN — FLUTICASONE FUROATE AND VILANTEROL TRIFENATATE 1 PUFF: 200; 25 POWDER RESPIRATORY (INHALATION) at 12:11

## 2024-11-16 RX ADMIN — SODIUM CHLORIDE, POTASSIUM CHLORIDE, SODIUM LACTATE AND CALCIUM CHLORIDE 1000 ML: 600; 310; 30; 20 INJECTION, SOLUTION INTRAVENOUS at 06:11

## 2024-11-16 RX ADMIN — HYDROXYZINE PAMOATE 25 MG: 25 CAPSULE ORAL at 09:11

## 2024-11-16 RX ADMIN — VANCOMYCIN HYDROCHLORIDE 1500 MG: 1.5 INJECTION, POWDER, LYOPHILIZED, FOR SOLUTION INTRAVENOUS at 03:11

## 2024-11-16 RX ADMIN — ENOXAPARIN SODIUM 40 MG: 40 INJECTION SUBCUTANEOUS at 04:11

## 2024-11-16 RX ADMIN — IPRATROPIUM BROMIDE AND ALBUTEROL SULFATE 3 ML: .5; 3 SOLUTION RESPIRATORY (INHALATION) at 02:11

## 2024-11-16 RX ADMIN — SODIUM CHLORIDE, POTASSIUM CHLORIDE, SODIUM LACTATE AND CALCIUM CHLORIDE: 600; 310; 30; 20 INJECTION, SOLUTION INTRAVENOUS at 11:11

## 2024-11-16 RX ADMIN — METHYLPREDNISOLONE SODIUM SUCCINATE 60 MG: 40 INJECTION, POWDER, FOR SOLUTION INTRAMUSCULAR; INTRAVENOUS at 12:11

## 2024-11-16 RX ADMIN — METHYLPREDNISOLONE SODIUM SUCCINATE 125 MG: 125 INJECTION, POWDER, FOR SOLUTION INTRAMUSCULAR; INTRAVENOUS at 02:11

## 2024-11-16 RX ADMIN — TIOTROPIUM BROMIDE INHALATION SPRAY 2 PUFF: 3.12 SPRAY, METERED RESPIRATORY (INHALATION) at 12:11

## 2024-11-16 RX ADMIN — AZITHROMYCIN MONOHYDRATE 500 MG: 500 INJECTION, POWDER, LYOPHILIZED, FOR SOLUTION INTRAVENOUS at 05:11

## 2024-11-16 RX ADMIN — HYDROXYCHLOROQUINE SULFATE 200 MG: 200 TABLET, FILM COATED ORAL at 08:11

## 2024-11-16 RX ADMIN — IPRATROPIUM BROMIDE AND ALBUTEROL SULFATE 3 ML: .5; 3 SOLUTION RESPIRATORY (INHALATION) at 07:11

## 2024-11-16 RX ADMIN — CEFEPIME 2 G: 2 INJECTION, POWDER, FOR SOLUTION INTRAVENOUS at 03:11

## 2024-11-16 RX ADMIN — PIPERACILLIN AND TAZOBACTAM 4.5 G: 4; .5 INJECTION, POWDER, LYOPHILIZED, FOR SOLUTION INTRAVENOUS; PARENTERAL at 11:11

## 2024-11-16 RX ADMIN — PIPERACILLIN AND TAZOBACTAM 4.5 G: 4; .5 INJECTION, POWDER, LYOPHILIZED, FOR SOLUTION INTRAVENOUS; PARENTERAL at 08:11

## 2024-11-16 RX ADMIN — PANTOPRAZOLE SODIUM 40 MG: 40 TABLET, DELAYED RELEASE ORAL at 12:11

## 2024-11-16 RX ADMIN — PIPERACILLIN AND TAZOBACTAM 4.5 G: 4; .5 INJECTION, POWDER, LYOPHILIZED, FOR SOLUTION INTRAVENOUS; PARENTERAL at 06:11

## 2024-11-16 RX ADMIN — HYDROXYZINE PAMOATE 25 MG: 25 CAPSULE ORAL at 06:11

## 2024-11-16 RX ADMIN — SODIUM CHLORIDE, POTASSIUM CHLORIDE, SODIUM LACTATE AND CALCIUM CHLORIDE 1000 ML: 600; 310; 30; 20 INJECTION, SOLUTION INTRAVENOUS at 10:11

## 2024-11-16 NOTE — PROGRESS NOTES
"Pharmacokinetic Initial Assessment: IV Vancomycin    Assessment/Plan:    Initiate intravenous vancomycin with loading dose of 1500 mg once followed by a maintenance dose of vancomycin 750mg IV every 12 hours  Desired empiric serum trough concentration is 10 to 20 mcg/mL  Draw vancomycin trough level 60 min prior to fourth dose on 11/17/24 at approximately 1500  Pharmacy will continue to follow and monitor vancomycin.      Please contact pharmacy at extension [phone] with any questions regarding this assessment.     Thank you for the consult,   Lizzy Devine       Patient brief summary:  Clarence Trejo Sr. is a 68 y.o. male initiated on antimicrobial therapy with IV Vancomycin for treatment of suspected  Pneumonia    Drug Allergies:   Review of patient's allergies indicates:  No Known Allergies    Actual Body Weight:   62.8 kg    Renal Function:   Estimated Creatinine Clearance: 80.5 mL/min (based on SCr of 0.78 mg/dL).,     Dialysis Method (if applicable):  N/A    CBC (last 72 hours):  Recent Labs   Lab Result Units 11/16/24  0250   WBC x10(3)/mcL 10.59   Hgb g/dL 18.7*   Hct % 57.1*   Platelet x10(3)/mcL 230   Mono % % 7.2   Eos % % 0.5   Basophil % % 1.0       Metabolic Panel (last 72 hours):  Recent Labs   Lab Result Units 11/16/24  0250   Sodium mmol/L 141   Potassium mmol/L 3.5   Chloride mmol/L 106   CO2 mmol/L 24   Glucose mg/dL 115   Blood Urea Nitrogen mg/dL 12.1   Creatinine mg/dL 0.78   Albumin g/dL 3.3*   Bilirubin Total mg/dL 0.6   ALP unit/L 94   AST unit/L 27   ALT unit/L 28   Magnesium Level mg/dL 1.90       Drug levels (last 3 results):  No results for input(s): "VANCOMYCINRA", "VANCORANDOM", "VANCOMYCINPE", "VANCOPEAK", "VANCOMYCINTR", "VANCOTROUGH" in the last 72 hours.    Microbiologic Results:  Microbiology Results (last 7 days)       Procedure Component Value Units Date/Time    Respiratory Culture [[id number]]     Order Status: Sent Specimen: Sputum     Blood culture #1 **CANNOT BE " ORDERED STAT** [[id number]] Collected: 11/16/24 0247    Order Status: Sent Specimen: Blood from Hand, Right Updated: 11/16/24 0252    Blood culture #2 **CANNOT BE ORDERED STAT** [[id number]] Collected: 11/16/24 0247    Order Status: Sent Specimen: Blood from Arm, Right Updated: 11/16/24 0252

## 2024-11-16 NOTE — ED PROVIDER NOTES
Encounter Date: 2024       History     Chief Complaint   Patient presents with    Shortness of Breath     Pt in via [facility] c/o SOB when laying down to go to bed. EMS states was 80% on RA and came up to 83% on 4L. Pt refused CPAP en route. Pt anxious on arrival Pt 86% 15L MD and respiratory in room.     Patient presents to the emergency department due to shortness of breath has a history of interstitial lung disease on oxygen at home.  EMS found him on room air satting in the low 80s, only mildly improved on nasal cannula.  He states he is having some burning in his chest with breathing.    The history is provided by the patient.     Review of patient's allergies indicates:  No Known Allergies  Past Medical History:   Diagnosis Date    SAM positive     Antisynthetase syndrome     ILD (interstitial lung disease)     Pulmonary embolism     Seronegative rheumatoid arthritis     TB lung, latent     Tobacco use      No past surgical history on file.  Family History   Problem Relation Name Age of Onset    COPD Mother      COPD Father       Social History     Tobacco Use    Smoking status: Former     Current packs/day: 0.00     Types: Cigarettes     Quit date: 2023     Years since quittin.8    Smokeless tobacco: Never   Substance Use Topics    Alcohol use: Not Currently    Drug use: Never     Review of Systems   Unable to perform ROS: Acuity of condition       Physical Exam     Initial Vitals [24 0220]   BP Pulse Resp Temp SpO2   (!) 160/108 106 (!) 44 97 °F (36.1 °C) (!) 86 %      MAP       --         Physical Exam    Nursing note and vitals reviewed.  Constitutional: He appears well-developed. He appears distressed.   HENT:   Head: Normocephalic and atraumatic.   Eyes: Conjunctivae are normal. Pupils are equal, round, and reactive to light.   Neck: Neck supple.   Normal range of motion.  Cardiovascular:  Normal rate, regular rhythm and normal heart sounds.           Pulmonary/Chest: No respiratory  distress. He has rhonchi.   Abdominal: Abdomen is soft. There is no abdominal tenderness.   Musculoskeletal:         General: No edema. Normal range of motion.      Cervical back: Normal range of motion and neck supple.     Neurological: He is alert and oriented to person, place, and time.   Skin: Skin is warm and dry.         ED Course   Critical Care    Date/Time: 11/16/2024 3:34 AM    Performed by: Lior Nathan MD  Authorized by: Lior Nathan MD  Direct patient critical care time: 16 minutes  Additional history critical care time: 4 minutes  Ordering / reviewing critical care time: 6 minutes  Documentation critical care time: 6 minutes  Consulting other physicians critical care time: 4 minutes  Consult with family critical care time: 0 minutes  Other critical care time: 0 minutes  Total critical care time (exclusive of procedural time) : 36 minutes  Critical care time was exclusive of separately billable procedures and treating other patients and teaching time.  Critical care was necessary to treat or prevent imminent or life-threatening deterioration of the following conditions: respiratory failure.  Critical care was time spent personally by me on the following activities: blood draw for specimens, development of treatment plan with patient or surrogate, discussions with consultants, interpretation of cardiac output measurements, evaluation of patient's response to treatment, ordering and performing treatments and interventions, examination of patient, obtaining history from patient or surrogate, ordering and review of laboratory studies, ordering and review of radiographic studies, pulse oximetry, re-evaluation of patient's condition and review of old charts.        Labs Reviewed   BLOOD GAS - Abnormal       Result Value    Sample Type Arterial Blood      Sample site Right Radial Artery      Drawn by BRIAN      pH, Blood gas 7.440      pCO2, Blood gas 33.0 (*)     pO2, Blood gas 52.0 (*)     TOC2, Blood  gas 23.4      Base Excess, Blood gas -0.80      sO2, Blood gas 83.5 (*)     HCO3, Blood gas 22.4      pAO2 529      A-aDO2 477      THb, Blood gas 18.1 (*)     O2 Hb, Blood Gas 80.3 (*)     CO Hgb 3.2 (*)     Met Hgb 0.6      Allens Test Yes      FIO2, Blood gas 80.0     BLOOD CULTURE [facility]   BLOOD CULTURE [facility]   COMPREHENSIVE METABOLIC PANEL   CBC W/ AUTO DIFFERENTIAL    Narrative:     The following orders were created for panel order CBC auto differential.  Procedure                               Abnormality         Status                     ---------                               -----------         ------                     CBC with Differential[[id number]]                           In process                   Please view results for these tests on the individual orders.   B-TYPE NATRIURETIC PEPTIDE   COVID/RSV/FLU A&B PCR   MAGNESIUM   PROTIME-INR   TROPONIN I   LACTIC ACID, PLASMA   CBC WITH DIFFERENTIAL   EXTRA TUBES    Narrative:     The following orders were created for panel order EXTRA TUBES.  Procedure                               Abnormality         Status                     ---------                               -----------         ------                     Gold Top Hold[[id number]]                                                                Please view results for these tests on the individual orders.   GOLD TOP HOLD     EKG Readings: (Independently Interpreted)   Initial Reading: No STEMI. Rhythm: Sinus Tachycardia. Heart Rate: 102. Ectopy: PACs. Conduction: Normal. Axis: Right Axis Deviation.       Imaging Results              X-Ray Chest AP Portable (In process)                      Medications   ceFEPIme injection 2 g (has no administration in time range)   vancomycin - pharmacy to dose (has no administration in time range)   vancomycin 1,500 mg in D5W 250 mL IVPB (admixture device) (has no administration in time range)   albuterol-ipratropium 2.5 mg-0.5 mg/3 mL nebulizer solution 3 mL (3  mLs Nebulization Given by Other 11/16/24 0228)   methylPREDNISolone sodium succinate injection 125 mg (125 mg Intravenous Given 11/16/24 0223)     Medical Decision Making                                    Clinical Impression:  Final diagnoses:  [R06.02] Shortness of breath  [J96.21] Acute on chronic hypoxic respiratory failure (Primary)  [J18.9] Pneumonia of both lungs due to infectious organism, unspecified part of lung  [J84.9] Interstitial lung disease          ED Disposition Condition    Admit Stable

## 2024-11-16 NOTE — PROGRESS NOTES
Inpatient Nutrition Evaluation    Admit Date: 11/16/2024   Total duration of encounter: 1 day   Patient Age: 68 y.o.    Nutrition Recommendation/Prescription     Continue Regular diet (pt preference-no milk/oatmeal/grits)  Will order ONS--boost breeze tid; Boost Breeze (provides 250 kcal, 9 g protein per serving)   MVI/fe  Biweekly wt  Will monitor nutrition status     Nutrition Assessment     Chart Review    Reason Seen: malnutrition screening tool (MST)    Malnutrition Screening Tool Results   Have you recently lost weight without trying?: Yes: 14-23 lbs  Have you been eating poorly because of a decreased appetite?: No   MST Score: 2   Diagnosis:  SOB/PNA, ILD, SIRS, antisynthetase syndrome, HTN    Relevant Medical History: antisynthetase syndrome, respiratory failure, ILD    Scheduled Medications:  azithromycin, 500 mg, Q24H  enoxparin, 40 mg, Daily  fluticasone furoate-vilanteroL, 1 puff, Daily  hydroxychloroquine, 200 mg, Daily  pantoprazole, 40 mg, Daily  piperacillin-tazobactam (Zosyn) IV (PEDS and ADULTS) (extended infusion is not appropriate), 4.5 g, Q8H  tiotropium bromide, 2 puff, Daily    Continuous Infusions:  lactated ringers, Last Rate: 75 mL/hr at 11/16/24 1147    PRN Medications:   Current Facility-Administered Medications:     acetaminophen, 650 mg, Oral, Q4H PRN    dextrose 10%, 12.5 g, Intravenous, PRN    dextrose 10%, 25 g, Intravenous, PRN    glucagon (human recombinant), 1 mg, Intramuscular, PRN    glucose, 16 g, Oral, PRN    glucose, 24 g, Oral, PRN    melatonin, 6 mg, Oral, Nightly PRN    naloxone, 0.02 mg, Intravenous, PRN    sodium chloride 0.9%, 10 mL, Intravenous, Q12H PRN    Pharmacy to dose Vancomycin consult, , , Once **AND** vancomycin - pharmacy to dose, , Intravenous, pharmacy to manage frequency    Recent Labs   Lab 11/11/24  0901 11/16/24  0250   NA  --  141   K  --  3.5   CALCIUM  --  8.8   MG  --  1.90   CO2  --  24   BUN  --  12.1   CREATININE  --  0.78   EGFRNORACEVR  --  >60  "  GLUCOSE  --  115   BILITOT  --  0.6   ALKPHOS  --  94   ALT  --  28   AST  --  27   ALBUMIN  --  3.3*   WBC 8.95 10.59   HGB 17.5 18.7*   HCT 53.8* 57.1*     Nutrition Orders:  Diet Adult Regular      Appetite/Oral Intake: good/% of meals  Factors Affecting Nutritional Intake: shortness of breath  Social Needs Impacting Access to Food: unable to assess at this time; will attempt on follow-up  Food/Gnosticism/Cultural Preferences:  no oatmeal/grits/milk  Food Allergies: none reported  Last Bowel Movement: 24  Wound(s):  none    Comments    () Received MST for wt loss; unable to speak to pt; per EMR wt hx--no recent wt change over past few months; appears to have lost approx 8-9% wt over past 11 months; not significant but will track wts. Nurse reported pt is eating well; had breakfast plus 2 sandwich trays; appetite good. Will order ONS for added nutrition. Labs acknowledged. Pt is slightly underwt according to BMI/ age criteria.     Anthropometrics    Height: 5' 7" (170.2 cm), Height Method: Stated  Last Weight: 62.8 kg (138 lb 7.2 oz) (24), Weight Method: Standard Scale  BMI (Calculated): 21.7  BMI Classification: underweight (BMI less than 22 if >65 years of age)        Ideal Body Weight (IBW), Male: 148 lb     % Ideal Body Weight, Male (lb): 93.55 %                 Usual Body Weight (UBW), k.9 kg (unable to obtain UBW from pt; per EMR--wt was 152# 2024)  % Usual Body Weight: 91.34     Usual Weight Provided By: EMR weight history    Wt Readings from Last 5 Encounters:   24 62.8 kg (138 lb 7.2 oz)   24 60.2 kg (132 lb 12.8 oz)   10/15/24 58.6 kg (129 lb 3 oz)   10/11/24 57.8 kg (127 lb 6.4 oz)   24 58.8 kg (129 lb 10.1 oz)     Weight Change(s) Since Admission: UBW 2024--152#  Wt Readings from Last 1 Encounters:   24 62.8 kg (138 lb 7.2 oz)   Admit Weight: 62.8 kg (138 lb 7.2 oz) (24), Weight Method: Standard Scale    Patient " Education     Not applicable.    Nutrition Goals & Monitoring     Dietitian will monitor: food and beverage intake, weight, and glucose/endocrine profile  Discharge planning: continue regular diet with boost breeze oral supplements  Nutrition Risk/Follow-Up: low (follow-up in 5-7 days)  Patients assigned 'low nutrition risk' status do not qualify for a full nutritional assessment but will be monitored and re-evaluated in a 5-7 day time period. Please consult if re-evaluation needed sooner.

## 2024-11-16 NOTE — H&P
[facility] Medicine Wards History and Physical Note      Resident Team: [facility] Medicine List 4  Attending Physician: Fabian Blake MD  Resident: Roe  Intern: Alcides     Date of Admit: 11/16/2024  Chief Complaint   Shortness of Breath (Pt in via [facility] c/o SOB when laying down to go to bed. EMS states was 80% on RA and came up to 83% on 4L. Pt refused CPAP en route. Pt anxious on arrival Pt 86% 15L MD and respiratory in room.)     OMI Trejo Sr. is a 68 y.o. male with PMH significant for antisynthetase syndrome, ILD, chronic hypoxic respiratory failure on 3L NC at home  presented to ED on 11/16/2024  with a primary complaint of Shortness of Breath (Pt in via [facility] c/o SOB when laying down to go to bed. EMS states was 80% on RA and came up to 83% on 4L. Pt refused CPAP en route. Pt anxious on arrival Pt 86% 15L MD and respiratory in room.)  Pt states he was feeling good earlier this evening. When he tried to go to sleep states he felt short of breath. Pt called EMS because he started having some burning in his chest. When EMS arrived pt was sating in the low 80s with mild improvement on nasal canula. Pt admits to using his inhaler earlier today but has been out of his neb machine solution, has also been out of another one of his medications but doesn't know which one. Shortness of breath has improved since arrival to the ED. Admits to increased sputum production. Denied any associated fevers, chills, N/V.     ED Course - Vital signs at admission AF, tachycardic 106, hypertensive 160/108, O2 sat 86% on 15L non-rebreather. Lab work showed no leukocytosis, baseline polycythemia, elevated .3, and elevated lactic acid 2.6. Chest X-ray appeared hazy bilaterally. Blood cultures drawn; pt given dose of Vanc and a Duoneb treatment. Internal Medicine consulted for admission.     History    PMH:  Past Medical History:   Diagnosis Date    SAM positive     Antisynthetase syndrome     ILD (interstitial lung disease)      Pulmonary embolism     Seronegative rheumatoid arthritis     TB lung, latent     Tobacco use      Past Surgical History: No past surgical history on file.    Family History:   Family History   Problem Relation Name Age of Onset    COPD Mother      COPD Father       Social:   Social History     Tobacco Use    Smoking status: Former     Current packs/day: 0.00     Types: Cigarettes     Quit date: 2023     Years since quittin.8    Smokeless tobacco: Never   Substance Use Topics    Alcohol use: Not Currently    Drug use: Never     Allergies: Review of patient's allergies indicates:  No Known Allergies    Home Medications   Prior to Admission medications    Medication Sig Start Date End Date Taking? Authorizing Provider   albuterol (PROVENTIL/VENTOLIN HFA) 90 mcg/actuation inhaler Inhale 1-2 puffs into the lungs every 6 (six) hours as needed for Wheezing. Rescue 23   Yara Spear MD   albuterol-ipratropium (DUO-NEB) 2.5 mg-0.5 mg/3 mL nebulizer solution Take 3 mLs by nebulization every 6 (six) hours as needed for Wheezing. Rescue  Patient not taking: Reported on 2024  Reagan Cordero DO   baclofen (LIORESAL) 10 MG tablet Take 10 mg by mouth 2 (two) times daily.  Patient not taking: Reported on 2024   Provider, Historical   budesonide-formoterol 80-4.5 mcg (SYMBICORT) 80-4.5 mcg/actuation HFAA Inhale 2 puffs into the lungs 2 (two) times a day. 24  Reagan Cordero DO   hydroxychloroquine (PLAQUENIL) 200 mg tablet Take 1 tablet (200 mg total) by mouth once daily. 24   Dixie Caraballo MD   lisinopriL (PRINIVIL,ZESTRIL) 5 MG tablet Take 1 tablet (5 mg total) by mouth once daily.  Patient not taking: Reported on 2024  Yara Spear MD   meloxicam (MOBIC) 7.5 MG tablet Take 1 tablet (7.5 mg total) by mouth 2 (two) times daily. 24   Diixe Caraballo MD   nintedanib (OFEV) 150 mg Cap Take 1 capsule (150 mg total) by mouth 2 (two) times daily.  "24   Dixie Caraballo MD   omeprazole (PRILOSEC) 20 MG capsule Take 1 capsule (20 mg total) by mouth once daily.  Patient not taking: Reported on 2024  Yara Spear MD   tadalafiL (CIALIS) 5 MG tablet Take 1 tablet (5 mg total) by mouth daily as needed for Erectile Dysfunction. 24  Yara Spear MD       Review of Systems   Review of Systems   Constitutional:  Positive for malaise/fatigue. Negative for chills and fever.   Respiratory:  Positive for cough, sputum production and shortness of breath.    Cardiovascular:  Negative for palpitations.   Gastrointestinal:  Negative for abdominal pain, nausea and vomiting.   Neurological:  Negative for weakness and headaches.      Vital Signs    BP  Min: 135/103  Max: 160/108  Temp  Av °F (36.1 °C)  Min: 97 °F (36.1 °C)  Max: 97 °F (36.1 °C)  Pulse  Av.5  Min: 97  Max: 106  Resp  Av.8  Min: 24  Max: 44  SpO2  Av.1 %  Min: 80 %  Max: 98 %  Height  Av' 7" (170.2 cm)  Min: 5' 7" (170.2 cm)  Max: 5' 7" (170.2 cm)  Weight  Av.8 kg (138 lb 7.2 oz)  Min: 62.8 kg (138 lb 7.2 oz)  Max: 62.8 kg (138 lb 7.2 oz)  Body mass index is 21.68 kg/m².  No intake/output data recorded.    Physical Exam    Physical Exam  Vitals and nursing note reviewed.   Constitutional:       Appearance: He is ill-appearing.      Comments: Cachetic    HENT:      Head: Normocephalic.      Comments: Non-rebreather mask in place     Nose: No congestion.      Mouth/Throat:      Mouth: Mucous membranes are dry.   Eyes:      Extraocular Movements: Extraocular movements intact.      Pupils: Pupils are equal, round, and reactive to light.   Cardiovascular:      Rate and Rhythm: Regular rhythm. Tachycardia present.   Pulmonary:      Effort: Pulmonary effort is normal.      Comments: Decreased breath sounds bilaterally. No wheezing or crackles heard. Rhonchi present bilaterally  Abdominal:      Palpations: Abdomen is soft.      Tenderness: There is no " abdominal tenderness. There is no guarding.   Musculoskeletal:      Cervical back: Normal range of motion. No rigidity.      Right lower leg: No edema.      Left lower leg: No edema.   Neurological:      Mental Status: He is alert.       Labs    Most Recent Data:  CBC:   Lab Results   Component Value Date    WBC 10.59 11/16/2024    HGB 18.7 (H) 11/16/2024    HCT 57.1 (H) 11/16/2024     11/16/2024    MCV 88.5 11/16/2024    RDW 16.0 11/16/2024     WBC Differential:   Recent Labs   Lab 11/11/24  0901 11/16/24 0250   WBC 8.95 10.59   HGB 17.5 18.7*   HCT 53.8* 57.1*    230   MCV 88.1 88.5     BMP:   Lab Results   Component Value Date     11/16/2024    K 3.5 11/16/2024     11/16/2024    CO2 24 11/16/2024    BUN 12.1 11/16/2024    CREATININE 0.78 11/16/2024    CALCIUM 8.8 11/16/2024    MG 1.90 11/16/2024    PHOS 3.1 04/11/2023     LFTs:   Lab Results   Component Value Date    ALBUMIN 3.3 (L) 11/16/2024    BILITOT 0.6 11/16/2024    AST 27 11/16/2024    ALKPHOS 94 11/16/2024    ALT 28 11/16/2024     Coags:   Lab Results   Component Value Date    INR 1.1 11/16/2024     DM:   Lab Results   Component Value Date    HGBA1C 5.9 04/20/2021    CREATININE 0.78 11/16/2024     Cardiac:   Lab Results   Component Value Date    TROPONINI <0.010 11/16/2024    .3 (H) 11/16/2024     Microbiology Data:  Microbiology Results (last 7 days)       Procedure Component Value Units Date/Time    Respiratory Culture [[id number]]     Order Status: Sent Specimen: Sputum     Blood culture #1 **CANNOT BE ORDERED STAT** [[id number]] Collected: 11/16/24 0247    Order Status: Sent Specimen: Blood from Hand, Right Updated: 11/16/24 0252    Blood culture #2 **CANNOT BE ORDERED STAT** [[id number]] Collected: 11/16/24 0247    Order Status: Sent Specimen: Blood from Arm, Right Updated: 11/16/24 0252          Imaging      Imaging Results              X-Ray Chest AP Portable (In process)                      Assessment and Plan      Shortness of breath, likely 2/2 CAP superimposed on ILD  SIRS 2/4  H/O Anti-synthetase syndrome  -Increasing shortness of breath for past few hours  -Admits to compliance of plaquenil, been out of inhalers for the past week  -Desaturating to the low 80s on RA.  -Home regimen: plaquenil, OFEV since 11/202 and daily Symbicort. Has been out of Duoneb solution for the past few days.   -Tachycardic to 106, tachypneic 44, no leukocytosis  -Lactate 2.6; repeat ordered  -Given solumedrol 125 in the ED  -Will resume home Plaquenil. Hold OFEV due to non formulary.   -Chest Xray with diffuse haziness present bilaterally and patchy consolidations.   -Newly elevated .3. H/O pulmonary artery htn seen on echo from 4/2023 - did not follow up with cardiology. Repeat echo ordered.   -Blood cultures pending, respiratory culture ordered  -Given Vanc and Cefepime in ED with change to Vancomycin, zosyn, and azithromycin  -MRSA PCR ordered, will d/c vanc if negative  -Duonebs ordered q6h  -IS ordered  -Consider starting solumedrol 80mg tid for severe disease  -Wean supplemental O2 as tolerated; O2 sat goal 90-95%     HTN  -Pt states he was not taking home lisinopril 5mg  -Monitor BP readings at this time and can resume home medication if needed      CODE STATUS: Full code  Access: pIV  Antibiotics: Vanc, Zosyn, Azithro  Diet: Regular  DVT Prophylaxis: Lovenox      Disposition: Pt admitted for shortness of breath requiring additional supplemental O2; likely 2/2 CAP. Newly elevated BNP on admission with HTN of pulmonary HTN; updated echo ordered.       Vincent Au MD  [facility]

## 2024-11-17 LAB
BASOPHILS # BLD AUTO: 0.03 X10(3)/MCL
BASOPHILS NFR BLD AUTO: 0.2 %
EOSINOPHIL # BLD AUTO: 0 X10(3)/MCL (ref 0–0.9)
EOSINOPHIL NFR BLD AUTO: 0 %
ERYTHROCYTE [DISTWIDTH] IN BLOOD BY AUTOMATED COUNT: 15.1 % (ref 11.5–17)
HCT VFR BLD AUTO: 46.6 % (ref 42–52)
HGB BLD-MCNC: 14.9 G/DL (ref 14–18)
HOLD SPECIMEN: NORMAL
IMM GRANULOCYTES # BLD AUTO: 0.08 X10(3)/MCL (ref 0–0.04)
IMM GRANULOCYTES NFR BLD AUTO: 0.6 %
LACTATE SERPL-SCNC: 1.8 MMOL/L (ref 0.5–2.2)
LYMPHOCYTES # BLD AUTO: 1.38 X10(3)/MCL (ref 0.6–4.6)
LYMPHOCYTES NFR BLD AUTO: 9.9 %
MCH RBC QN AUTO: 28 PG (ref 27–31)
MCHC RBC AUTO-ENTMCNC: 32 G/DL (ref 33–36)
MCV RBC AUTO: 87.6 FL (ref 80–94)
MONOCYTES # BLD AUTO: 1.1 X10(3)/MCL (ref 0.1–1.3)
MONOCYTES NFR BLD AUTO: 7.9 %
NEUTROPHILS # BLD AUTO: 11.32 X10(3)/MCL (ref 2.1–9.2)
NEUTROPHILS NFR BLD AUTO: 81.4 %
NRBC BLD AUTO-RTO: 0 %
PLATELET # BLD AUTO: 192 X10(3)/MCL (ref 130–400)
PMV BLD AUTO: 10.8 FL (ref 7.4–10.4)
RBC # BLD AUTO: 5.32 X10(6)/MCL (ref 4.7–6.1)
VANCOMYCIN TROUGH SERPL-MCNC: 3.6 UG/ML (ref 15–20)
WBC # BLD AUTO: 13.91 X10(3)/MCL (ref 4.5–11.5)

## 2024-11-17 PROCEDURE — 25000003 PHARM REV CODE 250

## 2024-11-17 PROCEDURE — 83605 ASSAY OF LACTIC ACID: CPT

## 2024-11-17 PROCEDURE — 21400001 HC TELEMETRY ROOM

## 2024-11-17 PROCEDURE — 36415 COLL VENOUS BLD VENIPUNCTURE: CPT | Performed by: STUDENT IN AN ORGANIZED HEALTH CARE EDUCATION/TRAINING PROGRAM

## 2024-11-17 PROCEDURE — 36415 COLL VENOUS BLD VENIPUNCTURE: CPT

## 2024-11-17 PROCEDURE — 63600175 PHARM REV CODE 636 W HCPCS

## 2024-11-17 PROCEDURE — 11000001 HC ACUTE MED/SURG PRIVATE ROOM

## 2024-11-17 PROCEDURE — 94640 AIRWAY INHALATION TREATMENT: CPT

## 2024-11-17 PROCEDURE — 94799 UNLISTED PULMONARY SVC/PX: CPT

## 2024-11-17 PROCEDURE — 99900035 HC TECH TIME PER 15 MIN (STAT)

## 2024-11-17 PROCEDURE — 80202 ASSAY OF VANCOMYCIN: CPT

## 2024-11-17 PROCEDURE — 94761 N-INVAS EAR/PLS OXIMETRY MLT: CPT

## 2024-11-17 PROCEDURE — 85025 COMPLETE CBC W/AUTO DIFF WBC: CPT

## 2024-11-17 PROCEDURE — 27000221 HC OXYGEN, UP TO 24 HOURS

## 2024-11-17 RX ORDER — FUROSEMIDE 10 MG/ML
40 INJECTION INTRAMUSCULAR; INTRAVENOUS ONCE
Status: COMPLETED | OUTPATIENT
Start: 2024-11-17 | End: 2024-11-17

## 2024-11-17 RX ORDER — TALC
3 POWDER (GRAM) TOPICAL ONCE
Status: COMPLETED | OUTPATIENT
Start: 2024-11-18 | End: 2024-11-17

## 2024-11-17 RX ADMIN — PIPERACILLIN AND TAZOBACTAM 4.5 G: 4; .5 INJECTION, POWDER, LYOPHILIZED, FOR SOLUTION INTRAVENOUS; PARENTERAL at 11:11

## 2024-11-17 RX ADMIN — METHYLPREDNISOLONE SODIUM SUCCINATE 60 MG: 40 INJECTION, POWDER, FOR SOLUTION INTRAMUSCULAR; INTRAVENOUS at 08:11

## 2024-11-17 RX ADMIN — Medication 6 MG: at 09:11

## 2024-11-17 RX ADMIN — METHYLPREDNISOLONE SODIUM SUCCINATE 60 MG: 40 INJECTION, POWDER, FOR SOLUTION INTRAMUSCULAR; INTRAVENOUS at 02:11

## 2024-11-17 RX ADMIN — FUROSEMIDE 40 MG: 10 INJECTION, SOLUTION INTRAVENOUS at 08:11

## 2024-11-17 RX ADMIN — AZITHROMYCIN MONOHYDRATE 500 MG: 500 INJECTION, POWDER, LYOPHILIZED, FOR SOLUTION INTRAVENOUS at 04:11

## 2024-11-17 RX ADMIN — PIPERACILLIN AND TAZOBACTAM 4.5 G: 4; .5 INJECTION, POWDER, LYOPHILIZED, FOR SOLUTION INTRAVENOUS; PARENTERAL at 03:11

## 2024-11-17 RX ADMIN — FLUTICASONE FUROATE AND VILANTEROL TRIFENATATE 1 PUFF: 200; 25 POWDER RESPIRATORY (INHALATION) at 07:11

## 2024-11-17 RX ADMIN — PIPERACILLIN AND TAZOBACTAM 4.5 G: 4; .5 INJECTION, POWDER, LYOPHILIZED, FOR SOLUTION INTRAVENOUS; PARENTERAL at 08:11

## 2024-11-17 RX ADMIN — Medication 3 MG: at 11:11

## 2024-11-17 RX ADMIN — ENOXAPARIN SODIUM 40 MG: 40 INJECTION SUBCUTANEOUS at 04:11

## 2024-11-17 RX ADMIN — HYDROXYCHLOROQUINE SULFATE 200 MG: 200 TABLET, FILM COATED ORAL at 08:11

## 2024-11-17 RX ADMIN — TIOTROPIUM BROMIDE INHALATION SPRAY 2 PUFF: 3.12 SPRAY, METERED RESPIRATORY (INHALATION) at 07:11

## 2024-11-17 RX ADMIN — PANTOPRAZOLE SODIUM 40 MG: 40 TABLET, DELAYED RELEASE ORAL at 08:11

## 2024-11-17 NOTE — PROGRESS NOTES
[facility] Internal Medicine Progress Note    Subjective:      Clarence Trejo Sr. is a 68 y.o. male with PMH significant for antisynthetase syndrome, ILD, chronic hypoxic respiratory failure on 3L NC at home  presented to ED on 2024  with a primary complaint of Shortness of Breath (Pt in via [facility] c/o SOB when laying down to go to bed. EMS states was 80% on RA and came up to 83% on 4L. Pt refused CPAP en route. Pt anxious on arrival Pt 86% 15L MD and respiratory in room.)  Pt states he was feeling good earlier this evening. When he tried to go to sleep states he felt short of breath. Pt called EMS because he started having some burning in his chest. When EMS arrived pt was sating in the low 80s with mild improvement on nasal canula. Pt admits to using his inhaler earlier today but has been out of his neb machine solution, has also been out of another one of his medications but doesn't know which one. Shortness of breath has improved since arrival to the ED. Admits to increased sputum production. Denied any associated fevers, chills, N/V.      ED Course - Vital signs at admission AF, tachycardic 106, hypertensive 160/108, O2 sat 86% on 15L non-rebreather. Lab work showed no leukocytosis, baseline polycythemia, elevated .3, and elevated lactic acid 2.6. Chest X-ray appeared hazy bilaterally. Blood cultures drawn; pt given dose of Vanc and a Duoneb treatment. Internal Medicine consulted for admission.     24hr interval   Patient was Solu-Medrol yesterday.  Seems to be no improvement in patient's oxygen requirements.  Currently still on 6 L NC.  Lactic acid is cleared.  Start patient on triple inhaler therapy.  He denies fevers, headache, chest pain, SOB, N/V/D and abdominal pain.  Continues to endorse shortness of breath but much improved from when he initially was admitted.    Objective:   Last 24 Hour Vital Signs:  BP  Min: 122/81  Max: 138/96  Temp  Av.3 °F (36.3 °C)  Min: 96.3 °F (35.7 °C)  Max: 98 °F  (36.7 °C)  Pulse  Av.8  Min: 92  Max: 103  Resp  Av.1  Min: 18  Max: 19  SpO2  Av.4 %  Min: 84 %  Max: 100 %  I/O last 3 completed shifts:  In: 3113.8 [P.O.:340; I.V.:189.9; IV Piggyback:2583.8]  Out: 740 [Urine:740]    Physical Examination:  Physical Examination:  Vitals:   Vitals:    24 0443   BP: (!) 138/96   Pulse: 92   Resp: 18   Temp: 98 °F (36.7 °C)       Physical Exam  Vitals and nursing note reviewed.   Constitutional:       Appearance: He is ill-appearing.      Comments: Cachetic    HENT:      Head: Normocephalic.      Comments: Non-rebreather mask in place     Nose: No congestion.      Mouth/Throat:      Mouth: Mucous membranes are dry.   Eyes:      Extraocular Movements: Extraocular movements intact.      Pupils: Pupils are equal, round, and reactive to light.   Cardiovascular:      Rate and Rhythm: Regular rhythm. Tachycardia present.   Pulmonary:      Effort: Pulmonary effort is normal.      Comments: Decreased breath sounds bilaterally.  Bilateral upper and lower lobes crackles heard. Rhonchi present bilaterally  Abdominal:      Palpations: Abdomen is soft.      Tenderness: There is no abdominal tenderness. There is no guarding.   Musculoskeletal:      Cervical back: Normal range of motion. No rigidity.      Right lower leg: No edema.      Left lower leg: No edema.   Neurological:      Mental Status: He is alert.     Laboratory:    Recent Labs   Lab 24  0901 24  0250 24  0409   WBC 8.95 10.59 13.91*   HGB 17.5 18.7* 14.9   HCT 53.8* 57.1* 46.6    230 192   MCV 88.1 88.5 87.6   RDW 16.1 16.0 15.1   NA  --  141  --    K  --  3.5  --    CL  --  106  --    CO2  --  24  --    BUN  --  12.1  --    CREATININE  --  0.78  --    ALBUMIN  --  3.3*  --    BILITOT  --  0.6  --    AST  --  27  --    ALKPHOS  --  94  --    ALT  --  28  --        Coags:   Lab Results   Component Value Date    INR 1.1 2024     FLP:   Lab Results   Component Value Date    CHOL 110  "07/15/2021    HDL 33 (L) 07/15/2021    TRIG 104 07/15/2021     DM:   Lab Results   Component Value Date    HGBA1C 5.9 04/20/2021    CREATININE 0.78 11/16/2024     Thyroid:   Lab Results   Component Value Date    TSH 0.7013 11/19/2021     Anemia: No results found for: "IRON", "TIBC", "FERRITIN", "FDBJZWVY96", "FOLATE"  Cardiac:   Lab Results   Component Value Date    TROPONINI <0.010 11/16/2024    .3 (H) 11/16/2024     Pulm:   No results found for: "PO2ART", "FIO2", "PEEP"   Urinalysis:   Lab Results   Component Value Date    COLORU Yellow 01/29/2024    NITRITE Negative 01/29/2024    KETONESU Negative 11/18/2021    UROBILINOGEN 1+ (A) 01/29/2024    WBCUA 0-5 01/29/2024       Trended Cardiac Data:  Recent Labs   Lab 11/16/24  0250   TROPONINI <0.010   .3*         Other Results:      Radiology:  Imaging Results              X-Ray Chest AP Portable (Final result)  Result time 11/16/24 09:14:36      Final result by Neftaly Grimaldo MD (11/16/24 09:14:36)                   Impression:      Extensive lungs chronic interstitial disease.      Electronically signed by: Neftaly Grimaldo  Date:    11/16/2024  Time:    09:14               Narrative:    EXAMINATION:  XR CHEST AP PORTABLE    CLINICAL HISTORY:  Shortness of breath;    TECHNIQUE:  One view    COMPARISON:  May 7, 2023.    FINDINGS:  Cardiopericardial silhouette is within normal limits.  Lungs are remarkable for severe emphysematous changes combined with fibrosis.  Overall appearance is similar compared to the previous radiographs.  Difficult to entirely exclude the possibility of superimposed mild congestive process.  There is no dense consolidation focally or segmentally.  No significant fluid within the pleural space.  No pneumothorax.                                      Current Medications:     Infusions:   lactated ringers   Intravenous Continuous 75 mL/hr at 11/17/24 0605 Rate Verify at 11/17/24 0605        Scheduled:   azithromycin  500 mg " Intravenous Q24H    enoxparin  40 mg Subcutaneous Daily    fluticasone furoate-vilanteroL  1 puff Inhalation Daily    hydroxychloroquine  200 mg Oral Daily    pantoprazole  40 mg Oral Daily    piperacillin-tazobactam (Zosyn) IV (PEDS and ADULTS) (extended infusion is not appropriate)  4.5 g Intravenous Q8H    tiotropium bromide  2 puff Inhalation Daily        PRN:    Current Facility-Administered Medications:     acetaminophen, 650 mg, Oral, Q4H PRN    dextrose 10%, 12.5 g, Intravenous, PRN    dextrose 10%, 25 g, Intravenous, PRN    glucagon (human recombinant), 1 mg, Intramuscular, PRN    glucose, 16 g, Oral, PRN    glucose, 24 g, Oral, PRN    melatonin, 6 mg, Oral, Nightly PRN    naloxone, 0.02 mg, Intravenous, PRN    sodium chloride 0.9%, 10 mL, Intravenous, Q12H PRN    Pharmacy to dose Vancomycin consult, , , Once **AND** vancomycin - pharmacy to dose, , Intravenous, pharmacy to manage frequency      Assessment:     Clarence KINZA Trejo Sr. is a 68 y.o.male with  Patient Active Problem List    Diagnosis Date Noted    Secondary erythrocytosis 10/11/2024    Nocturnal oxygen desaturation 10/11/2024    Chronic hypoxic respiratory failure 10/11/2024    Antinuclear antibody (SAM) titer greater than 1:80 10/11/2024    Erythrocytosis due to hypoxemia 01/11/2024    Pulmonary emphysema with fibrosis of lung 01/11/2024    Shortness of breath 11/23/2022    History of tobacco abuse 11/23/2022    TB lung, latent 11/23/2022    Seronegative rheumatoid arthritis 09/21/2022    Fatigue 09/21/2022    High risk medication use 09/21/2022    Clubbing of fingers 06/14/2022    Antisynthetase syndrome 06/14/2022    Interstitial lung disease 06/14/2022    Positive antinuclear antibody 06/14/2022        Plan:     Shortness of breath, likely 2/2 CAP superimposed on ILD  SIRS 2/4  H/O Anti-synthetase syndrome  -Increasing shortness of breath for past few hours  -Admits to compliance of plaquenil, been out of inhalers for the past  week  -Desaturating to the low 80s on RA.  -Home regimen: plaquenil, OFEV since 11/202 and daily Symbicort. Has been out of Duoneb solution for the past few days.   -Tachycardic to 106, tachypneic 44, no leukocytosis  -Lactate 2.6; downtrended since  -Given solumedrol 125 in the ED  -Will resume home Plaquenil. Hold OFEV due to non formulary.   -Chest Xray with diffuse haziness present bilaterally and patchy consolidations.  X-ray seems exactly the same as previous x-rays  -Newly elevated .3. H/O pulmonary artery htn seen on echo from 4/2023 - did not follow up with cardiology. Repeat echo ordered.   -Blood cultures pending, respiratory culture ordered  -Given Vanc and Cefepime in ED with change to Vancomycin, zosyn, and azithromycin  -MRSA PCR ordered, will d/c vanc if negative  -Duonebs ordered q6h  -IS ordered  -start tiotropium, Breo Ellipta  -Consider starting solumedrol 80mg tid for severe disease  -Wean supplemental O2 as tolerated; O2 sat goal 90-95%     HTN  -Pt states he was not taking home lisinopril 5mg  -Monitor BP readings at this time and can resume home medication if needed    CODE STATUS: Full code  Access: pIV  Antibiotics: Vanc, Zosyn, Azithro  Diet: Regular  DVT Prophylaxis: Lovenox    Yara Spear M.D  [facility] Internal Medicine PGY-2  11/17/2024

## 2024-11-17 NOTE — CONSULTS
Referral submitted to [facility] via [employer] fax & careport.

## 2024-11-18 LAB
ALBUMIN SERPL-MCNC: 2.6 G/DL (ref 3.4–4.8)
ALBUMIN/GLOB SERPL: 0.8 RATIO (ref 1.1–2)
ALLENS TEST BLOOD GAS (OHS): YES
ALP SERPL-CCNC: 65 UNIT/L (ref 40–150)
ALT SERPL-CCNC: 15 UNIT/L (ref 0–55)
ANION GAP SERPL CALC-SCNC: 8 MEQ/L
APICAL FOUR CHAMBER EJECTION FRACTION: 64 %
APICAL TWO CHAMBER EJECTION FRACTION: 60 %
AST SERPL-CCNC: 14 UNIT/L (ref 5–34)
AV INDEX (PROSTH): 0.58
AV MEAN GRADIENT: 4.8 MMHG
AV PEAK GRADIENT: 9 MMHG
AV VALVE AREA BY VELOCITY RATIO: 1.8 CM²
AV VALVE AREA: 2 CM²
AV VELOCITY RATIO: 0.53
BASE EXCESS BLD CALC-SCNC: 0.5 MMOL/L (ref -2–2)
BASOPHILS # BLD AUTO: 0.01 X10(3)/MCL
BASOPHILS NFR BLD AUTO: 0.1 %
BILIRUB SERPL-MCNC: 0.4 MG/DL
BLOOD GAS SAMPLE TYPE (OHS): ABNORMAL
BSA FOR ECHO PROCEDURE: 1.72 M2
BUN SERPL-MCNC: 14.1 MG/DL (ref 8.4–25.7)
CALCIUM SERPL-MCNC: 8.4 MG/DL (ref 8.8–10)
CHLORIDE SERPL-SCNC: 108 MMOL/L (ref 98–107)
CO2 BLDA-SCNC: 26.7 MMOL/L (ref 22–26)
CO2 SERPL-SCNC: 24 MMOL/L (ref 23–31)
COHGB MFR BLDA: 2.1 % (ref 0.5–1.5)
CREAT SERPL-MCNC: 0.73 MG/DL (ref 0.72–1.25)
CREAT/UREA NIT SERPL: 19
CV ECHO LV RWT: 0.55 CM
DOP CALC AO PEAK VEL: 1.5 M/S
DOP CALC AO VTI: 23.3 CM
DOP CALC LVOT AREA: 3.5 CM2
DOP CALC LVOT DIAMETER: 2.1 CM
DOP CALC LVOT PEAK VEL: 0.8 M/S
DOP CALC LVOT STROKE VOLUME: 46.4 CM3
DOP CALC MV VTI: 22.3 CM
DOP CALCLVOT PEAK VEL VTI: 13.4 CM
DRAWN BY BLOOD GAS (OHS): ABNORMAL
E WAVE DECELERATION TIME: 207.85 MSEC
E/A RATIO: 0.74
E/E' RATIO: 11.08 M/S
ECHO LV POSTERIOR WALL: 1.2 CM (ref 0.6–1.1)
EOSINOPHIL # BLD AUTO: 0 X10(3)/MCL (ref 0–0.9)
EOSINOPHIL NFR BLD AUTO: 0 %
ERYTHROCYTE [DISTWIDTH] IN BLOOD BY AUTOMATED COUNT: 15 % (ref 11.5–17)
FRACTIONAL SHORTENING: 29.5 % (ref 28–44)
GFR SERPLBLD CREATININE-BSD FMLA CKD-EPI: >60 ML/MIN/1.73/M2
GLOBULIN SER-MCNC: 3.1 GM/DL (ref 2.4–3.5)
GLUCOSE SERPL-MCNC: 152 MG/DL (ref 82–115)
HCO3 BLDA-SCNC: 25.4 MMOL/L (ref 22–26)
HCT VFR BLD AUTO: 44.7 % (ref 42–52)
HGB BLD-MCNC: 14.6 G/DL (ref 14–18)
HOLD SPECIMEN: NORMAL
HR MV ECHO: 92 BPM
IMM GRANULOCYTES # BLD AUTO: 0.09 X10(3)/MCL (ref 0–0.04)
IMM GRANULOCYTES NFR BLD AUTO: 0.7 %
INTERVENTRICULAR SEPTUM: 1.3 CM (ref 0.6–1.1)
LEFT ATRIUM SIZE: 3.68 CM
LEFT ATRIUM VOLUME INDEX MOD: 30.1 ML/M2
LEFT ATRIUM VOLUME MOD: 52 ML
LEFT INTERNAL DIMENSION IN SYSTOLE: 3.1 CM (ref 2.1–4)
LEFT VENTRICLE DIASTOLIC VOLUME INDEX: 49.79 ML/M2
LEFT VENTRICLE DIASTOLIC VOLUME: 86.13 ML
LEFT VENTRICLE END DIASTOLIC VOLUME APICAL 2 CHAMBER: 97.56 ML
LEFT VENTRICLE END DIASTOLIC VOLUME APICAL 4 CHAMBER: 53.02 ML
LEFT VENTRICLE MASS INDEX: 117.4 G/M2
LEFT VENTRICLE SYSTOLIC VOLUME INDEX: 21.2 ML/M2
LEFT VENTRICLE SYSTOLIC VOLUME: 36.74 ML
LEFT VENTRICULAR INTERNAL DIMENSION IN DIASTOLE: 4.4 CM (ref 3.5–6)
LEFT VENTRICULAR MASS: 203 G
LV LATERAL E/E' RATIO: 9 M/S
LV SEPTAL E/E' RATIO: 14.4 M/S
LVED V (TEICH): 86.13 ML
LVES V (TEICH): 36.74 ML
LVOT MG: 1.05 MMHG
LVOT MV: 0.46 CM/S
LYMPHOCYTES # BLD AUTO: 0.77 X10(3)/MCL (ref 0.6–4.6)
LYMPHOCYTES NFR BLD AUTO: 5.8 %
MCH RBC QN AUTO: 28.3 PG (ref 27–31)
MCHC RBC AUTO-ENTMCNC: 32.7 G/DL (ref 33–36)
MCV RBC AUTO: 86.8 FL (ref 80–94)
METHGB MFR BLDA: 0.3 % (ref 0–1.5)
MONOCYTES # BLD AUTO: 0.49 X10(3)/MCL (ref 0.1–1.3)
MONOCYTES NFR BLD AUTO: 3.7 %
MV MEAN GRADIENT: 2 MMHG
MV PEAK A VEL: 0.97 M/S
MV PEAK E VEL: 0.72 M/S
MV PEAK GRADIENT: 4 MMHG
MV STENOSIS PRESSURE HALF TIME: 60.28 MS
MV VALVE AREA BY CONTINUITY EQUATION: 2.08 CM2
MV VALVE AREA P 1/2 METHOD: 3.65 CM2
NEUTROPHILS # BLD AUTO: 11.84 X10(3)/MCL (ref 2.1–9.2)
NEUTROPHILS NFR BLD AUTO: 89.7 %
NRBC BLD AUTO-RTO: 0 %
O2 HB BLOOD GAS (OHS): 92.5 % (ref 94–100)
OHS LV EJECTION FRACTION SIMPSONS BIPLANE MOD: 62 %
OHS QRS DURATION: 66 MS
OHS QTC CALCULATION: 487 MS
OXYHGB MFR BLDA: 15.5 G/DL (ref 12–18)
PCO2 BLDA: 41 MMHG (ref 35–45)
PH BLDA: 7.4 [PH] (ref 7.35–7.45)
PISA TR MAX VEL: 3.27 M/S
PLATELET # BLD AUTO: 203 X10(3)/MCL (ref 130–400)
PMV BLD AUTO: 10.9 FL (ref 7.4–10.4)
PO2 BLDA: 73 MMHG (ref 75–100)
POTASSIUM SERPL-SCNC: 4.4 MMOL/L (ref 3.5–5.1)
PROT SERPL-MCNC: 5.7 GM/DL (ref 5.8–7.6)
RA PRESSURE ESTIMATED: 15 MMHG
RBC # BLD AUTO: 5.15 X10(6)/MCL (ref 4.7–6.1)
RIGHT VENTRICULAR END-DIASTOLIC DIMENSION: 2.57 CM
RV TB RVSP: 18 MMHG
SAMPLE SITE BLOOD GAS (OHS): ABNORMAL
SAO2 % BLDA: 94.8 %
SINUS: 3.1 CM
SODIUM SERPL-SCNC: 140 MMOL/L (ref 136–145)
TDI LATERAL: 0.08 M/S
TDI SEPTAL: 0.05 M/S
TDI: 0.07 M/S
TR MAX PG: 43 MMHG
TRICUSPID ANNULAR PLANE SYSTOLIC EXCURSION: 2.02 CM
TROPONIN I SERPL-MCNC: <0.01 NG/ML (ref 0–0.04)
TV REST PULMONARY ARTERY PRESSURE: 58 MMHG
WBC # BLD AUTO: 13.2 X10(3)/MCL (ref 4.5–11.5)
Z-SCORE OF LEFT VENTRICULAR DIMENSION IN END DIASTOLE: -0.86
Z-SCORE OF LEFT VENTRICULAR DIMENSION IN END SYSTOLE: 0.35

## 2024-11-18 PROCEDURE — 21400001 HC TELEMETRY ROOM

## 2024-11-18 PROCEDURE — 36600 WITHDRAWAL OF ARTERIAL BLOOD: CPT

## 2024-11-18 PROCEDURE — 93005 ELECTROCARDIOGRAM TRACING: CPT

## 2024-11-18 PROCEDURE — 63600175 PHARM REV CODE 636 W HCPCS

## 2024-11-18 PROCEDURE — 94640 AIRWAY INHALATION TREATMENT: CPT

## 2024-11-18 PROCEDURE — 82803 BLOOD GASES ANY COMBINATION: CPT

## 2024-11-18 PROCEDURE — 25000003 PHARM REV CODE 250

## 2024-11-18 PROCEDURE — 85025 COMPLETE CBC W/AUTO DIFF WBC: CPT

## 2024-11-18 PROCEDURE — 80053 COMPREHEN METABOLIC PANEL: CPT

## 2024-11-18 PROCEDURE — 99900035 HC TECH TIME PER 15 MIN (STAT)

## 2024-11-18 PROCEDURE — 36415 COLL VENOUS BLD VENIPUNCTURE: CPT

## 2024-11-18 PROCEDURE — 27000221 HC OXYGEN, UP TO 24 HOURS

## 2024-11-18 PROCEDURE — 25000242 PHARM REV CODE 250 ALT 637 W/ HCPCS

## 2024-11-18 PROCEDURE — 84484 ASSAY OF TROPONIN QUANT: CPT

## 2024-11-18 PROCEDURE — 94761 N-INVAS EAR/PLS OXIMETRY MLT: CPT

## 2024-11-18 RX ORDER — IPRATROPIUM BROMIDE AND ALBUTEROL SULFATE 2.5; .5 MG/3ML; MG/3ML
3 SOLUTION RESPIRATORY (INHALATION)
Status: DISCONTINUED | OUTPATIENT
Start: 2024-11-18 | End: 2024-11-20 | Stop reason: HOSPADM

## 2024-11-18 RX ADMIN — SODIUM CHLORIDE, POTASSIUM CHLORIDE, SODIUM LACTATE AND CALCIUM CHLORIDE: 600; 310; 30; 20 INJECTION, SOLUTION INTRAVENOUS at 06:11

## 2024-11-18 RX ADMIN — PANTOPRAZOLE SODIUM 40 MG: 40 TABLET, DELAYED RELEASE ORAL at 08:11

## 2024-11-18 RX ADMIN — PIPERACILLIN AND TAZOBACTAM 4.5 G: 4; .5 INJECTION, POWDER, LYOPHILIZED, FOR SOLUTION INTRAVENOUS; PARENTERAL at 08:11

## 2024-11-18 RX ADMIN — Medication 6 MG: at 09:11

## 2024-11-18 RX ADMIN — PIPERACILLIN AND TAZOBACTAM 4.5 G: 4; .5 INJECTION, POWDER, LYOPHILIZED, FOR SOLUTION INTRAVENOUS; PARENTERAL at 03:11

## 2024-11-18 RX ADMIN — FLUTICASONE FUROATE AND VILANTEROL TRIFENATATE 1 PUFF: 200; 25 POWDER RESPIRATORY (INHALATION) at 07:11

## 2024-11-18 RX ADMIN — METHYLPREDNISOLONE SODIUM SUCCINATE 60 MG: 40 INJECTION, POWDER, FOR SOLUTION INTRAMUSCULAR; INTRAVENOUS at 09:11

## 2024-11-18 RX ADMIN — METHYLPREDNISOLONE SODIUM SUCCINATE 60 MG: 40 INJECTION, POWDER, FOR SOLUTION INTRAMUSCULAR; INTRAVENOUS at 08:11

## 2024-11-18 RX ADMIN — PIPERACILLIN AND TAZOBACTAM 4.5 G: 4; .5 INJECTION, POWDER, LYOPHILIZED, FOR SOLUTION INTRAVENOUS; PARENTERAL at 12:11

## 2024-11-18 RX ADMIN — ENOXAPARIN SODIUM 40 MG: 40 INJECTION SUBCUTANEOUS at 04:11

## 2024-11-18 RX ADMIN — IPRATROPIUM BROMIDE AND ALBUTEROL SULFATE 3 ML: .5; 3 SOLUTION RESPIRATORY (INHALATION) at 10:11

## 2024-11-18 RX ADMIN — AZITHROMYCIN MONOHYDRATE 500 MG: 500 INJECTION, POWDER, LYOPHILIZED, FOR SOLUTION INTRAVENOUS at 05:11

## 2024-11-18 RX ADMIN — HYDROXYCHLOROQUINE SULFATE 200 MG: 200 TABLET, FILM COATED ORAL at 08:11

## 2024-11-18 RX ADMIN — TIOTROPIUM BROMIDE INHALATION SPRAY 2 PUFF: 3.12 SPRAY, METERED RESPIRATORY (INHALATION) at 07:11

## 2024-11-18 NOTE — PROGRESS NOTES
[facility] Internal Medicine  PROGRESS NOTE    Patient's Name: Clarence Trejo Sr.  : 1955  MRN: [id number]    Admission Date: 2024  Length of Stay: 2  Attending Physician: Rochelle Mi MD  Resident: Wilber  Intern: Esteban    SUBJECTIVE     Chief Complaint   Patient presents with    Shortness of Breath     Pt in via [facility] c/o SOB when laying down to go to bed. EMS states was 80% on RA and came up to 83% on 4L. Pt refused CPAP en route. Pt anxious on arrival Pt 86% 15L MD and respiratory in room.     History of Present Illness:  Clarence Trejo Sr. is a 68 y.o. male with PMH significant for antisynthetase syndrome, ILD, chronic hypoxic respiratory failure on 3L NC at home  presented to ED on 2024  with a primary complaint of Shortness of Breath (Pt in via [facility] c/o SOB when laying down to go to bed. EMS states was 80% on RA and came up to 83% on 4L. Pt refused CPAP en route. Pt anxious on arrival Pt 86% 15L MD and respiratory in room.)  Pt states he was feeling good earlier this evening. When he tried to go to sleep states he felt short of breath. Pt called EMS because he started having some burning in his chest. When EMS arrived pt was sating in the low 80s with mild improvement on nasal canula. Pt admits to using his inhaler earlier today but has been out of his neb machine solution, has also been out of another one of his medications but doesn't know which one. Shortness of breath has improved since arrival to the ED. Admits to increased sputum production. Denied any associated fevers, chills, N/V.      ED Course - Vital signs at admission AF, tachycardic 106, hypertensive 160/108, O2 sat 86% on 15L non-rebreather. Lab work showed no leukocytosis, baseline polycythemia, elevated .3, and elevated lactic acid 2.6. Chest X-ray appeared hazy bilaterally. Blood cultures drawn; pt given dose of Vanc and a Duoneb treatment. Internal Medicine consulted for admission.      Hospital Course/Significant Events:  11/16/2024: Admitted to [facility].    Interval History:  NAEON.  Patient currently on 5 L nasal cannula satting appropriately between 88-92%. Labs notable for leukocytosis 13.2 from 13.91 after starting IV Solumedrol on 11/16. Otherwise, patient states that he's feeling better to prior few days and feeling less short of breath, although still present. Reports that he uses approximately 3L NC at home but he had been increasing to 4L since 3L does not relieve his symptoms. Endorses improvement of cough and denies any fever, chills, chest pain, abdominal pain, nausea, vomiting, changes in BM, changes in urination, and peripheral swelling. Patient has no other acute complaints.    Review of Systems:  A 12-pt ROS was conducted and was negative unless stated above.    OBJECTIVE     VITAL SIGNS: 24 HR MIN & MAX Most Recent Vitals   Temp  Min: 97.4 °F (36.3 °C)  Max: 98 °F (36.7 °C)  97.6 °F (36.4 °C)   BP  Min: 99/69  Max: 134/82  127/85    Pulse  Min: 91  Max: 110  101   Resp  Min: 18  Max: 24  18    SpO2  Min: 86 %  Max: 95 %  (!) 88 %    Body mass index is 21.61 kg/m².    Intake/Output:  I/O last 3 completed shifts:  In: 2339.7 [P.O.:340; I.V.:1495; IV Piggyback:504.8]  Out: 1700 [Urine:1700]  Physical Exam  Vitals and nursing note reviewed.   Constitutional:       General: He is not in acute distress.     Appearance: Normal appearance. He is ill-appearing (chronically). He is not toxic-appearing.   HENT:      Head: Normocephalic and atraumatic.      Nose: Nose normal.      Mouth/Throat:      Mouth: Mucous membranes are moist.      Pharynx: Oropharynx is clear.   Eyes:      Extraocular Movements: Extraocular movements intact.      Conjunctiva/sclera: Conjunctivae normal.   Cardiovascular:      Rate and Rhythm: Normal rate and regular rhythm.      Pulses: Normal pulses.      Heart sounds: Normal heart sounds. No murmur heard.  Pulmonary:      Effort: Pulmonary effort is normal.  Tachypnea present. No respiratory distress.      Breath sounds: Rhonchi (diffusely) and rales (diffusely) present.      Comments: Currently on 5L NC.  Abdominal:      General: Abdomen is flat. Bowel sounds are normal. There is no distension.      Palpations: Abdomen is soft. There is no mass.      Tenderness: There is no abdominal tenderness.   Musculoskeletal:      Cervical back: Normal range of motion and neck supple.      Right lower leg: No edema.      Left lower leg: No edema.   Skin:     General: Skin is warm and dry.      Capillary Refill: Capillary refill takes less than 2 seconds.      Findings: No erythema or rash.   Neurological:      General: No focal deficit present.      Mental Status: He is alert and oriented to person, place, and time. Mental status is at baseline.   Psychiatric:         Behavior: Behavior normal.         Current Medications:  Scheduled:   azithromycin  500 mg Intravenous Q24H    enoxparin  40 mg Subcutaneous Daily    fluticasone furoate-vilanteroL  1 puff Inhalation Daily    hydroxychloroquine  200 mg Oral Daily    methylPREDNISolone injection (PEDS and ADULTS)  60 mg Intravenous Q12H    pantoprazole  40 mg Oral Daily    piperacillin-tazobactam (Zosyn) IV (PEDS and ADULTS) (extended infusion is not appropriate)  4.5 g Intravenous Q8H    tiotropium bromide  2 puff Inhalation Daily      Infusions:   lactated ringers   Intravenous Continuous 75 mL/hr at 11/17/24 1459 Rate Verify at 11/17/24 1459     PRNs:    Current Facility-Administered Medications:     acetaminophen, 650 mg, Oral, Q4H PRN    dextrose 10%, 12.5 g, Intravenous, PRN    dextrose 10%, 25 g, Intravenous, PRN    glucagon (human recombinant), 1 mg, Intramuscular, PRN    glucose, 16 g, Oral, PRN    glucose, 24 g, Oral, PRN    melatonin, 6 mg, Oral, Nightly PRN    naloxone, 0.02 mg, Intravenous, PRN    sodium chloride 0.9%, 10 mL, Intravenous, Q12H PRN  Labs:  CBC:  Recent Labs   Lab 11/16/24  0250 11/17/24  0402  24  0511   WBC 10.59 13.91* 13.20*   HGB 18.7* 14.9 14.6   HCT 57.1* 46.6 44.7    192 203   MCV 88.5 87.6 86.8   RDW 16.0 15.1 15.0     BMP/CMP:  Recent Labs   Lab 24  0250 24  05    140   K 3.5 4.4    108*   CO2    BUN 12.1 14.1   CREATININE 0.78 0.73   GLUCOSE 115 152*   EGFRNORACEVR >60 >60     RFTs/LFTs:  Recent Labs   Lab 24  02524  05   CALCIUM 8.8 8.4*   LABPROT 7.2  14.0 5.7*   ALBUMIN 3.3* 2.6*   AST 27 14   ALT 28 15   ALKPHOS 94 65   BILITOT 0.6 0.4     Recent Labs   Lab 24  025   MG 1.90     Cardiac Panel:  Recent Labs   Lab 24   TROPONINI <0.010   .3*     Interval Imaging:  Reviewed. None new as of 2024.    Past Pertinent Imagin2024 CXR: extensive chronic lung interstitial disease  2024 CTA PE: No acute PE identified, persistent mediastinal and right hilar lymphadenopathy, and severe emphysematous changes of the lungs.     Microbiology:  Microbiology Results (last 7 days)       Procedure Component Value Units Date/Time    Blood culture #1 **CANNOT BE ORDERED STAT** [[id number]]  (Normal) Collected: 24    Order Status: Completed Specimen: Blood from Hand, Right Updated: 24 1101     Blood Culture No Growth At 48 Hours    Blood culture #2 **CANNOT BE ORDERED STAT** [[id number]]  (Normal) Collected: 24    Order Status: Completed Specimen: Blood from Arm, Right Updated: 24 1101     Blood Culture No Growth At 48 Hours    Respiratory Culture [[id number]] Updated: 24 0850    Order Status: Canceled Specimen: Sputum from Nasal Swab           Antibiotics:  Antibiotics (From admission, onward)      Start     Stop Route Frequency Ordered    24 0530  azithromycin (ZITHROMAX) 500 mg in D5W 250 mL  IVPB (admixture device)         -- IV Every 24 hours (non-standard times) 24 0424    24 0430  piperacillin-tazobactam (ZOSYN) 4.5 g in D5W 100 mL IVPB (MB+)          11/30/24 0429 IV Every 8 hours (non-standard times) 11/16/24 0424             ASSESSMENT AND PLAN   Shortness of breath, likely 2/2 CAP superimposed on ILD  SIRS 2/4  History of anti-synthetase syndrome  History of pulmonary hypertension  - Home medications: hydroxychloroquine 200 mg daily, OFEV 150 mg BID since 11/2022, and Symbicort 80-4.5 mcg daily.  - Patient reports adherence with hydroxychloroquine but has been out of inhalers for past week.  - Patient with progressively worsening SOB for few hours prior to admission with ED vitals notable for tachycardia 106, tachypnea 44, and desaturations to the low 80s on RA in the ED. Patient given vancomycin and cefepime and Solumedrol 125 mg once in the ED.  - ED workup notable for lactic acidosis 2.6 and elevated .3 but no leukocytosis. CXR notable for extensive chronic interstitial lung disease, similar to prior CXR.  - Per chart review, patient has a history of pulmonary HTN on echo on 4/2023 but did not follow up with Cardiology.  - Bcx NGTD @ 24 hrs. COVID/Flu, RVP, and MRSA PCR negative.  - Continue hydroxycloroquine but held OFEV due to non-formulary.  - Patient started on Breo-Ellipta 200-25 mcg daily and tiotropium 2.5 mcg daily. Continue regimen.  - Patient switched to azithromycin 500 mg daily (Day 3) and Zosyn 4.5 q4h (Day 3). Plan to finish an antibiotic therapy today to complete 3 day regimen  - Patient started on Solumedrol 60 mg TID on 11/17.  Decreased to 60 mg BID and continue until 11/22 (5 days).  - Continue incentive spirometry.  - Wean supplemental O2 as tolerated; O2 sat goal 88-92%.  - Ordered echo to reevaluate pulmonary HTN.     HTN  - Patient prescribed lisinopril 5 mg daily but has not been adherent.  - Review of inpatient BP mildly soft to max SBP 130s.  - Continue holding lisinopril and CTM BP. Will resume if uncontrolled.      DVT Prophylaxis: Lovenox  GI Prophylaxis: None  Abx: Azithromycin and Zosyn  Access: PIV  Fluids: LR @  75 mL/hr  Diet: Diet Adult Regular    Code Status: Full Code    Disposition: 68 y.o. male admitted for progressively worsening SOB likely 2/2 CAP superimprosed on ILD and medication nonadherence. Disposition pending clinical improvement.       Case was discussed and seen with Dr. Mi. Please appreciate attending's attestation to follow.    Kenney Orellana MD  PGY-1 Resident  [facility] Internal Medicine Team 1  11/18/2024

## 2024-11-18 NOTE — PLAN OF CARE
11/18/24 1453   Discharge Assessment   Assessment Type Discharge Planning Assessment   Confirmed/corrected address, phone number and insurance Yes   Confirmed Demographics Correct on Facesheet   Source of Information patient   When was your last doctors appointment?   (PCP: Yara Spear)   Does patient/caregiver understand observation status   (Inpatient)   Communicated ORACIO with patient/caregiver Date not available/Unable to determine   Reason For Admission R06.02 Shortness of breath  J84.9 Interstitial lung disease  J18.9 Pneumonia of both lungs due to infectious organism, unspecified part of lung  J96.21 Acute on chronic hypoxic respiratory failure   People in Home significant other   Facility Arrived From: Home   Do you expect to return to your current living situation? Yes   Do you have help at home or someone to help you manage your care at home? Yes   Who are your caregiver(s) and their phone number(s)? JENNA Jean, P: [phone]; Clarence Trejo Jr., son, P: [phone]; J Luis Loganrose, uncle, P: [phone]   Prior to hospitilization cognitive status: Alert/Oriented   Current cognitive status: Alert/Oriented   Walking or Climbing Stairs Difficulty no   Dressing/Bathing Difficulty no   Home Accessibility not wheelchair accessible;stairs to enter home   Number of Stairs, Main Entrance three   Stair Railings, Main Entrance none   Home Layout Able to live on 1st floor   Equipment Currently Used at Home oxygen  (Patient has concentrator and portable concentrator from [facility] [place])   Readmission within 30 days? No   Patient currently being followed by outpatient case management? No   Do you currently have service(s) that help you manage your care at home? No   Do you take prescription medications? Yes  ([facility] on [address])   Do you have prescription coverage? Yes   Coverage [employer] Managed Medicare [employer]   Do you have any problems affording any of your prescribed medications? No   Is the patient  taking medications as prescribed? yes   Who is going to help you get home at discharge? Family/taxi   How do you get to doctors appointments? car, drives self   Are you on dialysis? No   Do you take coumadin? No   Discharge Plan A Home with family;Home Health   DME Needed Upon Discharge  other (see comments)  (TBD)   Discharge Plan discussed with: Patient;Spouse/sig other   Name(s) and Number(s) JENNA Jean, P: [phone]; Clarence Castañeda, son, P: [phone]   Transition of Care Barriers None   OTHER   Name(s) of People in Home JENNA Jean, P: [phone]

## 2024-11-18 NOTE — PLAN OF CARE
Patient has been denied by [address] [place] [address] due to insurance issues. Referral sent to [facility] via [employer] fax.

## 2024-11-19 LAB
ALBUMIN SERPL-MCNC: 2.8 G/DL (ref 3.4–4.8)
ALBUMIN/GLOB SERPL: 0.9 RATIO (ref 1.1–2)
ALP SERPL-CCNC: 66 UNIT/L (ref 40–150)
ALT SERPL-CCNC: 23 UNIT/L (ref 0–55)
ANION GAP SERPL CALC-SCNC: 13 MEQ/L
ANION GAP SERPL CALC-SCNC: 5 MEQ/L
AST SERPL-CCNC: 21 UNIT/L (ref 5–34)
BASOPHILS # BLD AUTO: 0.01 X10(3)/MCL
BASOPHILS NFR BLD AUTO: 0.1 %
BILIRUB SERPL-MCNC: 0.2 MG/DL
BUN SERPL-MCNC: 14.6 MG/DL (ref 8.4–25.7)
BUN SERPL-MCNC: 15.5 MG/DL (ref 8.4–25.7)
CALCIUM SERPL-MCNC: 8.5 MG/DL (ref 8.8–10)
CALCIUM SERPL-MCNC: 9.4 MG/DL (ref 8.8–10)
CHLORIDE SERPL-SCNC: 105 MMOL/L (ref 98–107)
CHLORIDE SERPL-SCNC: 108 MMOL/L (ref 98–107)
CO2 SERPL-SCNC: 26 MMOL/L (ref 23–31)
CO2 SERPL-SCNC: 26 MMOL/L (ref 23–31)
CREAT SERPL-MCNC: 0.78 MG/DL (ref 0.72–1.25)
CREAT SERPL-MCNC: 0.88 MG/DL (ref 0.72–1.25)
CREAT/UREA NIT SERPL: 17
CREAT/UREA NIT SERPL: 20
EOSINOPHIL # BLD AUTO: 0 X10(3)/MCL (ref 0–0.9)
EOSINOPHIL NFR BLD AUTO: 0 %
ERYTHROCYTE [DISTWIDTH] IN BLOOD BY AUTOMATED COUNT: 15.2 % (ref 11.5–17)
GFR SERPLBLD CREATININE-BSD FMLA CKD-EPI: >60 ML/MIN/1.73/M2
GFR SERPLBLD CREATININE-BSD FMLA CKD-EPI: >60 ML/MIN/1.73/M2
GLOBULIN SER-MCNC: 3.1 GM/DL (ref 2.4–3.5)
GLUCOSE SERPL-MCNC: 127 MG/DL (ref 82–115)
GLUCOSE SERPL-MCNC: 168 MG/DL (ref 82–115)
HCT VFR BLD AUTO: 46.4 % (ref 42–52)
HGB BLD-MCNC: 15.1 G/DL (ref 14–18)
HOLD SPECIMEN: NORMAL
IMM GRANULOCYTES # BLD AUTO: 0.05 X10(3)/MCL (ref 0–0.04)
IMM GRANULOCYTES NFR BLD AUTO: 0.3 %
LYMPHOCYTES # BLD AUTO: 0.61 X10(3)/MCL (ref 0.6–4.6)
LYMPHOCYTES NFR BLD AUTO: 4.1 %
MCH RBC QN AUTO: 28.2 PG (ref 27–31)
MCHC RBC AUTO-ENTMCNC: 32.5 G/DL (ref 33–36)
MCV RBC AUTO: 86.6 FL (ref 80–94)
MONOCYTES # BLD AUTO: 0.62 X10(3)/MCL (ref 0.1–1.3)
MONOCYTES NFR BLD AUTO: 4.2 %
NEUTROPHILS # BLD AUTO: 13.56 X10(3)/MCL (ref 2.1–9.2)
NEUTROPHILS NFR BLD AUTO: 91.3 %
NRBC BLD AUTO-RTO: 0 %
OHS QRS DURATION: 76 MS
OHS QTC CALCULATION: 456 MS
PLATELET # BLD AUTO: 215 X10(3)/MCL (ref 130–400)
PMV BLD AUTO: 11.7 FL (ref 7.4–10.4)
POTASSIUM SERPL-SCNC: 4.4 MMOL/L (ref 3.5–5.1)
POTASSIUM SERPL-SCNC: 4.4 MMOL/L (ref 3.5–5.1)
PROT SERPL-MCNC: 5.9 GM/DL (ref 5.8–7.6)
RBC # BLD AUTO: 5.36 X10(6)/MCL (ref 4.7–6.1)
SODIUM SERPL-SCNC: 139 MMOL/L (ref 136–145)
SODIUM SERPL-SCNC: 144 MMOL/L (ref 136–145)
WBC # BLD AUTO: 14.85 X10(3)/MCL (ref 4.5–11.5)

## 2024-11-19 PROCEDURE — 36415 COLL VENOUS BLD VENIPUNCTURE: CPT

## 2024-11-19 PROCEDURE — 21400001 HC TELEMETRY ROOM

## 2024-11-19 PROCEDURE — 25000242 PHARM REV CODE 250 ALT 637 W/ HCPCS

## 2024-11-19 PROCEDURE — 97165 OT EVAL LOW COMPLEX 30 MIN: CPT

## 2024-11-19 PROCEDURE — 85025 COMPLETE CBC W/AUTO DIFF WBC: CPT

## 2024-11-19 PROCEDURE — 25000003 PHARM REV CODE 250

## 2024-11-19 PROCEDURE — 36415 COLL VENOUS BLD VENIPUNCTURE: CPT | Performed by: INTERNAL MEDICINE

## 2024-11-19 PROCEDURE — 94761 N-INVAS EAR/PLS OXIMETRY MLT: CPT

## 2024-11-19 PROCEDURE — 80053 COMPREHEN METABOLIC PANEL: CPT

## 2024-11-19 PROCEDURE — 97161 PT EVAL LOW COMPLEX 20 MIN: CPT

## 2024-11-19 PROCEDURE — 94640 AIRWAY INHALATION TREATMENT: CPT

## 2024-11-19 PROCEDURE — 63600175 PHARM REV CODE 636 W HCPCS

## 2024-11-19 RX ORDER — HYDROXYZINE PAMOATE 25 MG/1
25 CAPSULE ORAL ONCE
Status: COMPLETED | OUTPATIENT
Start: 2024-11-19 | End: 2024-11-19

## 2024-11-19 RX ORDER — FUROSEMIDE 10 MG/ML
20 INJECTION INTRAMUSCULAR; INTRAVENOUS ONCE
Status: COMPLETED | OUTPATIENT
Start: 2024-11-19 | End: 2024-11-19

## 2024-11-19 RX ADMIN — FUROSEMIDE 20 MG: 10 INJECTION, SOLUTION INTRAMUSCULAR; INTRAVENOUS at 07:11

## 2024-11-19 RX ADMIN — FLUTICASONE FUROATE AND VILANTEROL TRIFENATATE 1 PUFF: 200; 25 POWDER RESPIRATORY (INHALATION) at 07:11

## 2024-11-19 RX ADMIN — TIOTROPIUM BROMIDE INHALATION SPRAY 2 PUFF: 3.12 SPRAY, METERED RESPIRATORY (INHALATION) at 07:11

## 2024-11-19 RX ADMIN — HYDROXYZINE PAMOATE 25 MG: 25 CAPSULE ORAL at 01:11

## 2024-11-19 RX ADMIN — HYDROXYCHLOROQUINE SULFATE 200 MG: 200 TABLET, FILM COATED ORAL at 08:11

## 2024-11-19 RX ADMIN — METHYLPREDNISOLONE SODIUM SUCCINATE 60 MG: 40 INJECTION, POWDER, FOR SOLUTION INTRAMUSCULAR; INTRAVENOUS at 08:11

## 2024-11-19 RX ADMIN — ENOXAPARIN SODIUM 40 MG: 40 INJECTION SUBCUTANEOUS at 04:11

## 2024-11-19 RX ADMIN — IPRATROPIUM BROMIDE AND ALBUTEROL SULFATE 3 ML: .5; 3 SOLUTION RESPIRATORY (INHALATION) at 07:11

## 2024-11-19 RX ADMIN — FUROSEMIDE 20 MG: 10 INJECTION, SOLUTION INTRAMUSCULAR; INTRAVENOUS at 04:11

## 2024-11-19 RX ADMIN — METHYLPREDNISOLONE SODIUM SUCCINATE 60 MG: 40 INJECTION, POWDER, FOR SOLUTION INTRAMUSCULAR; INTRAVENOUS at 09:11

## 2024-11-19 RX ADMIN — IPRATROPIUM BROMIDE AND ALBUTEROL SULFATE 3 ML: .5; 3 SOLUTION RESPIRATORY (INHALATION) at 01:11

## 2024-11-19 RX ADMIN — PANTOPRAZOLE SODIUM 40 MG: 40 TABLET, DELAYED RELEASE ORAL at 08:11

## 2024-11-19 NOTE — PLAN OF CARE
Problem: Physical Therapy  Goal: Physical Therapy Goal  Description: Goals to be met by: dc     Patient will increase functional independence with mobility by performin. Gait  x 260 feet with Modified [place] using No Assistive Device.   2. Ascend/descend 5 stair with bilateral Handrails Modified [place] using No Assistive Device.     Outcome: Progressing

## 2024-11-19 NOTE — PROGRESS NOTES
[facility]  PROGRESS NOTE    Patient's Name: Clarence Trejo Sr.  : 1955  MRN: [id number]    Admission Date: 2024  Length of Stay: 3  Attending Physician: Rochelle Mi MD  Resident: Pablo  Intern: Esteban    SUBJECTIVE     Chief Complaint   Patient presents with    Shortness of Breath     Pt in via [facility] c/o SOB when laying down to go to bed. EMS states was 80% on RA and came up to 83% on 4L. Pt refused CPAP en route. Pt anxious on arrival Pt 86% 15L MD and respiratory in room.     History of Present Illness:  Clarence Trejo Sr. is a 68 y.o. male with PMH significant for antisynthetase syndrome, ILD, chronic hypoxic respiratory failure on 3L NC at home  presented to ED on 2024  with a primary complaint of Shortness of Breath (Pt in via [facility] c/o SOB when laying down to go to bed. EMS states was 80% on RA and came up to 83% on 4L. Pt refused CPAP en route. Pt anxious on arrival Pt 86% 15L MD and respiratory in room.)  Pt states he was feeling good earlier this evening. When he tried to go to sleep states he felt short of breath. Pt called EMS because he started having some burning in his chest. When EMS arrived pt was sating in the low 80s with mild improvement on nasal canula. Pt admits to using his inhaler earlier today but has been out of his neb machine solution, has also been out of another one of his medications but doesn't know which one. Shortness of breath has improved since arrival to the ED. Admits to increased sputum production. Denied any associated fevers, chills, N/V.      ED Course - Vital signs at admission AF, tachycardic 106, hypertensive 160/108, O2 sat 86% on 15L non-rebreather. Lab work showed no leukocytosis, baseline polycythemia, elevated .3, and elevated lactic acid 2.6. Chest X-ray appeared hazy bilaterally. Blood cultures drawn; pt given dose of Vanc and a Duoneb treatment. Internal Medicine consulted for admission.      Hospital Course/Significant Events:  11/16/2024: Admitted to [facility].    Interval History:  Patient desaturated to 80% last night, placed on 12L non-rebreather, and eventually transitioned to 15L Venturi mask. ABG showed mild hypoxia 73, but otherwise WNL. Troponins WNL. CXR stable compared to prior imaging. 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 Otherwise, patient states that he's doing okay. He reports that last night, he desaturated, but wasn't feeling dyspneic at that time. Otherwise, denies any fever, chills, chest pain, cough, abdominal pain, nausea, vomiting, changes in BM, and changes in urination. Patient relates that he wants to be home soon for his birthday on Friday.     Review of Systems:  A 12-pt ROS was conducted and was negative unless stated above.    OBJECTIVE     VITAL SIGNS: 24 HR MIN & MAX Most Recent Vitals   Temp  Min: 97.4 °F (36.3 °C)  Max: 97.6 °F (36.4 °C)  97.4 °F (36.3 °C)   BP  Min: 125/82  Max: 148/97  (!) 148/97    Pulse  Min: 91  Max: 103  94   Resp  Min: 18  Max: 22  18    SpO2  Min: 80 %  Max: 94 %  (!) 93 %    Body mass index is 21.61 kg/m².    Intake/Output:  I/O last 3 completed shifts:  In: 1126.8 [P.O.:340; I.V.:663.9; IV Piggyback:122.9]  Out: 1700 [Urine:1700]  Physical Exam  Vitals and nursing note reviewed.   Constitutional:       General: He is not in acute distress.     Appearance: Normal appearance. He is ill-appearing (chronically). He is not toxic-appearing.   HENT:      Head: Normocephalic and atraumatic.      Nose: Nose normal.      Mouth/Throat:      Mouth: Mucous membranes are moist.      Pharynx: Oropharynx is clear.   Eyes:      Extraocular Movements: Extraocular  movements intact.      Conjunctiva/sclera: Conjunctivae normal.   Cardiovascular:      Rate and Rhythm: Normal rate and regular rhythm.      Pulses: Normal pulses.      Heart sounds: Normal heart sounds. No murmur heard.  Pulmonary:      Effort: Pulmonary effort is normal. Tachypnea present. No respiratory distress.      Breath sounds: Rhonchi (diffusely) and rales (diffusely) present.      Comments: Currently on 15L Venturi mask  Abdominal:      General: Abdomen is flat. Bowel sounds are normal. There is no distension.      Palpations: Abdomen is soft. There is no mass.      Tenderness: There is no abdominal tenderness.   Musculoskeletal:      Cervical back: Normal range of motion and neck supple.      Right lower leg: No edema.      Left lower leg: No edema.   Skin:     General: Skin is warm and dry.      Capillary Refill: Capillary refill takes less than 2 seconds.      Findings: No erythema or rash.   Neurological:      General: No focal deficit present.      Mental Status: He is alert and oriented to person, place, and time. Mental status is at baseline.   Psychiatric:         Behavior: Behavior normal.     Current Medications:  Scheduled:   albuterol-ipratropium  3 mL Nebulization Q6H WAKE    enoxparin  40 mg Subcutaneous Daily    fluticasone furoate-vilanteroL  1 puff Inhalation Daily    hydroxychloroquine  200 mg Oral Daily    methylPREDNISolone injection (PEDS and ADULTS)  60 mg Intravenous Q12H    pantoprazole  40 mg Oral Daily    tiotropium bromide  2 puff Inhalation Daily      Infusions:   lactated ringers   Intravenous Continuous 75 mL/hr at 11/18/24 2252 Rate Verify at 11/18/24 2252     PRNs:    Current Facility-Administered Medications:     acetaminophen, 650 mg, Oral, Q4H PRN    dextrose 10%, 12.5 g, Intravenous, PRN    dextrose 10%, 25 g, Intravenous, PRN    glucagon (human recombinant), 1 mg, Intramuscular, PRN    glucose, 16 g, Oral, PRN    glucose, 24 g, Oral, PRN    melatonin, 6 mg, Oral,  Nightly PRN    naloxone, 0.02 mg, Intravenous, PRN    sodium chloride 0.9%, 10 mL, Intravenous, Q12H PRN  Labs:  CBC:  Recent Labs   Lab 24  0250 24  0409 24  0511   WBC 10.59 13.91* 13.20*   HGB 18.7* 14.9 14.6   HCT 57.1* 46.6 44.7    192 203   MCV 88.5 87.6 86.8   RDW 16.0 15.1 15.0     BMP/CMP:  Recent Labs   Lab 24  0250 24  0512    140   K 3.5 4.4    108*   CO2  24   BUN 12.1 14.1   CREATININE 0.78 0.73   GLUCOSE 115 152*   EGFRNORACEVR >60 >60     RFTs/LFTs:  Recent Labs   Lab 24  0250 24  0512   CALCIUM 8.8 8.4*   LABPROT 7.2  14.0 5.7*   ALBUMIN 3.3* 2.6*   AST 27 14   ALT 28 15   ALKPHOS 94 65   BILITOT 0.6 0.4     Recent Labs   Lab 24  0250   MG 1.90     Cardiac Panel:  Recent Labs   Lab 24  0250 24  1901   TROPONINI <0.010 <0.010   .3*  --      Interval Imaging:  CXR (2024)  FINDINGS:  Cardiopericardial silhouette appearance is similar.  Lungs are remarkable for severe chronic interstitial changes with cystic formations and coarse reticulations.  Associated ground-glass opacities may be related to small airway disease versus mild congestive process.  There is no focally dense consolidation or significant fluid within the pleural spaces.  No pneumothorax.  Impression:  No significant interval change.    Past Pertinent Imagin2024 CXR: extensive chronic lung interstitial disease  2024 CTA PE: No acute PE identified, persistent mediastinal and right hilar lymphadenopathy, and severe emphysematous changes of the lungs.     Microbiology:  Microbiology Results (last 7 days)       Procedure Component Value Units Date/Time    Blood culture #1 **CANNOT BE ORDERED STAT** [[id number]]  (Normal) Collected: 24 0247    Order Status: Completed Specimen: Blood from Hand, Right Updated: 24 1101     Blood Culture No Growth At 48 Hours    Blood culture #2 **CANNOT BE ORDERED STAT** [[id number]]   (Normal) Collected: 11/16/24 0247    Order Status: Completed Specimen: Blood from Arm, Right Updated: 11/18/24 1101     Blood Culture No Growth At 48 Hours    Respiratory Culture [[id number]] Updated: 11/16/24 0850    Order Status: Canceled Specimen: Sputum from Nasal Swab           Antibiotics:  Antibiotics (From admission, onward)      None             ASSESSMENT AND PLAN   Shortness of breath, likely 2/2 CAP superimposed on ILD  SIRS 2/4  History of anti-synthetase syndrome  History of pulmonary hypertension  - Home medications: hydroxychloroquine 200 mg daily, OFEV 150 mg BID since 11/2022, and Symbicort 80-4.5 mcg daily.  - Patient reports adherence with hydroxychloroquine but has been out of inhalers for past week.  - Patient with progressively worsening SOB for few hours prior to admission with ED vitals notable for tachycardia 106, tachypnea 44, and desaturations to the low 80s on RA in the ED. Patient given vancomycin and cefepime and Solumedrol 125 mg once in the ED.  - ED workup notable for lactic acidosis 2.6 and elevated .3 but no leukocytosis. CXR notable for extensive chronic interstitial lung disease, similar to prior CXR.  - Per chart review, patient has a history of pulmonary HTN on echo on 4/2023 but did not follow up with Cardiology.  - Bcx NGTD @ 48 hrs. COVID/Flu, RVP, and MRSA PCR negative.  - Continue hydroxycloroquine but held OFEV due to non-formulary.  - Patient started on Breo-Ellipta 200-25 mcg daily and tiotropium 2.5 mcg daily. Continue regimen.  - Patient switched to azithromycin 500 mg daily (Day 3) and Zosyn 4.5 q4h (Day 3). Plan to finish an antibiotic therapy today to complete 3 day regimen  - Patient started on Solumedrol 60 mg TID on 11/17, but decreased to 60 mg BID yesterday. Continue for 5 days until 11/22.  - Continue incentive spirometry.  - Wean supplemental O2 as tolerated. O2 sat goal 88-92%.  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  - Will trial Lasix 20 mg IV once and monitor UOP and respiratory status.  - Will need Cardiology follow-up upon discharge.     HTN  - Patient prescribed lisinopril 5 mg daily but has not been adherent.  - Review of inpatient BP mildly soft to max SBP 130s.  - Continue holding lisinopril and CTM BP. Will resume if uncontrolled.      DVT Prophylaxis: Lovenox  GI Prophylaxis: None  Abx: None  Access: PIV  Fluids: LR @ 75 mL/hr  Diet: Diet Adult Regular    Code Status: Full Code    Disposition: 68 y.o. male admitted for progressively worsening SOB likely 2/2 CAP superimprosed on ILD and medication nonadherence. Disposition pending clinical improvement.       Case was discussed and seen with Dr. Mi. Please appreciate attending's attestation to follow.    Kenney Orellana MD  PGY-1 Resident  [facility] Team 1  11/19/2024

## 2024-11-19 NOTE — PT/OT/SLP EVAL
Occupational Therapy   Evaluation and Discharge Note    Name: Clarence Trejo Sr.  MRN: [id number]  Admitting Diagnosis:   1. Acute on chronic hypoxic respiratory failure    2. Shortness of breath    3. Pneumonia of both lungs due to infectious organism, unspecified part of lung    4. Interstitial lung disease    5. SOB (shortness of breath)       Patient Active Problem List   Diagnosis    Clubbing of fingers    Antisynthetase syndrome    Interstitial lung disease    Positive antinuclear antibody    Seronegative rheumatoid arthritis    Fatigue    High risk medication use    Shortness of breath    History of tobacco abuse    TB lung, latent    Erythrocytosis due to hypoxemia    Pulmonary emphysema with fibrosis of lung    Secondary erythrocytosis    Nocturnal oxygen desaturation    Chronic hypoxic respiratory failure    Antinuclear antibody (SAM) titer greater than 1:80      Recent Surgery: * No surgery found *      Recommendations:     Discharge Recommendations: Low Intensity Therapy  Discharge Equipment Recommendations: none  Barriers to discharge:  None    Assessment:     Clarence Trejo Sr. is a 68 y.o. male with a medical diagnosis of Shortness of breath. At this time, patient is functioning at their prior level of function and does not require further acute OT services.     Plan:     During this hospitalization, patient does not require further acute OT services.  Please re-consult if situation changes.    Plan of Care Reviewed with: patient    Subjective     Chief Complaint: none stated  Patient/Family Comments/goals: return home    Occupational Profile:  Living Environment: Pt lives with significant other in single level house with 3 steps no handrail  and tub shower combo  Previous level of function: mod Independent basic self care and ambulated without AD ; Pt has oxygen but does not use continuously and paces/energy conservation techniques  as needed due to SOB   Roles and Routines: Pt drives, unemployed x 2  years due to medical issues ; pts significant other cooks and performs chores   Equipment Used at home: oxygen  Assistance upon Discharge: significant other    Pain/Comfort:  Pain Rating 1: 0/10  Pain Rating Post-Intervention 1: 0/10    Patients cultural, spiritual, Anabaptism conflicts given the current situation: no    Objective:     Communicated with: Nurse Baig prior to session.  Patient found HOB elevated with oxygen, peripheral IV, pulse ox (continuous), telemetry upon OT entry to room.    General Precautions: Standard, fall  Orthopedic Precautions: N/A  Braces: N/A  Respiratory Status:  venti mask 12 L     VITALS  Preactivity   Post activity  HR  91  99  O2(12L) 88%  87%    Occupational Performance:    Bed Mobility:    Patient completed Supine to Sit with independence    Functional Mobility/Transfers:  Patient completed Sit <> Stand Transfer with independence  with  no assistive device   Patient completed Toilet Transfer Step Transfer technique with modified independence with  grab bars  Functional Mobility: Pt ambulated in room ~ 20 feet mod independent during self care routine to toilet and sink and without AD and no LOB ; Pt ambulated with PT in hallway 130 feet with no assistive device and supervision and O2 in place at 12 L during all mobility tasks.     Activities of Daily Living:  Grooming: independence standing at sink   Lower Body Dressing: modified independence don underwear and socks  Toileting: modified independence clothing mgt and hygiene     Cognitive/Visual Perceptual:  Cognitive/Psychosocial Skills:     -       Oriented to: Person, Place, Time, and Situation   -       Follows Commands/attention:Follows multistep  commands  -       Safety awareness/insight to disability: intact     Physical Exam:    Pt is right hand dominant  B UE AROM, strength and coordination WFL's       Treatment & Education:  Pt. educated on OT  POC, orientation to environment, use of call bell for assist as needed and  importance of wearing O2 at all times due to decreased endurance.    Patient left up in chair with all lines intact, call button in reach, nurse  notified, and significant other  present    GOALS:   Multidisciplinary Problems       Occupational Therapy Goals       Not on file                    History:     Past Medical History:   Diagnosis Date    SAM positive     Antisynthetase syndrome     ILD (interstitial lung disease)     Pulmonary embolism     Seronegative rheumatoid arthritis     TB lung, latent     Tobacco use        No past surgical history on file.    Time Tracking:     OT Date of Treatment: 11/19/24  OT Start Time: 0801  OT Stop Time: 0833  OT Total Time (min): 32 min    Billable Minutes:Evaluation 32 min     11/19/2024

## 2024-11-19 NOTE — PT/OT/SLP EVAL
Physical Therapy Evaluation    Patient Name:  Clarence Trejo Sr.   MRN:  [id number]    Recommendations:     Therapy Intensity Recommendations at Discharge: Low Intensity Therapy  Discharge Equipment Recommendations: none   Equipment to be obtained for discharge: none.  Barriers to discharge: decreased endurance    Assessment:     Clarence Trejo Sr. is a 68 y.o. male admitted with a medical diagnosis of Shortness of breath.  1. Acute on chronic hypoxic respiratory failure    2. Shortness of breath    3. Pneumonia of both lungs due to infectious organism, unspecified part of lung    4. Interstitial lung disease    5. SOB (shortness of breath)       Patient Active Problem List   Diagnosis    Clubbing of fingers    Antisynthetase syndrome    Interstitial lung disease    Positive antinuclear antibody    Seronegative rheumatoid arthritis    Fatigue    High risk medication use    Shortness of breath    History of tobacco abuse    TB lung, latent    Erythrocytosis due to hypoxemia    Pulmonary emphysema with fibrosis of lung    Secondary erythrocytosis    Nocturnal oxygen desaturation    Chronic hypoxic respiratory failure    Antinuclear antibody (SAM) titer greater than 1:80      He presents with the following impairments/functional limitations:  impaired endurance.    Rehab Prognosis: Good.    Patient would benefit from continued skilled acute PT services to: address above listed impairments/functional limitations; receive patient/caregiver education; reduce fall risk; and maximize independency/safety with functional mobility.    Recent Surgery: * No surgery found *      Plan:     During this hospitalization, patient to be seen 3 x/week to address the identified impairments/functional limitations via gait training, therapeutic activities, therapeutic exercises and progress toward the established goals.    Plan of Care Expires:  12/18/24    Subjective     Communicated with patient's nurse  prior to session.    Patient  agreeable to participate in evaluation.     Chief Complaint: none  Patient/Family Comments/goals: to return home  Pain/Comfort:  Pain Rating 1: 0/10  Pain Rating Post-Intervention 1: 0/10    Patients cultural, spiritual, Voodoo conflicts given the current situation: no    Social History  Living Environment: Patient  lives with his fiancee  in a single level home, with 3 steps steps outside, with tub-shower combo.  Functional Level: Prior to admission patient was driving, ambulated without assistive device, and was independent in ADL's.  Equipment Used at Home: oxygen  Equipment owned (not currently used): none.  Assistance Upon Discharge: significant other.    Objective:     Patient found supine in bed and with HOB elevated with oxygen  upon PT entry to room.    General Precautions: Standard, fall   Orthopedic Precautions:N/A   Braces: N/A  Respiratory Status: venti mask at 12 liters    Vitals   At Rest (pre-session)      HR 99   O2 Sat %  87%        Exams:  Orientation: Patient is oriented to person, place, time, situation  Commands: Patient follows commands consistently  BILAT UE ROM/strength - defer to OT - see OT note for details  RLE ROM: WNL  RLE Strength: WNL  LLE ROM: WNL  LLE Strength: WNL    Functional Mobility:    Bed Mobility:  Supine to Sit: independence    Transfers:  Sit to Stand: independence with no assistive device    Gait:  Patient ambulated 130 ft with no assistive device and supervision.  Patient demonstrates :       steady gait.    Other Mobility:  N/A    Balance:  Sit  Static: NORMAL: No deviations seen in posture held statically  Dynamic: NORMAL: No deviations seen in posture held dynamically  Stand  Static: NORMAL: No deviations seen in posture held statically  Dynamic: GOOD: Needs SUPERVISION only during gait and able to self right with moderate LOB    Additional Treatment Session  N/A    Patient left sitting in a chair with all lines intact, call button in reach, tray table at bedside,  and patient's nurse notified.    Education     Patient educated on the importance of early mobility to prevent functional decline during hospital stay.  Patient educated on and assisted with functional mobility as noted above.  Patient educated on PT Plan of Care and role of PT in acute care.  Patient was instructed to utilize staff assistance for mobility/transfers.  White board updated regarding patient's safest level of mobility with staff assistance    Goals     Multidisciplinary Problems       Physical Therapy Goals          Problem: Physical Therapy    Goal Priority Disciplines Outcome Interventions   Physical Therapy Goal     PT, PT/OT Progressing    Description: Goals to be met by: dc     Patient will increase functional independence with mobility by performin. Gait  x 260 feet with Modified [place] using No Assistive Device.   2. Ascend/descend 5 stair with bilateral Handrails Modified [place] using No Assistive Device.                        History:     Past Medical History:   Diagnosis Date    SAM positive     Antisynthetase syndrome     ILD (interstitial lung disease)     Pulmonary embolism     Seronegative rheumatoid arthritis     TB lung, latent     Tobacco use      No past surgical history on file.  Time Tracking:     PT Received On: 24  PT Start Time: 0801     PT Stop Time: 08  PT Total Time (min): 34 min     Billable Minutes: Evaluation 34    2024

## 2024-11-20 VITALS
RESPIRATION RATE: 17 BRPM | SYSTOLIC BLOOD PRESSURE: 154 MMHG | WEIGHT: 138 LBS | OXYGEN SATURATION: 91 % | BODY MASS INDEX: 21.66 KG/M2 | DIASTOLIC BLOOD PRESSURE: 106 MMHG | TEMPERATURE: 98 F | HEART RATE: 66 BPM | HEIGHT: 67 IN

## 2024-11-20 LAB
ALBUMIN SERPL-MCNC: 2.8 G/DL (ref 3.4–4.8)
ALBUMIN/GLOB SERPL: 0.8 RATIO (ref 1.1–2)
ALP SERPL-CCNC: 69 UNIT/L (ref 40–150)
ALT SERPL-CCNC: 27 UNIT/L (ref 0–55)
ANION GAP SERPL CALC-SCNC: 7 MEQ/L
AST SERPL-CCNC: 22 UNIT/L (ref 5–34)
BASOPHILS # BLD AUTO: 0.01 X10(3)/MCL
BASOPHILS NFR BLD AUTO: 0.1 %
BILIRUB SERPL-MCNC: 0.3 MG/DL
BUN SERPL-MCNC: 18.1 MG/DL (ref 8.4–25.7)
CALCIUM SERPL-MCNC: 8.7 MG/DL (ref 8.8–10)
CHLORIDE SERPL-SCNC: 106 MMOL/L (ref 98–107)
CO2 SERPL-SCNC: 28 MMOL/L (ref 23–31)
CREAT SERPL-MCNC: 0.77 MG/DL (ref 0.72–1.25)
CREAT/UREA NIT SERPL: 24
EOSINOPHIL # BLD AUTO: 0 X10(3)/MCL (ref 0–0.9)
EOSINOPHIL NFR BLD AUTO: 0 %
ERYTHROCYTE [DISTWIDTH] IN BLOOD BY AUTOMATED COUNT: 15.2 % (ref 11.5–17)
GFR SERPLBLD CREATININE-BSD FMLA CKD-EPI: >60 ML/MIN/1.73/M2
GLOBULIN SER-MCNC: 3.3 GM/DL (ref 2.4–3.5)
GLUCOSE SERPL-MCNC: 145 MG/DL (ref 82–115)
HCT VFR BLD AUTO: 48.3 % (ref 42–52)
HGB BLD-MCNC: 15.2 G/DL (ref 14–18)
IMM GRANULOCYTES # BLD AUTO: 0.07 X10(3)/MCL (ref 0–0.04)
IMM GRANULOCYTES NFR BLD AUTO: 0.5 %
LYMPHOCYTES # BLD AUTO: 0.53 X10(3)/MCL (ref 0.6–4.6)
LYMPHOCYTES NFR BLD AUTO: 3.7 %
MCH RBC QN AUTO: 27.3 PG (ref 27–31)
MCHC RBC AUTO-ENTMCNC: 31.5 G/DL (ref 33–36)
MCV RBC AUTO: 86.7 FL (ref 80–94)
MONOCYTES # BLD AUTO: 0.53 X10(3)/MCL (ref 0.1–1.3)
MONOCYTES NFR BLD AUTO: 3.7 %
NEUTROPHILS # BLD AUTO: 13.23 X10(3)/MCL (ref 2.1–9.2)
NEUTROPHILS NFR BLD AUTO: 92 %
NRBC BLD AUTO-RTO: 0 %
PLATELET # BLD AUTO: 198 X10(3)/MCL (ref 130–400)
PMV BLD AUTO: 10.7 FL (ref 7.4–10.4)
POTASSIUM SERPL-SCNC: 4.8 MMOL/L (ref 3.5–5.1)
PROT SERPL-MCNC: 6.1 GM/DL (ref 5.8–7.6)
RBC # BLD AUTO: 5.57 X10(6)/MCL (ref 4.7–6.1)
SODIUM SERPL-SCNC: 141 MMOL/L (ref 136–145)
WBC # BLD AUTO: 14.37 X10(3)/MCL (ref 4.5–11.5)

## 2024-11-20 PROCEDURE — 94640 AIRWAY INHALATION TREATMENT: CPT

## 2024-11-20 PROCEDURE — 94761 N-INVAS EAR/PLS OXIMETRY MLT: CPT

## 2024-11-20 PROCEDURE — 63600175 PHARM REV CODE 636 W HCPCS

## 2024-11-20 PROCEDURE — 36415 COLL VENOUS BLD VENIPUNCTURE: CPT

## 2024-11-20 PROCEDURE — 25000003 PHARM REV CODE 250

## 2024-11-20 PROCEDURE — 25000242 PHARM REV CODE 250 ALT 637 W/ HCPCS

## 2024-11-20 PROCEDURE — 97116 GAIT TRAINING THERAPY: CPT

## 2024-11-20 PROCEDURE — 80053 COMPREHEN METABOLIC PANEL: CPT

## 2024-11-20 PROCEDURE — 85025 COMPLETE CBC W/AUTO DIFF WBC: CPT

## 2024-11-20 PROCEDURE — 99900035 HC TECH TIME PER 15 MIN (STAT)

## 2024-11-20 PROCEDURE — 27000221 HC OXYGEN, UP TO 24 HOURS

## 2024-11-20 RX ORDER — PREDNISONE 20 MG/1
20 TABLET ORAL DAILY
Qty: 5 TABLET | Refills: 0 | Status: SHIPPED | OUTPATIENT
Start: 2024-11-20

## 2024-11-20 RX ADMIN — HYDROXYCHLOROQUINE SULFATE 200 MG: 200 TABLET, FILM COATED ORAL at 08:11

## 2024-11-20 RX ADMIN — IPRATROPIUM BROMIDE AND ALBUTEROL SULFATE 3 ML: .5; 3 SOLUTION RESPIRATORY (INHALATION) at 01:11

## 2024-11-20 RX ADMIN — IPRATROPIUM BROMIDE AND ALBUTEROL SULFATE 3 ML: .5; 3 SOLUTION RESPIRATORY (INHALATION) at 07:11

## 2024-11-20 RX ADMIN — METHYLPREDNISOLONE SODIUM SUCCINATE 60 MG: 40 INJECTION, POWDER, FOR SOLUTION INTRAMUSCULAR; INTRAVENOUS at 08:11

## 2024-11-20 RX ADMIN — TIOTROPIUM BROMIDE INHALATION SPRAY 2 PUFF: 3.12 SPRAY, METERED RESPIRATORY (INHALATION) at 07:11

## 2024-11-20 RX ADMIN — FLUTICASONE FUROATE AND VILANTEROL TRIFENATATE 1 PUFF: 200; 25 POWDER RESPIRATORY (INHALATION) at 07:11

## 2024-11-20 RX ADMIN — PANTOPRAZOLE SODIUM 40 MG: 40 TABLET, DELAYED RELEASE ORAL at 08:11

## 2024-11-20 NOTE — PLAN OF CARE
Problem: Adult Inpatient Plan of Care  Goal: Plan of Care Review  Outcome: Progressing  Goal: Patient-Specific Goal (Individualized)  Outcome: Progressing  Goal: Absence of Hospital-Acquired Illness or Injury  Outcome: Progressing  Goal: Optimal Comfort and Wellbeing  Outcome: Progressing  Goal: Readiness for Transition of Care  Outcome: Progressing     Problem: Fall Injury Risk  Goal: Absence of Fall and Fall-Related Injury  Outcome: Progressing     Problem: Gas Exchange Impaired  Goal: Optimal Gas Exchange  Outcome: Progressing

## 2024-11-20 NOTE — PROGRESS NOTES
[facility] Internal Medicine  PROGRESS NOTE    Patient's Name: Clarence Trjeo Sr.  : 1955  MRN: [id number]    Admission Date: 2024  Length of Stay: 4  Attending Physician: Rochelle Mi MD  Resident: Wilber  Intern: Esteban    SUBJECTIVE     Chief Complaint   Patient presents with    Shortness of Breath     Pt in via [facility] c/o SOB when laying down to go to bed. EMS states was 80% on RA and came up to 83% on 4L. Pt refused CPAP en route. Pt anxious on arrival Pt 86% 15L MD and respiratory in room.     History of Present Illness:  Clarence Trejo Sr. is a 68 y.o. male with PMH significant for antisynthetase syndrome, ILD, chronic hypoxic respiratory failure on 3L NC at home  presented to ED on 2024  with a primary complaint of Shortness of Breath (Pt in via [facility] c/o SOB when laying down to go to bed. EMS states was 80% on RA and came up to 83% on 4L. Pt refused CPAP en route. Pt anxious on arrival Pt 86% 15L MD and respiratory in room.)  Pt states he was feeling good earlier this evening. When he tried to go to sleep states he felt short of breath. Pt called EMS because he started having some burning in his chest. When EMS arrived pt was sating in the low 80s with mild improvement on nasal canula. Pt admits to using his inhaler earlier today but has been out of his neb machine solution, has also been out of another one of his medications but doesn't know which one. Shortness of breath has improved since arrival to the ED. Admits to increased sputum production. Denied any associated fevers, chills, N/V.      ED Course - Vital signs at admission AF, tachycardic 106, hypertensive 160/108, O2 sat 86% on 15L non-rebreather. Lab work showed no leukocytosis, baseline polycythemia, elevated .3, and elevated lactic acid 2.6. Chest X-ray appeared hazy bilaterally. Blood cultures drawn; pt given dose of Vanc and a Duoneb treatment. Internal Medicine consulted for admission.      Hospital Course/Significant Events:  11/16/2024: Admitted to [facility].    Interval History:  NAEON. BP slightly elevated overnight to 140s. Patient remains on Venturi mask on 15L satting appropriately. Net fluid balance -2.6L yesterday. Labs significant for leukocytosis 14.37.    Attempted to downtitrate to NC yesterday afternoon, but patient desatted to the low 80s, so patient was resumed on Venturi mask since then. Today on interview, patient was seen on Venturi mask at 15L satting approximately 96%. Downtitrated to 6L Venturi mask satting approximately 64%. Will attempt to descalate to NC later today. Otherwise, patient states that he's feeling well. Endorses good UOP. Was seen working with PT still maintaining oxygen saturations. Denies any fever, chills, chest pain, abdominal pain, nausea, vomiting, and changes in BM.    Review of Systems:  A 12-pt ROS was conducted and was negative unless stated above.    OBJECTIVE     VITAL SIGNS: 24 HR MIN & MAX Most Recent Vitals   Temp  Min: 97.4 °F (36.3 °C)  Max: 97.7 °F (36.5 °C)  97.6 °F (36.4 °C)   BP  Min: 137/88  Max: 149/91  137/88    Pulse  Min: 85  Max: 104  87   Resp  Min: 17  Max: 22  17    SpO2  Min: 87 %  Max: 99 %  (!) 94 %    Body mass index is 21.61 kg/m².    Intake/Output:  I/O last 3 completed shifts:  In: 1994.3 [P.O.:1066; I.V.:828.7; IV Piggyback:99.6]  Out: 2700 [Urine:2700]  Physical Exam  Vitals and nursing note reviewed.   Constitutional:       General: He is not in acute distress.     Appearance: Normal appearance. He is ill-appearing (chronically). He is not toxic-appearing.   HENT:      Head: Normocephalic and atraumatic.      Nose: Nose normal.      Mouth/Throat:      Mouth: Mucous membranes are moist.      Pharynx: Oropharynx is clear.   Eyes:      Extraocular Movements: Extraocular movements intact.      Conjunctiva/sclera: Conjunctivae normal.   Cardiovascular:      Rate and Rhythm: Normal rate and regular rhythm.      Pulses:  Normal pulses.      Heart sounds: Normal heart sounds. No murmur heard.  Pulmonary:      Effort: Pulmonary effort is normal. Tachypnea present. No respiratory distress.      Breath sounds: Rhonchi (diffusely) and rales (diffusely) present.      Comments: Currently on 15L Venturi mask  Abdominal:      General: Abdomen is flat. Bowel sounds are normal. There is no distension.      Palpations: Abdomen is soft. There is no mass.      Tenderness: There is no abdominal tenderness.   Musculoskeletal:      Cervical back: Normal range of motion and neck supple.      Right lower leg: No edema.      Left lower leg: No edema.   Skin:     General: Skin is warm and dry.      Capillary Refill: Capillary refill takes less than 2 seconds.      Findings: No erythema or rash.   Neurological:      General: No focal deficit present.      Mental Status: He is alert and oriented to person, place, and time. Mental status is at baseline.   Psychiatric:         Behavior: Behavior normal.     Current Medications:  Scheduled:   albuterol-ipratropium  3 mL Nebulization Q6H WAKE    enoxparin  40 mg Subcutaneous Daily    fluticasone furoate-vilanteroL  1 puff Inhalation Daily    hydroxychloroquine  200 mg Oral Daily    methylPREDNISolone injection (PEDS and ADULTS)  60 mg Intravenous Q12H    pantoprazole  40 mg Oral Daily    tiotropium bromide  2 puff Inhalation Daily      Infusions:      PRNs:    Current Facility-Administered Medications:     acetaminophen, 650 mg, Oral, Q4H PRN    dextrose 10%, 12.5 g, Intravenous, PRN    dextrose 10%, 25 g, Intravenous, PRN    glucagon (human recombinant), 1 mg, Intramuscular, PRN    glucose, 16 g, Oral, PRN    glucose, 24 g, Oral, PRN    melatonin, 6 mg, Oral, Nightly PRN    naloxone, 0.02 mg, Intravenous, PRN    sodium chloride 0.9%, 10 mL, Intravenous, Q12H PRN  Labs:  CBC:  Recent Labs   Lab 11/17/24  0409 11/18/24  0511 11/19/24  0551   WBC 13.91* 13.20* 14.85*   HGB 14.9 14.6 15.1   HCT 46.6 44.7 46.4     203 215   MCV 87.6 86.8 86.6   RDW 15.1 15.0 15.2     BMP/CMP:  Recent Labs   Lab 24  0512 24  0551 24  1344    139 144   K 4.4 4.4 4.4   * 108* 105   CO2 24 26 26   BUN 14.1 15.5 14.6   CREATININE 0.73 0.78 0.88   GLUCOSE 152* 127* 168*   EGFRNORACEVR >60 >60 >60     RFTs/LFTs:  Recent Labs   Lab 24  0250 24  0512 24  0551 24  1344   CALCIUM 8.8 8.4* 8.5* 9.4   LABPROT 7.2  14.0 5.7* 5.9  --    ALBUMIN 3.3* 2.6* 2.8*  --    AST 27 14 21  --    ALT 28 15 23  --    ALKPHOS 94 65 66  --    BILITOT 0.6 0.4 0.2  --      Recent Labs   Lab 24  0250   MG 1.90     Cardiac Panel:  Recent Labs   Lab 24  0250 24  1901   TROPONINI <0.010 <0.010   .3*  --      Interval Imaging:  Reviewed. No new interval imaging.    Past Pertinent Imagin2024 CXR: extensive chronic lung interstitial disease  2024 CTA PE: No acute PE identified, persistent mediastinal and right hilar lymphadenopathy, and severe emphysematous changes of the lungs.  2024 CXR: No interval change compared to last CXR.     Microbiology:  Microbiology Results (last 7 days)       Procedure Component Value Units Date/Time    Blood culture #1 **CANNOT BE ORDERED STAT** [[id number]]  (Normal) Collected: 24    Order Status: Completed Specimen: Blood from Hand, Right Updated: 24 1100     Blood Culture No Growth At 72 Hours    Blood culture #2 **CANNOT BE ORDERED STAT** [[id number]]  (Normal) Collected: 24    Order Status: Completed Specimen: Blood from Arm, Right Updated: 24 1100     Blood Culture No Growth At 72 Hours    Respiratory Culture [[id number]] Updated: 24 0850    Order Status: Canceled Specimen: Sputum from Nasal Swab           Antibiotics:  Antibiotics (From admission, onward)      None             ASSESSMENT AND PLAN   Shortness of breath, likely 2/2 CAP superimposed on ILD  SIRS 2/4  History of anti-synthetase  syndrome  History of pulmonary hypertension  - Home medications: hydroxychloroquine 200 mg daily, OFEV 150 mg BID since 11/2022, and Symbicort 80-4.5 mcg daily.  - Patient reports adherence with hydroxychloroquine but has been out of inhalers for past week.  - Patient with progressively worsening SOB for few hours prior to admission with ED vitals notable for tachycardia 106, tachypnea 44, and desaturations to the low 80s on RA in the ED. Patient given vancomycin and cefepime and Solumedrol 125 mg once in the ED.  - ED workup notable for lactic acidosis 2.6 and elevated .3 but no leukocytosis. CXR notable for extensive chronic interstitial lung disease, similar to prior CXR.  - Per chart review, patient has a history of pulmonary HTN on echo on 4/2023 but did not follow up with Cardiology.  - Bcx NGTD @ 48 hrs. COVID/Flu, RVP, and MRSA PCR negative.  - Continue hydroxycloroquine but held OFEV due to non-formulary.  - Patient started on Breo-Ellipta 200-25 mcg daily and tiotropium 2.5 mcg daily. Continue regimen.  - Patient now s/p 3 day regimen of azithromycin and Zosyn..   - Patient started on Solumedrol 60 mg TID on 11/17 now on 60 mg BID. Continue for 5 days until 11/22.  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 Will need Cardiology follow-up upon discharge.  - Lasix 20 mg IV x2 given yesterday with good UOP, but patient still on Venti mask.  - Continue incentive spirometry.  - Continuing to wean supplemental O2 as tolerated. O2 sat goal 88-92%.    HTN  - Patient prescribed lisinopril 5 mg daily but has not been adherent.  - Review of inpatient BP mildly soft to max SBP 140s.  - Continue holding lisinopril and CTM BP. Will  resume if uncontrolled.      DVT Prophylaxis: Lovenox  GI Prophylaxis: None  Abx: None  Access: PIV  Fluids: None  Diet: Diet Adult Regular    Code Status: Full Code    Disposition: 68 y.o. male admitted for progressively worsening SOB likely 2/2 ILD and medication nonadherence. Disposition pending clinical improvement and tolerating NC.       Case was discussed and seen with Dr. Mi. Please appreciate attending's attestation to follow.    Kenney Orellana MD  PGY-1 Resident  [employer] Internal Medicine Team 1  11/20/2024

## 2024-11-20 NOTE — PT/OT/SLP PROGRESS
Physical Therapy Treatment    Patient Name:  Clarence Trejo Sr.   MRN:  [id number]    Recommendations     Therapy Intensity Recommendations at Discharge: Low Intensity Therapy  Discharge Equipment Recommendations: none   Barriers to discharge: decreased endurance    Assessment     Clarence Trejo Sr. is a 68 y.o. male admitted with a medical diagnosis of  Shortness of breath.     Patient Active Problem List   Diagnosis    Clubbing of fingers    Antisynthetase syndrome    Interstitial lung disease    Positive antinuclear antibody    Seronegative rheumatoid arthritis    Fatigue    High risk medication use    Shortness of breath    History of tobacco abuse    TB lung, latent    Erythrocytosis due to hypoxemia    Pulmonary emphysema with fibrosis of lung    Secondary erythrocytosis    Nocturnal oxygen desaturation    Chronic hypoxic respiratory failure    Antinuclear antibody (SAM) titer greater than 1:80      He presents with the following impairments/functional limitations:  impaired endurance.    Rehab Prognosis: TBD pending progress.    Patient would benefit from continued skilled acute PT services to: address above listed impairments/functional limitations; receive patient/caregiver education; reduce fall risk; and maximize independency/safety with functional mobility.    Recent Surgery: * No surgery found *      Plan     During this hospitalization, patient to be seen 3 x/week to address the identified impairments/functional limitations via gait training, therapeutic activities, therapeutic exercises and progress toward the established goals.    Plan of Care Expires:  12/18/24    Subjective     Communicated with patient's nurse Yael prior to session.    Patient agreeable to participate in treatment session.    Chief Complaint: being SOB  Patient/Family Comments/goals: get better and return home  Pain/Comfort:  Pain Rating 1: 0/10  Pain Rating Post-Intervention 1: 0/10    Objective     Patient found supine in bed and  with HOB elevated with oxygen  upon PT entry to room.    General Precautions: Standard, fall   Orthopedic Precautions:N/A   Braces: N/A  Respiratory Status: 15 liters, pt. Is on ventimask    Functional Mobility:    Bed Mobility:  Supine to Sit: independence    Transfers:  Sit to Stand: modified independence with no assistive device    Gait:  Patient ambulated 400 ft with no assistive device and stand by assistance.  Patient demonstrates :       occasional unsteady gait.    Other Mobility:  N/A    Patient left sitting in chair with all lines intact, call button in reach, tray table at bedside, and patient's nurse notified.    Education     Patient educated on and assisted with functional mobility as noted above.  Patient educated on PT Plan of Care.  Patient was instructed to utilize staff assistance for mobility/transfers.  White board updated regarding patient's safest level of mobility with staff assistance    Goals     Multidisciplinary Problems       Physical Therapy Goals          Problem: Physical Therapy    Goal Priority Disciplines Outcome Interventions   Physical Therapy Goal     PT, PT/OT Progressing    Description: Goals to be met by: dc     Patient will increase functional independence with mobility by performin. Gait  x 260 feet with Modified [place] using No Assistive Device.   2. Ascend/descend 5 stair with bilateral Handrails Modified [place] using No Assistive Device.   Reviewed 2024                         Time Tracking     PT Received On: 24  PT Start Time: 07     PT Stop Time: 0802  PT Total Time (min): 16 min     Billable Minutes: Gait Training 16    2024

## 2024-11-21 ENCOUNTER — PATIENT OUTREACH (OUTPATIENT)
Dept: ADMINISTRATIVE | Facility: CLINIC | Age: 69
End: 2024-11-21
Payer: MEDICARE

## 2024-11-21 LAB
BACTERIA BLD CULT: NORMAL
BACTERIA BLD CULT: NORMAL

## 2024-11-21 NOTE — PT/OT/SLP DISCHARGE
POST DISCHARGE DOCUMENTATION - 2024 3:19 PM    Physical Therapy Discharge Note    Documenting Physical Therapist did not evaluate OR treat the patient.    Evaluating/treating Physical Therapist is not available to complete discharge summary.    Refer to prior Physical Therapy note dated 2024 for last known functional status of patient.      Name: Clarence Trejo Sr.  MRN: [id number]   Principal Problem: Shortness of breath     Recommendations: per last treatment session     Therapy Intensity Recommendations at Discharge: Low Intensity Therapy  Discharge Equipment Recommendations: none     Assessment:     Patient last seen by PT SERVICES on 2024    Patient was unexpectedly discharged from hospital.    Refer to therapy's initial evaluation or last treatment (progress) note for patient's last known functional status, goal achievement and therapists' recommendations.    Objective:     GOALS: 0 out of 2 STG's met by patient    Multidisciplinary Problems       Physical Therapy Goals       Problem: Physical Therapy    Goal Priority Disciplines Outcome Interventions   Physical Therapy Goal     PT, PT/OT Progressing    Description: Goals to be met by: dc   Patient will increase functional independence with mobility by performin. Gait  x 260 feet with Modified [place] using No Assistive Device.   2. Ascend/descend 5 stair with bilateral Handrails Modified [place] using No Assistive Device.   Reviewed 2024           Plan:     Patient Discharged to: home or self care per chart.    2024

## 2024-11-21 NOTE — PLAN OF CARE
Spoke to Migdalia with [facility], who stated that since patient left AMA, they will not be able to admit him to their services.

## 2024-11-21 NOTE — PROGRESS NOTES
C3 nurse attempted to contact Clarence Trejo Sr.  for a TCC post hospital discharge follow up call. No answer. Left voicemail with callback information. The patient does not have a scheduled HOSFU appointment. Message sent to PCP staff for assistance with scheduling visit with patient.    Low Dose Naltrexone Pregnancy And Lactation Text: Naltrexone is pregnancy category C.  There have been no adequate and well-controlled studies in pregnant women.  It should be used in pregnancy only if the potential benefit justifies the potential risk to the fetus.   Limited data indicates that naltrexone is minimally excreted into breastmilk.

## 2024-11-21 NOTE — DISCHARGE SUMMARY
[facility]   AMA DISCHARGE SUMMARY    Patient's Name: Clarence Trejo Sr.  : 1955  MRN: [id number]  Code Status: Full Code    Admitting Physician: Fabian Blake MD  Attending Physician: Rochelle Mi MD  Primary Care Provider: Yara Spear MD  Date of Admission: 2024  Date of Discharge: 2024    Condition: Fair  Outcome:  Left against medical advice  Disposition: Left Against Medical Advice    Discharge Diagnoses     Patient Active Problem List   Diagnosis    Clubbing of fingers    Antisynthetase syndrome    Interstitial lung disease    Positive antinuclear antibody    Seronegative rheumatoid arthritis    Fatigue    High risk medication use    Shortness of breath    History of tobacco abuse    TB lung, latent    Erythrocytosis due to hypoxemia    Pulmonary emphysema with fibrosis of lung    Secondary erythrocytosis    Nocturnal oxygen desaturation    Chronic hypoxic respiratory failure    Antinuclear antibody (SAM) titer greater than 1:80       Principal Problem:  Shortness of breath      Consultants and Procedures   Consultants:  IP CONSULT TO INTERNAL MEDICINE  IP CONSULT TO SOCIAL WORK/CASE MANAGEMENT    Procedures:   * No surgery found *       Brief Admission History   Clarence Trejo Sr. is a 68 y.o. male with PMH significant for antisynthetase syndrome, ILD, chronic hypoxic respiratory failure on 3L NC at home  presented to ED on 2024  with a primary complaint of Shortness of Breath (Pt in via [facility] c/o SOB when laying down to go to bed. EMS states was 80% on RA and came up to 83% on 4L. Pt refused CPAP en route. Pt anxious on arrival Pt 86% 15L MD and respiratory in room.)  Pt states he was feeling good earlier this evening. When he tried to go to sleep states he felt short of breath. Pt called EMS because he started having some burning in his chest. When EMS arrived pt was sating in the low 80s with mild improvement on nasal canula. Pt  admits to using his inhaler earlier today but has been out of his neb machine solution, has also been out of another one of his medications but doesn't know which one. Shortness of breath has improved since arrival to the ED. Admits to increased sputum production. Denied any associated fevers, chills, N/V.      ED Course - Vital signs at admission AF, tachycardic 106, hypertensive 160/108, O2 sat 86% on 15L non-rebreather. Lab work showed no leukocytosis, baseline polycythemia, elevated .3, and elevated lactic acid 2.6. Chest X-ray appeared hazy bilaterally. Blood cultures drawn; pt given dose of Vanc and a Duoneb treatment. Internal Medicine consulted for admission.       Hospital Course with Pertinent Findings   Patient was admitted to [facility] for further management for acute on chronic respiratory failure secondary to ILD from antisynthetase syndrome. Patient was started on vancomycin, Zosyn, and azithromycin for possible CAP. Patient was also started on Solumedrol, Duonebs, Breo-Ellipta, and tiotropium. CTA chest negative for pulmonary embolism. Infectious workup with Bcx, respiratory culture, and RVP negative. MRSA PCR was negative, so vancomycin was later continued. Patient initially required 5L NC to maintain saturations around 88-92%, but patient desatted on the evening of 11/18 in which he required 15L Venturi mask with 50% FiO2. A TTE was performed showing right heart overload, so patient was given IV Lasix for diuresis. However, it did not improve the patient's respiratory status. Multiple attempts were made to wean to NC, but patient would desaturate to the high 70s to low 80s, so he was resumed back on Venturi mask. However, during this time, patient reports that he would not feel dyspneic and feels at baseline.    Towards end of hospital course, patient reported that he wants to go home. We counseled the patient multiple times that he was still having increased oxygen requirements  and he has not been weaned back down to his home 4L NC. Despite this, patient remained adamant about being discharged home. He is clinically non-intoxicated, free from coercion, and appears to have intact insight, judgment, and reason. The patient is also not under any duress to leave the hospital. Based on this and my medical opinion, he currently has decision-making capacity. I have voiced my concerns for the patient's health given that a full evaluation and treatment had not occurred. I have discussed the need for continued evaluation to determine if his symptoms are caused by conditions that present risk of death or morbidity. I have personally and extensively discussed the risks of leaving against medical advice in layman terms, including but not limited to worsening oxygen requirements, respiratory failure, altered mental status, prolonged hospitalization, permanent disability, and death. I have also discussed the specific benefits of additional treatment, as well as tried offering alternative options in hopes that the patient might be amenable to partial evaluation and treatment which would be medically beneficial to the patient. However, the patient declined my options and continued to insist on leaving. Since I have been unable to convince the patient to stay and he cannot be held in the hospital against his will, I answered all of his questions about his condition and emphasized to him to return to this or any other Emergency Department as soon as possible to complete his evaluation and treatment, especially if his symptoms do not improve or worsens. Patient verbalized understanding, signed the AMA form, and patient left against medical advice. Completed AMA form was scanned into patient's chart.We discussed risks and complications by leaving against medical advice. Patient reported understanding. By the time patient left AMA, patient completed a 3 day regimen of azithromycin and Zosyn and a 5 day regimen  "of Solumedrol. He was given a prescription for 5 days of prednisone 20 mg. He was discharged against medical advice in fair condition.    Discharge Physical Exam:  Vitals  BP: (!) 154/106  Temp: 97.6 °F (36.4 °C)  Temp Source: Oral  Pulse: 66  Resp: 17  SpO2: (!) 91 %  Height: 5' 7" (170.2 cm)  Weight: 62.6 kg (138 lb)  Physical Exam  Vitals and nursing note reviewed.   Constitutional:       General: He is not in acute distress.     Appearance: Normal appearance. He is ill-appearing (chronically). He is not toxic-appearing.   HENT:      Head: Normocephalic and atraumatic.      Nose: Nose normal.      Mouth/Throat:      Mouth: Mucous membranes are moist.      Pharynx: Oropharynx is clear.   Eyes:      Extraocular Movements: Extraocular movements intact.      Conjunctiva/sclera: Conjunctivae normal.   Cardiovascular:      Rate and Rhythm: Normal rate and regular rhythm.      Pulses: Normal pulses.      Heart sounds: Normal heart sounds. No murmur heard.  Pulmonary:      Effort: Pulmonary effort is normal. Tachypnea present. No respiratory distress.      Breath sounds: Rhonchi (diffusely) and rales (diffusely) present.      Comments: Satting approximately high 70s to low 80s on 6L NC while at rest.  Abdominal:      General: Abdomen is flat. Bowel sounds are normal. There is no distension.      Palpations: Abdomen is soft. There is no mass.      Tenderness: There is no abdominal tenderness.   Musculoskeletal:      Cervical back: Normal range of motion and neck supple.      Right lower leg: No edema.      Left lower leg: No edema.   Skin:     General: Skin is warm and dry.      Capillary Refill: Capillary refill takes less than 2 seconds.      Findings: No erythema or rash. Clubbing of the distal fingers and nailbeds  Neurological:      General: No focal deficit present.      Mental Status: He is alert and oriented to person, place, and time. Mental status is at baseline.   Psychiatric:         Behavior: Behavior normal. "       Discharge Medications        Medication List        START taking these medications      predniSONE 20 MG tablet  Commonly known as: DELTASONE  Take 1 tablet (20 mg total) by mouth once daily.            CONTINUE taking these medications      albuterol 90 mcg/actuation inhaler  Commonly known as: PROVENTIL/VENTOLIN HFA  Inhale 1-2 puffs into the lungs every 6 (six) hours as needed for Wheezing. Rescue     baclofen 10 MG tablet  Commonly known as: LIORESAL     budesonide-formoterol 80-4.5 mcg 80-4.5 mcg/actuation Hfaa  Commonly known as: SYMBICORT  Inhale 2 puffs into the lungs 2 (two) times a day.     hydroxychloroquine 200 mg tablet  Commonly known as: PLAQUENIL  Take 1 tablet (200 mg total) by mouth once daily.     meloxicam 7.5 MG tablet  Commonly known as: MOBIC  Take 1 tablet (7.5 mg total) by mouth 2 (two) times daily.     OFEV 150 mg Cap  Generic drug: nintedanib  Take 1 capsule (150 mg total) by mouth 2 (two) times daily.     omeprazole 20 MG capsule  Commonly known as: PRILOSEC  Take 1 capsule (20 mg total) by mouth once daily.     tadalafiL 5 MG tablet  Commonly known as: CIALIS  Take 1 tablet (5 mg total) by mouth daily as needed for Erectile Dysfunction.            ASK your doctor about these medications      albuterol-ipratropium 2.5 mg-0.5 mg/3 mL nebulizer solution  Commonly known as: DUO-NEB  Take 3 mLs by nebulization every 6 (six) hours as needed for Wheezing. Rescue     lisinopriL 5 MG tablet  Commonly known as: PRINIVIL,ZESTRIL  Take 1 tablet (5 mg total) by mouth once daily.               Where to Get Your Medications        These medications were sent to [place] [id number] [facility] [address]      Phone: [phone]   predniSONE 20 MG tablet           Discharge Information   Diet:  As tolerated    Physical Activity:  As tolerated    Counseling Provided to Patient and Family:  Yes; risks of leaving against  medical advice, such as worsening oxygen requirements, respiratory failure, altered mental status, permanent disability, up to and including death    Instructions:  1. Take all medications as prescribed.  2. Keep all follow-up appointments.  3. Return to the hospital or call your primary care physician if any worsening symptoms occur, such as fever, chills, shortness of breath, cough, sputum production, worsening oxygen requirements.  4. The above information was discussed with the patient in clear terms. he was able to repeat the instructions to me in his own words. All questions answered.  Patient verbalized understanding. ED precautions provided.    Future Appointments:  Future Appointments   Date Time Provider Department Center   11/22/2024  9:30 AM Salvador Johnson MD [facility] CARD [facility]   12/11/2024 12:30 PM NURSE, [facility]   12/11/2024  1:30 PM Willa Guerra NP [facility] HEMOM [place] [facility]   1/6/2025  1:00 PM PROVIDERS, [facility]   1/7/2025  3:00 PM PROVIDER, [facility] PULMONOLOGY [facility] PULM [facility]   2/5/2025 11:00 AM Dixie Caraballo MD [facility] RHEU [place]       Follow up items to address with PCP and pending results at time of discharge:  Oxygen requirements    TIME SPENT ON DISCHARGE: 60 minutes      Case discussed with Dr. Mi. Please appreciate attending's attestation to follow.    Kenney Orellana MD  PGY-1 Resident  [facility] Internal Medicine Team 2  11/20/2024

## 2024-11-22 ENCOUNTER — OFFICE VISIT (OUTPATIENT)
Dept: CARDIOLOGY | Facility: CLINIC | Age: 69
End: 2024-11-22
Payer: MEDICARE

## 2024-11-22 VITALS
HEIGHT: 66 IN | SYSTOLIC BLOOD PRESSURE: 162 MMHG | RESPIRATION RATE: 28 BRPM | DIASTOLIC BLOOD PRESSURE: 118 MMHG | HEART RATE: 106 BPM | OXYGEN SATURATION: 88 % | BODY MASS INDEX: 22.98 KG/M2 | WEIGHT: 143 LBS

## 2024-11-22 DIAGNOSIS — J84.9 INTERSTITIAL LUNG DISEASE: ICD-10-CM

## 2024-11-22 DIAGNOSIS — R53.83 FATIGUE, UNSPECIFIED TYPE: ICD-10-CM

## 2024-11-22 DIAGNOSIS — J84.10 PULMONARY EMPHYSEMA WITH FIBROSIS OF LUNG: ICD-10-CM

## 2024-11-22 DIAGNOSIS — J96.11 CHRONIC HYPOXIC RESPIRATORY FAILURE: Primary | ICD-10-CM

## 2024-11-22 DIAGNOSIS — I27.20 PULMONARY HYPERTENSION: ICD-10-CM

## 2024-11-22 DIAGNOSIS — R06.02 SHORTNESS OF BREATH: ICD-10-CM

## 2024-11-22 DIAGNOSIS — Z87.891 HISTORY OF TOBACCO ABUSE: ICD-10-CM

## 2024-11-22 DIAGNOSIS — G47.34 NOCTURNAL OXYGEN DESATURATION: ICD-10-CM

## 2024-11-22 DIAGNOSIS — J43.9 PULMONARY EMPHYSEMA WITH FIBROSIS OF LUNG: ICD-10-CM

## 2024-11-22 PROCEDURE — 99214 OFFICE O/P EST MOD 30 MIN: CPT | Mod: PBBFAC | Performed by: INTERNAL MEDICINE

## 2024-11-22 NOTE — PROGRESS NOTES
2024 9:41 AM    Subjective:     CHIEF COMPLAINT:   Chief Complaint   Patient presents with    sob pulmonary htn     EKG 24   Echo 24  Pt just d/c from hospital                            HPI:    Mr. Clarence Trejo Sr. is a 69 y.o. male.  Today the patient comes for a new patient evaluation.    The patient has history of home oxygen use, interstitial lung disease, emphysema, and shortness of breath.    Occasional chest discomfort. Occurs 2x/week.   Drinking warm water seems to help the CP.   + shortness of breath.  Has BRAN  + orthopnea.  + PND  No edema.      feels fatigued on occasion.   No near syncope.    No syncope.     No lightheadedness  No palpitations.    LEVEL OF EXERTION:  The patient does ADL.  Patient can walk about a block and then has to stop due to BRAN.   METS:   Patient appears to do 2 METS      Past Medical History    Patient Active Problem List   Diagnosis    Clubbing of fingers    Antisynthetase syndrome    Interstitial lung disease    Positive antinuclear antibody    Seronegative rheumatoid arthritis    Fatigue    High risk medication use    Shortness of breath    History of tobacco abuse    TB lung, latent    Erythrocytosis due to hypoxemia    Pulmonary emphysema with fibrosis of lung    Secondary erythrocytosis    Nocturnal oxygen desaturation    Chronic hypoxic respiratory failure    Antinuclear antibody (SAM) titer greater than 1:80      Surgical History  History reviewed. No pertinent surgical history.    Social History     Socioeconomic History    Marital status:    Tobacco Use    Smoking status: Former     Current packs/day: 0.00     Types: Cigarettes     Quit date: 2023     Years since quittin.8    Smokeless tobacco: Never   Substance and Sexual Activity    Alcohol use: Not Currently    Drug use: Never    Sexual activity: Not Currently     Partners: Female     Social Drivers of Health     Financial Resource Strain: Low Risk  (2024)    Overall  Financial Resource Strain (CARDIA)     Difficulty of Paying Living Expenses: Not hard at all   Food Insecurity: No Food Insecurity (11/16/2024)    Hunger Vital Sign     Worried About Running Out of Food in the Last Year: Never true     Ran Out of Food in the Last Year: Never true   Transportation Needs: No Transportation Needs (11/16/2024)    TRANSPORTATION NEEDS     Transportation : No   Physical Activity: Inactive (8/1/2024)    Exercise Vital Sign     Days of Exercise per Week: 0 days     Minutes of Exercise per Session: 0 min   Stress: No Stress Concern Present (11/16/2024)    [employer] - Occupational Stress Questionnaire     Feeling of Stress : Not at all   Housing Stability: Low Risk  (11/16/2024)    Housing Stability Vital Sign     Unable to Pay for Housing in the Last Year: No     Homeless in the Last Year: No       Family History   Problem Relation Name Age of Onset    COPD Mother      COPD Father       Review of patient's allergies indicates:  No Known Allergies    Current Medications    Current Outpatient Medications   Medication Instructions    albuterol (PROVENTIL/VENTOLIN HFA) 90 mcg/actuation inhaler 1-2 puffs, Inhalation, Every 6 hours PRN, Rescue    albuterol-ipratropium (DUO-NEB) 2.5 mg-0.5 mg/3 mL nebulizer solution 3 mLs, Nebulization, Every 6 hours PRN, Rescue    baclofen (LIORESAL) 10 mg, 2 times daily    budesonide-formoterol 80-4.5 mcg (SYMBICORT) 80-4.5 mcg/actuation HFAA 2 puffs, Inhalation, 2 times daily    hydroxychloroquine (PLAQUENIL) 200 mg, Oral, Daily    lisinopriL (PRINIVIL,ZESTRIL) 5 mg, Oral, Daily    meloxicam (MOBIC) 7.5 mg, Oral, 2 times daily    OFEV 150 mg, Oral, 2 times daily    omeprazole (PRILOSEC) 20 mg, Oral, Daily    predniSONE (DELTASONE) 20 mg, Oral, Daily    tadalafiL (CIALIS) 5 mg, Oral, Daily PRN         ROS:  Refer to HPI     Objective:     BP Readings from Last 3 Encounters:   11/22/24 (!) 162/118   11/20/24 (!) 154/106   11/11/24  "124/82        Pulse Readings from Last 3 Encounters:   11/22/24 106   11/20/24 66   11/11/24 106        Temp Readings from Last 3 Encounters:   11/20/24 97.6 °F (36.4 °C) (Oral)   11/11/24 97.7 °F (36.5 °C) (Oral)   10/15/24 97.4 °F (36.3 °C)       Wt Readings from Last 3 Encounters:   11/22/24 64.9 kg (143 lb)   11/18/24 62.6 kg (138 lb)   11/11/24 60.2 kg (132 lb 12.8 oz)         PE:  Blood pressure (!) 162/118, pulse 106, resp. rate (!) 28, height 5' 6" (1.676 m), weight 64.9 kg (143 lb), SpO2 (!) 88%.   GENERAL:  In wheelchair  CONSTITUTIONAL:  No acute distress but has increased respiratory rate.  Not ill appearing, but is thin.    NECK:  No cervical adenopathy.  No carotid bruit.  CARDIOVASCULAR:  Tachycardic.  Regular rhythm.  No murmur.  PULMONARY:  Gets SOB talking.  No wheezing.  No crackles.  ABDOMINAL:  No distention.  No tenderness.  MUSCULOSKELETAL:  No deformity.  No edema  SKIN:  No bruising.  No rash.  NEUROLOGICAL:  Oriented x 3.  No weakness.                                                                                                                                                                                                                                                                                                                                                                                                                                                                               CARDIAC TESTING:  Echocardiogram  Results for orders placed during the hospital encounter of 11/16/24    Echo    Interpretation Summary    Left Ventricle: The left ventricle is normal in size. Mildly increased ventricular mass. Mildly increased wall thickness. There is mild concentric hypertrophy. Normal wall motion. Septal flattening in diastole consistent with right ventricular volume overload. There is normal systolic function. Biplane (2D) method of discs ejection fraction is 62%. Grade I diastolic " "dysfunction.    Right Ventricle: Mild right ventricular enlargement. Systolic function is normal.    Left Atrium: Normal left atrial size.    Right Atrium: Right atrium is dilated. A membranous structure dividing the right atrium is seen. 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    Aortic Valve: The aortic valve is a trileaflet valve. There is no stenosis. There is no significant regurgitation.    Mitral Valve: The mitral valve is structurally normal. There is no stenosis. There is no significant regurgitation.    Tricuspid Valve: There is mild to moderate regurgitation.    Pulmonic Valve: There is no significant regurgitation.    Aorta: Aortic root is normal in size measuring 3.1 cm.    Pulmonary Artery: There is moderate pulmonary hypertension. The estimated pulmonary artery systolic pressure is 58 mmHg.    IVC/SVC: Elevated venous pressure at 15 mmHg.    Pericardium: There is a trivial effusion.      Stress Test  No results found for this or any previous visit.     Coronary Angiogram  No results found for this or any previous visit.     Holter Monitor  No cardiac monitor results found for the past 12 months    Last BMP BMP  Lab Results   Component Value Date     11/20/2024    K 4.8 11/20/2024     11/20/2024    CO2 28 11/20/2024    BUN 18.1 11/20/2024    CREATININE 0.77 11/20/2024    CALCIUM 8.7 (L) 11/20/2024    EGFRNORACEVR >60 11/20/2024      Creatinine    Lab Results   Component Value Date    CREATININE 0.77 11/20/2024    CREATININE 0.88 11/19/2024    CREATININE 0.78 11/19/2024     Magnesium No components found for: "MAG"  Last CBC     Lab Results   Component Value Date    WBC 14.37 (H) 11/20/2024    HGB 15.2 11/20/2024    HCT 48.3 11/20/2024    MCV 86.7 11/20/2024     11/20/2024           BNP    Lab Results   Component Value Date    .3 (H) 11/16/2024    BNP 20.3 05/07/2023    BNP 31.9 04/08/2023     Last lipids    Lab Results   Component Value Date    CHOL 110 07/15/2021    HDL " 33 (L) 07/15/2021    LDL 56.00 07/15/2021    TRIG 104 07/15/2021    TOTALCHOLEST 3 07/15/2021      LFT     Lab Results   Component Value Date    ALT 27 11/20/2024    ALT 23 11/19/2024    ALT 15 11/18/2024    AST 22 11/20/2024    AST 21 11/19/2024    AST 14 11/18/2024     Troponin    Lab Results   Component Value Date    TROPONINI <0.010 11/18/2024    TROPONINI <0.010 11/16/2024    TROPONINI 0.011 05/07/2023       Assessment:     1. Pulmonary hypertension  - Ambulatory referral/consult to Cardiology    2. Chronic hypoxic respiratory failure    3. Interstitial lung disease    4. Pulmonary emphysema with fibrosis of lung    5. Shortness of breath    6. Fatigue, unspecified type    7. Nocturnal oxygen desaturation    8. History of tobacco abuse    10 Year Cardiovascular Risk:  The ASCVD Risk score (Jaime SKINNER, et al., 2019) failed to calculate for the following reasons:    Cannot find a previous HDL lab    Cannot find a previous total cholesterol lab  BMI:  Body mass index is 23.08 kg/m².  Patient is normal weight (BMI 18.5-24.9)  Tobacco:  none   quit x 1 year  Alcohol:  none  Substance abuse:  none    Last PCP visit:  8/1/2024    Plan:     MEDICATIONS:  no changes made    WORK UP:  No cardiac work up ordered.  Don't think patient will tolerate a stress test (he was recently hospitalized and diuresed, and does not appear to be volume overloaded at this time, but has tachypnea and shortness of breath.  Tachypnea and shortness of breath do not appear to be due to a cardiac issues)   Patient needs to see Pulmonary for his pulmonary HTN.   He has an appt with Pulmonary on 1/7/25.     WEIGHT:     Wt Readings from Last 3 Encounters:   11/22/24 64.9 kg (143 lb)   11/18/24 62.6 kg (138 lb)   11/11/24 60.2 kg (132 lb 12.8 oz)     BLOOD PRESSURE:    BP Readings from Last 3 Encounters:   11/22/24 (!) 162/118   11/20/24 (!) 154/106   11/11/24 124/82   Have patient do BP log    HEART RATE:    Pulse Readings from Last 3 Encounters:    11/22/24 106   11/20/24 66   11/11/24 106   Patient is most likely tachycardic from his lung issues     LIPIDS:    Lab Results   Component Value Date    CHOL 110 07/15/2021    LDL 56.00 07/15/2021       BNP:    Lab Results   Component Value Date    .3 (H) 11/16/2024    BNP 20.3 05/07/2023    BNP 31.9 04/08/2023   Patient states he was diuresed while in the hospital (was d/c from hospital yesterday)  Today physical exam does not suggest patient needs more diuresis.      FOLLOW UP:  4 months    Salvador Johnson MD  Cardiology Attending     Future Appointments   Date Time Provider Department Center   11/25/2024 12:30 PM Marshall Hopkins,  [facility] IM RES [facility]   12/11/2024 12:30 PM NURSE, [facility] HEMATOLOGY ONCOLOGY [facility] HEMOM [place] [facility]   12/11/2024  1:30 PM Willa Guerra, NP [facility] HEMOM [place] [facility]   1/6/2025  1:00 PM PROVIDERS, [facility] [place] [facility]   1/7/2025  3:00 PM PROVIDER, [facility] PULMONOLOGY [facility] PULM [place]   2/5/2025 11:00 AM Dixie Caraballo MD [facility] RHEU [place]

## 2024-11-22 NOTE — PROGRESS NOTES
C3 nurse spoke with Clarence Trejo Sr.  for a TCC post hospital discharge follow up call. The patient does not have a scheduled HOSFU appointment with Yara Spear MD  within 5-7 days post hospital discharge date 11/20/24. C3 nurse was unable to schedule HOSFU appointment in [facility].    Message sent to PCP staff requesting they contact patient and schedule follow up appointment.

## 2024-11-25 ENCOUNTER — OFFICE VISIT (OUTPATIENT)
Dept: INTERNAL MEDICINE | Facility: CLINIC | Age: 69
End: 2024-11-25
Payer: MEDICARE

## 2024-11-25 VITALS
OXYGEN SATURATION: 91 % | TEMPERATURE: 98 F | HEIGHT: 66 IN | WEIGHT: 139.38 LBS | HEART RATE: 97 BPM | SYSTOLIC BLOOD PRESSURE: 152 MMHG | DIASTOLIC BLOOD PRESSURE: 106 MMHG | BODY MASS INDEX: 22.4 KG/M2 | RESPIRATION RATE: 20 BRPM

## 2024-11-25 DIAGNOSIS — D89.89 ANTISYNTHETASE SYNDROME: ICD-10-CM

## 2024-11-25 DIAGNOSIS — R06.02 SHORTNESS OF BREATH: ICD-10-CM

## 2024-11-25 PROCEDURE — 99214 OFFICE O/P EST MOD 30 MIN: CPT | Mod: PBBFAC

## 2024-11-25 RX ORDER — BUDESONIDE AND FORMOTEROL FUMARATE DIHYDRATE 80; 4.5 UG/1; UG/1
2 AEROSOL RESPIRATORY (INHALATION) 2 TIMES DAILY
Qty: 10.2 G | Refills: 5 | Status: SHIPPED | OUTPATIENT
Start: 2024-11-25 | End: 2025-05-24

## 2024-11-25 RX ORDER — IPRATROPIUM BROMIDE AND ALBUTEROL SULFATE 2.5; .5 MG/3ML; MG/3ML
3 SOLUTION RESPIRATORY (INHALATION) EVERY 6 HOURS PRN
Qty: 75 ML | Refills: 5 | Status: SHIPPED | OUTPATIENT
Start: 2024-11-25 | End: 2025-11-25

## 2024-11-25 RX ORDER — ALBUTEROL SULFATE 90 UG/1
1-2 INHALANT RESPIRATORY (INHALATION) EVERY 6 HOURS PRN
Qty: 18 G | Refills: 6 | Status: SHIPPED | OUTPATIENT
Start: 2024-11-25

## 2024-11-25 NOTE — PROGRESS NOTES
[facility] INTERNAL MEDICINE  OUTPATIENT OFFICE VISIT NOTE    SUBJECTIVE:      Chief Complaint: Follow-up (Hospital follow up for Shortness of Breath)       HPI: Clarence Trejo Sr. is a 69 y.o. yo male w/ PMH of  has a past medical history of SAM positive, Antisynthetase syndrome, ILD (interstitial lung disease), Pulmonary embolism, Seronegative rheumatoid arthritis, TB lung, latent, and Tobacco use., who presents for Post- Wards. Patient was admitted to [facility] for further management for acute on chronic respiratory failure secondary to ILD from antisynthetase syndrome. Patient was started on vancomycin, Zosyn, and azithromycin for possible CAP. Patient was also started on Solumedrol, Duonebs, Breo-Ellipta, and tiotropium. A TTE was performed showing right heart overload, so patient was given IV Lasix for diuresis. However, it did not improve the patient's respiratory status. By the time patient left AMA, patient completed a 3 day regimen of azithromycin and Zosyn and a 5 day regimen of Solumedrol. He was given a prescription for 5 days of prednisone 20 mg. Patient was not weaned back down to his 3L NC home regimen. However, he was adamant about leaving the hospital, per chart review. He was discharged against medical advice in fair condition.     Patient today says that he has one more prednisone. He states he is on 5L of NC at home.  Patient denies any chest pain or palpitations. Patient has an appointment with Pulmonalogy on 1/07/2025. Patient was brought down to 3L in the clinic today and patient began to sat at 86%. I increased him to 4L and he began to sat at 91%.     Past Medical History:   has a past medical history of SAM positive, Antisynthetase syndrome, ILD (interstitial lung disease), Pulmonary embolism, Seronegative rheumatoid arthritis, TB lung, latent, and Tobacco use.     Past Surgical History:   has no past surgical history on file.     Family History:  family history includes COPD in his  father and mother.     Social History:   reports that he quit smoking about 22 months ago. His smoking use included cigarettes. He has never used smokeless tobacco. He reports that he does not currently use alcohol. He reports that he does not use drugs.     Allergies:  has No Known Allergies.     Home Medications:  Current Outpatient Medications   Medication Instructions    albuterol (PROVENTIL/VENTOLIN HFA) 90 mcg/actuation inhaler 1-2 puffs, Inhalation, Every 6 hours PRN, Rescue    albuterol-ipratropium (DUO-NEB) 2.5 mg-0.5 mg/3 mL nebulizer solution 3 mLs, Nebulization, Every 6 hours PRN, Rescue    baclofen (LIORESAL) 10 mg, 2 times daily    budesonide-formoterol 80-4.5 mcg (SYMBICORT) 80-4.5 mcg/actuation HFAA 2 puffs, Inhalation, 2 times daily    hydroxychloroquine (PLAQUENIL) 200 mg, Oral, Daily    lisinopriL (PRINIVIL,ZESTRIL) 5 mg, Oral, Daily    meloxicam (MOBIC) 7.5 mg, Oral, 2 times daily    OFEV 150 mg, Oral, 2 times daily    omeprazole (PRILOSEC) 20 mg, Oral, Daily    predniSONE (DELTASONE) 20 mg, Oral, Daily    tadalafiL (CIALIS) 5 mg, Oral, Daily PRN        ROS:  Negative for all symptoms other than those listed in HPI         OBJECTIVE:     Vital signs:   Vitals:    11/25/24 1251   BP: (!) 152/106   Pulse:    Resp:    Temp:           Physical Examination:    General - Appears comfortable, appropriatley conversive   Mental Status - alert and oriented x 3  HEENT - no rhinorrhea   Cardiac - RRR, no murmurs, rubs, or gallops; no edema in LE   Respiratory -increased effort of breathing but in no distress.   Abdominal - nondistended, soft, nontender to palpation   Extremities -  LE, UE, and joints are nonerythematous and non swollen.   MSK:  in wheelchair   Skin - no rashes or bruises seen on skin.        Labs:  BMP:   Lab Results   Component Value Date    CO2 28 11/20/2024    BUN 18.1 11/20/2024    CREATININE 0.77 11/20/2024    GLUCOSE 145 (H) 11/20/2024    CALCIUM 8.7 (L) 11/20/2024     CBC:   Lab  "Results   Component Value Date    WBC 14.37 (H) 11/20/2024    HGB 15.2 11/20/2024    HCT 48.3 11/20/2024    MCV 86.7 11/20/2024    RDW 15.2 11/20/2024     LFTs:   Lab Results   Component Value Date    LABPROT 6.1 11/20/2024    ALBUMIN 2.8 (L) 11/20/2024    AST 22 11/20/2024    ALT 27 11/20/2024    ALKPHOS 69 11/20/2024     FLP:   Lab Results   Component Value Date    CHOL 110 07/15/2021    HDL 33 (L) 07/15/2021    LDL 56.00 07/15/2021    TRIG 104 07/15/2021    TOTALCHOLEST 3 07/15/2021     DM:   Lab Results   Component Value Date    HGBA1C 5.9 04/20/2021    .6 04/20/2021    CREATININE 0.77 11/20/2024    CREATRANDUR 202.2 (H) 01/29/2024    PROTEINURINE 24.1 01/29/2024     Thyroid:   Lab Results   Component Value Date    TSH 0.7013 11/19/2021    [id number] 0.93 04/20/2021     Anemia: No results found for: "IRON", "TIBC", "FERRITIN", "LRRBSPNV15", "FOLATE"            ASSESSMENT & PLAN:      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  Advanced pulmonary emphysema versus cystic lung disease most pronounced at the lung apices.  -PFTs:  12/09/2022:  FVC 72%, FEV1 75%, ratio 78%, total lung capacity 71%, DLCO 24%.  DLCO by [facility] 38%  -Continue Symbicort, albuterol  -On home O2 via NC at 3L/min at night.   - followed by rheumatology and pulmonology.   -was treated with Actemra and OFEV. Patient stopped taking actemra and plaquenil. Currently taking plaquenil only.   -patient short of breath. Patient is on 3 L oxygen at home  - Patient was using 5L NC after the hospital visit. I brought patient back down to 4L. I told patient that if he is still on 5L NC when he comes to see his PCP at his next visit, he may need to be admitted to the hospital.   - Will add Spiriva and educated him on " his inhaler regimen.   - advised him of the importance of attending his Pulmonologist appointment.      TB lung, latent  -was treated for latent TB in the past.  Year unknown.  Patient states he was treated for 1 year while he was incarcerated in [facility] for over ten years.   -patient not sure where certificate for latent TB treatment is  -we will check her health department records for treatment of latent TB      HTN  -patient blood pressure today is 152/106.   - Patient currently on 5mg Lisinopril.     Antisynthetase syndrome  -Other features of disease other than ILD such as Raynauds, mechanics hand, fevers not present.   -arthritis in several joints is present.        Health Maintenance  Vaccinations  Immunization History   Administered Date(s) Administered    COVID-19, MRNA, LN-S, PF (MODERNA FULL 0.5 ML DOSE) 2021, 2021    Pneumococcal Conjugate - 20 Valent 2023    Pneumococcal Polysaccharide - 23 Valent 2021       -Flu:  not due      -Pneumonia: Done today 2024     -Shingles: Refused   -Tdap: Refused   -COVID: Refused   Screening  -AAA: ordered   -Osteoporosis:  not indicated     -Lung Ca. Screening: done on 2023   -Colon Ca. Screenin24   -Hep C, HIV: done on 2022  Social   -EtOH:  reports that he does not currently use alcohol.   -Tobacco:  reports that he quit smoking about 22 months ago. His smoking use included cigarettes. He has never used smokeless tobacco.   -Drugs:  reports no history of drug use.    -Depression:   Depression: Low Risk  (2024)    Depression     Last PHQ-4: Flowsheet Data: 0                Return to clinic in 1 month(s).    Marshall Hopkins,   Internal Medicine - PGY-1

## 2024-11-25 NOTE — PROGRESS NOTES
I have reviewed and agree with the resident's findings, including all diagnostic interpretations and plans as written.    Pt was admitted for acute on chronic respiratory failure in setting of emphysema and ILD on home oxygen 3 L NC. Pt tx for possible CAP and also exacerbation of ILD with Abx, steroids and diuretics without improvement in sxs. Unfortunately, pt left AMA but was sent out with 5 days of prednisone 20 mg daily. Pt today on 4 L NC with oxygen saturation >90%. Pt has a pulmonology appt in 1/2025 and rheumatology appt in 2/2025. Dicussed inhalers with patient today. Will refill albuterol and Symbicort and also start on Spiriva to see if patient will have improvement. He will require close follow up. Rest per resident note.     Megan Suárez MD

## 2024-12-03 NOTE — LETTER
December 10, 2024    Clarence Trejo Sr.  [address] [facility] [address]               [address] [place] [id number]  Phone: [phone] [facility] has given a quote of $2,803.65 to be paid in full once transport is scheduled. Once transfer is scheduled through [facility]'s patient flow center, family can call [phone]. Payment is made over the phone [facility] with require the following quote number: [phone].           Any questions regarding transfer status can be directed to Phillip Daniels RN, CM. Any questions regarding pricing will need to be directed to [facility] [phone].      Thank you,  Phillip Daniels RN-CM  [phone]

## 2024-12-03 NOTE — Clinical Note
Diagnosis: Respiratory distress [937625]   Future Attending Provider: KRIS WAY [[id number]]   Admit to which facility:: [facility] [[id number]]   Reason for IP Medical Treatment  (Clinical interventions that can only be accomplished in the IP setting? ) :: respiratory distress   Plans for Post-Acute care--if anticipated (pick the single best option):: A. No post acute care anticipated at this time   Special Needs:: No Special Needs [1]

## 2024-12-04 NOTE — PLAN OF CARE
12/04/24 1427   Discharge Assessment   Assessment Type Discharge Planning Assessment   Confirmed/corrected address, phone number and insurance Yes   Confirmed Demographics Correct on Facesheet   Source of Information patient   Communicated ORACIO with patient/caregiver Date not available/Unable to determine   Reason For Admission Shortness of breath   People in Home significant other   Do you expect to return to your current living situation? Other (see comments)  (Daughter wants to move him to [place])   Do you have help at home or someone to help you manage your care at home? No   Prior to hospitilization cognitive status: Unable to Assess   Current cognitive status: Coma/Sedated/Intubated   Walking or Climbing Stairs Difficulty no   Dressing/Bathing Difficulty no   Home Accessibility stairs to enter home   Number of Stairs, Main Entrance three   Home Layout Able to live on 1st floor   Equipment Currently Used at Home oxygen  ([employer])   Do you currently have service(s) that help you manage your care at home? No   Do you have prescription coverage? Yes   Coverage [employer]   Who is going to help you get home at discharge? Daughter   How do you get to doctors appointments? car, drives self;family or friend will provide   Are you on dialysis? No   Do you take coumadin? No   Discharge Plan A Home with family;Rehab;Skilled Nursing Facility   Discharge Plan B Home with family;Home Health   DME Needed Upon Discharge  other (see comments)  (TBD)   Discharge Plan discussed with: POA   Name(s) and Number(s) Salud Reyes   OTHER   Name(s) of People in Home Significant other     Patient is intubated, dc assessment completed by Antionette Reyes. Antionette is patient's daughter and POA. Per POA patient lives with his significant other and was independent with ADLs. POA can not confirm who is the patient's PCP nor pharmacy at this time. If patient is able to be weaned off the vent and is strong enough daughter would like to move  him to [place] with her. CM will continue to follow dc disp not determined at this time.

## 2024-12-04 NOTE — LOPA/MORA/SWTA/AOC/AEB
[facility] ([facility])  Notification of Referral  [facility] Contact # [phone]        Thank you for the referral of this patient to determine suitability for organ, tissue, and eye donation.  A chart review has been conducted (date):2024 at (time) 22:21 PM.    ? Potential candidate for organ donation - [facility] following patient. Any changes in patients condition, discussion of withdrawing the vent or brain death exams, or family mention of donation immediately call [phone]. Refer all organ referrals within 1 hour of meeting the clinical triger of a patient with a neurological, anoxic, or life threatening injury and ONE of the following:    * GCS </= 8    * Loss of 2 or more brain stem reflexes    * Hypothermic Protocol Initiated    * Withdrawal of support discussion regardless of GCS    * Family mention of Donation    ? Potential for candidate for tissue and eye donation- call [facility] at [phone] within 2 hours of death for screening as a potential tissue and/or eye donor.      ? Potential candidate for eye donation - call [facility] at [phone] within 2 hours of death for screening as a potential eye donor.    ? NOT a candidate for organ/tissue/eye donation- call [facility] at [phone] within 2 hours of death to report the time of death.    ? Potential candidate for donation/ Referral Closed- Any changes in patients condition, GCS of 5 or less, discussion of withdrawing the vent, brain death exams, or family mention of donation immediately call [phone].      Screened by: Yulia Zhang    [facility] Referral Number:  [id number]      Suitable for:  [x]Organ  [x] Tissue  [x] Eye  []Not suitable for Donation    Rule out Reason: N/A    Patient Name: Clarence Trejo Sr.                   69 y.o. male  Patient MRN: [id number]  : 1955  DOD:  TOD:  Cause of Death: N/A    [x]Vented Patient  [facility] representative to approach family if appropriate  []DNR status obtained Referral of  critical care patients when family initiates Do Not Resuscitate  []Cardiac Death   Clinical Support Center to approach family via telephone if appropriate  []Donor Registry  Patient is listed in the Donor Registry    Completed by: Vanessa Ordaz

## 2024-12-04 NOTE — PROGRESS NOTES
Inpatient Nutrition Assessment    Admit Date: 12/3/2024   Total duration of encounter: 1 day   Patient Age: 69 y.o.    Nutrition Recommendation/Prescription     Start tube feeding when appropriate.  Tube feeding recommendation:     Peptamen AF goal rate 60 ml/hr to provide  1440 kcal/d  (77% est needs, 97% with meds)  90 g protein/d  (100% est needs)  972 ml free water/d (66% est needs)  (calculations based on estimated 20 hr/d run time)     If no IV fluids running, can give 50ml q 2hr water flushes. Total water provided: 1472ml (100% est needs.)     Communication of Recommendations: reviewed with nurse    Nutrition Assessment     Malnutrition Assessment/Nutrition-Focused Physical Exam    Malnutrition Context: chronic illness (12/04/24 1006)  Malnutrition Level: moderate (12/04/24 1006)  Energy Intake (Malnutrition): other (see comments) (Unable to assess) (12/04/24 1006)  Weight Loss (Malnutrition): other (see comments) (Unable to assess) (12/04/24 1006)  Subcutaneous Fat (Malnutrition): mild depletion (12/04/24 1006)  Orbital Region (Subcutaneous Fat Loss): mild depletion        Muscle Mass (Malnutrition): mild depletion (12/04/24 1006)  Pentecostalism Region (Muscle Loss): mild depletion  Clavicle Bone Region (Muscle Loss): mild depletion                             A minimum of two characteristics is recommended for diagnosis of either severe or non-severe malnutrition.    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  Pulmonary hypertension  Electrolyte derangement    Relevant Medical History: antisynthetase syndrome, ILD, chronic hypoxic respiratory failure     Scheduled Medications:  ceFEPime IV (PEDS and ADULTS), 1 g, Q8H  enoxparin, 40 mg, Daily  ketamine in 0.9 % sod chloride, 150 mg, Once  mupirocin, , BID  pantoprazole, 40 mg, Before breakfast  predniSONE, 40  mg, Daily  sildenafil, 20 mg, TID  sodium zirconium cyclosilicate, 10 g, TID    Continuous Infusions:  albuterol sulfate, Last Rate: Stopped (12/03/24 1913)  NORepinephrine bitartrate-D5W, Last Rate: 0.28 mcg/kg/min (12/04/24 0700)  propofoL, Last Rate: 50 mcg/kg/min (12/04/24 1002)    PRN Medications:  calcium gluconate IVPB, 1 g, Q10 Min PRN  dextrose 10%, 12.5 g, PRN  dextrose 10%, 25 g, PRN  dextrose 10%, 25 g, PRN  glucagon (human recombinant), 1 mg, PRN  insulin aspart U-100, 0-10 Units, Q6H PRN    Calorie Containing IV Medications: Diprivan @ 14 ml/hr (provides 370 kcal/d)    Recent Labs   Lab 12/03/24  1923 12/03/24  1954 12/04/24  0008 12/04/24  0408 12/04/24  0817   *  --  140 141 141   K 5.7*  --  5.9* 5.8* 4.9   CALCIUM 6.6*  --  8.3* 8.0* 8.1*   PHOS  --   --   --  6.1*  --    MG  --   --   --  2.40  --    *  --  111* 108* 109*   CO2 14*  --  20* 24 23   BUN 15.7  --  19.1 21.2 21.4   CREATININE 1.53*  --  1.02 1.16 1.07   EGFRNORACEVR 49  --  >60 >60 >60   GLUCOSE 185*  --  169* 220* 275*   BILITOT 0.7  --   --  0.9  --    ALKPHOS 83  --   --  96  --    ALT 39  --   --  63*  --    AST 56*  --   --  61*  --    ALBUMIN 3.0*  --   --  2.7*  --    WBC  --  21.04*  --  21.89  21.89*  --    HGB  --  16.7  --  17.4  --    HCT  --  53.1*  --  57.9*  --      Nutrition Orders:  No diet orders on file      Appetite/Oral Intake: not applicable/not applicable  Factors Affecting Nutritional Intake: on mechanical ventilation  Social Needs Impacting Access to Food: none identified per MD notes  Food/Congregation/Cultural Preferences: unable to obtain  Food Allergies: no known food allergies  Last Bowel Movement: 12/02/24  Wound(s):  None documented     Comments    12/4/24: Discussed with RN. Will provide tube feeding recommendations for when appropriate to start tube feeding. Receiving kcal from meds.  Noted previous EMR wts show trend in decreasing wt. Unable to verify UBW at this time.  "    Anthropometrics    Height: 5' 5.98" (167.6 cm), Height Method: Measured  Last Weight: 59 kg (130 lb 1.1 oz) (12/04/24 0217), Weight Method: Bed Scale  BMI (Calculated): 21  BMI Classification: normal (BMI 18.5-24.9)        Ideal Body Weight (IBW), Male: 141.88 lb     % Ideal Body Weight, Male (lb): 91.6 %                          Usual Weight Provided By: unable to obtain usual weight    Wt Readings from Last 5 Encounters:   12/04/24 59 kg (130 lb 1.1 oz)   11/25/24 63.2 kg (139 lb 6.4 oz)   11/22/24 64.9 kg (143 lb)   11/18/24 62.6 kg (138 lb)   11/11/24 60.2 kg (132 lb 12.8 oz)     Weight Change(s) Since Admission:   Wt Readings from Last 1 Encounters:   12/04/24 0217 59 kg (130 lb 1.1 oz)   12/03/24 1855 59 kg (130 lb)   Admit Weight: 59 kg (130 lb) (12/03/24 1855), Weight Method: Bed Scale    Estimated Needs    Weight Used For Calorie Calculations: 59 kg (130 lb 1.1 oz)  Energy Calorie Requirements (kcal): 1860kcal  Energy Need Method: [facility]  Weight Used For Protein Calculations: 59 kg (130 lb 1.1 oz)  Protein Requirements: 89gm (1.5g/kg)  Fluid Requirements (mL): 1475ml (25ml/kg)  CHO Requirement: 210gm (45% est kcal needs)     Enteral Nutrition     Patient not receiving enteral nutrition at this time.    Parenteral Nutrition     Patient not receiving parenteral nutrition support at this time.    Evaluation of Received Nutrient Intake    Calories: not meeting estimated needs  Protein: not meeting estimated needs    Patient Education     Not applicable.    Nutrition Diagnosis     PES: Inadequate oral intake related to acute illness as evidenced by intubation since admit. (new)     PES: Moderate chronic disease or condition related malnutrition Related to chronic condition As Evidenced by:  - weight loss: Unable to assess - muscle mass depletion: 4 areas of mild muscle loss (Pectoralis, Clavicle, Trapezius, Temporalis) - loss of subcutaneous fat: 2 areas of mild fat loss (Infraorbital, Buccal) " new    Nutrition Interventions     Intervention(s): modified composition of enteral nutrition, modified rate of enteral nutrition, and collaboration with other providers    Goal: Meet greater than 80% of nutritional needs by follow-up. (new)  Goal: Tolerate enteral feeding at goal rate by follow-up. (new)    Nutrition Goals & Monitoring     Dietitian will monitor: energy intake and enteral nutrition intake  Discharge planning: too early to determine; pending clinical course  Nutrition Risk/Follow-Up: high (follow-up in 1-4 days)   Please consult if re-assessment needed sooner.

## 2024-12-04 NOTE — PLAN OF CARE
Problem: Infection  Goal: Absence of Infection Signs and Symptoms  Outcome: Progressing     Problem: Adult Inpatient Plan of Care  Goal: Plan of Care Review  Outcome: Progressing  Goal: Patient-Specific Goal (Individualized)  Outcome: Progressing  Goal: Absence of Hospital-Acquired Illness or Injury  Outcome: Progressing  Goal: Optimal Comfort and Wellbeing  Outcome: Progressing  Goal: Readiness for Transition of Care  Outcome: Progressing     Problem: Skin Injury Risk Increased  Goal: Skin Health and Integrity  Outcome: Progressing

## 2024-12-04 NOTE — ED PROVIDER NOTES
Encounter Date: 12/3/2024    SCRIBE #1 NOTE: I, Randi Mark, am scribing for, and in the presence of,  Manuel Riuz MD. I have scribed the following portions of the note - the EKG reading. Other sections scribed: HPI, ROS, PE.       History     Chief Complaint   Patient presents with    Respiratory Distress     Pt arrived in respiratory distress on 15 L NRB O2 SAT 69%.  GCS 15      Patient is a 69 year old male with a history of PE, COPD, and emphysema that presents to the ED via EMS for respiratory distress. Patient called EMS for a sudden onset of shortness of breath and cough at home. EMS reports an initial respiratory rate in the 70s and 69% with oxygen mask. They report the patient refused CPAP en route.     The history is provided by the patient, the EMS personnel and medical records.     Review of patient's allergies indicates:  No Known Allergies  Past Medical History:   Diagnosis Date    SAM positive     Antisynthetase syndrome     ILD (interstitial lung disease)     Pulmonary embolism     Seronegative rheumatoid arthritis     TB lung, latent     Tobacco use      History reviewed. No pertinent surgical history.  Family History   Problem Relation Name Age of Onset    COPD Mother      COPD Father       Social History     Tobacco Use    Smoking status: Former     Current packs/day: 0.00     Types: Cigarettes     Quit date: 2023     Years since quittin.8    Smokeless tobacco: Never   Substance Use Topics    Alcohol use: Not Currently    Drug use: Never     Review of Systems   Constitutional:  Negative for chills and fever.   HENT:  Negative for congestion, rhinorrhea and sore throat.    Eyes:  Negative for visual disturbance.   Respiratory:  Positive for cough and shortness of breath.    Cardiovascular:  Negative for chest pain.   Gastrointestinal:  Negative for abdominal pain, nausea and vomiting.   Genitourinary:  Negative for dysuria and hematuria.   Musculoskeletal:  Negative for joint swelling.    Skin:  Negative for rash.   Neurological:  Negative for weakness.   Psychiatric/Behavioral:  Negative for confusion.    All other systems reviewed and are negative.      Physical Exam     Initial Vitals   BP Pulse Resp Temp SpO2   12/03/24 1855 12/03/24 1855 12/03/24 1855 12/03/24 1928 12/03/24 1855   (!) 178/131 (!) 140 (!) 60 97.7 °F (36.5 °C) (!) 69 %      MAP       --                Physical Exam    Nursing note and vitals reviewed.  Constitutional: He appears distressed.   Severe respiratory distress on arrival   HENT:   Head: Normocephalic and atraumatic. Mouth/Throat: Oropharynx is clear and moist.   Eyes: Conjunctivae and EOM are normal. Pupils are equal, round, and reactive to light.   Neck: Neck supple.   Cardiovascular:  Regular rhythm.   Tachycardia present.         No murmur heard.  Pulmonary/Chest: He is in respiratory distress. He exhibits no tenderness.   Clear breath sounds. Good air movement to all lung fields. Tachypneic in the 50s. Satting in the 40s with non-breather.   Abdominal: Abdomen is soft. Bowel sounds are normal. He exhibits no distension. There is no abdominal tenderness.   Musculoskeletal:         General: Normal range of motion.      Cervical back: Neck supple.      Lumbar back: Normal. No tenderness. Normal range of motion.     Neurological: He is alert and oriented to person, place, and time. He has normal strength. No cranial nerve deficit or sensory deficit.   Psychiatric:   Agitated. Refusinf BiPAP. Pulling out lines.         ED Course   Intubation    Date/Time: 12/3/2024 7:08 PM  Location procedure was performed: [facility] EMERGENCY DEPARTMENT    Performed by: Manuel Ruiz IV, MD  Authorized by: Manuel Ruiz IV, MD  Consent Done: Emergent Situation  Indications: respiratory distress, airway protection and respiratory failure  Patient status: sedated  Preoxygenation: nonrebreather mask  Sedatives: ketmine  Paralytic: rocuronium  Laryngoscope size: Mac 4  Tube size: 7.5  mm  Tube type: cuffed  Number of attempts: 1  Cricoid pressure: yes  Cords visualized: yes  Post-procedure assessment: chest rise, CO2 detector and ETCO2 monitor  Breath sounds: clear and equal and absent over the epigastrium  Cuff inflated: yes  ETT to lip: 24 cm  Tube secured with: adhesive tape, bite block and ETT hartley  Chest x-ray interpreted by me.  Chest x-ray findings: endotracheal tube too high  Tube repositioned: tube repositioned successfully  Patient tolerance: Patient tolerated the procedure well with no immediate complications  Complications: No  Specimens: No  Implants: No      Critical Care    Date/Time: 12/3/2024 6:51 PM    Performed by: Manuel Ruiz IV, MD  Authorized by: Manuel Ruiz IV, MD  Total critical care time (exclusive of procedural time) : 45 minutes  Critical care time was exclusive of separately billable procedures and treating other patients.  Critical care was necessary to treat or prevent imminent or life-threatening deterioration of the following conditions: respiratory failure.  Critical care was time spent personally by me on the following activities: blood draw for specimens, development of treatment plan with patient or surrogate, discussions with consultants, discussions with primary provider, interpretation of cardiac output measurements, evaluation of patient's response to treatment, examination of patient, obtaining history from patient or surrogate, ordering and performing treatments and interventions, ordering and review of laboratory studies, ordering and review of radiographic studies, pulse oximetry, re-evaluation of patient's condition and review of old charts.        Labs Reviewed   COMPREHENSIVE METABOLIC PANEL - Abnormal       Result Value    Sodium 149 (*)     Potassium 5.7 (*)     Chloride 112 (*)     CO2 14 (*)     Glucose 185 (*)     Blood Urea Nitrogen 15.7      Creatinine 1.53 (*)     Calcium 6.6 (*)     Protein Total 5.5 (*)     Albumin 3.0 (*)      Globulin 2.5      Albumin/Globulin Ratio 1.2      Bilirubin Total 0.7      ALP 83      ALT 39      AST 56 (*)     eGFR 49      Anion Gap 23.0      BUN/Creatinine Ratio 10     B-TYPE NATRIURETIC PEPTIDE - Abnormal    Natriuretic Peptide 796.6 (*)    CBC WITH DIFFERENTIAL - Abnormal    WBC 21.04 (*)     RBC 6.00      Hgb 16.7      Hct 53.1 (*)     MCV 88.5      MCH 27.8      MCHC 31.5 (*)     RDW 17.6 (*)     Platelet 195      MPV 10.3      Neut % 83.6      Lymph % 7.8      Mono % 7.0      Eos % 0.3      Basophil % 0.5      Lymph # 1.65      Neut # 17.59 (*)     Mono # 1.47 (*)     Eos # 0.07      Baso # 0.10      IG# 0.16 (*)     IG% 0.8      NRBC% 0.0     DRUG SCREEN, URINE (BEAKER) - Abnormal    Amphetamines, Urine Negative      Barbiturates, Urine Negative      Benzodiazepine, Urine Negative      Cannabinoids, Urine Negative      Cocaine, Urine Negative      Fentanyl, Urine Negative      MDMA, Urine Negative      Opiates, Urine Negative      Phencyclidine, Urine Negative      pH, Urine 6.0      Specific Gravity, Urine Auto 1.036 (*)     Narrative:     Cut off concentrations:    Amphetamines - 1000 ng/ml  Barbiturates - 200 ng/ml  Benzodiazepine - 200 ng/ml  Cannabinoids (THC) - 50 ng/ml  Cocaine - 300 ng/ml  Fentanyl - 1.0 ng/ml  MDMA - 500 ng/ml  Opiates - 300 ng/ml   Phencyclidine (PCP) - 25 ng/ml    Specimen submitted for drug analysis and tested for pH and specific gravity in order to evaluate sample integrity. Suspect tampering if specific gravity is <1.003 and/or pH is not within the range of 4.5 - 8.0  False negatives may result form substances such as bleach added to urine.  False positives may result for the presence of a substance with similar chemical structure to the drug or its metabolite.    This test provides only a PRELIMINARY analytical test result. A more specific alternate chemical method must be used in order to obtain a confirmed analytical result. Gas chromatography/mass spectrometry (GC/MS)  is the preferred confirmatory method. Other chemical confirmation methods are available. Clinical consideration and professional judgement should be applied to any drug of abuse test result, particularly when preliminary positive results are used.    Positive results will be confirmed only at the physicians request. Unconfirmed screening results are to be used only for medical purposes (treatment).        TROPONIN I - Normal    Troponin-I 0.029     COVID/RSV/FLU A&B PCR - Normal    Influenza A PCR Not Detected      Influenza B PCR Not Detected      Respiratory Syncytial Virus PCR Not Detected      SARS-CoV-2 PCR Not Detected      Narrative:     The Xpert Xpress SARS-CoV-2/FLU/RSV plus is a rapid, multiplexed real-time PCR test intended for the simultaneous qualitative detection and differentiation of SARS-CoV-2, Influenza A, Influenza B, and respiratory syncytial virus (RSV) viral RNA in either nasopharyngeal swab or nasal swab specimens.         BLOOD CULTURE [facility]LG   BLOOD CULTURE [facility]   CBC W/ AUTO DIFFERENTIAL    Narrative:     The following orders were created for panel order CBC auto differential.  Procedure                               Abnormality         Status                     ---------                               -----------         ------                     CBC with Differential[[id number]]       Abnormal            Final result                 Please view results for these tests on the individual orders.   BLOOD GAS     EKG Readings: (Independently Interpreted)   Initial Reading: No STEMI. Rhythm: Sinus Tachycardia. Heart Rate: 132. Ectopy: PVCs. Conduction: Normal. ST Segments: Normal ST Segments. T Waves: Normal. Axis: Normal.   1924. No ischemic changes.        Imaging Results              CTA Chest Non-Coronary (PE Studies) (In process)                      XR Gastric tube check, non-radiologist performed (In process)                      X-Ray Chest 1 View (Final result)  Result time 12/03/24  20:39:41      Final result by Bert Botello MD (12/03/24 20:39:41)                   Impression:      Chronic interstitial lung changes.    No acute findings no significant changes compared with previous exams.    Interval insertion of endotracheal tube and nasogastric tube      Electronically signed by: Bert Botello  Date:    12/03/2024  Time:    20:39               Narrative:    EXAMINATION:  XR CHEST 1 VIEW    CPT 15969    CLINICAL HISTORY:  placement;    COMPARISON:  November 18, 2024    FINDINGS:  Examination reveals mediastinal silhouette to be within normal limits cardiac silhouette is enlarged diffuse increase interstitial markings identified throughout both lung fields likely chronic in nature and similar to previous exams dating back to 2023 no superimposed focal consolidative changes.    There is an endotracheal tube with the tip above the claude nasogastric tube is seen with the tip below the diaphragm                                       Medications   sterile water for injection injection (has no administration in time range)   albuterol nebulizer solution 15 mg (0 mg Nebulization Stopped 12/3/24 1913)   propofol (DIPRIVAN) 10 mg/mL infusion (20 mcg/kg/min × 59 kg Intravenous Verify Only 12/3/24 2102)   ketamine in 0.9 % sod chloride 50 mg/5 mL (10 mg/mL) injection 150 mg ( Intravenous Not Given 12/3/24 1916)   enoxaparin injection 40 mg (has no administration in time range)   methylPREDNISolone sodium succinate injection 125 mg (has no administration in time range)   pantoprazole injection 40 mg (has no administration in time range)   sildenafil tablet 20 mg (has no administration in time range)   lactated ringers bolus 1,000 mL (1,000 mLs Intravenous New Bag 12/3/24 2114)   LORazepam injection 2 mg (2 mg Intravenous Given by Other 12/3/24 1857)   magnesium sulfate 2g in water 50mL IVPB (premix) (0 g Intravenous Stopped 12/3/24 1915)   methylPREDNISolone sodium succinate injection 125 mg  (125 mg Intravenous Given by Other 12/3/24 1901)   rocuronium injection (100 mg Intravenous Given 12/3/24 1911)   sodium chloride 0.9% bolus (1,000 mLs Intravenous New Bag 12/3/24 1925)   acetaminophen 1,000 mg/100 mL (10 mg/mL) injection 1,000 mg (0 mg Intravenous Stopped 12/3/24 2005)   iohexoL (OMNIPAQUE 350) injection 70 mL (70 mLs Intravenous Given 12/3/24 2051)     Medical Decision Making  69-year-old male with a history of severe end-stage emphysema interstitial lung disease, presenting with acute respiratory distress patient tachypneic to the 50s on arrival hypoxic to 40s on non-rebreather, severe distress  No improvement with nebs steroids Mag  Attempted BiPAP - patient became severely agitated and unable to tolerate, attempted Ativan to facilitate BiPAP synchrony unsuccessful  Decision made to give a dose of ketamine and delay sequence intubate - able to get patient's O2 sat above 90 prior to intubation which went well  Patient admitted to ICU for further care and workup    Differential diagnosis includes, but is not limited to:  Judging by the patient's chief complaint and pertinent history, the patient has the following possible differential diagnoses, including but not limited to the following.  Some of these are deemed to be lower likelihood and some more likely based on my physical exam and history combined with possible lab work and/or imaging studies.   Please see the pertinent studies, and refer to the HPI.  Some of these diagnoses will take further evaluation to fully rule out, perhaps as an outpatient and the patient was encouraged to follow up when discharged for more comprehensive evaluation.    ACS, pneumonia, COVID/Flu, congestive heart failure, asthma, COPD, pleural effusion, pulmonary edema, acute bronchitis, PE, pneumothorax, hemothorax, aortic dissection, electrolyte abnormalities, anemia, anxiety       Problems Addressed:  Respiratory distress: acute illness or injury  Shortness of  breath: acute illness or injury that poses a threat to life or bodily functions    Amount and/or Complexity of Data Reviewed  Labs: ordered.  Radiology: ordered and independent interpretation performed.     Details: ETT and OG tube in good position on chest x-ray  ECG/medicine tests: ordered and independent interpretation performed.     Details: 1924. Initial Reading: No STEMI. Rhythm: Sinus Tachycardia. Heart Rate: 132. Ectopy: PVCs. Conduction: Normal. ST Segments: Normal ST Segments. T Waves: Normal. Axis: Normal. No ischemic changes.  Discussion of management or test interpretation with external provider(s): Discussed with the ICU resident who will accept the admission    Risk  Prescription drug management.  Drug therapy requiring intensive monitoring for toxicity.  Decision regarding hospitalization.  Emergency major surgery.    Critical Care  Total time providing critical care: 45 minutes            Scribe Attestation:   Scribe #1: I performed the above scribed service and the documentation accurately describes the services I performed. I attest to the accuracy of the note.    Attending Attestation:           Physician Attestation for Scribe:  Physician Attestation Statement for Scribe #1: I, Manuel Ruiz MD, reviewed documentation, as scribed by Randi Flores in my presence, and it is both accurate and complete.             ED Course as of 12/03/24 2124   Tue Dec 03, 2024   1931 Paged ICU [ED]   1933 Discussed with ICU [ED]      ED Course User Index  [ED] Randi Flores                           Clinical Impression:  Final diagnoses:  [R06.03] Respiratory distress  [R06.02] Shortness of breath  [J96.01] Acute respiratory failure with hypoxia (Primary)          ED Disposition Condition    Admit Stable                Manuel Ruiz IV, MD  12/03/24 2125

## 2024-12-04 NOTE — H&P
[facility] - Emergency Dept  Pulmonary Critical Care Note    Patient Name: Clarence Trejo Sr.  MRN: [id number]  Admission Date: 12/3/2024  Hospital Length of Stay: 0 days  Code Status: Prior  Attending Provider: Tony Rausch MD  Primary Care Provider: Yara Spear MD     Subjective:     HPI:   68 y.o. male with PMH significant for antisynthetase syndrome, ILD, chronic hypoxic respiratory failure on 3L NC at home presented to ED in acute respiratory distress.  He was picked up by EMS who reported that patient was saturating in the 50s upon arrival.  On arrival to the emergency department patient was placed on BiPAP but continued to remove the mask due to being extremely confused.  This is likely a result of hypoxia.  Patient was very combative and was given anxiolytics without any improvement in behavior.  Due to inability to increase patient's saturations decision was made to intubate the patient.  Patient was recently admitted to [facility] due to shortness of breath.  He left AMA while having increased oxygen requirements far greater than his baseline.    Hospital Course/Significant events:      24 Hour Interval History:      Past Medical History:   Diagnosis Date    SAM positive     Antisynthetase syndrome     ILD (interstitial lung disease)     Pulmonary embolism     Seronegative rheumatoid arthritis     TB lung, latent     Tobacco use        History reviewed. No pertinent surgical history.    Social History     Socioeconomic History    Marital status:    Tobacco Use    Smoking status: Former     Current packs/day: 0.00     Types: Cigarettes     Quit date: 2023     Years since quittin.8    Smokeless tobacco: Never   Substance and Sexual Activity    Alcohol use: Not Currently    Drug use: Never    Sexual activity: Not Currently     Partners: Female     Social Drivers of Health     Financial Resource Strain: Low Risk  (2024)    Overall Financial Resource  Strain (CARDIA)     Difficulty of Paying Living Expenses: Not hard at all   Food Insecurity: No Food Insecurity (11/16/2024)    Hunger Vital Sign     Worried About Running Out of Food in the Last Year: Never true     Ran Out of Food in the Last Year: Never true   Transportation Needs: No Transportation Needs (11/16/2024)    TRANSPORTATION NEEDS     Transportation : No   Physical Activity: Inactive (8/1/2024)    Exercise Vital Sign     Days of Exercise per Week: 0 days     Minutes of Exercise per Session: 0 min   Stress: No Stress Concern Present (11/16/2024)    [employer] [place] [employer] - Occupational Stress Questionnaire     Feeling of Stress : Not at all   Housing Stability: Low Risk  (11/16/2024)    Housing Stability Vital Sign     Unable to Pay for Housing in the Last Year: No     Homeless in the Last Year: No           Current Outpatient Medications   Medication Instructions    albuterol (PROVENTIL/VENTOLIN HFA) 90 mcg/actuation inhaler 1-2 puffs, Inhalation, Every 6 hours PRN, Rescue    albuterol-ipratropium (DUO-NEB) 2.5 mg-0.5 mg/3 mL nebulizer solution 3 mLs, Nebulization, Every 6 hours PRN, Rescue    baclofen (LIORESAL) 10 mg, 2 times daily    budesonide-formoterol 80-4.5 mcg (SYMBICORT) 80-4.5 mcg/actuation HFAA 2 puffs, Inhalation, 2 times daily    hydroxychloroquine (PLAQUENIL) 200 mg, Oral, Daily    lisinopriL (PRINIVIL,ZESTRIL) 5 mg, Oral, Daily    meloxicam (MOBIC) 7.5 mg, Oral, 2 times daily    OFEV 150 mg, Oral, 2 times daily    omeprazole (PRILOSEC) 20 mg, Oral, Daily    predniSONE (DELTASONE) 20 mg, Oral, Daily    tadalafiL (CIALIS) 5 mg, Oral, Daily PRN    tiotropium bromide (SPIRIVA RESPIMAT) 1.25 mcg/actuation inhaler 2 puffs, Inhalation, Daily, Controller       Current Inpatient Medications   acetaminophen  1,000 mg Intravenous Once    ketamine in 0.9 % sod chloride  150 mg Intravenous Once    sterile water for injection           Current Intravenous Infusions   albuterol  sulfate  15 mg Nebulization Continuous   15 mg at 12/03/24 1906    propofoL  0-50 mcg/kg/min Intravenous Continuous 1.8 mL/hr at 12/03/24 1945 5 mcg/kg/min at 12/03/24 1945               Objective:     No intake or output data in the 24 hours ending 12/03/24 1953      Vital Signs (Most Recent):  Temp: (!) 101.3 °F (38.5 °C) (12/03/24 1950)  Pulse: (!) 132 (12/03/24 1945)  Resp: (!) 24 (12/03/24 1945)  BP: (!) 134/94 (12/03/24 1945)  SpO2: (!) 92 % (12/03/24 1945)  Body mass index is 20.98 kg/m².  Weight: 59 kg (130 lb) Vital Signs (24h Range):  Temp:  [97.7 °F (36.5 °C)-101.3 °F (38.5 °C)] 101.3 °F (38.5 °C)  Pulse:  [124-150] 132  Resp:  [24-60] 24  SpO2:  [45 %-97 %] 92 %  BP: ()/() 134/94     Constitutional:       General: He is not in acute distress.     Appearance: Normal appearance. He is ill-appearing (chronically).  Toxic appearing   HENT:      Head: Normocephalic and atraumatic.      Nose: Nose normal.      Mouth/Throat:      Mouth: Mucous membranes are moist.      Pharynx: Oropharynx is clear.   Eyes:      Extraocular Movements: Extraocular movements intact.      Conjunctiva/sclera: Conjunctivae normal.   Cardiovascular:      Rate and Rhythm: Normal rate and regular rhythm.      Pulses: Normal pulses.      Heart sounds: Normal heart sounds. No murmur heard.  Pulmonary:      Effort: Pulmonary effort is normal. Tachypnea present. No respiratory distress.      Breath sounds: Rhonchi (diffusely) and rales (diffusely) present.      Comments:  Patient was intubated and sedated  Abdominal:      General: Abdomen is flat. Bowel sounds are normal. There is no distension.      Palpations: Abdomen is soft. There is no mass.      Tenderness: There is no abdominal tenderness.   Musculoskeletal:      Cervical back: Normal range of motion and neck supple.      Right lower leg: No edema.      Left lower leg: No edema.   Skin:     General: Skin is warm and dry.      Capillary Refill: Capillary refill takes less  "than 2 seconds.      Findings: No erythema or rash.   Neurological:      General:  Unable to assess as patient was recently paralyzed for intubation.   Psychiatric:         Behavior: Behavior normal.       Lines/Drains/Airways       Drain  Duration                  NG/OG Tube 12/03/24 1913 orogastric 18 Fr. Left mouth <1 day         Urethral Catheter 12/03/24 1927 Temperature probe 16 Fr. <1 day              Airway  Duration                  Airway - Non-Surgical 12/03/24 1912 Endotracheal Tube <1 day              Peripheral Intravenous Line  Duration                  Peripheral IV - Single Lumen 12/03/24 1853 20 G Anterior;Left Forearm <1 day         Peripheral IV - Single Lumen 12/03/24 1900 20 G Right Forearm <1 day         Peripheral IV - Single Lumen 12/03/24 1905 18 G Left Forearm <1 day         Peripheral IV - Single Lumen 12/03/24 1952 16 G Right;Lateral Forearm <1 day                    Significant Labs:    Lab Results   Component Value Date    WBC 14.37 (H) 11/20/2024    HGB 15.2 11/20/2024    HCT 48.3 11/20/2024    MCV 86.7 11/20/2024     11/20/2024           BMP  Lab Results   Component Value Date     11/20/2024    K 4.8 11/20/2024    CO2 28 11/20/2024    BUN 18.1 11/20/2024    CREATININE 0.77 11/20/2024    CALCIUM 8.7 (L) 11/20/2024    AGAP 7.0 11/20/2024    EGFRNONAA >105 11/25/2021         ABG  No results for input(s): "PH", "PO2", "PCO2", "HCO3", "POCBASEDEF" in the last 168 hours.    Mechanical Ventilation Support:  Vent Mode: A/C (12/03/24 1919)  Ventilator Initiated: Yes (12/03/24 1919)  Set Rate: 24 BPM (12/03/24 1919)  Vt Set: 450 mL (12/03/24 1919)  PEEP/CPAP: 5 cmH20 (12/03/24 1919)  Oxygen Concentration (%): 60 (12/03/24 1945)  Peak Airway Pressure: 19 cmH20 (12/03/24 1919)  Total Ve: 11.1 L/m (12/03/24 1919)      Significant Imaging:  I have reviewed the pertinent imaging within the past 24 hours.        Assessment/Plan:     Assessment  Acute on chronic respiratory failure  ILD " due to Anti synthetase syndrome  Pulmonary hypertension  Electrolyte derangement        Plan  Patient was sedated intubated currently on mechanical ventilation.  Wean ventilator per hours number protocol.  Currently saturating 97% on 100% FiO2.  We will obtain CT PE to rule out pulmonary embolism.  Pending ABG  Patient has recently left AMA after being admitted to [facility] due to  similar complaints.  It is very likely this is just a progression of patient's chronic interstitial lung disease.   Patient's white count is increased which has been true for the last 2 weeks even though he was treated with a extensive course for community-acquired pneumonia on previous hospital stay.  Blood cultures have never been positive neither have sputum cultures.  Increase in white count likely reactive.  Continue sildenafil for pulmonary hypertension    Prognosis is guarded    DVT Prophylaxis:  Lovenox  GI Prophylaxis:  None       32 minutes of critical care was time spent personally by me on the following activities: development of treatment plan with patient or surrogate and bedside caregivers, discussions with consultants, evaluation of patient's response to treatment, examination of patient, ordering and performing treatments and interventions, ordering and review of laboratory studies, ordering and review of radiographic studies, pulse oximetry, re-evaluation of patient's condition.  This critical care time did not overlap with that of any other provider or involve time for any procedures.     Yara Spear MD  Pulmonary Critical Care Medicine  [facility] - Emergency Dept  DOS: 12/03/2024

## 2024-12-04 NOTE — PROCEDURES
"Clarence Trejo Sr. is a 69 y.o. male patient.    Temp: 97.7 °F (36.5 °C) (12/04/24 0900)  Pulse: 92 (12/04/24 0945)  Resp: (!) 27 (12/04/24 0945)  BP: 106/71 (12/04/24 0945)  SpO2: 97 % (12/04/24 0945)  Weight: 59 kg (130 lb 1.1 oz) (12/04/24 0217)  Height: 5' 5.98" (167.6 cm) (12/04/24 1006)    PICC  Time out: Immediately prior to procedure a time out was called to verify the correct patient, procedure, equipment, support staff and site/side marked as required  Indications: med administration  Preparation: skin prepped with ChloraPrep  Skin prep agent dried: skin prep agent completely dried prior to procedure  Sterile barriers: all five maximum sterile barriers used - cap, mask, sterile gown, sterile gloves, and large sterile sheet  Hand hygiene: hand hygiene performed prior to central venous catheter insertion  Location details: left brachial  Catheter type: triple lumen  Catheter size: 5 Fr  Catheter Length: 41cm    Ultrasound guidance: yes  Needle advanced into vessel with real time Ultrasound guidance.  Guidewire confirmed in vessel.  Sterile sheath used.            Name key  12/4/2024    "

## 2024-12-05 NOTE — PROGRESS NOTES
[facility] - Emergency Dept  Pulmonary Critical Care Note    Patient Name: Clarence Trejo Sr.  MRN: [id number]  Admission Date: 12/3/2024  Hospital Length of Stay: 2 days  Code Status: Prior  Attending Provider: Jean Pierre Decker MD  Primary Care Provider: Yara Spear MD     Subjective:     HPI:   68 y.o. male with PMH significant for antisynthetase syndrome, ILD, chronic hypoxic respiratory failure on 3L NC at home presented to ED in acute respiratory distress.  He was picked up by EMS who reported that patient was saturating in the 50s upon arrival.  On arrival to the emergency department patient was placed on BiPAP but continued to remove the mask due to being extremely confused.  This is likely a result of hypoxia.  Patient was very combative and was given anxiolytics without any improvement in behavior.  Due to inability to increase patient's saturations decision was made to intubate the patient.  Patient was recently admitted to [facility] due to shortness of breath.  He left AMA while having increased oxygen requirements far greater than his baseline.    Hospital Course/Significant events:      24 Hour Interval History:   JAYCOB PEREZ.  No calls received overnight.  Oxygenating well on Lovenox later settings. WBC increased from 21.89 to 25.33.  High-dose steroids switched to prednisone.      Past Medical History:   Diagnosis Date    SAM positive     Antisynthetase syndrome     ILD (interstitial lung disease)     Pulmonary embolism     Seronegative rheumatoid arthritis     TB lung, latent     Tobacco use        History reviewed. No pertinent surgical history.    Social History     Socioeconomic History    Marital status:    Tobacco Use    Smoking status: Former     Current packs/day: 0.00     Types: Cigarettes     Quit date: 2023     Years since quittin.9    Smokeless tobacco: Never   Substance and Sexual Activity    Alcohol use: Not Currently    Drug use:  Never    Sexual activity: Not Currently     Partners: Female     Social Drivers of Health     Financial Resource Strain: Low Risk  (11/16/2024)    Overall Financial Resource Strain (CARDIA)     Difficulty of Paying Living Expenses: Not hard at all   Food Insecurity: No Food Insecurity (11/16/2024)    Hunger Vital Sign     Worried About Running Out of Food in the Last Year: Never true     Ran Out of Food in the Last Year: Never true   Transportation Needs: No Transportation Needs (11/16/2024)    TRANSPORTATION NEEDS     Transportation : No   Physical Activity: Inactive (8/1/2024)    Exercise Vital Sign     Days of Exercise per Week: 0 days     Minutes of Exercise per Session: 0 min   Stress: No Stress Concern Present (11/16/2024)    [employer] [place] [employer] - Occupational Stress Questionnaire     Feeling of Stress : Not at all   Housing Stability: Low Risk  (11/16/2024)    Housing Stability Vital Sign     Unable to Pay for Housing in the Last Year: No     Homeless in the Last Year: No           Current Outpatient Medications   Medication Instructions    albuterol (PROVENTIL/VENTOLIN HFA) 90 mcg/actuation inhaler 1-2 puffs, Inhalation, Every 6 hours PRN, Rescue    albuterol-ipratropium (DUO-NEB) 2.5 mg-0.5 mg/3 mL nebulizer solution 3 mLs, Nebulization, Every 6 hours PRN, Rescue    baclofen (LIORESAL) 10 mg, 2 times daily    budesonide-formoterol 80-4.5 mcg (SYMBICORT) 80-4.5 mcg/actuation HFAA 2 puffs, Inhalation, 2 times daily    hydroxychloroquine (PLAQUENIL) 200 mg, Oral, Daily    lisinopriL (PRINIVIL,ZESTRIL) 5 mg, Oral, Daily    meloxicam (MOBIC) 7.5 mg, Oral, 2 times daily    OFEV 150 mg, Oral, 2 times daily    omeprazole (PRILOSEC) 20 mg, Oral, Daily    predniSONE (DELTASONE) 20 mg, Oral, Daily    tadalafiL (CIALIS) 5 mg, Oral, Daily PRN    tiotropium bromide (SPIRIVA RESPIMAT) 1.25 mcg/actuation inhaler 2 puffs, Inhalation, Daily, Controller       Current Inpatient Medications   ceFEPime IV  (PEDS and ADULTS)  1 g Intravenous Q8H    enoxparin  40 mg Subcutaneous Daily    ketamine in 0.9 % sod chloride  150 mg Intravenous Once    mupirocin   Nasal BID    pantoprazole  40 mg Intravenous Before breakfast    predniSONE  40 mg Oral Daily    sildenafil  20 mg Oral TID       Current Intravenous Infusions   albuterol sulfate  15 mg Nebulization Continuous   Stopped at 12/03/24 1913    fentanyl  0-250 mcg/hr Intravenous Continuous 2.5 mL/hr at 12/04/24 2226 25 mcg/hr at 12/04/24 2226    NORepinephrine bitartrate-D5W  0-3 mcg/kg/min Intravenous Continuous 33.2 mL/hr at 12/05/24 0050 0.3 mcg/kg/min at 12/05/24 0050    propofoL  0-50 mcg/kg/min Intravenous Continuous 14.2 mL/hr at 12/05/24 0319 40 mcg/kg/min at 12/05/24 0319               Objective:       Intake/Output Summary (Last 24 hours) at 12/5/2024 0431  Last data filed at 12/5/2024 0409  Gross per 24 hour   Intake 2951.67 ml   Output 2405 ml   Net 546.67 ml         Vital Signs (Most Recent):  Temp: 98.2 °F (36.8 °C) (12/05/24 0400)  Pulse: 100 (12/05/24 0415)  Resp: (!) 28 (12/05/24 0415)  BP: 103/72 (12/05/24 0415)  SpO2: (!) 94 % (12/05/24 0415)  Body mass index is 21 kg/m².  Weight: 59 kg (130 lb 1.1 oz) Vital Signs (24h Range):  Temp:  [97.7 °F (36.5 °C)-98.6 °F (37 °C)] 98.2 °F (36.8 °C)  Pulse:  [] 100  Resp:  [23-40] 28  SpO2:  [94 %-100 %] 94 %  BP: ()/(61-90) 103/72     Constitutional:       General: He is not in acute distress.     Appearance: Normal appearance. He is ill-appearing (chronically).  Toxic appearing   HENT:      Head: Normocephalic and atraumatic.      Nose: Nose normal.      Mouth/Throat:      Mouth: Mucous membranes are moist.      Pharynx: Oropharynx is clear.   Eyes:      Extraocular Movements: Extraocular movements intact.      Conjunctiva/sclera: Conjunctivae normal.   Cardiovascular:      Rate and Rhythm: Normal rate and regular rhythm.      Pulses: Normal pulses.      Heart sounds: Normal heart sounds. No murmur  heard.  Pulmonary:      Effort: Pulmonary effort is normal. Tachypnea present. No respiratory distress.      Breath sounds: Rhonchi (diffusely) and rales (diffusely) present.      Comments:  Patient was intubated and sedated  Abdominal:      General: Abdomen is flat. Bowel sounds are normal. There is no distension.      Palpations: Abdomen is soft. There is no mass.      Tenderness: There is no abdominal tenderness.   Musculoskeletal:      Cervical back: Normal range of motion and neck supple.      Right lower leg: No edema.      Left lower leg: No edema.   Skin:     General: Skin is warm and dry.      Capillary Refill: Capillary refill takes less than 2 seconds.      Findings: No erythema or rash.   Neurological:      General:  Unable to assess as patient was recently paralyzed for intubation.   Psychiatric:         Behavior: Behavior normal.       Lines/Drains/Airways       Peripherally Inserted Central Catheter Line  Duration             PICC Triple Lumen 12/04/24 1041 left brachial <1 day              Drain  Duration                  NG/OG Tube 12/03/24 1913 orogastric 18 Fr. Left mouth 1 day         Urethral Catheter 12/03/24 1927 Temperature probe 16 Fr. 1 day              Airway  Duration                  Airway - Non-Surgical 12/03/24 1912 Endotracheal Tube 1 day              Peripheral Intravenous Line  Duration                  Peripheral IV - Single Lumen 12/03/24 1900 20 G Right Forearm 1 day         Peripheral IV - Single Lumen 12/03/24 1905 18 G Left Forearm 1 day         Peripheral IV - Single Lumen 12/03/24 2100 18 G Anterior;Proximal;Right Forearm 1 day                    Significant Labs:    Lab Results   Component Value Date    WBC 25.33 (H) 12/05/2024    HGB 16.2 12/05/2024    HCT 53.3 (H) 12/05/2024    MCV 90.0 12/05/2024     12/05/2024           BMP  Lab Results   Component Value Date     12/05/2024    K 4.9 12/05/2024    CO2 28 12/05/2024    BUN 14.6 12/05/2024    CREATININE 0.70  (L) 12/05/2024    CALCIUM 8.2 (L) 12/05/2024    AGAP 7.0 12/05/2024    EGFRNONAA >105 11/25/2021         ABG  Recent Labs   Lab 12/04/24  0914   PH 7.270*   PO2 90.0   PCO2 58.0*   HCO3 26.6*   POCBASEDEF -1.20       Mechanical Ventilation Support:  Vent Mode: A/C (12/05/24 0310)  Ventilator Initiated: Yes (12/03/24 1919)  Set Rate: 28 BPM (12/05/24 0310)  Vt Set: 450 mL (12/05/24 0310)  PEEP/CPAP: 8 cmH20 (12/05/24 0310)  Oxygen Concentration (%): 40 (12/05/24 0400)  Peak Airway Pressure: 23 cmH20 (12/05/24 0310)  Total Ve: 10.7 L/m (12/05/24 0310)  F/VT Ratio<105 (RSBI): (!) 70.89 (12/05/24 0310)      Significant Imaging:  I have reviewed the pertinent imaging within the past 24 hours.        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  Wean ventilator per hours number protocol.  Currently saturating > 92% on 100% FiO2.  CT PE negative for PE.   Repeat ABG pending, patient has been acidotic and hypercapnic  Patient has recently left A after being admitted to [facility] due to  similar complaints.  It is very likely this is just a progression of patient's chronic interstitial lung disease.   Patient's white count is increased which has been true for the last 2 weeks even though he was treated with a extensive course for community-acquired pneumonia on previous hospital stay.  Blood cultures have never been positive neither have sputum cultures.  Increase in white count likely reactive however will continue cefepime  Leukocytosis has worsened, however patient was started on high-dose steroids on admission.  Continue prednisone 40 mg daily  Continue sildenafil for pulmonary hypertension    Prognosis is guarded    DVT Prophylaxis:  Lovenox  GI Prophylaxis:  None       32 minutes of critical care was time spent  personally by me on the following activities: development of treatment plan with patient or surrogate and bedside caregivers, discussions with consultants, evaluation of patient's response to treatment, examination of patient, ordering and performing treatments and interventions, ordering and review of laboratory studies, ordering and review of radiographic studies, pulse oximetry, re-evaluation of patient's condition.  This critical care time did not overlap with that of any other provider or involve time for any procedures.     Enzo Dos Santos MD  Pulmonary Critical Care Medicine  [facility] - Emergency Dept  DOS: 12/05/2024

## 2024-12-06 NOTE — PLAN OF CARE
Problem: Skin Injury Risk Increased  Goal: Skin Health and Integrity  Outcome: Progressing     Problem: Infection  Goal: Absence of Infection Signs and Symptoms  Outcome: Not Progressing     Problem: Adult Inpatient Plan of Care  Goal: Patient-Specific Goal (Individualized)  Outcome: Not Progressing  Flowsheets (Taken 12/5/2024 1938)  Patient/Family-Specific Goals (Include Timeframe): rich

## 2024-12-06 NOTE — PROGRESS NOTES
[facility] - Emergency Dept  Pulmonary Critical Care Note    Patient Name: Clarence Trejo Sr.  MRN: [id number]  Admission Date: 12/3/2024  Hospital Length of Stay: 3 days  Code Status: Prior  Attending Provider: Jean Pierre Decker MD  Primary Care Provider: Yara Spear MD     Subjective:     HPI:   68 y.o. male with PMH significant for antisynthetase syndrome, ILD, chronic hypoxic respiratory failure on 3L NC at home presented to ED in acute respiratory distress.  He was picked up by EMS who reported that patient was saturating in the 50s upon arrival.  On arrival to the emergency department patient was placed on BiPAP but continued to remove the mask due to being extremely confused.  This is likely a result of hypoxia.  Patient was very combative and was given anxiolytics without any improvement in behavior.  Due to inability to increase patient's saturations decision was made to intubate the patient.  Patient was recently admitted to [facility] due to shortness of breath.  He left AMA while having increased oxygen requirements far greater than his baseline.    Hospital Course/Significant events:  Admitted to ICU 12/3/24    24 Hour Interval History:   JAYCOB PEREZ. Remains intubated. ABG's with persistent hypercapnia. WBC downtrending.  High-dose steroids switched to prednisone.      Past Medical History:   Diagnosis Date    SAM positive     Antisynthetase syndrome     ILD (interstitial lung disease)     Pulmonary embolism     Seronegative rheumatoid arthritis     TB lung, latent     Tobacco use        History reviewed. No pertinent surgical history.    Social History     Socioeconomic History    Marital status:    Tobacco Use    Smoking status: Former     Current packs/day: 0.00     Types: Cigarettes     Quit date: 2023     Years since quittin.9    Smokeless tobacco: Never   Substance and Sexual Activity    Alcohol use: Not Currently    Drug use: Never    Sexual  activity: Not Currently     Partners: Female     Social Drivers of Health     Financial Resource Strain: Low Risk  (11/16/2024)    Overall Financial Resource Strain (CARDIA)     Difficulty of Paying Living Expenses: Not hard at all   Food Insecurity: No Food Insecurity (11/16/2024)    Hunger Vital Sign     Worried About Running Out of Food in the Last Year: Never true     Ran Out of Food in the Last Year: Never true   Transportation Needs: No Transportation Needs (11/16/2024)    TRANSPORTATION NEEDS     Transportation : No   Physical Activity: Inactive (8/1/2024)    Exercise Vital Sign     Days of Exercise per Week: 0 days     Minutes of Exercise per Session: 0 min   Stress: No Stress Concern Present (11/16/2024)    [employer] - Occupational Stress Questionnaire     Feeling of Stress : Not at all   Housing Stability: Low Risk  (11/16/2024)    Housing Stability Vital Sign     Unable to Pay for Housing in the Last Year: No     Homeless in the Last Year: No           Current Outpatient Medications   Medication Instructions    albuterol (PROVENTIL/VENTOLIN HFA) 90 mcg/actuation inhaler 1-2 puffs, Inhalation, Every 6 hours PRN, Rescue    albuterol-ipratropium (DUO-NEB) 2.5 mg-0.5 mg/3 mL nebulizer solution 3 mLs, Nebulization, Every 6 hours PRN, Rescue    baclofen (LIORESAL) 10 mg, 2 times daily    budesonide-formoterol 80-4.5 mcg (SYMBICORT) 80-4.5 mcg/actuation HFAA 2 puffs, Inhalation, 2 times daily    hydroxychloroquine (PLAQUENIL) 200 mg, Oral, Daily    lisinopriL (PRINIVIL,ZESTRIL) 5 mg, Oral, Daily    meloxicam (MOBIC) 7.5 mg, Oral, 2 times daily    OFEV 150 mg, Oral, 2 times daily    omeprazole (PRILOSEC) 20 mg, Oral, Daily    predniSONE (DELTASONE) 20 mg, Oral, Daily    tadalafiL (CIALIS) 5 mg, Oral, Daily PRN    tiotropium bromide (SPIRIVA RESPIMAT) 1.25 mcg/actuation inhaler 2 puffs, Inhalation, Daily, Controller       Current Inpatient Medications   ceFEPime IV (PEDS and ADULTS)   1 g Intravenous Q8H    enoxparin  40 mg Subcutaneous Daily    ketamine in 0.9 % sod chloride  150 mg Intravenous Once    mupirocin   Nasal BID    pantoprazole  40 mg Intravenous Daily    predniSONE  40 mg Oral Daily    sodium zirconium cyclosilicate  10 g Oral TID       Current Intravenous Infusions   albuterol sulfate  15 mg Nebulization Continuous   Stopped at 12/03/24 1913    fentanyl  0-250 mcg/hr Intravenous Continuous 7.5 mL/hr at 12/06/24 0740 75 mcg/hr at 12/06/24 0740    NORepinephrine bitartrate-D5W  0-3 mcg/kg/min Intravenous Continuous 11.1 mL/hr at 12/06/24 0740 0.1 mcg/kg/min at 12/06/24 0740    propofoL  0-50 mcg/kg/min Intravenous Continuous 17.7 mL/hr at 12/06/24 0740 50 mcg/kg/min at 12/06/24 0740               Objective:       Intake/Output Summary (Last 24 hours) at 12/6/2024 1058  Last data filed at 12/6/2024 0957  Gross per 24 hour   Intake 2695.06 ml   Output 2705 ml   Net -9.94 ml         Vital Signs (Most Recent):  Temp: 96.8 °F (36 °C) (12/06/24 0701)  Pulse: 83 (12/06/24 0745)  Resp: (!) 28 (12/06/24 0745)  BP: (!) 80/49 (12/06/24 0745)  SpO2: 95 % (12/06/24 0745)  Body mass index is 21 kg/m².  Weight: 59 kg (130 lb 1.1 oz) Vital Signs (24h Range):  Temp:  [96.8 °F (36 °C)-98.8 °F (37.1 °C)] 96.8 °F (36 °C)  Pulse:  [70-86] 83  Resp:  [16-32] 28  SpO2:  [91 %-98 %] 95 %  BP: ()/(49-86) 80/49     Constitutional:       General: He is not in acute distress.     Appearance: Normal appearance. He is ill-appearing (chronically).  Toxic appearing   HENT:      Head: Normocephalic and atraumatic.      Nose: Nose normal.      Mouth/Throat:      Mouth: Mucous membranes are moist.      Pharynx: Oropharynx is clear.   Eyes:      Extraocular Movements: Extraocular movements intact.      Conjunctiva/sclera: Conjunctivae normal.   Cardiovascular:      Rate and Rhythm: Normal rate and regular rhythm.      Pulses: Normal pulses.      Heart sounds: Normal heart sounds. No murmur heard.  Pulmonary:       Effort: Pulmonary effort is normal. Tachypnea present. No respiratory distress.      Breath sounds: Rhonchi (diffusely) and rales (diffusely) present.      Comments:  Patient was intubated and sedated  Abdominal:      General: Abdomen is flat. Bowel sounds are normal. There is no distension.      Palpations: Abdomen is soft. There is no mass.      Tenderness: There is no abdominal tenderness.   Musculoskeletal:      Cervical back: Normal range of motion and neck supple.      Right lower leg: No edema.      Left lower leg: No edema.   Skin:     General: Skin is warm and dry.      Capillary Refill: Capillary refill takes less than 2 seconds.      Findings: No erythema or rash.   Neurological:      General:  Unable to assess as patient was recently paralyzed for intubation.   Psychiatric:         Behavior: Behavior normal.       Lines/Drains/Airways       Peripherally Inserted Central Catheter Line  Duration             PICC Triple Lumen 12/04/24 1041 left brachial 2 days              Drain  Duration                  NG/OG Tube 12/03/24 1913 orogastric 18 Fr. Left mouth 2 days         Urethral Catheter 12/03/24 1927 Temperature probe 16 Fr. 2 days              Airway  Duration                  Airway - Non-Surgical 12/03/24 1912 Endotracheal Tube 2 days              Peripheral Intravenous Line  Duration                  Peripheral IV - Single Lumen 12/03/24 1900 20 G Right Forearm 2 days         Peripheral IV - Single Lumen 12/03/24 1905 18 G Left Forearm 2 days         Peripheral IV - Single Lumen 12/03/24 2100 18 G Anterior;Proximal;Right Forearm 2 days                    Significant Labs:    Lab Results   Component Value Date    WBC 15.77 (H) 12/06/2024    HGB 15.0 12/06/2024    HCT 49.1 12/06/2024    MCV 89.6 12/06/2024     (L) 12/06/2024           BMP  Lab Results   Component Value Date     12/06/2024    K 5.4 (H) 12/06/2024    CO2 28 12/06/2024    BUN 18.7 12/06/2024    CREATININE 0.62 (L) 12/06/2024     CALCIUM 8.3 (L) 12/06/2024    AGAP 6.0 12/06/2024    EGFRNONAA >105 11/25/2021         ABG  Recent Labs   Lab 12/06/24  0757   PH 7.340*   PO2 73.0*   PCO2 68.0*   HCO3 36.7*   POCBASEDEF 8.60       Mechanical Ventilation Support:  Vent Mode: A/C (12/06/24 0423)  Ventilator Initiated: Yes (12/03/24 1919)  Set Rate: 28 BPM (12/06/24 0423)  Vt Set: 450 mL (12/06/24 0423)  PEEP/CPAP: 8 cmH20 (12/06/24 0423)  Oxygen Concentration (%): 50 (12/06/24 0956)  Peak Airway Pressure: 29 cmH20 (12/06/24 0423)  Total Ve: 12.3 L/m (12/06/24 0423)  F/VT Ratio<105 (RSBI): (!) 50 (12/05/24 1613)      Significant Imaging:  I have reviewed the pertinent imaging within the past 24 hours.        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  Wean ventilator per hours number protocol.  Currently saturating > 92% on 100% FiO2.  CT PE negative for PE.   Repeat ABG pending, patient has been acidotic and hypercapnic  Patient has recently left AMA after being admitted to [facility] due to  similar complaints.  It is very likely this is just a progression of patient's chronic interstitial lung disease.   Patient's white count is increased which has been true for the last 2 weeks even though he was treated with a extensive course for community-acquired pneumonia on previous hospital stay.  Blood cultures have never been positive neither have sputum cultures.  Increase in white count likely reactive however will continue cefepime  Leukocytosis downtrending, patient on high-dose steroids on admission.  Continue prednisone 40 mg daily  Continue sildenafil for pulmonary hypertension    Prognosis is guarded    DVT Prophylaxis:  Lovenox  GI Prophylaxis:  None       32 minutes of critical care was time spent personally by me on the following  activities: development of treatment plan with patient or surrogate and bedside caregivers, discussions with consultants, evaluation of patient's response to treatment, examination of patient, ordering and performing treatments and interventions, ordering and review of laboratory studies, ordering and review of radiographic studies, pulse oximetry, re-evaluation of patient's condition.  This critical care time did not overlap with that of any other provider or involve time for any procedures.     Rosmery Kaur MD  Pulmonary Critical Care Medicine  [facility] - Emergency Dept  DOS: 12/06/2024

## 2024-12-06 NOTE — PROGRESS NOTES
Inpatient Nutrition Assessment    Admit Date: 12/3/2024   Total duration of encounter: 3 days   Patient Age: 69 y.o.    Nutrition Recommendation/Prescription     Tube feeding recommendation:     Peptamen AF goal rate 60 ml/hr to provide  1440 kcal/d  (77% est needs, 103% with meds)  90 g protein/d  (100% est needs)  972 ml free water/d (66% est needs)  (calculations based on estimated 20 hr/d run time)     If no IV fluids running, can give 50ml q 2hr water flushes. Total water provided: 1472ml (100% est needs.)     Communication of Recommendations: reviewed with nurse    Nutrition Assessment     Malnutrition Assessment/Nutrition-Focused Physical Exam    Malnutrition Context: chronic illness (12/04/24 1006)  Malnutrition Level: moderate (12/04/24 1006)  Energy Intake (Malnutrition): other (see comments) (Unable to assess) (12/04/24 1006)  Weight Loss (Malnutrition): other (see comments) (Unable to assess) (12/04/24 1006)  Subcutaneous Fat (Malnutrition): mild depletion (12/04/24 1006)  Orbital Region (Subcutaneous Fat Loss): mild depletion        Muscle Mass (Malnutrition): mild depletion (12/04/24 1006)  [place] Region (Muscle Loss): mild depletion  Clavicle Bone Region (Muscle Loss): mild depletion                             A minimum of two characteristics is recommended for diagnosis of either severe or non-severe malnutrition.    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  Pulmonary hypertension  Electrolyte derangement    Relevant Medical History: antisynthetase syndrome, ILD, chronic hypoxic respiratory failure     Scheduled Medications:  ceFEPime IV (PEDS and ADULTS), 1 g, Q8H  enoxparin, 40 mg, Daily  ketamine in 0.9 % sod chloride, 150 mg, Once  mupirocin, , BID  pantoprazole, 40 mg, Daily  predniSONE, 40 mg, Daily  sodium zirconium cyclosilicate, 10 g,  TID    Continuous Infusions:  albuterol sulfate, Last Rate: Stopped (12/03/24 1913)  fentanyl, Last Rate: 75 mcg/hr (12/06/24 0740)  NORepinephrine bitartrate-D5W, Last Rate: 0.1 mcg/kg/min (12/06/24 0740)  propofoL, Last Rate: 50 mcg/kg/min (12/06/24 0740)    PRN Medications:  calcium gluconate IVPB, 1 g, Q10 Min PRN  dextrose 10%, 12.5 g, PRN  dextrose 10%, 25 g, PRN  glucagon (human recombinant), 1 mg, PRN  HYDROmorphone, 2 mg, Q3H PRN  insulin aspart U-100, 0-10 Units, Q6H PRN  sodium chloride 0.9%, 10 mL, Q12H PRN    Calorie Containing IV Medications: Diprivan @ 18 ml/hr (provides 475 kcal/d)    Recent Labs   Lab 12/03/24  1923 12/03/24  1954 12/04/24  0008 12/04/24  0408 12/04/24  0817 12/04/24  1152 12/04/24  1803 12/05/24  0247 12/06/24  0321 12/06/24  0322   *  --  140 141 141 143 145 144  --  141   K 5.7*  --  5.9* 5.8* 4.9 4.7 5.3* 4.9  --  5.4*   CALCIUM 6.6*  --  8.3* 8.0* 8.1* 8.4* 8.6* 8.2*  --  8.3*   PHOS  --   --   --  6.1*  --   --   --  3.2  --  3.0   MG  --   --   --  2.40  --   --   --  2.20  --  2.40   *  --  111* 108* 109* 109* 107 109*  --  107   CO2 14*  --  20* 24 23 26 24 28  --  28   BUN 15.7  --  19.1 21.2 21.4 19.6 17.0 14.6  --  18.7   CREATININE 1.53*  --  1.02 1.16 1.07 0.97 0.76 0.70*  --  0.62*   EGFRNORACEVR 49  --  >60 >60 >60 >60 >60 >60  --  >60   GLUCOSE 185*  --  169* 220* 275* 214* 165* 146*  --  138*   BILITOT 0.7  --   --  0.9  --   --   --  0.4  --  0.3   ALKPHOS 83  --   --  96  --   --   --  89  --  77   ALT 39  --   --  63*  --   --   --  55  --  42   AST 56*  --   --  61*  --   --   --  35*  --  21   ALBUMIN 3.0*  --   --  2.7*  --   --   --  2.5*  --  2.2*   WBC  --  21.04*  --  21.89  21.89*  --   --   --  25.33* 15.77*  --    HGB  --  16.7  --  17.4  --   --   --  16.2 15.0  --    HCT  --  53.1*  --  57.9*  --   --   --  53.3* 49.1  --      Nutrition Orders:  No diet orders on file      Appetite/Oral Intake: not applicable/not applicable  Factors  "Affecting Nutritional Intake: on mechanical ventilation  Social Needs Impacting Access to Food: none identified per MD notes  Food/Evangelical/Cultural Preferences: unable to obtain  Food Allergies: no known food allergies  Last Bowel Movement: 12/02/24  Wound(s):  None documented     Comments    12/4/24: Discussed with RN. Will provide tube feeding recommendations for when appropriate to start tube feeding. Receiving kcal from meds.  Noted previous EMR wts show trend in decreasing wt. Unable to verify UBW at this time.     12/6/24: TF continues. Tolerated per RN. Still receiving kcal from meds.     Anthropometrics    Height: 5' 5.98" (167.6 cm), Height Method: Measured  Last Weight: 59 kg (130 lb 1.1 oz) (12/04/24 0217), Weight Method: Bed Scale  BMI (Calculated): 21  BMI Classification: normal (BMI 18.5-24.9)        Ideal Body Weight (IBW), Male: 141.88 lb     % Ideal Body Weight, Male (lb): 91.6 %                          Usual Weight Provided By: unable to obtain usual weight    Wt Readings from Last 5 Encounters:   12/04/24 59 kg (130 lb 1.1 oz)   11/25/24 63.2 kg (139 lb 6.4 oz)   11/22/24 64.9 kg (143 lb)   11/18/24 62.6 kg (138 lb)   11/11/24 60.2 kg (132 lb 12.8 oz)     Weight Change(s) Since Admission:   Wt Readings from Last 1 Encounters:   12/04/24 0217 59 kg (130 lb 1.1 oz)   12/03/24 1855 59 kg (130 lb)   Admit Weight: 59 kg (130 lb) (12/03/24 1855), Weight Method: Bed Scale    Estimated Needs    Weight Used For Calorie Calculations: 59 kg (130 lb 1.1 oz)  Energy Calorie Requirements (kcal): 1860kcal  Energy Need Method: [facility]  Weight Used For Protein Calculations: 59 kg (130 lb 1.1 oz)  Protein Requirements: 89gm (1.5g/kg)  Fluid Requirements (mL): 1475ml (25ml/kg)  CHO Requirement: 210gm (45% est kcal needs)     Enteral Nutrition     Formula: Peptamen AF  Rate/Volume: 60ml/hr  Water Flushes: 50ml q2hr  Additives/Modulars: none at this time  Route: orogastric tube  Method: continuous  Total " Nutrition Provided by Tube Feeding, Additives, and Flushes:  Calories Provided  1440 kcal/d, 77% needs   Protein Provided  90 g/d, 100% needs   Fluid Provided  1472 ml/d, 100% needs   Continuous feeding calculations based on estimated 20 hr/d run time unless otherwise stated.    Parenteral Nutrition     Patient not receiving parenteral nutrition support at this time.    Evaluation of Received Nutrient Intake    Calories: meeting estimated needs  Protein: meeting estimated needs    Patient Education     Not applicable.    Nutrition Diagnosis     PES: Inadequate oral intake related to acute illness as evidenced by intubation since admit. (active)     PES: Moderate chronic disease or condition related malnutrition Related to chronic condition As Evidenced by:  - weight loss: Unable to assess - muscle mass depletion: 4 areas of mild muscle loss (Pectoralis, Clavicle, Trapezius, Temporalis) - loss of subcutaneous fat: 2 areas of mild fat loss (Infraorbital, Buccal) new    Nutrition Interventions     Intervention(s): modified composition of enteral nutrition, modified rate of enteral nutrition, and collaboration with other providers    Goal: Meet greater than 80% of nutritional needs by follow-up. (goal progressing)  Goal: Tolerate enteral feeding at goal rate by follow-up. (goal progressing)    Nutrition Goals & Monitoring     Dietitian will monitor: energy intake and enteral nutrition intake  Discharge planning: too early to determine; pending clinical course  Nutrition Risk/Follow-Up: high (follow-up in 1-4 days)   Please consult if re-assessment needed sooner.

## 2024-12-07 NOTE — PLAN OF CARE
Problem: Skin Injury Risk Increased  Goal: Skin Health and Integrity  12/6/2024 1952 by Bancroft, Alexander, RN  Outcome: Progressing  12/6/2024 1952 by Bancroft, Alexander, RN  Outcome: Progressing     Problem: Infection  Goal: Absence of Infection Signs and Symptoms  12/6/2024 1952 by Bancroft, Alexander, RN  Outcome: Not Progressing  12/6/2024 1952 by Bancroft, Alexander, RN  Outcome: Not Progressing     Problem: Adult Inpatient Plan of Care  Goal: Patient-Specific Goal (Individualized)  Outcome: Not Progressing

## 2024-12-07 NOTE — PROGRESS NOTES
[facility] - Emergency Dept  Pulmonary Critical Care Note    Patient Name: Clarence Trejo Sr.  MRN: [id number]  Admission Date: 12/3/2024  Hospital Length of Stay: 4 days  Code Status: Prior  Attending Provider: Jean Pierre Decker MD  Primary Care Provider: Yraa Spear MD     Subjective:     HPI:   68 y.o. male with PMH significant for antisynthetase syndrome, ILD, chronic hypoxic respiratory failure on 3L NC at home presented to ED in acute respiratory distress.  He was picked up by EMS who reported that patient was saturating in the 50s upon arrival.  On arrival to the emergency department patient was placed on BiPAP but continued to remove the mask due to being extremely confused.  This is likely a result of hypoxia.  Patient was very combative and was given anxiolytics without any improvement in behavior.  Due to inability to increase patient's saturations decision was made to intubate the patient.  Patient was recently admitted to [facility] due to shortness of breath.  He left AMA while having increased oxygen requirements far greater than his baseline.    Hospital Course/Significant events:  Admitted to ICU 12/3/24    24 Hour Interval History:   No acute events overnight.  Vitals remained stable.  Patient remains intubated, sedated on fentanyl 100 mcg/hour and propofol 35 mics per kg per minute, mechanically ventilated on PRVC /32/5/40% with 0.8 iTime. ABG this am 7.38/67/118/39.6.  Remains on Levophed at 0.08 mcg/kg/min.  Urine output 2.95 L, net -371 mL. White count downtrending, remains on cefepime day 4 of 5 for community-acquired pneumonia.  Repeat chest x-ray pending.      Past Medical History:   Diagnosis Date    SAM positive     Antisynthetase syndrome     ILD (interstitial lung disease)     Pulmonary embolism     Seronegative rheumatoid arthritis     TB lung, latent     Tobacco use        History reviewed. No pertinent surgical history.    Social History      Socioeconomic History    Marital status:    Tobacco Use    Smoking status: Former     Current packs/day: 0.00     Types: Cigarettes     Quit date: 2023     Years since quittin.9    Smokeless tobacco: Never   Substance and Sexual Activity    Alcohol use: Not Currently    Drug use: Never    Sexual activity: Not Currently     Partners: Female     Social Drivers of Health     Financial Resource Strain: Low Risk  (2024)    Overall Financial Resource Strain (CARDIA)     Difficulty of Paying Living Expenses: Not hard at all   Food Insecurity: No Food Insecurity (2024)    Hunger Vital Sign     Worried About Running Out of Food in the Last Year: Never true     Ran Out of Food in the Last Year: Never true   Transportation Needs: No Transportation Needs (2024)    TRANSPORTATION NEEDS     Transportation : No   Physical Activity: Inactive (2024)    Exercise Vital Sign     Days of Exercise per Week: 0 days     Minutes of Exercise per Session: 0 min   Stress: No Stress Concern Present (2024)    [employer] - Occupational Stress Questionnaire     Feeling of Stress : Not at all   Housing Stability: Low Risk  (2024)    Housing Stability Vital Sign     Unable to Pay for Housing in the Last Year: No     Homeless in the Last Year: No           Current Outpatient Medications   Medication Instructions    albuterol (PROVENTIL/VENTOLIN HFA) 90 mcg/actuation inhaler 1-2 puffs, Inhalation, Every 6 hours PRN, Rescue    albuterol-ipratropium (DUO-NEB) 2.5 mg-0.5 mg/3 mL nebulizer solution 3 mLs, Nebulization, Every 6 hours PRN, Rescue    baclofen (LIORESAL) 10 mg, 2 times daily    budesonide-formoterol 80-4.5 mcg (SYMBICORT) 80-4.5 mcg/actuation HFAA 2 puffs, Inhalation, 2 times daily    hydroxychloroquine (PLAQUENIL) 200 mg, Oral, Daily    lisinopriL (PRINIVIL,ZESTRIL) 5 mg, Oral, Daily    meloxicam (MOBIC) 7.5 mg, Oral, 2 times daily    OFEV 150 mg, Oral, 2  times daily    omeprazole (PRILOSEC) 20 mg, Oral, Daily    predniSONE (DELTASONE) 20 mg, Oral, Daily    tadalafiL (CIALIS) 5 mg, Oral, Daily PRN    tiotropium bromide (SPIRIVA RESPIMAT) 1.25 mcg/actuation inhaler 2 puffs, Inhalation, Daily, Controller       Current Inpatient Medications   ceFEPime IV (PEDS and ADULTS)  1 g Intravenous Q8H    enoxparin  40 mg Subcutaneous Daily    ketamine in 0.9 % sod chloride  150 mg Intravenous Once    mupirocin   Nasal BID    pantoprazole  40 mg Intravenous Daily    predniSONE  40 mg Oral Daily       Current Intravenous Infusions   fentanyl  0-250 mcg/hr Intravenous Continuous 10 mL/hr at 12/07/24 0605 100 mcg/hr at 12/07/24 0605    NORepinephrine bitartrate-D5W  0-3 mcg/kg/min Intravenous Continuous 8.9 mL/hr at 12/07/24 0605 0.08 mcg/kg/min at 12/07/24 0605    propofoL  0-50 mcg/kg/min Intravenous Continuous 12.4 mL/hr at 12/07/24 0605 35 mcg/kg/min at 12/07/24 0605               Objective:       Intake/Output Summary (Last 24 hours) at 12/7/2024 0752  Last data filed at 12/7/2024 0605  Gross per 24 hour   Intake 2536.03 ml   Output 2550 ml   Net -13.97 ml         Vital Signs (Most Recent):  Temp: 98.2 °F (36.8 °C) (12/07/24 0345)  Pulse: 79 (12/07/24 0630)  Resp: (!) 32 (12/07/24 0630)  BP: 101/63 (12/07/24 0630)  SpO2: 98 % (12/07/24 0630)  Body mass index is 21 kg/m².  Weight: 59 kg (130 lb 1.1 oz) Vital Signs (24h Range):  Temp:  [97.9 °F (36.6 °C)-98.2 °F (36.8 °C)] 98.2 °F (36.8 °C)  Pulse:  [59-94] 79  Resp:  [21-33] 32  SpO2:  [95 %-99 %] 98 %  BP: ()/(53-81) 101/63     Constitutional:       General: He is not in acute distress.     Appearance: Normal appearance. He is ill-appearing (chronically).  Toxic appearing   HENT:      Head: Normocephalic and atraumatic.      Nose: Nose normal.      Mouth/Throat:      Mouth: Mucous membranes are moist.      Pharynx: Oropharynx is clear.   Eyes:      Extraocular Movements: Extraocular movements intact.       Conjunctiva/sclera: Conjunctivae normal.   Cardiovascular:      Rate and Rhythm: Normal rate and regular rhythm.      Pulses: Normal pulses.      Heart sounds: Normal heart sounds. No murmur heard.  Pulmonary:      Effort: Pulmonary effort is normal. Tachypnea present. No respiratory distress.      Breath sounds: Rhonchi (diffusely) and rales (diffusely) present.      Comments:  Patient was intubated and sedated  Abdominal:      General: Abdomen is flat. Bowel sounds are normal. There is no distension.      Palpations: Abdomen is soft. There is no mass.      Tenderness: There is no abdominal tenderness.   Musculoskeletal:      Cervical back: Normal range of motion and neck supple.      Right lower leg: No edema.      Left lower leg: No edema.   Skin:     General: Skin is warm and dry.      Capillary Refill: Capillary refill takes less than 2 seconds.      Findings: No erythema or rash.   Neurological:      General:  Unable to assess as patient was recently paralyzed for intubation.   Psychiatric:         Behavior: Behavior normal.       Lines/Drains/Airways       Peripherally Inserted Central Catheter Line  Duration             PICC Triple Lumen 12/04/24 1041 left brachial 2 days              Drain  Duration                  NG/OG Tube 12/03/24 1913 orogastric 18 Fr. Left mouth 3 days         Urethral Catheter 12/03/24 1927 Temperature probe 16 Fr. 3 days              Airway  Duration                  Airway - Non-Surgical 12/03/24 1912 Endotracheal Tube 3 days              Peripheral Intravenous Line  Duration                  Peripheral IV - Single Lumen 12/03/24 1900 20 G Right Forearm 3 days         Peripheral IV - Single Lumen 12/03/24 1905 18 G Left Forearm 3 days         Peripheral IV - Single Lumen 12/03/24 2100 18 G Anterior;Proximal;Right Forearm 3 days                    Significant Labs:    Lab Results   Component Value Date    WBC 13.48 (H) 12/07/2024    HGB 14.6 12/07/2024    HCT 48.9 12/07/2024    MCV  90.9 12/07/2024     12/07/2024           BMP  Lab Results   Component Value Date     12/07/2024    K 4.9 12/07/2024    CO2 33 (H) 12/07/2024    BUN 19.6 12/07/2024    CREATININE 0.62 (L) 12/07/2024    CALCIUM 8.7 (L) 12/07/2024    AGAP 5.0 12/07/2024    EGFRNONAA >105 11/25/2021         ABG  Recent Labs   Lab 12/07/24 0426   PH 7.380   PO2 118.0*   PCO2 67.0*   HCO3 39.6*   POCBASEDEF 11.80       Mechanical Ventilation Support:  Vent Mode: A/C (12/07/24 0415)  Ventilator Initiated: Yes (12/03/24 1919)  Set Rate: 32 BPM (12/07/24 0415)  Vt Set: 450 mL (12/07/24 0415)  PEEP/CPAP: 5 cmH20 (12/07/24 0415)  Oxygen Concentration (%): 50 (12/07/24 0415)  Peak Airway Pressure: 28 cmH20 (12/07/24 0415)  Total Ve: 15 L/m (12/07/24 0415)  F/VT Ratio<105 (RSBI): (!) 69.72 (12/07/24 0415)      Significant Imaging:  I have reviewed the pertinent imaging within the past 24 hours.        Assessment/Plan:     Assessment  Community Acquired PNA with H. 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  Pulmonary hypertension  Electrolyte derangement    Plan  Continue ICU level care   Continue to titrate mechanical ventilation for ARDS net protocol   Continue to wean sedation and pressor support as tolerated  Continue prednisone and cefepime (day 4 / 5)   Continue to trend ABG  F/u CXR   Continue to monitor labs and replace lytes as needed       Prognosis is guarded    DVT Prophylaxis:  Lovenox  GI Prophylaxis:  None       32 minutes of critical care was time spent personally by me on the following activities: development of treatment plan with patient or surrogate and bedside caregivers, discussions with consultants, evaluation of patient's response to treatment, examination of patient, ordering and performing treatments and interventions, ordering and review of laboratory studies, ordering and review of radiographic studies, pulse oximetry, re-evaluation of  patient's condition.  This critical care time did not overlap with that of any other provider or involve time for any procedures.     Clinton Yen DO  Pulmonary Critical Care Medicine  [facility] - Emergency Dept  DOS: 12/07/2024

## 2024-12-08 NOTE — PLAN OF CARE
Problem: Skin Injury Risk Increased  Goal: Skin Health and Integrity  Outcome: Progressing     Problem: Infection  Goal: Absence of Infection Signs and Symptoms  Outcome: Not Progressing     Problem: Adult Inpatient Plan of Care  Goal: Patient-Specific Goal (Individualized)  Outcome: Not Progressing

## 2024-12-08 NOTE — PROGRESS NOTES
[facility] - Emergency Dept  Pulmonary Critical Care Note    Patient Name: Clarence Trejo Sr.  MRN: [id number]  Admission Date: 12/3/2024  Hospital Length of Stay: 5 days  Code Status: Prior  Attending Provider: Jean Pierre Decker MD  Primary Care Provider: Yara Spear MD     Subjective:     HPI:   68 y.o. male with PMH significant for antisynthetase syndrome, ILD, chronic hypoxic respiratory failure on 3L NC at home presented to ED in acute respiratory distress.  He was picked up by EMS who reported that patient was saturating in the 50s upon arrival.  On arrival to the emergency department patient was placed on BiPAP but continued to remove the mask due to being extremely confused.  This is likely a result of hypoxia.  Patient was very combative and was given anxiolytics without any improvement in behavior.  Due to inability to increase patient's saturations decision was made to intubate the patient.  Patient was recently admitted to [facility] due to shortness of breath.  He left AMA while having increased oxygen requirements far greater than his baseline.    Hospital Course/Significant events:  Admitted to ICU 12/3/24    24 Hour Interval History:   No acute events overnight.  ABG this morning 7.36/75/61/42.4 on PRVC /32/5/40% (P/F 152).  Remains on fentanyl now at 125 mcg/hour and propofol 50 mcg/kg per minute.  Remains on Levophed at 0.05 mcg/kg/min.  Urine output-2.95 (net negative 240 cc).  On day 5 of 5 of cefepime for CAP.        Past Medical History:   Diagnosis Date    SAM positive     Antisynthetase syndrome     ILD (interstitial lung disease)     Pulmonary embolism     Seronegative rheumatoid arthritis     TB lung, latent     Tobacco use        History reviewed. No pertinent surgical history.    Social History     Socioeconomic History    Marital status:    Tobacco Use    Smoking status: Former     Current packs/day: 0.00     Types: Cigarettes     Quit  date: 2023     Years since quittin.9    Smokeless tobacco: Never   Substance and Sexual Activity    Alcohol use: Not Currently    Drug use: Never    Sexual activity: Not Currently     Partners: Female     Social Drivers of Health     Financial Resource Strain: Low Risk  (2024)    Overall Financial Resource Strain (CARDIA)     Difficulty of Paying Living Expenses: Not hard at all   Food Insecurity: No Food Insecurity (2024)    Hunger Vital Sign     Worried About Running Out of Food in the Last Year: Never true     Ran Out of Food in the Last Year: Never true   Transportation Needs: No Transportation Needs (2024)    TRANSPORTATION NEEDS     Transportation : No   Physical Activity: Inactive (2024)    Exercise Vital Sign     Days of Exercise per Week: 0 days     Minutes of Exercise per Session: 0 min   Stress: No Stress Concern Present (2024)    [employer] - Occupational Stress Questionnaire     Feeling of Stress : Not at all   Housing Stability: Low Risk  (2024)    Housing Stability Vital Sign     Unable to Pay for Housing in the Last Year: No     Homeless in the Last Year: No           Current Outpatient Medications   Medication Instructions    albuterol (PROVENTIL/VENTOLIN HFA) 90 mcg/actuation inhaler 1-2 puffs, Inhalation, Every 6 hours PRN, Rescue    albuterol-ipratropium (DUO-NEB) 2.5 mg-0.5 mg/3 mL nebulizer solution 3 mLs, Nebulization, Every 6 hours PRN, Rescue    baclofen (LIORESAL) 10 mg, 2 times daily    budesonide-formoterol 80-4.5 mcg (SYMBICORT) 80-4.5 mcg/actuation HFAA 2 puffs, Inhalation, 2 times daily    hydroxychloroquine (PLAQUENIL) 200 mg, Oral, Daily    lisinopriL (PRINIVIL,ZESTRIL) 5 mg, Oral, Daily    meloxicam (MOBIC) 7.5 mg, Oral, 2 times daily    OFEV 150 mg, Oral, 2 times daily    omeprazole (PRILOSEC) 20 mg, Oral, Daily    predniSONE (DELTASONE) 20 mg, Oral, Daily    tadalafiL (CIALIS) 5 mg, Oral, Daily PRN     tiotropium bromide (SPIRIVA RESPIMAT) 1.25 mcg/actuation inhaler 2 puffs, Inhalation, Daily, Controller       Current Inpatient Medications   ceFEPime IV (PEDS and ADULTS)  1 g Intravenous Q8H    enoxparin  40 mg Subcutaneous Daily    ketamine in 0.9 % sod chloride  150 mg Intravenous Once    mupirocin   Nasal BID    pantoprazole  40 mg Intravenous Daily    predniSONE  40 mg Oral Daily       Current Intravenous Infusions   fentanyl  0-250 mcg/hr Intravenous Continuous 12.5 mL/hr at 12/08/24 0610 125 mcg/hr at 12/08/24 0610    NORepinephrine bitartrate-D5W  0-3 mcg/kg/min Intravenous Continuous 5.5 mL/hr at 12/08/24 0610 0.05 mcg/kg/min at 12/08/24 0610    propofoL  0-50 mcg/kg/min Intravenous Continuous 17.7 mL/hr at 12/08/24 0610 50 mcg/kg/min at 12/08/24 0610               Objective:       Intake/Output Summary (Last 24 hours) at 12/8/2024 0643  Last data filed at 12/8/2024 0610  Gross per 24 hour   Intake 2711.38 ml   Output 2950 ml   Net -238.62 ml         Vital Signs (Most Recent):  Temp: 97.8 °F (36.6 °C) (12/08/24 0315)  Pulse: (!) 57 (12/08/24 0630)  Resp: (!) 32 (12/08/24 0630)  BP: 123/78 (12/08/24 0630)  SpO2: 96 % (12/08/24 0630)  Body mass index is 21 kg/m².  Weight: 59 kg (130 lb 1.1 oz) Vital Signs (24h Range):  Temp:  [97.8 °F (36.6 °C)-98.6 °F (37 °C)] 97.8 °F (36.6 °C)  Pulse:  [] 57  Resp:  [16-37] 32  SpO2:  [80 %-100 %] 96 %  BP: ()/(54-90) 123/78     Constitutional:       General: He is not in acute distress.     Appearance: Normal appearance. He is ill-appearing (chronically).  Toxic appearing   HENT:      Head: Normocephalic and atraumatic.      Nose: Nose normal.      Mouth/Throat:      Mouth: Mucous membranes are moist.      Pharynx: Oropharynx is clear.   Eyes:      Extraocular Movements: Extraocular movements intact.      Conjunctiva/sclera: Conjunctivae normal.   Cardiovascular:      Rate and Rhythm: Normal rate and regular rhythm.      Pulses: Normal pulses.      Heart  sounds: Normal heart sounds. No murmur heard.  Pulmonary:      Effort: Pulmonary effort is normal. Tachypnea present. No respiratory distress.      Breath sounds: Rhonchi (diffusely) and rales (diffusely) present.      Comments:  Patient was intubated and sedated  Abdominal:      General: Abdomen is flat. Bowel sounds are normal. There is no distension.      Palpations: Abdomen is soft. There is no mass.      Tenderness: There is no abdominal tenderness.   Musculoskeletal:      Cervical back: Normal range of motion and neck supple.      Right lower leg: No edema.      Left lower leg: No edema.   Skin:     General: Skin is warm and dry.      Capillary Refill: Capillary refill takes less than 2 seconds.      Findings: No erythema or rash.   Neurological:      General:  Unable to assess as patient was recently paralyzed for intubation.   Psychiatric:         Behavior: Behavior normal.       Lines/Drains/Airways       Peripherally Inserted Central Catheter Line  Duration             PICC Triple Lumen 12/04/24 1041 left brachial 3 days              Drain  Duration                  NG/OG Tube 12/03/24 1913 orogastric 18 Fr. Left mouth 4 days         Urethral Catheter 12/03/24 1927 Temperature probe 16 Fr. 4 days              Airway  Duration                  Airway - Non-Surgical 12/03/24 1912 Endotracheal Tube 4 days              Peripheral Intravenous Line  Duration                  Peripheral IV - Single Lumen 12/03/24 1900 20 G Right Forearm 4 days         Peripheral IV - Single Lumen 12/03/24 1905 18 G Left Forearm 4 days         Peripheral IV - Single Lumen 12/03/24 2100 18 G Anterior;Proximal;Right Forearm 4 days                    Significant Labs:    Lab Results   Component Value Date    WBC 10.67 12/08/2024    HGB 14.6 12/08/2024    HCT 49.6 12/08/2024    MCV 91.2 12/08/2024     12/08/2024           BMP  Lab Results   Component Value Date     12/08/2024    K 4.4 12/08/2024    CO2 34 (H) 12/08/2024     BUN 17.4 12/08/2024    CREATININE 0.60 (L) 12/08/2024    CALCIUM 8.6 (L) 12/08/2024    AGAP 4.0 12/08/2024    EGFRNONAA >105 11/25/2021         ABG  Recent Labs   Lab 12/08/24 0454   PH 7.360   PO2 61.0*   PCO2 75.0*   HCO3 42.4*   POCBASEDEF 13.70       Mechanical Ventilation Support:  Vent Mode: A/C (12/08/24 0446)  Ventilator Initiated: Yes (12/03/24 1919)  Set Rate: 32 BPM (12/08/24 0446)  Vt Set: 450 mL (12/08/24 0446)  PEEP/CPAP: 5 cmH20 (12/08/24 0446)  Oxygen Concentration (%): 40 (12/08/24 0446)  Peak Airway Pressure: 25 cmH20 (12/08/24 0446)  Total Ve: 13.7 L/m (12/08/24 0446)  F/VT Ratio<105 (RSBI): (!) 84.21 (12/07/24 0824)      Significant Imaging:  I have reviewed the pertinent imaging within the past 24 hours.        Assessment/Plan:     Assessment  Community Acquired PNA with H. 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  Pulmonary hypertension  Electrolyte derangement    Plan  Continue ICU level care   Continue to titrate mechanical ventilation for ARDS net protocol   Continue to wean sedation and pressor support as tolerated  Continue prednisone and cefepime (day 5 / 5)   Continue to trend ABG  Continue to monitor labs and replace lytes as needed       Prognosis is grim    DVT Prophylaxis:  Lovenox  GI Prophylaxis:  None       32 minutes of critical care was time spent personally by me on the following activities: development of treatment plan with patient or surrogate and bedside caregivers, discussions with consultants, evaluation of patient's response to treatment, examination of patient, ordering and performing treatments and interventions, ordering and review of laboratory studies, ordering and review of radiographic studies, pulse oximetry, re-evaluation of patient's condition.  This critical care time did not overlap with that of any other provider or involve time for any procedures.     Clinton Yen DO  Pulmonary  Critical Care Medicine  [facility] - Emergency Dept  DOS: 12/08/2024

## 2024-12-09 NOTE — PLAN OF CARE
Received call from ICU nurse, family is wishing to transfer patient to [facility]. Contacted POA and confirmed information. Informed POA she will be responsible for transportation cost. PFC order placed to transfer center. Isidra in patient flow center needs confirmation from family that they will cover transport cost before she is able to move forward with request.     Contacted [facility] office for exact pricing. Quote number is [id number]; quoted for $2,803.65. POA is aware of price and understands it must be paid in full. POA is contacting a [employer] ministry to assist her and she will call CM to confirm if she is able to cover the charges.     [phone]- Contacted daughter to follow up with mode of payment. She is currently arranging funds and is asking CM to follow up in the morning. Patient flow center will continue to follow and will move forward with transfer request once POA is able to confirm payment plan.

## 2024-12-09 NOTE — PROGRESS NOTES
[facility] - Emergency Dept  Pulmonary Critical Care Note    Patient Name: Calrence Trejo Sr.  MRN: [id number]  Admission Date: 12/3/2024  Hospital Length of Stay: 6 days  Code Status: Prior  Attending Provider: Jean Pierre Decker MD  Primary Care Provider: Yara Spear MD     Subjective:     HPI:   68 y.o. male with PMH significant for antisynthetase syndrome, ILD, chronic hypoxic respiratory failure on 3L NC at home presented to ED in acute respiratory distress.  He was picked up by EMS who reported that patient was saturating in the 50s upon arrival.  On arrival to the emergency department patient was placed on BiPAP but continued to remove the mask due to being extremely confused.  This is likely a result of hypoxia.  Patient was very combative and was given anxiolytics without any improvement in behavior.  Due to inability to increase patient's saturations decision was made to intubate the patient.  Patient was recently admitted to [facility] and [facility] due to shortness of breath.  He left AMA while having increased oxygen requirements far greater than his baseline.    Hospital Course/Significant events:  Admitted to ICU 12/3/24    24 Hour Interval History:   -No acute events overnight.  ABG yt morning 7.36/75/61/42.4 on PRVC /32/5/40% (P/F 152). ABG pending this AM.  Remains on fentanyl now at 125 mcg/hour and propofol 50 mcg/kg per minute.  Remains on Levophed at 0.05 mcg/kg/min.  Urine output 2.58 ovn.  -Completed day 5 of 5 of cefepime for CAP.      Past Medical History:   Diagnosis Date    SAM positive     Antisynthetase syndrome     ILD (interstitial lung disease)     Pulmonary embolism     Seronegative rheumatoid arthritis     TB lung, latent     Tobacco use        History reviewed. No pertinent surgical history.    Social History     Socioeconomic History    Marital status:    Tobacco Use    Smoking status: Former     Current packs/day: 0.00     Types: Cigarettes      Quit date: 2023     Years since quittin.9    Smokeless tobacco: Never   Substance and Sexual Activity    Alcohol use: Not Currently    Drug use: Never    Sexual activity: Not Currently     Partners: Female     Social Drivers of Health     Financial Resource Strain: Low Risk  (2024)    Overall Financial Resource Strain (CARDIA)     Difficulty of Paying Living Expenses: Not hard at all   Food Insecurity: No Food Insecurity (2024)    Hunger Vital Sign     Worried About Running Out of Food in the Last Year: Never true     Ran Out of Food in the Last Year: Never true   Transportation Needs: No Transportation Needs (2024)    TRANSPORTATION NEEDS     Transportation : No   Physical Activity: Inactive (2024)    Exercise Vital Sign     Days of Exercise per Week: 0 days     Minutes of Exercise per Session: 0 min   Stress: No Stress Concern Present (2024)    [employer] [place] [employer] - Occupational Stress Questionnaire     Feeling of Stress : Not at all   Housing Stability: Low Risk  (2024)    Housing Stability Vital Sign     Unable to Pay for Housing in the Last Year: No     Homeless in the Last Year: No       Current Outpatient Medications   Medication Instructions    albuterol (PROVENTIL/VENTOLIN HFA) 90 mcg/actuation inhaler 1-2 puffs, Inhalation, Every 6 hours PRN, Rescue    albuterol-ipratropium (DUO-NEB) 2.5 mg-0.5 mg/3 mL nebulizer solution 3 mLs, Nebulization, Every 6 hours PRN, Rescue    baclofen (LIORESAL) 10 mg, 2 times daily    budesonide-formoterol 80-4.5 mcg (SYMBICORT) 80-4.5 mcg/actuation HFAA 2 puffs, Inhalation, 2 times daily    hydroxychloroquine (PLAQUENIL) 200 mg, Oral, Daily    lisinopriL (PRINIVIL,ZESTRIL) 5 mg, Oral, Daily    meloxicam (MOBIC) 7.5 mg, Oral, 2 times daily    OFEV 150 mg, Oral, 2 times daily    omeprazole (PRILOSEC) 20 mg, Oral, Daily    predniSONE (DELTASONE) 20 mg, Oral, Daily    tadalafiL (CIALIS) 5 mg, Oral, Daily PRN     tiotropium bromide (SPIRIVA RESPIMAT) 1.25 mcg/actuation inhaler 2 puffs, Inhalation, Daily, Controller       Current Inpatient Medications   enoxparin  40 mg Subcutaneous Daily    ketamine in 0.9 % sod chloride  150 mg Intravenous Once    pantoprazole  40 mg Intravenous Daily       Current Intravenous Infusions   fentanyl  0-250 mcg/hr Intravenous Continuous 12.5 mL/hr at 12/09/24 0639 125 mcg/hr at 12/09/24 0639    NORepinephrine bitartrate-D5W  0-3 mcg/kg/min Intravenous Continuous 5.5 mL/hr at 12/09/24 0639 0.05 mcg/kg/min at 12/09/24 0639    propofoL  0-50 mcg/kg/min Intravenous Continuous 17.7 mL/hr at 12/09/24 0639 50 mcg/kg/min at 12/09/24 0639       Objective:       Intake/Output Summary (Last 24 hours) at 12/9/2024 0703  Last data filed at 12/9/2024 0639  Gross per 24 hour   Intake 2851.97 ml   Output 2455 ml   Net 396.97 ml         Vital Signs (Most Recent):  Temp: 97.9 °F (36.6 °C) (12/09/24 0315)  Pulse: 68 (12/09/24 0615)  Resp: (!) 32 (12/09/24 0615)  BP: 91/62 (12/09/24 0615)  SpO2: 97 % (12/09/24 0615)  Body mass index is 21 kg/m².  Weight: 59 kg (130 lb 1.1 oz) Vital Signs (24h Range):  Temp:  [97.9 °F (36.6 °C)-98.3 °F (36.8 °C)] 97.9 °F (36.6 °C)  Pulse:  [61-92] 68  Resp:  [22-32] 32  SpO2:  [95 %-100 %] 97 %  BP: ()/(53-87) 91/62     Constitutional:       General: He is not in acute distress.     Appearance: Normal appearance. He is ill-appearing (chronically).  Toxic appearing   HENT:      Head: Normocephalic and atraumatic.      Nose: Nose normal.      Mouth/Throat:      Mouth: Mucous membranes are moist.      Pharynx: Oropharynx is clear.   Eyes:      Extraocular Movements: Extraocular movements intact.      Conjunctiva/sclera: Conjunctivae normal.   Cardiovascular:      Rate and Rhythm: Normal rate and regular rhythm.      Pulses: Normal pulses.      Heart sounds: Normal heart sounds. No murmur heard.  Pulmonary:      Effort: Pulmonary effort is normal. Tachypnea present. No  respiratory distress.      Breath sounds: Rhonchi (diffusely) and rales (diffusely) present.      Comments:  Patient was intubated and sedated  Abdominal:      General: Abdomen is flat. Bowel sounds are normal. There is no distension.      Palpations: Abdomen is soft. There is no mass.      Tenderness: There is no abdominal tenderness.   Musculoskeletal:      Cervical back: Normal range of motion and neck supple.      Right lower leg: No edema.      Left lower leg: No edema.   Skin:     General: Skin is warm and dry.      Capillary Refill: Capillary refill takes less than 2 seconds.      Findings: No erythema or rash.   Neurological:      General:  Unable to assess as patient was recently paralyzed for intubation.   Psychiatric:         Behavior: Behavior normal.       Lines/Drains/Airways       Peripherally Inserted Central Catheter Line  Duration             PICC Triple Lumen 12/04/24 1041 left brachial 4 days              Drain  Duration                  NG/OG Tube 12/03/24 1913 orogastric 18 Fr. Left mouth 5 days         Urethral Catheter 12/03/24 1927 Temperature probe 16 Fr. 5 days              Airway  Duration                  Airway - Non-Surgical 12/03/24 1912 Endotracheal Tube 5 days              Peripheral Intravenous Line  Duration                  Peripheral IV - Single Lumen 12/03/24 1900 20 G Right Forearm 5 days         Peripheral IV - Single Lumen 12/03/24 1905 18 G Left Forearm 5 days         Peripheral IV - Single Lumen 12/03/24 2100 18 G Anterior;Proximal;Right Forearm 5 days                    Significant Labs:    Lab Results   Component Value Date    WBC 10.67 12/08/2024    HGB 14.6 12/08/2024    HCT 49.6 12/08/2024    MCV 91.2 12/08/2024     12/08/2024           BMP  Lab Results   Component Value Date     12/08/2024    K 4.4 12/08/2024    CO2 34 (H) 12/08/2024    BUN 17.4 12/08/2024    CREATININE 0.60 (L) 12/08/2024    CALCIUM 8.6 (L) 12/08/2024    AGAP 4.0 12/08/2024    EGFRNONAA  >105 11/25/2021         ABG  Recent Labs   Lab 12/08/24  0454   PH 7.360   PO2 61.0*   PCO2 75.0*   HCO3 42.4*   POCBASEDEF 13.70       Mechanical Ventilation Support:  Vent Mode: A/C (12/09/24 0528)  Ventilator Initiated: Yes (12/03/24 1919)  Set Rate: 32 BPM (12/09/24 0528)  Vt Set: 450 mL (12/09/24 0528)  PEEP/CPAP: 5 cmH20 (12/09/24 0528)  Oxygen Concentration (%): 50 (12/09/24 0528)  Peak Airway Pressure: 30 cmH20 (12/09/24 0528)  Total Ve: 11.7 L/m (12/09/24 0528)  F/VT Ratio<105 (RSBI): (!) 72.23 (12/08/24 1500)      Significant Imaging:  I have reviewed the pertinent imaging within the past 24 hours.        Assessment/Plan:     Assessment  Community Acquired PNA with H. 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  Pulmonary hypertension  Electrolyte derangement    Plan  Continue ICU level care   Continue to titrate mechanical ventilation for ARDS net protocol   Continue to wean sedation and pressor support as tolerated  Completed prednisone and cefepime (day 5 / 5)   Continue to trend ABG  Continue to monitor labs and replace lytes as needed     Prognosis is grim    DVT Prophylaxis:  Lovenox  GI Prophylaxis:  None       32 minutes of critical care was time spent personally by me on the following activities: development of treatment plan with patient or surrogate and bedside caregivers, discussions with consultants, evaluation of patient's response to treatment, examination of patient, ordering and performing treatments and interventions, ordering and review of laboratory studies, ordering and review of radiographic studies, pulse oximetry, re-evaluation of patient's condition.  This critical care time did not overlap with that of any other provider or involve time for any procedures.     Enzo Dos Santos MD  Pulmonary Critical Care Medicine  [facility] - Emergency Dept  DOS: 12/09/2024

## 2024-12-09 NOTE — PLAN OF CARE
Problem: Infection  Goal: Absence of Infection Signs and Symptoms  Outcome: Progressing     Problem: Skin Injury Risk Increased  Goal: Skin Health and Integrity  Outcome: Progressing     Problem: Adult Inpatient Plan of Care  Goal: Patient-Specific Goal (Individualized)  Outcome: Not Progressing

## 2024-12-10 NOTE — PROGRESS NOTES
[facility] - Emergency Dept  Pulmonary Critical Care Note    Patient Name: Clarence Trejo Sr.  MRN: [id number]  Admission Date: 12/3/2024  Hospital Length of Stay: 7 days  Code Status: Prior  Attending Provider: Jean Pierre Decker MD  Primary Care Provider: Yara Spear MD     Subjective:     HPI:   68 y.o. male with PMH significant for antisynthetase syndrome, ILD, chronic hypoxic respiratory failure on 3L NC at home presented to ED in acute respiratory distress.  He was picked up by EMS who reported that patient was saturating in the 50s upon arrival.  On arrival to the emergency department patient was placed on BiPAP but continued to remove the mask due to being extremely confused.  This is likely a result of hypoxia.  Patient was very combative and was given anxiolytics without any improvement in behavior.  Due to inability to increase patient's saturations decision was made to intubate the patient.  Patient was recently admitted to [facility] due to shortness of breath.  He left AMA while having increased oxygen requirements far greater than his baseline.    Hospital Course/Significant events:  Admitted to ICU 12/3/24    24 Hour Interval History:   -No acute events overnight.  ABG yt morning 7.35/75/93 on PRVC /32/5/40% (P/F 152).  ABG largely consistent with previous ABGs however oxygenation has improved.  ABG this morning pending.  Remains on fentanyl now at 125 mcg/hour and propofol 50 mcg/kg per minute.  Remains on Levophed at 0.05 mcg/kg/min.  Urine output 2.4L last 24 hrs. No significant changes compared to yesterday.   -potential spontaneous breathing trial today      Past Medical History:   Diagnosis Date    SAM positive     Antisynthetase syndrome     ILD (interstitial lung disease)     Pulmonary embolism     Seronegative rheumatoid arthritis     TB lung, latent     Tobacco use        History reviewed. No pertinent surgical history.    Social History      Socioeconomic History    Marital status:    Tobacco Use    Smoking status: Former     Current packs/day: 0.00     Types: Cigarettes     Quit date: 2023     Years since quittin.9    Smokeless tobacco: Never   Substance and Sexual Activity    Alcohol use: Not Currently    Drug use: Never    Sexual activity: Not Currently     Partners: Female     Social Drivers of Health     Financial Resource Strain: Low Risk  (2024)    Overall Financial Resource Strain (CARDIA)     Difficulty of Paying Living Expenses: Not hard at all   Food Insecurity: No Food Insecurity (2024)    Hunger Vital Sign     Worried About Running Out of Food in the Last Year: Never true     Ran Out of Food in the Last Year: Never true   Transportation Needs: No Transportation Needs (2024)    TRANSPORTATION NEEDS     Transportation : No   Physical Activity: Inactive (2024)    Exercise Vital Sign     Days of Exercise per Week: 0 days     Minutes of Exercise per Session: 0 min   Stress: No Stress Concern Present (2024)    [employer] [place] [employer] - Occupational Stress Questionnaire     Feeling of Stress : Not at all   Housing Stability: Low Risk  (2024)    Housing Stability Vital Sign     Unable to Pay for Housing in the Last Year: No     Homeless in the Last Year: No       Current Outpatient Medications   Medication Instructions    albuterol (PROVENTIL/VENTOLIN HFA) 90 mcg/actuation inhaler 1-2 puffs, Inhalation, Every 6 hours PRN, Rescue    albuterol-ipratropium (DUO-NEB) 2.5 mg-0.5 mg/3 mL nebulizer solution 3 mLs, Nebulization, Every 6 hours PRN, Rescue    baclofen (LIORESAL) 10 mg, 2 times daily    budesonide-formoterol 80-4.5 mcg (SYMBICORT) 80-4.5 mcg/actuation HFAA 2 puffs, Inhalation, 2 times daily    hydroxychloroquine (PLAQUENIL) 200 mg, Oral, Daily    lisinopriL (PRINIVIL,ZESTRIL) 5 mg, Oral, Daily    meloxicam (MOBIC) 7.5 mg, Oral, 2 times daily    OFEV 150 mg, Oral, 2 times  daily    omeprazole (PRILOSEC) 20 mg, Oral, Daily    predniSONE (DELTASONE) 20 mg, Oral, Daily    tadalafiL (CIALIS) 5 mg, Oral, Daily PRN    tiotropium bromide (SPIRIVA RESPIMAT) 1.25 mcg/actuation inhaler 2 puffs, Inhalation, Daily, Controller       Current Inpatient Medications   enoxparin  40 mg Subcutaneous Daily    ketamine in 0.9 % sod chloride  150 mg Intravenous Once    pantoprazole  40 mg Intravenous Daily       Current Intravenous Infusions   fentanyl  0-250 mcg/hr Intravenous Continuous 12.5 mL/hr at 12/10/24 0615 125 mcg/hr at 12/10/24 0615    NORepinephrine bitartrate-D5W  0-3 mcg/kg/min Intravenous Continuous 5.5 mL/hr at 12/10/24 0615 0.05 mcg/kg/min at 12/10/24 0615    propofoL  0-50 mcg/kg/min Intravenous Continuous 17.7 mL/hr at 12/10/24 0615 50 mcg/kg/min at 12/10/24 0615       Objective:       Intake/Output Summary (Last 24 hours) at 12/10/2024 0628  Last data filed at 12/10/2024 0615  Gross per 24 hour   Intake 2785.51 ml   Output 2395 ml   Net 390.51 ml         Vital Signs (Most Recent):  Temp: 97.8 °F (36.6 °C) (12/10/24 0315)  Pulse: 69 (12/10/24 0615)  Resp: (!) 32 (12/10/24 0615)  BP: 109/71 (12/10/24 0615)  SpO2: 97 % (12/10/24 0615)  Body mass index is 21 kg/m².  Weight: 59 kg (130 lb 1.1 oz) Vital Signs (24h Range):  Temp:  [97.7 °F (36.5 °C)-98 °F (36.7 °C)] 97.8 °F (36.6 °C)  Pulse:  [53-93] 69  Resp:  [6-33] 32  SpO2:  [95 %-100 %] 97 %  BP: ()/(50-93) 109/71     Constitutional:       General: He is not in acute distress.     Appearance: Normal appearance. He is ill-appearing (chronically).  Toxic appearing   HENT:      Head: Normocephalic and atraumatic.      Nose: Nose normal.      Mouth/Throat:      Mouth: Mucous membranes are moist.      Pharynx: Oropharynx is clear.   Eyes:      Extraocular Movements: Extraocular movements intact.      Conjunctiva/sclera: Conjunctivae normal.   Cardiovascular:      Rate and Rhythm: Normal rate and regular rhythm.      Pulses: Normal  pulses.      Heart sounds: Normal heart sounds. No murmur heard.  Pulmonary:      Effort: Pulmonary effort is normal. Tachypnea present. No respiratory distress.      Breath sounds: Rhonchi (diffusely) and rales (diffusely) present.      Comments:  Patient was intubated and sedated  Abdominal:      General: Abdomen is flat. Bowel sounds are normal. There is no distension.      Palpations: Abdomen is soft. There is no mass.      Tenderness: There is no abdominal tenderness.   Musculoskeletal:      Cervical back: Normal range of motion and neck supple.      Right lower leg: No edema.      Left lower leg: No edema.   Skin:     General: Skin is warm and dry.      Capillary Refill: Capillary refill takes less than 2 seconds.      Findings: No erythema or rash.   Neurological:      General:  Unable to assess as patient was recently paralyzed for intubation.   Psychiatric:         Behavior: Behavior normal.       Lines/Drains/Airways       Peripherally Inserted Central Catheter Line  Duration             PICC Triple Lumen 12/04/24 1041 left brachial 5 days              Drain  Duration                  NG/OG Tube 12/03/24 1913 orogastric 18 Fr. Left mouth 6 days         Urethral Catheter 12/03/24 1927 Temperature probe 16 Fr. 6 days              Airway  Duration                  Airway - Non-Surgical 12/03/24 1912 Endotracheal Tube 6 days              Peripheral Intravenous Line  Duration                  Peripheral IV - Single Lumen 12/03/24 1900 20 G Right Forearm 6 days         Peripheral IV - Single Lumen 12/03/24 2100 18 G Anterior;Proximal;Right Forearm 6 days                    Significant Labs:    Lab Results   Component Value Date    WBC 10.67 12/08/2024    HGB 14.6 12/08/2024    HCT 49.6 12/08/2024    MCV 91.2 12/08/2024     12/08/2024           BMP  Lab Results   Component Value Date     12/08/2024    K 4.4 12/08/2024    CO2 34 (H) 12/08/2024    BUN 17.4 12/08/2024    CREATININE 0.60 (L) 12/08/2024     CALCIUM 8.6 (L) 12/08/2024    AGAP 4.0 12/08/2024    EGFRNONAA >105 11/25/2021         ABG  Recent Labs   Lab 12/09/24  0740   PH 7.350   PO2 93.0   PCO2 75.0*   HCO3 41.4*   POCBASEDEF 12.00*       Mechanical Ventilation Support:  Vent Mode: A/C (12/10/24 0506)  Ventilator Initiated: Yes (12/03/24 1919)  Set Rate: 32 BPM (12/10/24 0506)  Vt Set: 450 mL (12/10/24 0506)  PEEP/CPAP: 5 cmH20 (12/10/24 0506)  Oxygen Concentration (%): 50 (12/10/24 0506)  Peak Airway Pressure: 27 cmH20 (12/10/24 0506)  Total Ve: 14.1 L/m (12/10/24 0506)  F/VT Ratio<105 (RSBI): (!) 75.12 (12/09/24 1751)      Significant Imaging:  I have reviewed the pertinent imaging within the past 24 hours.        Assessment/Plan:     Assessment  Community Acquired PNA with H. 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  Pulmonary hypertension  Electrolyte derangement    Plan  Continue ICU level care   Continue to titrate mechanical ventilation for ARDS net protocol   Continue to wean sedation and pressor support as tolerated  Completed prednisone and cefepime (day 5 / 5)   Continue to trend ABG  Continue to monitor labs and replace lytes as needed     Prognosis is grim    DVT Prophylaxis:  Lovenox  GI Prophylaxis:  None       32 minutes of critical care was time spent personally by me on the following activities: development of treatment plan with patient or surrogate and bedside caregivers, discussions with consultants, evaluation of patient's response to treatment, examination of patient, ordering and performing treatments and interventions, ordering and review of laboratory studies, ordering and review of radiographic studies, pulse oximetry, re-evaluation of patient's condition.  This critical care time did not overlap with that of any other provider or involve time for any procedures.     Enzo Dos Santos MD  Pulmonary Critical Care Medicine  [facility] - Emergency  Depmary jane  DOS: 12/10/2024

## 2024-12-10 NOTE — PLAN OF CARE
Daughter is requesting Quote and quote number emailed to her. Communication letter with Joi's quote and payment method sent via email to [email or web address].     Daughter confirms she is able to pay for transfer. Transfer Center notified, and will begin transfer process.     Vincent richard is out of network with patient's insurance. Transfer request is currently pending at [facility] and  [facility].

## 2024-12-10 NOTE — PROGRESS NOTES
Inpatient Nutrition Assessment    Admit Date: 12/3/2024   Total duration of encounter: 7 days   Patient Age: 69 y.o.    Nutrition Recommendation/Prescription     Tube feeding recommendation:     Peptamen AF goal rate 60 ml/hr to provide  1440 kcal/d  (77% est needs, 103% with meds)  90 g protein/d  (100% est needs)  972 ml free water/d (66% est needs)  (calculations based on estimated 20 hr/d run time)     If no IV fluids running, can give 50ml q 2hr water flushes. Total water provided: 1472ml (100% est needs.)     Communication of Recommendations: reviewed with nurse    Nutrition Assessment     Malnutrition Assessment/Nutrition-Focused Physical Exam    Malnutrition Context: chronic illness (12/04/24 1006)  Malnutrition Level: moderate (12/04/24 1006)  Energy Intake (Malnutrition): other (see comments) (Unable to assess) (12/04/24 1006)  Weight Loss (Malnutrition): other (see comments) (Unable to assess) (12/04/24 1006)  Subcutaneous Fat (Malnutrition): mild depletion (12/04/24 1006)  Orbital Region (Subcutaneous Fat Loss): mild depletion        Muscle Mass (Malnutrition): mild depletion (12/04/24 1006)  [place] Region (Muscle Loss): mild depletion  Clavicle Bone Region (Muscle Loss): mild depletion                             A minimum of two characteristics is recommended for diagnosis of either severe or non-severe malnutrition.    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  Pulmonary hypertension  Electrolyte derangement    Relevant Medical History: antisynthetase syndrome, ILD, chronic hypoxic respiratory failure     Scheduled Medications:  enoxparin, 40 mg, Daily  ketamine in 0.9 % sod chloride, 150 mg, Once  pantoprazole, 40 mg, Daily    Continuous Infusions:  fentanyl, Last Rate: 125 mcg/hr (12/10/24 0615)  NORepinephrine bitartrate-D5W, Last Rate: 0.05  mcg/kg/min (12/10/24 0615)  propofoL, Last Rate: 50 mcg/kg/min (12/10/24 0615)    PRN Medications:  calcium gluconate IVPB, 1 g, Q10 Min PRN  dextrose 10%, 12.5 g, PRN  dextrose 10%, 25 g, PRN  glucagon (human recombinant), 1 mg, PRN  HYDROmorphone, 2 mg, Q3H PRN  insulin aspart U-100, 0-10 Units, Q6H PRN  sodium chloride 0.9%, 10 mL, Q12H PRN    Calorie Containing IV Medications: Diprivan @ 18 ml/hr (provides 475 kcal/d)    Recent Labs   Lab 12/03/24  1923 12/03/24  1954 12/04/24  0008 12/04/24  0408 12/04/24  0817 12/04/24  1152 12/04/24  1803 12/05/24  0247 12/06/24  0321 12/06/24  0322 12/07/24  0311 12/08/24  0401   *  --    < > 141 141 143 145 144  --  141 142 142   K 5.7*  --    < > 5.8* 4.9 4.7 5.3* 4.9  --  5.4* 4.9 4.4   CALCIUM 6.6*  --    < > 8.0* 8.1* 8.4* 8.6* 8.2*  --  8.3* 8.7* 8.6*   PHOS  --   --   --  6.1*  --   --   --  3.2  --  3.0 2.9 2.8   MG  --   --   --  2.40  --   --   --  2.20  --  2.40 2.20 1.90   *  --    < > 108* 109* 109* 107 109*  --  107 104 104   CO2 14*  --    < > 24 23 26 24 28  --  28 33* 34*   BUN 15.7  --    < > 21.2 21.4 19.6 17.0 14.6  --  18.7 19.6 17.4   CREATININE 1.53*  --    < > 1.16 1.07 0.97 0.76 0.70*  --  0.62* 0.62* 0.60*   EGFRNORACEVR 49  --    < > >60 >60 >60 >60 >60  --  >60 >60 >60   GLUCOSE 185*  --    < > 220* 275* 214* 165* 146*  --  138* 114 111   BILITOT 0.7  --   --  0.9  --   --   --  0.4  --  0.3 0.3 0.4   ALKPHOS 83  --   --  96  --   --   --  89  --  77 70 70   ALT 39  --   --  63*  --   --   --  55  --  42 32 27   AST 56*  --   --  61*  --   --   --  35*  --  21 13 14   ALBUMIN 3.0*  --   --  2.7*  --   --   --  2.5*  --  2.2* 2.2* 2.1*   WBC  --  21.04*  --  21.89  21.89*  --   --   --  25.33* 15.77*  --  13.48* 10.67   HGB  --  16.7  --  17.4  --   --   --  16.2 15.0  --  14.6 14.6   HCT  --  53.1*  --  57.9*  --   --   --  53.3* 49.1  --  48.9 49.6    < > = values in this interval not displayed.     Nutrition Orders:  No diet orders  "on file      Appetite/Oral Intake: not applicable/not applicable  Factors Affecting Nutritional Intake: on mechanical ventilation  Social Needs Impacting Access to Food: none identified per MD notes  Food/Sikh/Cultural Preferences: unable to obtain  Food Allergies: no known food allergies  Last Bowel Movement: 12/02/24  Wound(s):  None documented     Comments    12/4/24: Discussed with RN. Will provide tube feeding recommendations for when appropriate to start tube feeding. Receiving kcal from meds.  Noted previous EMR wts show trend in decreasing wt. Unable to verify UBW at this time.     12/6/24: TF continues. Tolerated per RN. Still receiving kcal from meds.     12/10/24: TF continues @ goal rate.     Anthropometrics    Height: 5' 5.98" (167.6 cm), Height Method: Measured  Last Weight: 59 kg (130 lb 1.1 oz) (12/04/24 0217), Weight Method: Bed Scale  BMI (Calculated): 21  BMI Classification: normal (BMI 18.5-24.9)        Ideal Body Weight (IBW), Male: 141.88 lb     % Ideal Body Weight, Male (lb): 91.6 %                          Usual Weight Provided By: unable to obtain usual weight    Wt Readings from Last 5 Encounters:   12/04/24 59 kg (130 lb 1.1 oz)   11/25/24 63.2 kg (139 lb 6.4 oz)   11/22/24 64.9 kg (143 lb)   11/18/24 62.6 kg (138 lb)   11/11/24 60.2 kg (132 lb 12.8 oz)     Weight Change(s) Since Admission:   Wt Readings from Last 1 Encounters:   12/04/24 0217 59 kg (130 lb 1.1 oz)   12/03/24 1855 59 kg (130 lb)   Admit Weight: 59 kg (130 lb) (12/03/24 1855), Weight Method: Bed Scale    Estimated Needs    Weight Used For Calorie Calculations: 59 kg (130 lb 1.1 oz)  Energy Calorie Requirements (kcal): 1860kcal  Energy Need Method: [facility]  Weight Used For Protein Calculations: 59 kg (130 lb 1.1 oz)  Protein Requirements: 89gm (1.5g/kg)  Fluid Requirements (mL): 1475ml (25ml/kg)  CHO Requirement: 210gm (45% est kcal needs)     Enteral Nutrition     Formula: Peptamen AF  Rate/Volume: 60ml/hr  Water " Flushes: 50ml q2hr  Additives/Modulars: none at this time  Route: orogastric tube  Method: continuous  Total Nutrition Provided by Tube Feeding, Additives, and Flushes:  Calories Provided  1440 kcal/d, 77% needs   Protein Provided  90 g/d, 100% needs   Fluid Provided  1472 ml/d, 100% needs   Continuous feeding calculations based on estimated 20 hr/d run time unless otherwise stated.    Parenteral Nutrition     Patient not receiving parenteral nutrition support at this time.    Evaluation of Received Nutrient Intake    Calories: meeting estimated needs  Protein: meeting estimated needs    Patient Education     Not applicable.    Nutrition Diagnosis     PES: Inadequate oral intake related to acute illness as evidenced by intubation since admit. (active)     PES: Moderate chronic disease or condition related malnutrition Related to chronic condition As Evidenced by:  - weight loss: Unable to assess - muscle mass depletion: 4 areas of mild muscle loss (Pectoralis, Clavicle, Trapezius, Temporalis) - loss of subcutaneous fat: 2 areas of mild fat loss (Infraorbital, Buccal) new    Nutrition Interventions     Intervention(s): collaboration with other providers    Goal: Meet greater than 80% of nutritional needs by follow-up. (goal progressing)  Goal: Tolerate enteral feeding at goal rate by follow-up. (goal progressing)    Nutrition Goals & Monitoring     Dietitian will monitor: energy intake and enteral nutrition intake  Discharge planning: too early to determine; pending clinical course  Nutrition Risk/Follow-Up: high (follow-up in 1-4 days)   Please consult if re-assessment needed sooner.

## 2024-12-11 NOTE — CARE UPDATE
[id number] Emery with the transfer is saying  [facility] is still wanting us to obtain auth from [employer].   Called Victoria with [employer] and left a voice message re above  Rec call from Victoria with [employer] who reported I can try calling the customer service for the member which is [phone]  Provided Emery with customer service number

## 2024-12-11 NOTE — CARE UPDATE
[id number] Emery with the transfer center called [facility] twice and they did not answer re their address

## 2024-12-11 NOTE — CARE UPDATE
[id number] Rec message from Emery with the transfer center reporting the only facility that can accept pt in [place] is [facility]. He informs me that [facility] will accept pt if we can get authorization from pt's insurance to pay for pt's stay at [facility]. Called Patito in  who reports [facility] needs to obtain the auth for pt's stay at their facility.   Called [facility] (pt's insurance) and confirmed with Lydia with [facility] that [facility] would need to obtain auth for pt to stay at their facility[facility] #[phone] ext [phone].    Informed Emery with transfer center of above.    No

## 2024-12-11 NOTE — CARE UPDATE
[id number] Rec call from pt's dgtr, Antionette ([phone]) and informed her of the problem transfer center is having with [facility]. She asked which [facility] was the transfer center sending the referral to. I told her I will message the transfer center and call her back. Messaged Emery with the transfer center re which  [facility].

## 2024-12-11 NOTE — PROGRESS NOTES
[facility] - Emergency Dept  Pulmonary Critical Care Note    Patient Name: Clarence Trejo Sr.  MRN: [id number]  Admission Date: 12/3/2024  Hospital Length of Stay: 8 days  Code Status: Prior  Attending Provider: Jean Pierre Decker MD  Primary Care Provider: Yara Spear MD     Subjective:     HPI:   68 y.o. male with PMH significant for antisynthetase syndrome, ILD, chronic hypoxic respiratory failure on 3L NC at home presented to ED in acute respiratory distress.  He was picked up by EMS who reported that patient was saturating in the 50s upon arrival.  On arrival to the emergency department patient was placed on BiPAP but continued to remove the mask due to being extremely confused.  This is likely a result of hypoxia.  Patient was very combative and was given anxiolytics without any improvement in behavior.  Due to inability to increase patient's saturations decision was made to intubate the patient.  Patient was recently admitted to [facility] due to shortness of breath.  He left AMA while having increased oxygen requirements far greater than his baseline.    Hospital Course/Significant events:  Admitted to ICU 12/3/24    24 Hour Interval History:   -No acute events overnight.  Patient with more spontaneous movement today than I have seen in the past few days.  Yesterday evening when patient was off sedation he was awake moving trying to get out of bed.  He was not following commands so sedation was restarted .  Remains on fentanyl now at 125 mcg/hour and propofol 50 mcg/kg per minute.  Remains on Levophed at 0.0 for his safety4 mcg/kg/min.  Urine output 2.3L last 24 hrs.  -spontaneous breathing trial today      Past Medical History:   Diagnosis Date    SAM positive     Antisynthetase syndrome     ILD (interstitial lung disease)     Pulmonary embolism     Seronegative rheumatoid arthritis     TB lung, latent     Tobacco use        History reviewed. No pertinent surgical  history.    Social History     Socioeconomic History    Marital status:    Tobacco Use    Smoking status: Former     Current packs/day: 0.00     Types: Cigarettes     Quit date: 2023     Years since quittin.9    Smokeless tobacco: Never   Substance and Sexual Activity    Alcohol use: Not Currently    Drug use: Never    Sexual activity: Not Currently     Partners: Female     Social Drivers of Health     Financial Resource Strain: Low Risk  (2024)    Overall Financial Resource Strain (CARDIA)     Difficulty of Paying Living Expenses: Not hard at all   Food Insecurity: No Food Insecurity (2024)    Hunger Vital Sign     Worried About Running Out of Food in the Last Year: Never true     Ran Out of Food in the Last Year: Never true   Transportation Needs: No Transportation Needs (2024)    TRANSPORTATION NEEDS     Transportation : No   Physical Activity: Inactive (2024)    Exercise Vital Sign     Days of Exercise per Week: 0 days     Minutes of Exercise per Session: 0 min   Stress: No Stress Concern Present (2024)    Citizen of [place] of Occupational Health - Occupational Stress Questionnaire     Feeling of Stress : Not at all   Housing Stability: Low Risk  (2024)    Housing Stability Vital Sign     Unable to Pay for Housing in the Last Year: No     Homeless in the Last Year: No       Current Outpatient Medications   Medication Instructions    albuterol (PROVENTIL/VENTOLIN HFA) 90 mcg/actuation inhaler 1-2 puffs, Inhalation, Every 6 hours PRN, Rescue    albuterol-ipratropium (DUO-NEB) 2.5 mg-0.5 mg/3 mL nebulizer solution 3 mLs, Nebulization, Every 6 hours PRN, Rescue    baclofen (LIORESAL) 10 mg, 2 times daily    budesonide-formoterol 80-4.5 mcg (SYMBICORT) 80-4.5 mcg/actuation HFAA 2 puffs, Inhalation, 2 times daily    hydroxychloroquine (PLAQUENIL) 200 mg, Oral, Daily    lisinopriL (PRINIVIL,ZESTRIL) 5 mg, Oral, Daily    meloxicam (MOBIC) 7.5 mg, Oral, 2 times daily     OFEV 150 mg, Oral, 2 times daily    omeprazole (PRILOSEC) 20 mg, Oral, Daily    predniSONE (DELTASONE) 20 mg, Oral, Daily    tadalafiL (CIALIS) 5 mg, Oral, Daily PRN    tiotropium bromide (SPIRIVA RESPIMAT) 1.25 mcg/actuation inhaler 2 puffs, Inhalation, Daily, Controller       Current Inpatient Medications   enoxparin  40 mg Subcutaneous Daily    ketamine in 0.9 % sod chloride  150 mg Intravenous Once    pantoprazole  40 mg Intravenous Daily       Current Intravenous Infusions   dexmedeTOMIDine (Precedex) infusion (titrating)  0-1.4 mcg/kg/hr Intravenous Continuous   Stopped at 12/10/24 2236    fentanyl  0-250 mcg/hr Intravenous Continuous 17.5 mL/hr at 12/11/24 0558 175 mcg/hr at 12/11/24 0558    NORepinephrine bitartrate-D5W  0-3 mcg/kg/min Intravenous Continuous 2.2 mL/hr at 12/11/24 0558 0.02 mcg/kg/min at 12/11/24 0558    propofoL  0-50 mcg/kg/min Intravenous Continuous 15.9 mL/hr at 12/11/24 0558 45 mcg/kg/min at 12/11/24 0558       Objective:       Intake/Output Summary (Last 24 hours) at 12/11/2024 0700  Last data filed at 12/11/2024 0558  Gross per 24 hour   Intake 2745.12 ml   Output 2300 ml   Net 445.12 ml         Vital Signs (Most Recent):  Temp: 98.5 °F (36.9 °C) (12/11/24 0315)  Pulse: 67 (12/11/24 0615)  Resp: (!) 28 (12/11/24 0615)  BP: (!) 90/57 (12/11/24 0615)  SpO2: (!) 94 % (12/11/24 0615)  Body mass index is 21 kg/m².  Weight: 59 kg (130 lb 1.1 oz) Vital Signs (24h Range):  Temp:  [97.5 °F (36.4 °C)-98.5 °F (36.9 °C)] 98.5 °F (36.9 °C)  Pulse:  [] 67  Resp:  [2-33] 28  SpO2:  [54 %-100 %] 94 %  BP: ()/() 90/57     Constitutional:       General: He is not in acute distress.     Appearance: Normal appearance. He is ill-appearing (chronically).  Toxic appearing   HENT:      Head: Normocephalic and atraumatic.      Nose: Nose normal.      Mouth/Throat:      Mouth: Mucous membranes are moist.      Pharynx: Oropharynx is clear.   Eyes:      Extraocular Movements: Extraocular  movements intact.      Conjunctiva/sclera: Conjunctivae normal.   Cardiovascular:      Rate and Rhythm: Normal rate and regular rhythm.      Pulses: Normal pulses.      Heart sounds: Normal heart sounds. No murmur heard.  Pulmonary:      Effort: Pulmonary effort is normal. Tachypnea present. No respiratory distress.      Breath sounds: Rhonchi (diffusely) and rales (diffusely) present.      Comments:  Patient was intubated and sedated  Abdominal:      General: Abdomen is flat. Bowel sounds are normal. There is no distension.      Palpations: Abdomen is soft. There is no mass.      Tenderness: There is no abdominal tenderness.   Musculoskeletal:      Cervical back: Normal range of motion and neck supple.      Right lower leg: No edema.      Left lower leg: No edema.   Skin:     General: Skin is warm and dry.      Capillary Refill: Capillary refill takes less than 2 seconds.      Findings: No erythema or rash.   Neurological:      General:  Unable to assess as patient was recently paralyzed for intubation.   Psychiatric:         Behavior: Behavior normal.       Lines/Drains/Airways       Peripherally Inserted Central Catheter Line  Duration             PICC Triple Lumen 12/04/24 1041 left brachial 6 days              Drain  Duration                  NG/OG Tube 12/03/24 1913 orogastric 18 Fr. Left mouth 7 days         Urethral Catheter 12/03/24 1927 Temperature probe 16 Fr. 7 days              Airway  Duration                  Airway - Non-Surgical 12/03/24 1912 Endotracheal Tube 7 days              Peripheral Intravenous Line  Duration                  Peripheral IV - Single Lumen 12/03/24 1900 20 G Right Forearm 7 days         Peripheral IV - Single Lumen 12/10/24 1956 18 G No Posterior;Proximal;Right Forearm <1 day                    Significant Labs:    Lab Results   Component Value Date    WBC 9.17 12/11/2024    HGB 15.6 12/11/2024    HCT 50.8 12/11/2024    MCV 87.6 12/11/2024     12/11/2024            BMP  Lab Results   Component Value Date     12/11/2024    K 4.8 12/11/2024    CO2 34 (H) 12/11/2024    BUN 22.6 12/11/2024    CREATININE 0.60 (L) 12/11/2024    CALCIUM 8.9 12/11/2024    AGAP 6.0 12/11/2024    EGFRNONAA >105 11/25/2021         ABG  Recent Labs   Lab 12/10/24  0722   PH 7.380   PO2 96.0   PCO2 73.0*   HCO3 43.2*   POCBASEDEF 14.80       Mechanical Ventilation Support:  Vent Mode: PRESSURE A/C (12/11/24 0550)  Ventilator Initiated: Yes (12/03/24 1919)  Set Rate: 28 BPM (12/11/24 0550)  Vt Set: 450 mL (12/11/24 0550)  PEEP/CPAP: 5 cmH20 (12/11/24 0550)  Oxygen Concentration (%): 50 (12/11/24 0550)  Peak Airway Pressure: 31 cmH20 (12/11/24 0550)  Total Ve: 17.8 L/m (12/11/24 0550)  F/VT Ratio<105 (RSBI): (!) 45.16 (12/11/24 0210)      Significant Imaging:  I have reviewed the pertinent imaging within the past 24 hours.        Assessment/Plan:     Assessment  Community Acquired PNA with H. 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  Pulmonary hypertension  Electrolyte derangement    Plan  Continue ICU level care   Continue to titrate mechanical ventilation for ARDS net protocol   Spontaneous breathing trial today   Continue to wean sedation and pressor support as tolerated  Completed prednisone and cefepime (day 5 / 5)   Continue to trend ABG  Continue to monitor labs and replace lytes as needed     Prognosis is grim    DVT Prophylaxis:  Lovenox  GI Prophylaxis:  None       32 minutes of critical care was time spent personally by me on the following activities: development of treatment plan with patient or surrogate and bedside caregivers, discussions with consultants, evaluation of patient's response to treatment, examination of patient, ordering and performing treatments and interventions, ordering and review of laboratory studies, ordering and review of radiographic studies, pulse oximetry, re-evaluation of patient's  condition.  This critical care time did not overlap with that of any other provider or involve time for any procedures.     Enzo Dos Santos MD  Pulmonary Critical Care Medicine  [facility] - Emergency Dept  DOS: 12/11/2024

## 2024-12-12 NOTE — PROGRESS NOTES
Inpatient Nutrition Assessment    Admit Date: 12/3/2024   Total duration of encounter: 9 days   Patient Age: 69 y.o.    Nutrition Recommendation/Prescription     Tube feeding recommendation:     Peptamen AF goal rate 60 ml/hr to provide  1440 kcal/d  (77% est needs, 103% with meds)  90 g protein/d  (100% est needs)  972 ml free water/d (66% est needs)  (calculations based on estimated 20 hr/d run time)     If no IV fluids running, can give 50ml q 2hr water flushes. Total water provided: 1472ml (100% est needs.)     Communication of Recommendations: reviewed with nurse    Nutrition Assessment     Malnutrition Assessment/Nutrition-Focused Physical Exam    Malnutrition Context: chronic illness (12/04/24 1006)  Malnutrition Level: moderate (12/04/24 1006)  Energy Intake (Malnutrition): other (see comments) (Unable to assess) (12/04/24 1006)  Weight Loss (Malnutrition): other (see comments) (Unable to assess) (12/04/24 1006)  Subcutaneous Fat (Malnutrition): mild depletion (12/04/24 1006)  Orbital Region (Subcutaneous Fat Loss): mild depletion        Muscle Mass (Malnutrition): mild depletion (12/04/24 1006)  Jain Region (Muscle Loss): mild depletion  Clavicle Bone Region (Muscle Loss): mild depletion                             A minimum of two characteristics is recommended for diagnosis of either severe or non-severe malnutrition.    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  Pulmonary hypertension  Electrolyte derangement    Relevant Medical History: antisynthetase syndrome, ILD, chronic hypoxic respiratory failure     Scheduled Medications:  enoxparin, 40 mg, Daily  ketamine in 0.9 % sod chloride, 150 mg, Once  pantoprazole, 40 mg, Daily  polyethylene glycol, 17 g, Daily    Continuous Infusions:  dexmedeTOMIDine (Precedex) infusion (titrating), Last Rate:  Stopped (12/10/24 2236)  fentanyl, Last Rate: 200 mcg/hr (12/12/24 0640)  NORepinephrine bitartrate-D5W, Last Rate: 0.1 mcg/kg/min (12/12/24 0640)  propofoL, Last Rate: 45 mcg/kg/min (12/12/24 0940)    PRN Medications:  calcium gluconate IVPB, 1 g, Q10 Min PRN  dextrose 10%, 12.5 g, PRN  dextrose 10%, 25 g, PRN  fentaNYL, 100 mcg, Q2H PRN  glucagon (human recombinant), 1 mg, PRN  HYDROmorphone, 2 mg, Q3H PRN  insulin aspart U-100, 0-10 Units, Q6H PRN  senna-docusate 8.6-50 mg, 1 tablet, Daily PRN  sodium chloride 0.9%, 10 mL, Q12H PRN    Calorie Containing IV Medications: Diprivan @ 18 ml/hr (provides 475 kcal/d)    Recent Labs   Lab 12/06/24  0321 12/06/24  0322 12/07/24  0311 12/08/24  0401 12/11/24  0323 12/12/24  0347   NA  --  141 142 142 141 141   K  --  5.4* 4.9 4.4 4.8 4.5   CALCIUM  --  8.3* 8.7* 8.6* 8.9 8.4*   PHOS  --  3.0 2.9 2.8  --   --    MG  --  2.40 2.20 1.90  --   --    CL  --  107 104 104 101 104   CO2  --  28 33* 34* 34* 33*   BUN  --  18.7 19.6 17.4 22.6 20.6   CREATININE  --  0.62* 0.62* 0.60* 0.60* 0.63*   EGFRNORACEVR  --  >60 >60 >60 >60 >60   GLUCOSE  --  138* 114 111 70* 133*   BILITOT  --  0.3 0.3 0.4 0.4 0.4   ALKPHOS  --  77 70 70 78 77   ALT  --  42 32 27 25 27   AST  --  21 13 14 23 23   ALBUMIN  --  2.2* 2.2* 2.1* 2.4* 2.4*   WBC 15.77*  --  13.48* 10.67 9.17 7.68   HGB 15.0  --  14.6 14.6 15.6 15.6   HCT 49.1  --  48.9 49.6 50.8 51.4     Nutrition Orders:  No diet orders on file  Tube Feedings/Formulas 60; 1,200; Peptamen AF; OG; 50; Every 2 hours    Appetite/Oral Intake: not applicable/not applicable  Factors Affecting Nutritional Intake: on mechanical ventilation  Social Needs Impacting Access to Food: none identified per MD notes  Food/Anabaptist/Cultural Preferences: unable to obtain  Food Allergies: no known food allergies  Last Bowel Movement: 12/02/24  Wound(s):  None documented     Comments    12/4/24: Discussed with RN. Will provide tube feeding recommendations for when  "appropriate to start tube feeding. Receiving kcal from meds.  Noted previous EMR wts show trend in decreasing wt. Unable to verify UBW at this time.     12/6/24: TF continues. Tolerated per RN. Still receiving kcal from meds.     12/10/24: TF continues @ goal rate.     12/12/24: TF continues, tolerated per RN. Still receiving kcal from meds.     Anthropometrics    Height: 5' 5.98" (167.6 cm), Height Method: Measured  Last Weight: 59 kg (130 lb 1.1 oz) (12/04/24 0217), Weight Method: Bed Scale  BMI (Calculated): 21  BMI Classification: normal (BMI 18.5-24.9)        Ideal Body Weight (IBW), Male: 141.88 lb     % Ideal Body Weight, Male (lb): 91.6 %                          Usual Weight Provided By: unable to obtain usual weight    Wt Readings from Last 5 Encounters:   12/04/24 59 kg (130 lb 1.1 oz)   11/25/24 63.2 kg (139 lb 6.4 oz)   11/22/24 64.9 kg (143 lb)   11/18/24 62.6 kg (138 lb)   11/11/24 60.2 kg (132 lb 12.8 oz)     Weight Change(s) Since Admission:   Wt Readings from Last 1 Encounters:   12/04/24 0217 59 kg (130 lb 1.1 oz)   12/03/24 1855 59 kg (130 lb)   Admit Weight: 59 kg (130 lb) (12/03/24 1855), Weight Method: Bed Scale    Estimated Needs    Weight Used For Calorie Calculations: 59 kg (130 lb 1.1 oz)  Energy Calorie Requirements (kcal): 1860kcal  Energy Need Method: [facility]  Weight Used For Protein Calculations: 59 kg (130 lb 1.1 oz)  Protein Requirements: 89gm (1.5g/kg)  Fluid Requirements (mL): 1475ml (25ml/kg)  CHO Requirement: 210gm (45% est kcal needs)     Enteral Nutrition     Formula: Peptamen AF  Rate/Volume: 60ml/hr  Water Flushes: 50ml q2hr  Additives/Modulars: none at this time  Route: orogastric tube  Method: continuous  Total Nutrition Provided by Tube Feeding, Additives, and Flushes:  Calories Provided  1440 kcal/d, 77% needs   Protein Provided  90 g/d, 100% needs   Fluid Provided  1472 ml/d, 100% needs   Continuous feeding calculations based on estimated 20 hr/d run time unless " otherwise stated.    Parenteral Nutrition     Patient not receiving parenteral nutrition support at this time.    Evaluation of Received Nutrient Intake    Calories: meeting estimated needs  Protein: meeting estimated needs    Patient Education     Not applicable.    Nutrition Diagnosis     PES: Inadequate oral intake related to acute illness as evidenced by intubation since admit. (active)     PES: Moderate chronic disease or condition related malnutrition Related to chronic condition As Evidenced by:  - weight loss: Unable to assess - muscle mass depletion: 4 areas of mild muscle loss (Pectoralis, Clavicle, Trapezius, Temporalis) - loss of subcutaneous fat: 2 areas of mild fat loss (Infraorbital, Buccal) new    Nutrition Interventions     Intervention(s): collaboration with other providers    Goal: Meet greater than 80% of nutritional needs by follow-up. (goal progressing)  Goal: Tolerate enteral feeding at goal rate by follow-up. (goal progressing)    Nutrition Goals & Monitoring     Dietitian will monitor: energy intake and enteral nutrition intake  Discharge planning: too early to determine; pending clinical course  Nutrition Risk/Follow-Up: high (follow-up in 1-4 days)   Please consult if re-assessment needed sooner.

## 2024-12-12 NOTE — PROGRESS NOTES
[facility] - Emergency Dept  Pulmonary Critical Care Note    Patient Name: Clarence Trejo Sr.  MRN: [id number]  Admission Date: 12/3/2024  Hospital Length of Stay: 9 days  Code Status: Prior  Attending Provider: Jean Pierre Decker MD  Primary Care Provider: Yara Spear MD     Subjective:     HPI:   68 y.o. male with PMH significant for antisynthetase syndrome, ILD, chronic hypoxic respiratory failure on 3L NC at home presented to ED in acute respiratory distress.  He was picked up by EMS who reported that patient was saturating in the 50s upon arrival.  On arrival to the emergency department patient was placed on BiPAP but continued to remove the mask due to being extremely confused.  This is likely a result of hypoxia.  Patient was very combative and was given anxiolytics without any improvement in behavior.  Due to inability to increase patient's saturations decision was made to intubate the patient.  Patient was recently [facility] to [facility] due to shortness of breath.  He left AMA while having increased oxygen requirements far greater than his baseline.    Hospital Course/Significant events:  Admitted to ICU 12/3/24    24 Hour Interval History:   -No acute events overnight.    -Patient with spontaneous movement today, he does not follow commands  -Remains on fentanyl and propofol.  Off sedation patient becomes tachypneic with significant distress    -Remains on Levophed for BP support   -Urine output 2.7L last 24 hrs.  -spontaneous breathing trial today      Past Medical History:   Diagnosis Date    SAM positive     Antisynthetase syndrome     ILD (interstitial lung disease)     Pulmonary embolism     Seronegative rheumatoid arthritis     TB lung, latent     Tobacco use        History reviewed. No pertinent surgical history.    Social History     Socioeconomic History    Marital status:    Tobacco Use    Smoking status: Former     Current packs/day: 0.00     Types:  Cigarettes     Quit date: 2023     Years since quittin.9    Smokeless tobacco: Never   Substance and Sexual Activity    Alcohol use: Not Currently    Drug use: Never    Sexual activity: Not Currently     Partners: Female     Social Drivers of Health     Financial Resource Strain: Low Risk  (2024)    Overall Financial Resource Strain (CARDIA)     Difficulty of Paying Living Expenses: Not hard at all   Food Insecurity: No Food Insecurity (2024)    Hunger Vital Sign     Worried About Running Out of Food in the Last Year: Never true     Ran Out of Food in the Last Year: Never true   Transportation Needs: No Transportation Needs (2024)    TRANSPORTATION NEEDS     Transportation : No   Physical Activity: Inactive (2024)    Exercise Vital Sign     Days of Exercise per Week: 0 days     Minutes of Exercise per Session: 0 min   Stress: No Stress Concern Present (2024)    [employer] [place] [employer] - Occupational Stress Questionnaire     Feeling of Stress : Not at all   Housing Stability: Low Risk  (2024)    Housing Stability Vital Sign     Unable to Pay for Housing in the Last Year: No     Homeless in the Last Year: No       Current Outpatient Medications   Medication Instructions    albuterol (PROVENTIL/VENTOLIN HFA) 90 mcg/actuation inhaler 1-2 puffs, Inhalation, Every 6 hours PRN, Rescue    albuterol-ipratropium (DUO-NEB) 2.5 mg-0.5 mg/3 mL nebulizer solution 3 mLs, Nebulization, Every 6 hours PRN, Rescue    baclofen (LIORESAL) 10 mg, 2 times daily    budesonide-formoterol 80-4.5 mcg (SYMBICORT) 80-4.5 mcg/actuation HFAA 2 puffs, Inhalation, 2 times daily    hydroxychloroquine (PLAQUENIL) 200 mg, Oral, Daily    lisinopriL (PRINIVIL,ZESTRIL) 5 mg, Oral, Daily    meloxicam (MOBIC) 7.5 mg, Oral, 2 times daily    OFEV 150 mg, Oral, 2 times daily    omeprazole (PRILOSEC) 20 mg, Oral, Daily    predniSONE (DELTASONE) 20 mg, Oral, Daily    tadalafiL (CIALIS) 5 mg, Oral,  Daily PRN    tiotropium bromide (SPIRIVA RESPIMAT) 1.25 mcg/actuation inhaler 2 puffs, Inhalation, Daily, Controller       Current Inpatient Medications   enoxparin  40 mg Subcutaneous Daily    ketamine in 0.9 % sod chloride  150 mg Intravenous Once    pantoprazole  40 mg Intravenous Daily    polyethylene glycol  17 g Oral Daily       Current Intravenous Infusions   dexmedeTOMIDine (Precedex) infusion (titrating)  0-1.4 mcg/kg/hr Intravenous Continuous   Stopped at 12/10/24 2236    fentanyl  0-250 mcg/hr Intravenous Continuous 20 mL/hr at 12/12/24 0408 200 mcg/hr at 12/12/24 0408    NORepinephrine bitartrate-D5W  0-3 mcg/kg/min Intravenous Continuous 13.3 mL/hr at 12/12/24 0430 0.12 mcg/kg/min at 12/12/24 0430    propofoL  0-50 mcg/kg/min Intravenous Continuous 14.2 mL/hr at 12/12/24 0300 40 mcg/kg/min at 12/12/24 0300       Objective:       Intake/Output Summary (Last 24 hours) at 12/12/2024 0624  Last data filed at 12/12/2024 0600  Gross per 24 hour   Intake 2228.8 ml   Output 2780 ml   Net -551.2 ml         Vital Signs (Most Recent):  Temp: 98.5 °F (36.9 °C) (12/11/24 0315)  Pulse: 64 (12/12/24 0600)  Resp: (!) 26 (12/12/24 0600)  BP: 109/70 (12/12/24 0600)  SpO2: 98 % (12/12/24 0600)  Body mass index is 21 kg/m².  Weight: 59 kg (130 lb 1.1 oz) Vital Signs (24h Range):  Pulse:  [] 64  Resp:  [26-28] 26  SpO2:  [91 %-99 %] 98 %  BP: ()/(56-97) 109/70     Constitutional:       General: He is not in acute distress.     Appearance: Normal appearance. He is ill-appearing (chronically).  Toxic appearing   HENT:      Head: Normocephalic and atraumatic.      Nose: Nose normal.      Mouth/Throat:      Mouth: Mucous membranes are moist.      Pharynx: Oropharynx is clear.   Eyes:      Extraocular Movements: Extraocular movements intact.      Conjunctiva/sclera: Conjunctivae normal.   Cardiovascular:      Rate and Rhythm: Normal rate and regular rhythm.      Pulses: Normal pulses.      Heart sounds: Normal heart  sounds. No murmur heard.  Pulmonary:      Effort: Pulmonary effort is normal. Tachypnea present. No respiratory distress.      Breath sounds: Rhonchi (diffusely) and rales (diffusely) present.      Comments:  Patient was intubated and sedated  Abdominal:      General: Abdomen is flat. Bowel sounds are normal. There is no distension.      Palpations: Abdomen is soft. There is no mass.      Tenderness: There is no abdominal tenderness.   Musculoskeletal:      Cervical back: Normal range of motion and neck supple.      Right lower leg: No edema.      Left lower leg: No edema.   Skin:     General: Skin is warm and dry.      Capillary Refill: Capillary refill takes less than 2 seconds.      Findings: No erythema or rash.   Neurological:      General:  Unable to assess as patient was recently paralyzed for intubation.   Psychiatric:         Behavior: Behavior normal.       Lines/Drains/Airways       Peripherally Inserted Central Catheter Line  Duration             PICC Triple Lumen 12/04/24 1041 left brachial 7 days              Drain  Duration                  NG/OG Tube 12/03/24 1913 orogastric 18 Fr. Left mouth 8 days         Urethral Catheter 12/03/24 1927 Temperature probe 16 Fr. 8 days              Airway  Duration                  Airway - Non-Surgical 12/03/24 1912 Endotracheal Tube 8 days              Peripheral Intravenous Line  Duration                  Peripheral IV - Single Lumen 12/03/24 1900 20 G Right Forearm 8 days         Peripheral IV - Single Lumen 12/10/24 1956 18 G No Posterior;Proximal;Right Forearm 1 day                    Significant Labs:    Lab Results   Component Value Date    WBC 7.68 12/12/2024    HGB 15.6 12/12/2024    HCT 51.4 12/12/2024    MCV 88.6 12/12/2024     12/12/2024           BMP  Lab Results   Component Value Date     12/12/2024    K 4.5 12/12/2024    CO2 33 (H) 12/12/2024    BUN 20.6 12/12/2024    CREATININE 0.63 (L) 12/12/2024    CALCIUM 8.4 (L) 12/12/2024    AGAP  4.0 12/12/2024    EGFRNONAA >105 11/25/2021         ABG  Recent Labs   Lab 12/11/24  0707   PH 7.450   PO2 86.0   PCO2 50.0*   HCO3 34.8*   POCBASEDEF 8.90*       Mechanical Ventilation Support:  Vent Mode: A/C (12/12/24 0525)  Ventilator Initiated: Yes (12/03/24 1919)  Set Rate: 26 BPM (12/12/24 0525)  Vt Set: 450 mL (12/11/24 0550)  PEEP/CPAP: 5 cmH20 (12/12/24 0525)  Oxygen Concentration (%): 50 (12/12/24 0525)  Peak Airway Pressure: 31 cmH20 (12/12/24 0525)  Total Ve: 14.5 L/m (12/12/24 0525)  F/VT Ratio<105 (RSBI): (!) 45.16 (12/11/24 0210)      Significant Imaging:  I have reviewed the pertinent imaging within the past 24 hours.        Assessment/Plan:     Assessment  Community Acquired PNA with H. 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  Pulmonary hypertension  Electrolyte derangement    Plan  Continue ICU level care   Continue to titrate mechanical ventilation for ARDS net protocol   Spontaneous breathing trial today   Continue to wean sedation and pressor support as tolerated  Completed prednisone and cefepime (day 5 / 5)   Continue to trend ABG  Continue to monitor labs and replace lytes as needed     Prognosis is grim    DVT Prophylaxis:  Lovenox  GI Prophylaxis:  None       32 minutes of critical care was time spent personally by me on the following activities: development of treatment plan with patient or surrogate and bedside caregivers, discussions with consultants, evaluation of patient's response to treatment, examination of patient, ordering and performing treatments and interventions, ordering and review of laboratory studies, ordering and review of radiographic studies, pulse oximetry, re-evaluation of patient's condition.  This critical care time did not overlap with that of any other provider or involve time for any procedures.     Enzo Dos Santos MD  Pulmonary Critical Care Medicine  [facility] - Emergency  Dept  DOS: 12/12/2024

## 2024-12-12 NOTE — CARE UPDATE
[id number] Rec message from pt's nurse requesting financial assistance for cremation per pt's dgtr request. Pt's nurse reports pt's dgtr does not have the funding to cremate pt.   Provided pt's nurse with [phone] and [employer] number [phone] opt 1. Pt's nurse will give this infor to pt's dgtr.   I left a message for [employer] re funding for cremation.

## 2024-12-13 NOTE — CARE UPDATE
[id number] Rec call from Sandee pt's nurse reporting the nurse yesterday, Marcel received a message from the tx center asking which [facility] does pt's family would like pt to transfer to. Family would like the [facility] in [facility] or [facility].   Messaged Emery to find out about the transfer. [facility] in [facility] do not have any open beds as of 8pm last night. Updated pt's nurse.

## 2024-12-13 NOTE — PROGRESS NOTES
[facility] - Emergency Dept  Pulmonary Critical Care Note    Patient Name: Clarence Trejo Sr.  MRN: [id number]  Admission Date: 12/3/2024  Hospital Length of Stay: 10 days  Code Status: DNR  Attending Provider: Jean Pierre Decker MD  Primary Care Provider: Yara Spear MD     Subjective:     HPI:   68 y.o. male with PMH significant for antisynthetase syndrome, ILD, chronic hypoxic respiratory failure on 3L NC at home presented to ED in acute respiratory distress.  He was picked up by EMS who reported that patient was saturating in the 50s upon arrival.  On arrival to the emergency department patient was placed on BiPAP but continued to remove the mask due to being extremely confused.  This is likely a result of hypoxia.  Patient was very combative and was given anxiolytics without any improvement in behavior.  Due to inability to increase patient's saturations decision was made to intubate the patient.  Patient was recently admitted to [facility] due to shortness of breath.  He left AMA while having increased oxygen requirements far greater than his baseline.    Hospital Course/Significant events:  Admitted to ICU 12/3/24    24 Hour Interval History:   -vital signs stable, no acute events overnight.  -patient continues to have spontaneous, non purposeful movements this a.m.; does not follow commands  -remains on fentanyl at 225 mics per hour and propofol at 50 mics per kg per minute; off of Precedex.  Remains on Levophed at 0.12 mics per kg per minute.  -urine output 3.4 L over the past 24 hours (net -256 cc).  -labs stable this morning.          Past Medical History:   Diagnosis Date    SAM positive     Antisynthetase syndrome     ILD (interstitial lung disease)     Pulmonary embolism     Seronegative rheumatoid arthritis     TB lung, latent     Tobacco use        History reviewed. No pertinent surgical history.    Social History     Socioeconomic History    Marital status:     Tobacco Use    Smoking status: Former     Current packs/day: 0.00     Types: Cigarettes     Quit date: 2023     Years since quittin.9    Smokeless tobacco: Never   Substance and Sexual Activity    Alcohol use: Not Currently    Drug use: Never    Sexual activity: Not Currently     Partners: Female     Social Drivers of Health     Financial Resource Strain: Low Risk  (2024)    Overall Financial Resource Strain (CARDIA)     Difficulty of Paying Living Expenses: Not hard at all   Food Insecurity: No Food Insecurity (2024)    Hunger Vital Sign     Worried About Running Out of Food in the Last Year: Never true     Ran Out of Food in the Last Year: Never true   Transportation Needs: No Transportation Needs (2024)    TRANSPORTATION NEEDS     Transportation : No   Physical Activity: Inactive (2024)    Exercise Vital Sign     Days of Exercise per Week: 0 days     Minutes of Exercise per Session: 0 min   Stress: No Stress Concern Present (2024)    [employer] - Occupational Stress Questionnaire     Feeling of Stress : Not at all   Housing Stability: Low Risk  (2024)    Housing Stability Vital Sign     Unable to Pay for Housing in the Last Year: No     Homeless in the Last Year: No       Current Outpatient Medications   Medication Instructions    albuterol (PROVENTIL/VENTOLIN HFA) 90 mcg/actuation inhaler 1-2 puffs, Inhalation, Every 6 hours PRN, Rescue    albuterol-ipratropium (DUO-NEB) 2.5 mg-0.5 mg/3 mL nebulizer solution 3 mLs, Nebulization, Every 6 hours PRN, Rescue    baclofen (LIORESAL) 10 mg, 2 times daily    budesonide-formoterol 80-4.5 mcg (SYMBICORT) 80-4.5 mcg/actuation HFAA 2 puffs, Inhalation, 2 times daily    hydroxychloroquine (PLAQUENIL) 200 mg, Oral, Daily    lisinopriL (PRINIVIL,ZESTRIL) 5 mg, Oral, Daily    meloxicam (MOBIC) 7.5 mg, Oral, 2 times daily    OFEV 150 mg, Oral, 2 times daily    omeprazole (PRILOSEC) 20 mg, Oral,  Daily    predniSONE (DELTASONE) 20 mg, Oral, Daily    tadalafiL (CIALIS) 5 mg, Oral, Daily PRN    tiotropium bromide (SPIRIVA RESPIMAT) 1.25 mcg/actuation inhaler 2 puffs, Inhalation, Daily, Controller       Current Inpatient Medications   enoxparin  40 mg Subcutaneous Daily    ketamine in 0.9 % sod chloride  150 mg Intravenous Once    pantoprazole  40 mg Intravenous Daily    polyethylene glycol  17 g Oral Daily       Current Intravenous Infusions   dexmedeTOMIDine (Precedex) infusion (titrating)  0-1.4 mcg/kg/hr Intravenous Continuous   Stopped at 12/10/24 2236    fentanyl  0-250 mcg/hr Intravenous Continuous 22.5 mL/hr at 12/13/24 0633 225 mcg/hr at 12/13/24 0633    NORepinephrine bitartrate-D5W  0-3 mcg/kg/min Intravenous Continuous 13.3 mL/hr at 12/13/24 0633 0.12 mcg/kg/min at 12/13/24 0633    propofoL  0-50 mcg/kg/min Intravenous Continuous 17.7 mL/hr at 12/13/24 0633 50 mcg/kg/min at 12/13/24 0633       Objective:       Intake/Output Summary (Last 24 hours) at 12/13/2024 0710  Last data filed at 12/13/2024 0633  Gross per 24 hour   Intake 3143.39 ml   Output 3400 ml   Net -256.61 ml         Vital Signs (Most Recent):  Temp: 98.2 °F (36.8 °C) (12/13/24 0400)  Pulse: (!) 56 (12/13/24 0700)  Resp: (!) 26 (12/13/24 0700)  BP: 101/66 (12/13/24 0700)  SpO2: (!) 94 % (12/13/24 0700)  Body mass index is 21 kg/m².  Weight: 59 kg (130 lb 1.1 oz) Vital Signs (24h Range):  Temp:  [97.9 °F (36.6 °C)-98.2 °F (36.8 °C)] 98.2 °F (36.8 °C)  Pulse:  [52-90] 56  Resp:  [16-27] 26  SpO2:  [92 %-98 %] 94 %  BP: ()/(47-90) 101/66     Constitutional:       General: He is not in acute distress.     Appearance: Normal appearance. He is ill-appearing (chronically).  Toxic appearing   HENT:      Head: Normocephalic and atraumatic.      Nose: Nose normal.      Mouth/Throat:      Mouth: Mucous membranes are moist.      Pharynx: Oropharynx is clear.   Eyes:      Extraocular Movements: Extraocular movements intact.       Conjunctiva/sclera: Conjunctivae normal.   Cardiovascular:      Rate and Rhythm: Normal rate and regular rhythm.      Pulses: Normal pulses.      Heart sounds: Normal heart sounds. No murmur heard.  Pulmonary:      Effort: Pulmonary effort is normal. Tachypnea present. No respiratory distress.      Breath sounds: Rhonchi (diffusely) and rales (diffusely) present.      Comments:  Patient was intubated and sedated  Abdominal:      General: Abdomen is flat. Bowel sounds are normal. There is no distension.      Palpations: Abdomen is soft. There is no mass.      Tenderness: There is no abdominal tenderness.   Musculoskeletal:      Cervical back: Normal range of motion and neck supple.      Right lower leg: No edema.      Left lower leg: No edema.   Skin:     General: Skin is warm and dry.      Capillary Refill: Capillary refill takes less than 2 seconds.      Findings: No erythema or rash.   Neurological:      General:  Unable to assess as patient was recently paralyzed for intubation.   Psychiatric:         Behavior: Behavior normal.       Lines/Drains/Airways       Peripherally Inserted Central Catheter Line  Duration             PICC Triple Lumen 12/04/24 1041 left brachial 8 days              Drain  Duration                  NG/OG Tube 12/03/24 1913 orogastric 18 Fr. Left mouth 9 days         Urethral Catheter 12/03/24 1927 Temperature probe 16 Fr. 9 days              Airway  Duration                  Airway - Non-Surgical 12/03/24 1912 Endotracheal Tube 9 days              Peripheral Intravenous Line  Duration                  Peripheral IV - Single Lumen 12/03/24 1900 20 G Right Forearm 9 days         Peripheral IV - Single Lumen 12/10/24 1956 18 G No Posterior;Proximal;Right Forearm 2 days                    Significant Labs:    Lab Results   Component Value Date    WBC 8.25 12/13/2024    HGB 16.0 12/13/2024    HCT 53.5 (H) 12/13/2024    MCV 89.6 12/13/2024     12/13/2024           BMP  Lab Results    Component Value Date     12/13/2024    K 4.8 12/13/2024    CO2 30 12/13/2024    BUN 18.6 12/13/2024    CREATININE 0.67 (L) 12/13/2024    CALCIUM 8.7 (L) 12/13/2024    AGAP 6.0 12/13/2024    EGFRNONAA >105 11/25/2021         ABG  Recent Labs   Lab 12/11/24  0707   PH 7.450   PO2 86.0   PCO2 50.0*   HCO3 34.8*   POCBASEDEF 8.90*       Mechanical Ventilation Support:  Vent Mode: A/C (12/13/24 0512)  Ventilator Initiated: Yes (12/03/24 1919)  Set Rate: 26 BPM (12/13/24 0512)  Vt Set: 450 mL (12/11/24 0550)  PEEP/CPAP: 5 cmH20 (12/13/24 0512)  Oxygen Concentration (%): 50 (12/13/24 0512)  Peak Airway Pressure: 31 cmH20 (12/13/24 0512)  Total Ve: 15.5 L/m (12/13/24 0512)  F/VT Ratio<105 (RSBI): (!) 44.52 (12/13/24 0512)      Significant Imaging:  I have reviewed the pertinent imaging within the past 24 hours.        Assessment/Plan:     Assessment  Community Acquired PNA with H. 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  Pulmonary hypertension  Electrolyte derangement    Plan  Continue ICU level care   Continue to titrate mechanical ventilation for ARDS net protocol   Continue to wean sedation and pressor support as tolerated  Completed prednisone and cefepime (day 5 / 5)   Continue to monitor labs and replace lytes as needed     Prognosis is grim    DVT Prophylaxis:  Lovenox  GI Prophylaxis:  None       32 minutes of critical care was time spent personally by me on the following activities: development of treatment plan with patient or surrogate and bedside caregivers, discussions with consultants, evaluation of patient's response to treatment, examination of patient, ordering and performing treatments and interventions, ordering and review of laboratory studies, ordering and review of radiographic studies, pulse oximetry, re-evaluation of patient's condition.  This critical care time did not overlap with that of any other provider or involve  time for any procedures.     Clinton Yen DO  Pulmonary Critical Care Medicine  [facility] - Emergency Dept  DOS: 12/13/2024

## 2024-12-13 NOTE — CARE UPDATE
[id number] Messaged Emery with the transfer center to determine if he heard from [facility] transfer [place]. He called at 11:30 and again at 13:30 to the transfer center at [facility] and there was no answer. He could not leave a message

## 2024-12-13 NOTE — CONSULTS
Consults    Patient Name: Clarence Trejo Sr.   MRN: [id number]   Admission Date: 12/3/2024   Hospital Length of Stay: 10   Attending Provider: Jean Pierre Decker MD   Consulting Provider: Apurva JAMES  Reason for Consult: Goals of Care  Primary Care Physician: Yara Spear MD     Principal Problem: <principal problem not specified>     Patient information was obtained from relative(s) and ER records.      Final diagnoses:  [R06.03] Respiratory distress  [R06.02] Shortness of breath  [J96.01] Acute respiratory failure with hypoxia (Primary)     Assessment/Plan:     I reviewed the patient and family's understanding of the seriousness of the illness and its expected prognosis. We discussed the patient's goals of care and treatment preferences.  I clarified current code status. I identified the surrogate decision maker or health care POA.  I answered all questions and we formulated a plan including recommendations for symptom management and how to best achieve goals of care.  Advance Care Planning     Date: 12/13/2024    [facility]  I engaged the family in a voluntary conversation about advance care planning and we specifically addressed what the goals of care would be moving forward, in light of the patient's change in clinical status, specifically current condition.  We did specifically address the patient's likely prognosis, which is poor.  We explored the patient's values and preferences for future care.  The family endorses that what is most important right now is to focus on comfort and QOL     Accordingly, we have decided that the best plan to meet the patient's goals includes pivot to comfort-focused care             Spoke to daughter Antionette--introduced service and discussed patient's history and current condition.  Antionette states that patient was living in the home in unsuitable living conditions with mold and that she was currently seeking apartment placement for him when he was hospitalized.  States that she  has a very busy job and children in [place] in his currently trying to see to his financial and medical affairs.  Informed me that that is why she was seeking hospital placement in [place].  She also states that she and her  are willing to take patient home to care for him.    Extensive discussion with daughter about patient's current condition and very poor prognosis.  Discussed that if patient were removed from life support he would likely live minutes towers.  Informed daughter that it would be very difficult to find a hospital willing to take patient for withdrawal of life support due to hospitalist role of providing acute care.  Daughter informed that she has a relationship with her cream [employer] and [place] that would be willing to help family with a expenses.    Daughter then informed that she and patient's birthdays are month apart and she would like life support withdrawn on her birthday on December 22nd.  Extensive discussion with daughter that patient has been on the ventilator for 10 days and the medical team is concerned about his quality of life and comfort.  Discussed that patient's prognosis is poor and that he may not likely live until that date.    She then informed that she would be coming to hospital hopefully tomorrow.  I encouraged her to reach out to crematory in [place] for any assistance that they might provide with his burial.  She verbalized understanding.  Offered extensive support and informed the palliative Medicine will continue to follow        History of Present Illness:     Patient is a 68 year old male with PMH significant for anti synthetase syndrome, HLD, chronic hypoxic respiratory failure on 3 L nasal cannula at home who presented to the ED and acute respiratory distress.  Patient was picked up by EMS who reported that patient was saturating in the 50s upon arrival.  Upon presentation patient was placed on BiPAP but continued to remove the mass due to being extremely fused after  which she was subsequently intubated.  Of note patient was recently admitted to [facility] due to shortness of breath and left AMA while having increased oxygen requirements far greater than his baseline.  Patient's neurological status has remained poor and continues to have spontaneous nonpurposeful movements.  Remains on fentanyl, precedex and levophed.  Patient's daughter has voiced her wishes for patient to be transferred to a hospital closer to [facility], [place] and [place].  Palliative Medicine consult for assistance with goals of care.      Active Ambulatory Problems     Diagnosis Date Noted    Clubbing of fingers 06/14/2022    Antisynthetase syndrome 06/14/2022    Interstitial lung disease 06/14/2022    Positive antinuclear antibody 06/14/2022    Seronegative rheumatoid arthritis 09/21/2022    Fatigue 09/21/2022    High risk medication use 09/21/2022    Shortness of breath 11/23/2022    History of tobacco abuse 11/23/2022    TB lung, latent 11/23/2022    Erythrocytosis due to hypoxemia 01/11/2024    Pulmonary emphysema with fibrosis of lung 01/11/2024    Secondary erythrocytosis 10/11/2024    Nocturnal oxygen desaturation 10/11/2024    Chronic hypoxic respiratory failure 10/11/2024    Antinuclear antibody (SAM) titer greater than 1:80 10/11/2024     Resolved Ambulatory Problems     Diagnosis Date Noted    No Resolved Ambulatory Problems     Past Medical History:   Diagnosis Date    SAM positive     ILD (interstitial lung disease)     Pulmonary embolism     Tobacco use         History reviewed. No pertinent surgical history.     Review of patient's allergies indicates:  No Known Allergies       Current Facility-Administered Medications:     [COMPLETED] calcium gluconate 1 g in NS IVPB (premixed), 1 g, Intravenous, Once, Stopped at 12/04/24 0628 **AND** calcium gluconate 1 g in NS IVPB (premixed), 1 g, Intravenous, Q10 Min PRN, Yobani, Rachel, DO    dexmedetomidine (PRECEDEX) 400mcg/100mL 0.9% NaCL infusion, 0-1.4 mcg/kg/hr,  Intravenous, Continuous, Luis Mooney MD, Stopped at 12/10/24 2236    dextrose 10% bolus 125 mL 125 mL, 12.5 g, Intravenous, PRN, Jose Luis Gaming MD    dextrose 10% bolus 250 mL 250 mL, 25 g, Intravenous, PRN, Jose Luis Gaming MD    enoxaparin injection 40 mg, 40 mg, Subcutaneous, Daily, Yara Spear MD, 40 mg at 12/12/24 1834    fentaNYL 2500 mcg in 0.9% sodium chloride 250 mL infusion premix, 0-250 mcg/hr, Intravenous, Continuous, Clinton Yen DO, Last Rate: 22.5 mL/hr at 12/13/24 1434, 225 mcg/hr at 12/13/24 1434    fentaNYL injection 100 mcg, 100 mcg, Intravenous, Q2H PRN, Luis Mooney MD    glucagon (human recombinant) injection 1 mg, 1 mg, Intramuscular, PRN, Jose Luis Gaming MD    HYDROmorphone tablet 2 mg, 2 mg, Oral, Q3H PRN, Yara Spear MD, 2 mg at 12/05/24 1129    insulin aspart U-100 injection 0-10 Units, 0-10 Units, Subcutaneous, Q6H PRN, Jose Luis Gaming MD, 2 Units at 12/05/24 0610    ketamine in 0.9 % sod chloride 50 mg/5 mL (10 mg/mL) injection 150 mg, 150 mg, Intravenous, Once, Manuel Ruiz IV, MD    NORepinephrine 8 mg in dextrose 5% 250 mL infusion, 0-3 mcg/kg/min, Intravenous, Continuous, Enzo Dos Santos MD, Last Rate: 16.6 mL/hr at 12/13/24 1534, 0.15 mcg/kg/min at 12/13/24 1534    pantoprazole injection 40 mg, 40 mg, Intravenous, Daily, Clinton Yen DO, 40 mg at 12/13/24 0846    polyethylene glycol packet 17 g, 17 g, Oral, Daily, Luis Mooney MD, 17 g at 12/13/24 0858    propofol (DIPRIVAN) 10 mg/mL infusion, 0-50 mcg/kg/min, Intravenous, Continuous, Manuel Ruiz IV, MD, Last Rate: 17.7 mL/hr at 12/13/24 1527, 50 mcg/kg/min at 12/13/24 1527    senna-docusate 8.6-50 mg per tablet 1 tablet, 1 tablet, Oral, Daily PRN, Luis Mooney MD    Flushing PICC/Midline Protocol, , , Until Discontinued **AND** sodium chloride 0.9% flush 10 mL, 10 mL, Intravenous, Q12H PRN, Jean Pierre Decker MD       Current Facility-Administered Medications:      "[COMPLETED] calcium gluconate IVPB, 1 g, Intravenous, Once **AND** calcium gluconate IVPB, 1 g, Intravenous, Q10 Min PRN    dextrose 10%, 12.5 g, Intravenous, PRN    dextrose 10%, 25 g, Intravenous, PRN    fentaNYL, 100 mcg, Intravenous, Q2H PRN    glucagon (human recombinant), 1 mg, Intramuscular, PRN    HYDROmorphone, 2 mg, Oral, Q3H PRN    insulin aspart U-100, 0-10 Units, Subcutaneous, Q6H PRN    senna-docusate 8.6-50 mg, 1 tablet, Oral, Daily PRN    Flushing PICC/Midline Protocol, , , Until Discontinued **AND** sodium chloride 0.9%, 10 mL, Intravenous, Q12H PRN     Family History   Problem Relation Name Age of Onset    COPD Mother      COPD Father          Review of Systems   Unable to perform ROS: Intubated            Objective:   BP (!) 79/56   Pulse 74   Temp 98.2 °F (36.8 °C) (Oral)   Resp (!) 21   Ht 5' 5.98" (1.676 m)   Wt 59 kg (130 lb 1.1 oz)   SpO2 (!) 94%   BMI 21.00 kg/m²      Physical Exam  Constitutional:       Appearance: He is ill-appearing.   HENT:      Head: Normocephalic.   Eyes:      Pupils: Pupils are equal, round, and reactive to light.   Cardiovascular:      Rate and Rhythm: Normal rate.   Abdominal:      Palpations: Abdomen is soft.   Skin:     General: Skin is warm.   Neurological:      Comments: obtunded             Review of Symptoms      Symptom Assessment (ESAS 0-10 Scale)  Pain:  0  Dyspnea:  0  Anxiety:  0  Nausea:  0  Depression:  0  Anorexia:  0  Fatigue:  0  Insomnia:  0  Restlessness:  0  Agitation:  0         Bowel Management Plan (BMP):  Yes      Performance Status:  10    Psychosocial/Cultural:   See Palliative Psychosocial Note: Yes  Patient is single and has 3 adult children:  Daughter who lives in [place], son who is a , and son who is currently incarcerated  **Primary  to Follow**  Palliative Care  Consult: No    Spiritual:  F - Jana and Belief:  Yazidism      Advance Care Planning   Advance Directives:   Do Not Resuscitate " Status: Yes      Decision Making:  Family answered questions  Goals of Care: The family endorses that what is most important right now is to focus on comfort and QOL     Accordingly, we have decided that the best plan to meet the patient's goals includes continuing with treatment          PAINAD: NA    Caregiver burden formerly assessed: Yes        > 50% of 60 min of encounter was spent in chart review, face to face discussion of goals of care, symptom assessment, coordination of care and emotional support.       Apurva JAMES, [facility]  Palliative Medicine  [facility]

## 2024-12-14 NOTE — PROGRESS NOTES
[facility] - Emergency Dept  Pulmonary Critical Care Note    Patient Name: Clarence Trejo Sr.  MRN: [id number]  Admission Date: 12/3/2024  Hospital Length of Stay: 11 days  Code Status: DNR  Attending Provider: Jean Pierre Decker MD  Primary Care Provider: Yara Spear MD     Subjective:     HPI:   68 y.o. male with PMH significant for antisynthetase syndrome, ILD, chronic hypoxic respiratory failure on 3L NC at home presented to ED in acute respiratory distress.  He was picked up by EMS who reported that patient was saturating in the 50s upon arrival.  On arrival to the emergency department patient was placed on BiPAP but continued to remove the mask due to being extremely confused.  This is likely a result of hypoxia.  Patient was very combative and was given anxiolytics without any improvement in behavior.  Due to inability to increase patient's saturations decision was made to intubate the patient.  Patient was recently admitted to [facility] due to shortness of breath.  He left AMA while having increased oxygen requirements far greater than his baseline.    Hospital Course/Significant events:  Admitted to ICU 12/3/24    24 Hour Interval History:   -vital signs stable.  Remains on pressor support.  No changes to vent settings.  -CBC unremarkable, CMP notable for potassium of 5.7 given Lokelma.  -urine output 3.3 L (net-85 cc).  -palliative spoke with daughter yesterday; nursing reports that patient's daughter plans to come to visit on Sunday and potentially withdrawal care.  -otherwise no major changes to his care        Past Medical History:   Diagnosis Date    SAM positive     Antisynthetase syndrome     ILD (interstitial lung disease)     Pulmonary embolism     Seronegative rheumatoid arthritis     TB lung, latent     Tobacco use        History reviewed. No pertinent surgical history.    Social History     Socioeconomic History    Marital status:    Tobacco Use     Smoking status: Former     Current packs/day: 0.00     Types: Cigarettes     Quit date: 2023     Years since quittin.9    Smokeless tobacco: Never   Substance and Sexual Activity    Alcohol use: Not Currently    Drug use: Never    Sexual activity: Not Currently     Partners: Female     Social Drivers of Health     Financial Resource Strain: Low Risk  (2024)    Overall Financial Resource Strain (CARDIA)     Difficulty of Paying Living Expenses: Not hard at all   Food Insecurity: No Food Insecurity (2024)    Hunger Vital Sign     Worried About Running Out of Food in the Last Year: Never true     Ran Out of Food in the Last Year: Never true   Transportation Needs: No Transportation Needs (2024)    TRANSPORTATION NEEDS     Transportation : No   Physical Activity: Inactive (2024)    Exercise Vital Sign     Days of Exercise per Week: 0 days     Minutes of Exercise per Session: 0 min   Stress: No Stress Concern Present (2024)    [employer] - Occupational Stress Questionnaire     Feeling of Stress : Not at all   Housing Stability: Low Risk  (2024)    Housing Stability Vital Sign     Unable to Pay for Housing in the Last Year: No     Homeless in the Last Year: No       Current Outpatient Medications   Medication Instructions    albuterol (PROVENTIL/VENTOLIN HFA) 90 mcg/actuation inhaler 1-2 puffs, Inhalation, Every 6 hours PRN, Rescue    albuterol-ipratropium (DUO-NEB) 2.5 mg-0.5 mg/3 mL nebulizer solution 3 mLs, Nebulization, Every 6 hours PRN, Rescue    baclofen (LIORESAL) 10 mg, 2 times daily    budesonide-formoterol 80-4.5 mcg (SYMBICORT) 80-4.5 mcg/actuation HFAA 2 puffs, Inhalation, 2 times daily    hydroxychloroquine (PLAQUENIL) 200 mg, Oral, Daily    lisinopriL (PRINIVIL,ZESTRIL) 5 mg, Oral, Daily    meloxicam (MOBIC) 7.5 mg, Oral, 2 times daily    OFEV 150 mg, Oral, 2 times daily    omeprazole (PRILOSEC) 20 mg, Oral, Daily    predniSONE  (DELTASONE) 20 mg, Oral, Daily    tadalafiL (CIALIS) 5 mg, Oral, Daily PRN    tiotropium bromide (SPIRIVA RESPIMAT) 1.25 mcg/actuation inhaler 2 puffs, Inhalation, Daily, Controller       Current Inpatient Medications   enoxparin  40 mg Subcutaneous Daily    ketamine in 0.9 % sod chloride  150 mg Intravenous Once    pantoprazole  40 mg Intravenous Daily    polyethylene glycol  17 g Oral Daily       Current Intravenous Infusions   dexmedeTOMIDine (Precedex) infusion (titrating)  0-1.4 mcg/kg/hr Intravenous Continuous   Stopped at 12/10/24 2236    fentanyl  0-250 mcg/hr Intravenous Continuous 22.5 mL/hr at 12/14/24 0018 225 mcg/hr at 12/14/24 0018    NORepinephrine bitartrate-D5W  0-3 mcg/kg/min Intravenous Continuous 16.6 mL/hr at 12/14/24 0015 0.15 mcg/kg/min at 12/14/24 0015    propofoL  0-50 mcg/kg/min Intravenous Continuous 17.7 mL/hr at 12/14/24 0511 50 mcg/kg/min at 12/14/24 0511       Objective:       Intake/Output Summary (Last 24 hours) at 12/14/2024 0626  Last data filed at 12/14/2024 0400  Gross per 24 hour   Intake 1959.2 ml   Output 3180 ml   Net -1220.8 ml         Vital Signs (Most Recent):  Temp: 97.9 °F (36.6 °C) (12/14/24 0400)  Pulse: 68 (12/14/24 0500)  Resp: (!) 26 (12/14/24 0500)  BP: 110/72 (12/14/24 0500)  SpO2: 97 % (12/14/24 0500)  Body mass index is 21 kg/m².  Weight: 59 kg (130 lb 1.1 oz) Vital Signs (24h Range):  Temp:  [97.9 °F (36.6 °C)-98.6 °F (37 °C)] 97.9 °F (36.6 °C)  Pulse:  [] 68  Resp:  [21-28] 26  SpO2:  [93 %-97 %] 97 %  BP: ()/(52-89) 110/72     Constitutional:       General: He is not in acute distress.     Appearance: Normal appearance. He is ill-appearing (chronically).  Toxic appearing   HENT:      Head: Normocephalic and atraumatic.      Nose: Nose normal.      Mouth/Throat:      Mouth: Mucous membranes are moist.      Pharynx: Oropharynx is clear.   Eyes:      Extraocular Movements: Extraocular movements intact.      Conjunctiva/sclera: Conjunctivae normal.    Cardiovascular:      Rate and Rhythm: Normal rate and regular rhythm.      Pulses: Normal pulses.      Heart sounds: Normal heart sounds. No murmur heard.  Pulmonary:      Effort: Pulmonary effort is normal. Tachypnea present. No respiratory distress.      Breath sounds: Rhonchi (diffusely) and rales (diffusely) present.      Comments:  Patient was intubated and sedated  Abdominal:      General: Abdomen is flat. Bowel sounds are normal. There is no distension.      Palpations: Abdomen is soft. There is no mass.      Tenderness: There is no abdominal tenderness.   Musculoskeletal:      Cervical back: Normal range of motion and neck supple.      Right lower leg: No edema.      Left lower leg: No edema.   Skin:     General: Skin is warm and dry.      Capillary Refill: Capillary refill takes less than 2 seconds.      Findings: No erythema or rash.   Neurological:      General:  Unable to assess as patient was recently paralyzed for intubation.   Psychiatric:         Behavior: Behavior normal.       Lines/Drains/Airways       Peripherally Inserted Central Catheter Line  Duration             PICC Triple Lumen 12/04/24 1041 left brachial 9 days              Drain  Duration                  NG/OG Tube 12/03/24 1913 orogastric 18 Fr. Left mouth 10 days         Urethral Catheter 12/03/24 1927 Temperature probe 16 Fr. 10 days              Airway  Duration                  Airway - Non-Surgical 12/03/24 1912 Endotracheal Tube 10 days              Peripheral Intravenous Line  Duration                  Peripheral IV - Single Lumen 12/03/24 1900 20 G Right Forearm 10 days         Peripheral IV - Single Lumen 12/10/24 1956 18 G No Posterior;Proximal;Right Forearm 3 days                    Significant Labs:    Lab Results   Component Value Date    WBC 7.75 12/14/2024    HGB 16.2 12/14/2024    HCT 55.1 (H) 12/14/2024    MCV 90.8 12/14/2024     12/14/2024           BMP  Lab Results   Component Value Date     12/14/2024     K 5.7 (H) 12/14/2024    CO2 30 12/14/2024    BUN 20.1 12/14/2024    CREATININE 0.64 (L) 12/14/2024    CALCIUM 8.9 12/14/2024    AGAP 7.0 12/14/2024    EGFRNONAA >105 11/25/2021         ABG  Recent Labs   Lab 12/11/24  0707   PH 7.450   PO2 86.0   PCO2 50.0*   HCO3 34.8*   POCBASEDEF 8.90*       Mechanical Ventilation Support:  Vent Mode: A/C (12/14/24 0410)  Ventilator Initiated: Yes (12/03/24 1919)  Set Rate: 26 BPM (12/14/24 0410)  Vt Set: 450 mL (12/11/24 0550)  PEEP/CPAP: 5 cmH20 (12/14/24 0410)  Oxygen Concentration (%): 50 (12/14/24 0410)  Peak Airway Pressure: 30 cmH20 (12/14/24 0410)  Total Ve: 7.3 L/m (12/14/24 0410)  F/VT Ratio<105 (RSBI): (!) 74.32 (12/14/24 0410)      Significant Imaging:  I have reviewed the pertinent imaging within the past 24 hours.        Assessment/Plan:     Assessment  Community Acquired PNA with H. 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  Pulmonary hypertension  Electrolyte derangement    Plan  Continue ICU level care   Continue to titrate mechanical ventilation for ARDS net protocol   Continue to wean sedation and pressor support as tolerated  Completed prednisone and cefepime (day 5 / 5)   Continue to monitor labs and replace lytes as needed     Prognosis is grim    DVT Prophylaxis:  Lovenox  GI Prophylaxis:  None       32 minutes of critical care was time spent personally by me on the following activities: development of treatment plan with patient or surrogate and bedside caregivers, discussions with consultants, evaluation of patient's response to treatment, examination of patient, ordering and performing treatments and interventions, ordering and review of laboratory studies, ordering and review of radiographic studies, pulse oximetry, re-evaluation of patient's condition.  This critical care time did not overlap with that of any other provider or involve time for any procedures.     Clinton Yen,  DO  Pulmonary Critical Care Medicine  [facility] - Emergency Dept  DOS: 12/14/2024

## 2024-12-14 NOTE — PLAN OF CARE
Problem: Infection  Goal: Absence of Infection Signs and Symptoms  Outcome: Progressing     Problem: Adult Inpatient Plan of Care  Goal: Plan of Care Review  Outcome: Progressing  Goal: Patient-Specific Goal (Individualized)  Outcome: Progressing  Goal: Absence of Hospital-Acquired Illness or Injury  Outcome: Progressing  Goal: Optimal Comfort and Wellbeing  Outcome: Progressing  Goal: Readiness for Transition of Care  Outcome: Progressing     Problem: Skin Injury Risk Increased  Goal: Skin Health and Integrity  Outcome: Progressing     Problem: Coping Ineffective  Goal: Effective Coping  Outcome: Progressing

## 2024-12-15 PROBLEM — Z51.5 COMFORT MEASURES ONLY STATUS: Status: ACTIVE | Noted: 2024-01-01

## 2024-12-15 NOTE — PLAN OF CARE
Problem: Adult Inpatient Plan of Care  Goal: Plan of Care Review  Outcome: Not Progressing  Goal: Patient-Specific Goal (Individualized)  Outcome: Not Progressing  Goal: Absence of Hospital-Acquired Illness or Injury  Outcome: Not Progressing     Problem: Coping Ineffective  Goal: Effective Coping  Outcome: Not Progressing

## 2024-12-15 NOTE — PROGRESS NOTES
[facility] - Emergency Dept  Pulmonary Critical Care Note    Patient Name: Clarence Trejo Sr.  MRN: [id number]  Admission Date: 12/3/2024  Hospital Length of Stay: 12 days  Code Status: DNR  Attending Provider: Jhoana Mello,*  Primary Care Provider: Yara Spear MD     Subjective:     HPI:   68 y.o. male with PMH significant for antisynthetase syndrome, ILD, chronic hypoxic respiratory failure on 3L NC at home presented to ED in acute respiratory distress.  He was picked up by EMS who reported that patient was saturating in the 50s upon arrival.  On arrival to the emergency department patient was placed on BiPAP but continued to remove the mask due to being extremely confused.  This is likely a result of hypoxia.  Patient was very combative and was given anxiolytics without any improvement in behavior.  Due to inability to increase patient's saturations decision was made to intubate the patient.  Patient was recently admitted to [facility] due to shortness of breath.  He left AMA while having increased oxygen requirements far greater than his baseline.    Hospital Course/Significant events:  Admitted to ICU 12/3/24    24 Hour Interval History:   -patient downgraded today for palliative withdrawal of care        Past Medical History:   Diagnosis Date    SAM positive     Antisynthetase syndrome     ILD (interstitial lung disease)     Pulmonary embolism     Seronegative rheumatoid arthritis     TB lung, latent     Tobacco use        History reviewed. No pertinent surgical history.    Social History     Socioeconomic History    Marital status:    Tobacco Use    Smoking status: Former     Current packs/day: 0.00     Types: Cigarettes     Quit date: 2023     Years since quittin.9    Smokeless tobacco: Never   Substance and Sexual Activity    Alcohol use: Not Currently    Drug use: Never    Sexual activity: Not Currently     Partners: Female     Social Drivers of  Health     Financial Resource Strain: Low Risk  (11/16/2024)    Overall Financial Resource Strain (CARDIA)     Difficulty of Paying Living Expenses: Not hard at all   Food Insecurity: No Food Insecurity (11/16/2024)    Hunger Vital Sign     Worried About Running Out of Food in the Last Year: Never true     Ran Out of Food in the Last Year: Never true   Transportation Needs: No Transportation Needs (11/16/2024)    TRANSPORTATION NEEDS     Transportation : No   Physical Activity: Inactive (8/1/2024)    Exercise Vital Sign     Days of Exercise per Week: 0 days     Minutes of Exercise per Session: 0 min   Stress: No Stress Concern Present (11/16/2024)    [employer] - Occupational Stress Questionnaire     Feeling of Stress : Not at all   Housing Stability: Low Risk  (11/16/2024)    Housing Stability Vital Sign     Unable to Pay for Housing in the Last Year: No     Homeless in the Last Year: No       Current Outpatient Medications   Medication Instructions    albuterol (PROVENTIL/VENTOLIN HFA) 90 mcg/actuation inhaler 1-2 puffs, Inhalation, Every 6 hours PRN, Rescue    albuterol-ipratropium (DUO-NEB) 2.5 mg-0.5 mg/3 mL nebulizer solution 3 mLs, Nebulization, Every 6 hours PRN, Rescue    baclofen (LIORESAL) 10 mg, 2 times daily    budesonide-formoterol 80-4.5 mcg (SYMBICORT) 80-4.5 mcg/actuation HFAA 2 puffs, Inhalation, 2 times daily    hydroxychloroquine (PLAQUENIL) 200 mg, Oral, Daily    lisinopriL (PRINIVIL,ZESTRIL) 5 mg, Oral, Daily    meloxicam (MOBIC) 7.5 mg, Oral, 2 times daily    OFEV 150 mg, Oral, 2 times daily    omeprazole (PRILOSEC) 20 mg, Oral, Daily    predniSONE (DELTASONE) 20 mg, Oral, Daily    tadalafiL (CIALIS) 5 mg, Oral, Daily PRN    tiotropium bromide (SPIRIVA RESPIMAT) 1.25 mcg/actuation inhaler 2 puffs, Inhalation, Daily, Controller       Current Inpatient Medications   ketamine in 0.9 % sod chloride  150 mg Intravenous Once       Current Intravenous  Infusions        Objective:       Intake/Output Summary (Last 24 hours) at 12/15/2024 1516  Last data filed at 12/15/2024 1031  Gross per 24 hour   Intake 3266.74 ml   Output 3450 ml   Net -183.26 ml         Vital Signs (Most Recent):  Temp: 98.8 °F (37.1 °C) (12/15/24 1200)  Pulse: (!) 127 (12/15/24 1400)  Resp: (!) 48 (12/15/24 1505)  BP: 127/76 (12/15/24 1205)  SpO2: (!) 78 % (12/15/24 1400)  Body mass index is 21 kg/m².  Weight: 59 kg (130 lb 1.1 oz) Vital Signs (24h Range):  Temp:  [98.2 °F (36.8 °C)-98.9 °F (37.2 °C)] 98.8 °F (37.1 °C)  Pulse:  [] 127  Resp:  [11-48] 48  SpO2:  [78 %-98 %] 78 %  BP: ()/() 127/76     Constitutional:       General: He is not in acute distress.     Appearance: Normal appearance. He is ill-appearing (chronically).  Toxic appearing   HENT:      Head: Normocephalic and atraumatic.      Nose: Nose normal.      Mouth/Throat:      Mouth: Mucous membranes are moist.      Pharynx: Oropharynx is clear.   Eyes:      Extraocular Movements: Extraocular movements intact.      Conjunctiva/sclera: Conjunctivae normal.   Cardiovascular:      Rate and Rhythm: Normal rate and regular rhythm.      Pulses: Normal pulses.      Heart sounds: Normal heart sounds. No murmur heard.  Pulmonary:      Effort: Pulmonary effort is normal. Tachypnea present. No respiratory distress.      Breath sounds: Rhonchi (diffusely) and rales (diffusely) present.      Comments:  Patient was intubated and sedated  Abdominal:      General: Abdomen is flat. Bowel sounds are normal. There is no distension.      Palpations: Abdomen is soft. There is no mass.      Tenderness: There is no abdominal tenderness.   Musculoskeletal:      Cervical back: Normal range of motion and neck supple.      Right lower leg: No edema.      Left lower leg: No edema.   Skin:     General: Skin is warm and dry.      Capillary Refill: Capillary refill takes less than 2 seconds.      Findings: No erythema or rash.   Neurological:       General:  Unable to assess as patient was recently paralyzed for intubation.   Psychiatric:         Behavior: Behavior normal.       Lines/Drains/Airways       Peripherally Inserted Central Catheter Line  Duration             PICC Triple Lumen 12/04/24 1041 left brachial 11 days              Drain  Duration                  Urethral Catheter 12/03/24 1927 Temperature probe 16 Fr. 11 days              Peripheral Intravenous Line  Duration                  Peripheral IV - Single Lumen 12/03/24 1900 20 G Right Forearm 11 days         Peripheral IV - Single Lumen 12/10/24 1956 18 G No Posterior;Proximal;Right Forearm 4 days                    Significant Labs:    Lab Results   Component Value Date    WBC 9.94 12/15/2024    HGB 15.6 12/15/2024    HCT 53.2 (H) 12/15/2024    MCV 92.2 12/15/2024     12/15/2024           BMP  Lab Results   Component Value Date     12/15/2024    K 5.3 (H) 12/15/2024    CO2 35 (H) 12/15/2024    BUN 17.1 12/15/2024    CREATININE 0.62 (L) 12/15/2024    CALCIUM 9.2 12/15/2024    AGAP 5.0 12/15/2024    EGFRNONAA >105 11/25/2021         ABG  Recent Labs   Lab 12/11/24  0707   PH 7.450   PO2 86.0   PCO2 50.0*   HCO3 34.8*   POCBASEDEF 8.90*       Mechanical Ventilation Support:  Vent Mode: PRESSURE A/C (12/15/24 0810)  Ventilator Initiated: Yes (12/03/24 1919)  Set Rate: 26 BPM (12/15/24 0810)  Vt Set: 450 mL (12/11/24 0550)  PEEP/CPAP: 5 cmH20 (12/15/24 0810)  Oxygen Concentration (%): 50 (12/15/24 0810)  Peak Airway Pressure: 32 cmH20 (12/15/24 0810)  Total Ve: 10.4 L/m (12/15/24 0608)  F/VT Ratio<105 (RSBI): (!) 95.39 (12/15/24 0810)      Significant Imaging:  I have reviewed the pertinent imaging within the past 24 hours.        Assessment/Plan:     Assessment  Community Acquired PNA with H. 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  Pulmonary hypertension  Electrolyte derangement    Plan  Continue ICU level  care   Continue to titrate mechanical ventilation for ARDS net protocol   Continue to wean sedation and pressor support as tolerated  Completed prednisone and cefepime (day 5 / 5)   Continue to monitor labs and replace lytes as needed     Prognosis is grim    DVT Prophylaxis:  Lovenox  GI Prophylaxis:  None       32 minutes of critical care was time spent personally by me on the following activities: development of treatment plan with patient or surrogate and bedside caregivers, discussions with consultants, evaluation of patient's response to treatment, examination of patient, ordering and performing treatments and interventions, ordering and review of laboratory studies, ordering and review of radiographic studies, pulse oximetry, re-evaluation of patient's condition.  This critical care time did not overlap with that of any other provider or involve time for any procedures.     Clinton Yen DO  Pulmonary Critical Care Medicine  [facility] - Emergency Dept  DOS: 12/15/2024

## 2024-12-15 NOTE — H&P
[facility] Hospital Medicine History & Physical Examination       Patient Name: Clarence Trejo Sr.  MRN: [id number]  Patient Class: IP- Inpatient   Admission Date: 12/3/2024   Admitting Physician: STEPHANIE Service   Length of Stay: 12  Attending Physician: Dr. Jhoana Mello  Primary Care Provider: Yara Spear MD  Face-to-Face encounter date: 12/15/2024  Code Status: DNR  Chief Complaint: Respiratory Distress (Pt arrived in respiratory distress on 15 L NRB O2 SAT 69%.  GCS 15 )        Screening for Social Drivers for health:  Patient screened for food insecurity, housing instability, transportation needs, utility difficulties, and interpersonal safety (select all that apply as identified as concern)  []Housing or Food  []Transportation Needs  []Utility Difficulties  []Interpersonal safety  [x]None    Patient information was obtained from patient, patient's family, past medical records and/or ER records.     HISTORY OF PRESENT ILLNESS:   Clarence Trejo Sr. is a 69 y.o. male who PMH includes ILD, anti synthetase, SAM positive, PE, RA, TB of lung, tobacco abuse; presented to [facility] on 12/3/2024 patient was picked up by EMS who reported that patient was saturating in the 50s upon arrival. On arrival to the emergency department patient was placed on BiPAP but continued  with respiratory failure which he required intubation and mechanical ventilation. PT initially presented to [facility] recently secondary to to same symptoms was admitted but left AMA while having increased oxygen requirements far greater than his baseline. He was subsequently picked up by EMS on 12/3/2024 and brought to [facility] for further evaluation which required intubation and admission to the ICU unit. Pt was noted to have H flu pneumonia and was septic requiring vasopressor therapy. Palliative services consulted. Pt was made DNR/DNI per family and care withdrawn. PT has been downgraded out of ICU to the floor. [facility] medicine  services have been consulted for assumption of care .     PAST MEDICAL HISTORY:     Past Medical History:   Diagnosis Date    SAM positive     Antisynthetase syndrome     ILD (interstitial lung disease)     Pulmonary embolism     Seronegative rheumatoid arthritis     TB lung, latent     Tobacco use        PAST SURGICAL HISTORY:   History reviewed. No pertinent surgical history.    ALLERGIES:   Patient has no known allergies.    FAMILY HISTORY:   Reviewed and negative    SOCIAL HISTORY:     Social History     Tobacco Use    Smoking status: Former     Current packs/day: 0.00     Types: Cigarettes     Quit date: 2023     Years since quittin.9    Smokeless tobacco: Never   Substance Use Topics    Alcohol use: Not Currently        HOME MEDICATIONS:   As documented  Prior to Admission medications    Medication Sig Start Date End Date Taking? Authorizing Provider   albuterol (PROVENTIL/VENTOLIN HFA) 90 mcg/actuation inhaler Inhale 1-2 puffs into the lungs every 6 (six) hours as needed for Wheezing. Rescue 24   Marshall Hopkins DO   albuterol-ipratropium (DUO-NEB) 2.5 mg-0.5 mg/3 mL nebulizer solution Take 3 mLs by nebulization every 6 (six) hours as needed for Wheezing. Rescue 24  Marshall Hopkins DO   baclofen (LIORESAL) 10 MG tablet Take 10 mg by mouth 2 (two) times daily.  Patient not taking: Reported on 2024   Provider, Historical   budesonide-formoterol 80-4.5 mcg (SYMBICORT) 80-4.5 mcg/actuation HFAA Inhale 2 puffs into the lungs 2 (two) times a day. 24  Marshall Hopkins DO   hydroxychloroquine (PLAQUENIL) 200 mg tablet Take 1 tablet (200 mg total) by mouth once daily. 24   Dixie Caraballo MD   lisinopriL (PRINIVIL,ZESTRIL) 5 MG tablet Take 1 tablet (5 mg total) by mouth once daily. 24  Yara Spear MD   meloxicam (MOBIC) 7.5 MG tablet Take 1 tablet (7.5 mg total) by mouth 2 (two) times daily.  Patient not taking: Reported on 2024    Dixie Caraballo MD   nintedanib (OFEV) 150 mg Cap Take 1 capsule (150 mg total) by mouth 2 (two) times daily. 8/5/24   Dixie Caraballo MD   omeprazole (PRILOSEC) 20 MG capsule Take 1 capsule (20 mg total) by mouth once daily. 8/28/23 11/22/24  Yara Spear MD   predniSONE (DELTASONE) 20 MG tablet Take 1 tablet (20 mg total) by mouth once daily. 11/20/24   Ajay Merino DO   tadalafiL (CIALIS) 5 MG tablet Take 1 tablet (5 mg total) by mouth daily as needed for Erectile Dysfunction.  Patient not taking: Reported on 11/25/2024 11/11/24 11/11/25  Yara Spear MD   tiotropium bromide (SPIRIVA RESPIMAT) 1.25 mcg/actuation inhaler Inhale 2 puffs into the lungs once daily. Controller 11/25/24   Marshall Hopkins DO       REVIEW OF SYSTEMS:   Except as documented, all other systems reviewed and negative     PHYSICAL EXAM:     VITAL SIGNS: 24 HRS MIN & MAX LAST   Temp  Min: 98.2 °F (36.8 °C)  Max: 98.9 °F (37.2 °C) 98.8 °F (37.1 °C)   BP  Min: 82/69  Max: 138/114 127/76   Pulse  Min: 76  Max: 127  (!) 127   Resp  Min: 11  Max: 42 (!) 42   SpO2  Min: 78 %  Max: 98 % (!) 78 %       General appearance: thin frail chronically ill appearing male look older than stated age, obtunded lethargic; family at bedside  HENT: Atraumatic head. dry mucous membranes of oral cavity.  Eyes: pupils sluggish  Lungs: diminished course breath sounds, labored respirations, tachypnea O2 per nasal cannula    Heart: Regular rate and rhythm. S1 and S2 present cap refill sluggish  Abdomen: Soft, non-distended,hypoactive bowel sounds   Extremities: does not follow commands, generalized weakness  Skin: warm and dry  Neuro: obtunded, does not follow commands  Psych/mental status: obtunded does not follow commands    LABS AND IMAGING:     Recent Labs   Lab 12/13/24  0349 12/14/24  0342 12/15/24  0259   WBC 8.25 7.75 9.94   RBC 5.97 6.07 5.77   HGB 16.0 16.2 15.6   HCT 53.5* 55.1* 53.2*   MCV 89.6 90.8 92.2   MCH 26.8* 26.7* 27.0   MCHC 29.9* 29.4* 29.3*   RDW  16.5 16.7 15.9    179 193   MPV 11.3* 11.5* 11.5*       Recent Labs   Lab 12/09/24  0740 12/10/24  0722 12/11/24  0323 12/11/24  0707 12/12/24  0347 12/13/24  0349 12/14/24  0342 12/14/24  0832 12/15/24  0259   NA  --   --    < >  --    < > 142 140  --  140   K  --   --    < >  --    < > 4.8 5.7*  --  5.3*   CL  --   --    < >  --    < > 106 103  --  100   CO2  --   --    < >  --    < > 30 30  --  35*   BUN  --   --    < >  --    < > 18.6 20.1  --  17.1   CREATININE  --   --    < >  --    < > 0.67* 0.64*  --  0.62*   CALCIUM  --   --    < >  --    < > 8.7* 8.9  --  9.2   PH 7.350 7.380  --  7.450  --   --   --   --   --    MG  --   --   --   --   --  1.90  --   --   --    ALBUMIN  --   --    < >  --    < > 2.4* 2.5*  --  2.6*   ALKPHOS  --   --    < >  --    < > 82 79  --  86   ALT  --   --    < >  --    < > 28 27  --  24   AST  --   --    < >  --    < > 25 24  --  22   BILITOT  --   --    < >  --    < > 0.4 0.3 0.3 0.3    < > = values in this interval not displayed.       Microbiology Results (last 7 days)       Procedure Component Value Units Date/Time    Blood Culture [[id number]]  (Normal) Collected: 12/03/24 2137    Order Status: Completed Specimen: Blood Updated: 12/08/24 2300     Blood Culture No Growth at 5 days    Blood Culture [[id number]]  (Normal) Collected: 12/03/24 2146    Order Status: Completed Specimen: Blood Updated: 12/08/24 2300     Blood Culture No Growth at 5 days             X-Ray Chest 1 View  Narrative: EXAMINATION:  Single view chest radiograph.    CLINICAL HISTORY:  pneumonia;    TECHNIQUE:  Single view of the chest.    COMPARISON:  Chest radiograph 12/04/2024.    FINDINGS:  The endotracheal tube, nasogastric tube, and left upper extremity PICC are unchanged.  The diffuse interstitial and alveolar opacities are unchanged.  There is no pneumothorax.  The cardiac silhouette is normal in size.  There is no acute osseous abnormality.  Impression: No significant change from prior  exam.    Electronically signed by: Salvador Mccartney MD  Date:    12/07/2024  Time:    09:09        ASSESSMENT & PLAN:   ASSESSMENT:  Acute respiratory failure requiring intubation and mechanical ventilation- POA  Pneumonia- H  Influenzae  with resp failure, CAP- POA  Septic shock requiring vasopressor therapy- POA  Interstitial Lung Disease- secondary to anti-synthetase syndrome-POA  Pulmonary hypertension- POA  Leukocytosis- POA  Electrolyte derangements- POA  Weakness- POA    PLAN:  Family has decided on palliative care and withdrawal  Patient has been withdrawal and extubated  Palliative services consulted  He is a DNR/DNI   Comfort measures  Will continue to monitor, provide comfort measures,  and provide emotional support      VTE Prophylaxis: SCD for DVT prophylaxis and will be advised to be as mobile as possible and sit in a chair as tolerated    Patient condition:  Guarded    __________________________________________________________________________  INPATIENT LIST OF MEDICATIONS     Scheduled Meds:   ketamine in 0.9 % sod chloride  150 mg Intravenous Once     Continuous Infusions:  PRN Meds:.  Current Facility-Administered Medications:     [COMPLETED] calcium gluconate IVPB, 1 g, Intravenous, Once **AND** calcium gluconate IVPB, 1 g, Intravenous, Q10 Min PRN    dextrose 10%, 12.5 g, Intravenous, PRN    dextrose 10%, 25 g, Intravenous, PRN    fentaNYL, 100 mcg, Intravenous, Q2H PRN    glycopyrrolate, 0.1 mg, Intravenous, TID PRN    HYDROmorphone, 2 mg, Oral, Q3H PRN    lorazepam, 1 mg, Intravenous, Q1H PRN    morphine, 4 mg, Intravenous, Q15 Min PRN    senna-docusate 8.6-50 mg, 1 tablet, Oral, Daily PRN    Flushing PICC/Midline Protocol, , , Until Discontinued **AND** sodium chloride 0.9%, 10 mL, Intravenous, Q12H PRN      Radha BACH FNP have reviewed and discussed the case with   Dr. Jhoana Mello.  Please see the following addendum for further assessment and plan from their attending  MD. Radha Durbin, FN   12/15/2024    ________________________________________________________________________________

## 2024-12-16 PROBLEM — J96.21 ACUTE ON CHRONIC HYPOXIC RESPIRATORY FAILURE: Status: ACTIVE | Noted: 2024-01-01

## 2024-12-16 NOTE — DISCHARGE SUMMARY
[facility] Hospital Medicine Discharge Summary - PATIENT     Admit Date: 12/3/2024  Discharge Date and Time: 41:15 PM  Admitting Physician:  Team  Discharging Physician: Jhoana Mello MD.  Primary Care Physician: Yara Spear MD  Consults: Neurology, Neurosurgery, and Pulmonary/Intensive care    Discharge Diagnoses:  # Comfort care  # Acute on chronic hypoxic respiratory failure in the setting of interstitial lung disease with underlying anti synthetase syndrome  # Pulm HTN 2/2 ILD  # H.influenza PNA  # Septic shock 2/2 PNA  # HTN  # Leukocytosis - resolved  # Hyperkalemia  # Hypoalbuminemia    Hospital Course:   69 year old man with chronic hypoxic respiratory failure in the setting of interstitial lung disease with underlying anti synthetase syndrome, HTN, PE, tobacco use disorder and a recent admission for acute hypoxic respiratory failure where he left AMA presented for AMS and acute on chronic hypoxic respiratory failure on 12/3/2024. He was intubated and admitted to the ICU. His CT chest showed severe pulmonary emphysema, severe fibrotic changes largely unchanged and a RLL PNA. His sputum culture was positive for H.influenza - he was treated with cefepime and also required pressors. He did not improve for extubation given his underlying ILD - initially plans were being made by family to bring him to [place] where the daughter lives but there were some insurance issues and ultimately the daughter came to visit him here. Palliative was consulted. Decision was made to opt for comfort care and the patient underwent palliative extubation on 12/15. Patient was subsequently transferred to hospital medicine for further care.    Comfort care measures were initiated. Patient was made comfortable. Family at bedside. Patient  on 2024 at 12:51pm. Family was at bedside when he passed. They were appreciative of care. All questions answered. Family does not  want autopsy.     Cause of Death: Acute on chronic hypoxic respiratory failure in the setting of interstitial lung disease with underlying anti synthetase syndrome      Vitals:  Patient  12:51 pm on 2024      Physical Exam:  Patient identified. Lying in bed with eyes closed and no signs of life.  No respiratory effort noted. No response to verbal stimuli.  No carotid pulse palpable. Asystole on monitor.   Pupils fixed and dilated bilaterally  No heart sounds audible during 1 min of auscultation  No breath sounds audible during 1 min of auscultation    Procedures Performed: No admission procedures for hospital encounter.     Significant Diagnostic Studies: See Full reports for all details    Recent Labs   Lab 12/13/24  0349 12/14/24  0342 12/15/24  0259   WBC 8.25 7.75 9.94   RBC 5.97 6.07 5.77   HGB 16.0 16.2 15.6   HCT 53.5* 55.1* 53.2*   MCV 89.6 90.8 92.2   MCH 26.8* 26.7* 27.0   MCHC 29.9* 29.4* 29.3*   RDW 16.5 16.7 15.9    179 193   MPV 11.3* 11.5* 11.5*       Recent Labs   Lab 12/10/24  0722 24  0323 24  0707 24  0832 12/15/24  0259   NA  --    < >  --    < > 142 140  --  140   K  --    < >  --    < > 4.8 5.7*  --  5.3*   CL  --    < >  --    < > 106 103  --  100   CO2  --    < >  --    < > 30 30  --  35*   BUN  --    < >  --    < > 18.6 20.1  --  17.1   CREATININE  --    < >  --    < > 0.67* 0.64*  --  0.62*   CALCIUM  --    < >  --    < > 8.7* 8.9  --  9.2   PH 7.380  --  7.450  --   --   --   --   --    MG  --   --   --   --  1.90  --   --   --    ALBUMIN  --    < >  --    < > 2.4* 2.5*  --  2.6*   ALKPHOS  --    < >  --    < > 82 79  --  86   ALT  --    < >  --    < > 28 27  --  24   AST  --    < >  --    < > 25 24  --  22   BILITOT  --    < >  --    < > 0.4 0.3 0.3 0.3    < > = values in this interval not displayed.        Microbiology Results (last 7 days)       ** No results found for the last 168 hours. **              X-Ray Chest 1 View  Narrative: EXAMINATION:  Single view chest radiograph.    CLINICAL HISTORY:  pneumonia;    TECHNIQUE:  Single view of the chest.    COMPARISON:  Chest radiograph 2024.    FINDINGS:  The endotracheal tube, nasogastric tube, and left upper extremity PICC are unchanged.  The diffuse interstitial and alveolar opacities are unchanged.  There is no pneumothorax.  The cardiac silhouette is normal in size.  There is no acute osseous abnormality.  Impression: No significant change from prior exam.    Electronically signed by: Salvador Mccartney MD  Date:    2024  Time:    09:09         Medication List        STOP taking these medications      albuterol 90 mcg/actuation inhaler  Commonly known as: PROVENTIL/VENTOLIN HFA     albuterol-ipratropium 2.5 mg-0.5 mg/3 mL nebulizer solution  Commonly known as: DUO-NEB     baclofen 10 MG tablet  Commonly known as: LIORESAL     budesonide-formoterol 80-4.5 mcg 80-4.5 mcg/actuation Hfaa  Commonly known as: SYMBICORT     hydroxychloroquine 200 mg tablet  Commonly known as: PLAQUENIL     lisinopriL 5 MG tablet  Commonly known as: PRINIVIL,ZESTRIL     meloxicam 7.5 MG tablet  Commonly known as: MOBIC     OFEV 150 mg Cap  Generic drug: nintedanib     omeprazole 20 MG capsule  Commonly known as: PRILOSEC     predniSONE 20 MG tablet  Commonly known as: DELTASONE     tadalafiL 5 MG tablet  Commonly known as: CIALIS     tiotropium bromide 1.25 mcg/actuation inhaler  Commonly known as: SPIRIVA RESPIMAT               Discharge Disposition: patient  on 12:51 pm (2024)      For further questions contact hospitalist office    Discharge time 55 minutes        Jhoana David M.D on 2024. at 1:15 PM.

## 2024-12-16 NOTE — PROGRESS NOTES
[facility] Hospital Medicine Progress Note        Chief Complaint: Inpatient Follow-up     HPI:   69 year old man with chronic hypoxic respiratory failure in the setting of interstitial lung disease with underlying anti synthetase syndrome, HTN, PE, tobacco use disorder and a recent admission for acute hypoxic respiratory failure where he left AMA presented for AMS and acute on chronic hypoxic respiratory failure on 12/3/2024. He was intubated and admitted to the ICU. His CT chest showed severe pulmonary emphysema, severe fibrotic changes largely unchanged and a RLL PNA. His sputum culture was positive for H.influenza - he was treated with cefepime and also required pressors. He did not improve for extubation given his underlying ILD - initially plans were being made by family to bring him to [place] where the daughter lives but there were some insurance issues and ultimately the daughter came to visit him here. Palliative was consulted. Decision was made to opt for comfort care and the patient underwent palliative extubation on 12/15. Patient was subsequently transferred to hospital medicine for further care     Interval Hx:   Seen this morning. Family at bedside. Patient in mild respiratory distress.     Case was discussed with patient's nurse and  on the floor.    Objective/physical exam:  General appearance: chronically ill, not following commands, labored respirations  Lungs: on nasal canula, rhonchorus with labored respirations    Heart: tachycardic, normal S1/S2  Abdomen: Soft, non-distended, non-tender   Neuro: unable to follow commands    VITAL SIGNS: 24 HRS MIN & MAX LAST   Temp  Min: 98 °F (36.7 °C)  Max: 98.6 °F (37 °C) 98.1 °F (36.7 °C)   BP  Min: 127/76  Max: 127/76 127/76   Pulse  Min: 82  Max: 141  82   Resp  Min: 0  Max: 63 (!) 45   SpO2  Min: 59 %  Max: 89 % (!) 59 %     I have reviewed the following labs:  Recent Labs   Lab 12/13/24  0349 12/14/24  3757  12/15/24  0259   WBC 8.25 7.75 9.94   RBC 5.97 6.07 5.77   HGB 16.0 16.2 15.6   HCT 53.5* 55.1* 53.2*   MCV 89.6 90.8 92.2   MCH 26.8* 26.7* 27.0   MCHC 29.9* 29.4* 29.3*   RDW 16.5 16.7 15.9    179 193   MPV 11.3* 11.5* 11.5*     Recent Labs   Lab 12/10/24  0722 12/11/24  0323 12/11/24  0707 12/12/24  0347 12/13/24  0349 12/14/24 0342 12/14/24 0832 12/15/24  0259   NA  --    < >  --    < > 142 140  --  140   K  --    < >  --    < > 4.8 5.7*  --  5.3*   CL  --    < >  --    < > 106 103  --  100   CO2  --    < >  --    < > 30 30  --  35*   BUN  --    < >  --    < > 18.6 20.1  --  17.1   CREATININE  --    < >  --    < > 0.67* 0.64*  --  0.62*   CALCIUM  --    < >  --    < > 8.7* 8.9  --  9.2   PH 7.380  --  7.450  --   --   --   --   --    MG  --   --   --   --  1.90  --   --   --    ALBUMIN  --    < >  --    < > 2.4* 2.5*  --  2.6*   ALKPHOS  --    < >  --    < > 82 79  --  86   ALT  --    < >  --    < > 28 27  --  24   AST  --    < >  --    < > 25 24  --  22   BILITOT  --    < >  --    < > 0.4 0.3 0.3 0.3    < > = values in this interval not displayed.     Microbiology Results (last 7 days)       ** No results found for the last 168 hours. **             See below for Radiology    Assessment/Plan:  # Acute on chronic hypoxic respiratory failure in the setting of interstitial lung disease with underlying anti synthetase syndrome  # Pulm HTN 2/2 ILD  # H.influenza PNA  # Septic shock 2/2 PNA  # HTN  # Leukocytosis - resolved  # Hyperkalemia  # Hypoalbuminemia     - comfort care measures;will increase morphine to help with respiratory distress  - supportive care  - palliative has been following this admission  - please see my ACP note below  - our thoughts and prayers with the family during this difficult time  - will consult case management for inpatient hospice     Advanced care planning:  Spoke with the daughter Antionette at bedside along with other family members. We discussed hospital course. We discussed  the palliative extubation that happened yesterday. We discussed comfort measures and how we will increase morphine to help him from the respiratory status. I answered all their questions and provided emotional support. They were grateful for the care. I spent 35 mins discussing ACP    VTE prophylaxis: not indicated    Patient condition:  comfort care    Anticipated discharge and Disposition:         All diagnosis and differential diagnosis have been reviewed; assessment and plan has been documented; I have personally reviewed the labs and test results that are presently available; I have reviewed the patients medication list; I have reviewed the consulting providers response and recommendations. I have reviewed or attempted to review medical records based upon their availability    All of the patient's questions have been  addressed and answered. Patient's is agreeable to the above stated plan. I will continue to monitor closely and make adjustments to medical management as needed.    _____________________________________________________________________    Malnutrition Status:  Nutrition consulted. Most recent weight and BMI monitored-     Measurements:  Wt Readings from Last 1 Encounters:   12/04/24 59 kg (130 lb 1.1 oz)   Body mass index is 21 kg/m².    Patient has been screened and assessed by RD.    Malnutrition Type:  Context: chronic illness  Level: moderate    Malnutrition Characteristic Summary:  Weight Loss (Malnutrition): other (see comments) (Unable to assess)  Energy Intake (Malnutrition): other (see comments) (Unable to assess)  Subcutaneous Fat (Malnutrition): mild depletion  Muscle Mass (Malnutrition): mild depletion    Interventions/Recommendations (treatment strategy):        Scheduled Med:   glycopyrrolate  0.2 mg Intravenous QID    scopolamine  1 patch Transdermal Q3 Days      Continuous Infusions:     PRN Meds:    Current Facility-Administered Medications:     [COMPLETED] calcium gluconate IVPB,  1 g, Intravenous, Once **AND** calcium gluconate IVPB, 1 g, Intravenous, Q10 Min PRN    dextrose 10%, 12.5 g, Intravenous, PRN    dextrose 10%, 25 g, Intravenous, PRN    fentaNYL, 100 mcg, Intravenous, Q2H PRN    haloperidol lactate, 1 mg, Intravenous, Q4H PRN    HYDROmorphone, 2 mg, Oral, Q3H PRN    lorazepam, 2 mg, Intravenous, Q1H PRN    metoclopramide, 5 mg, Intravenous, Q6H PRN    morphine, 4 mg, Intravenous, Q1H PRN    ondansetron, 8 mg, Intravenous, Q8H PRN    senna-docusate 8.6-50 mg, 1 tablet, Oral, Daily PRN    Flushing PICC/Midline Protocol, , , Until Discontinued **AND** sodium chloride 0.9%, 10 mL, Intravenous, Q12H PRN     Radiology:  I have personally reviewed the following imaging and agree with the radiologist.     X-Ray Chest 1 View  Narrative: EXAMINATION:  Single view chest radiograph.    CLINICAL HISTORY:  pneumonia;    TECHNIQUE:  Single view of the chest.    COMPARISON:  Chest radiograph 12/04/2024.    FINDINGS:  The endotracheal tube, nasogastric tube, and left upper extremity PICC are unchanged.  The diffuse interstitial and alveolar opacities are unchanged.  There is no pneumothorax.  The cardiac silhouette is normal in size.  There is no acute osseous abnormality.  Impression: No significant change from prior exam.    Electronically signed by: Salvador Mccartney MD  Date:    12/07/2024  Time:    09:09      Jhoana Mello MD  Department of Hospital Medicine   [facility]   12/16/2024

## 2024-12-16 NOTE — PLAN OF CARE
24 1322   Final Note   Assessment Type Discharge Planning Assessment   Anticipated Discharge Disposition    Post-Acute Status   Discharge Delays None known at this time

## 2024-12-16 NOTE — CARE UPDATE
[id number] Rec consult for hospice. Spoke with pt's brother in law who reports pt's dgtr went to the hotel where they are staying to shower. I told him I will come back in the room when she gets here to discuss inpt hospice. He reports pt's dgtr is from here, he is not.

## 2024-12-16 NOTE — PLAN OF CARE
Problem: Infection  Goal: Absence of Infection Signs and Symptoms  Outcome: Progressing     Problem: Adult Inpatient Plan of Care  Goal: Plan of Care Review  Outcome: Progressing  Goal: Patient-Specific Goal (Individualized)  Outcome: Progressing  Goal: Absence of Hospital-Acquired Illness or Injury  Outcome: Progressing  Goal: Optimal Comfort and Wellbeing  Outcome: Progressing  Goal: Readiness for Transition of Care  Outcome: Progressing     Problem: Skin Injury Risk Increased  Goal: Skin Health and Integrity  Outcome: Progressing     Problem: Coping Ineffective  Goal: Effective Coping  Outcome: Progressing     Problem: Palliative Care  Goal: Enhanced Quality of Life  Outcome: Progressing

## 2024-12-16 NOTE — CARE UPDATE
[facility] Hospital Medicine     I was called by the bedside RN to confirm the death of Mr. Clarence Trejo    Family present at the bedside during my encounter an assessment    Patient identified. Lying in bed with eyes closed and no signs of life.  No respiratory effort noted. No response to verbal stimuli.  No carotid pulse palpable. Asystole on monitor.   Pupils fixed and dilated bilaterally  No heart sounds audible during 1 min of auscultation  No breath sounds audible during 1 min of auscultation    Death confirmed at 12:51 pm on 12/16/2024    Cause of Death: Acute on chronic hypoxic respiratory failure in the setting of interstitial lung disease with underlying anti synthetase syndrome      Jhoana Mello MD

## 2024-12-16 NOTE — CONSULTS
Inpatient consult to Palliative Care  Consult performed by: Apurva Talbot FNP  Consult ordered by: Jean Pierre Decker MD      Patient Name: Clarence Trejo Sr.   MRN: [id number]   Admission Date: 12/3/2024   Hospital Length of Stay: 13   Attending Provider: Jhoana Mello,*   Consulting Provider: Apurva JAMES  Reason for Consult: Goals of Care  Primary Care Physician:  Yara Spear MD     Principal Problem: <principal problem not specified>       Final diagnoses:  [R06.03] Respiratory distress  [R06.02] Shortness of breath  [J96.01] Acute respiratory failure with hypoxia (Primary)      Assessment/Plan:     I reviewed the patient and family's understanding of the seriousness of the illness and its expected prognosis. We discussed the patient's goals of care and treatment preferences.        Advance Care Planning     Date: 2024    [facility]  I engaged the family in a voluntary conversation about advance care planning and we specifically addressed what the goals of care would be moving forward, in light of the patient's change in clinical status, specifically current condition.  We did specifically address the patient's likely prognosis, which is poor.  We explored the patient's values and preferences for future care.  The family endorses that what is most important right now is to focus on comfort and QOL     Accordingly, we have decided that the best plan to meet the patient's goals includes pivot to comfort-focused care        Spoke to family at bedside concerning inpatient hospice YASMANY states that patient's daughter Antionette is coming back to room soon--informed I would speak with her at that time.  Returned to room--patient just . Offered support to family.     Recommendations:     Comfort measures in place      Interval History:     Patient minimally responsive--appears to be actively dying.  Underwent palliative extubation on 12/15.  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    Interstitial lung disease 06/14/2022    Positive antinuclear antibody 06/14/2022    Seronegative rheumatoid arthritis 09/21/2022    Fatigue 09/21/2022    High risk medication use 09/21/2022    Shortness of breath 11/23/2022    History of tobacco abuse 11/23/2022    TB lung, latent 11/23/2022    Erythrocytosis due to hypoxemia 01/11/2024    Pulmonary emphysema with fibrosis of lung 01/11/2024    Secondary erythrocytosis 10/11/2024    Nocturnal oxygen desaturation 10/11/2024    Chronic hypoxic respiratory failure 10/11/2024    Antinuclear antibody (SAM) titer greater than 1:80 10/11/2024     Resolved Ambulatory Problems     Diagnosis Date Noted    No Resolved Ambulatory Problems     Past Medical History:   Diagnosis Date    SAM positive     ILD (interstitial lung disease)     Pulmonary embolism     Tobacco use         History reviewed. No pertinent surgical history.     Review of patient's allergies indicates:  No Known Allergies       Current Facility-Administered Medications:     [COMPLETED] calcium gluconate 1 g in NS IVPB (premixed), 1 g, Intravenous, Once, Stopped at 12/04/24 0628 **AND** calcium gluconate 1 g in NS IVPB (premixed), 1 g, Intravenous, Q10 Min PRN, Rachel Hilton DO    dextrose 10% bolus 125 mL 125 mL, 12.5 g, Intravenous, PRN, Jose Luis Gaming MD    dextrose 10% bolus 250 mL 250 mL, 25 g, Intravenous, PRN, Jose Luis Gaming MD    fentaNYL injection 100 mcg, 100 mcg, Intravenous, Q2H PRN, Luis Mooney MD    glycopyrrolate injection 0.2 mg, 0.2 mg, Intravenous, QID, Chelo Zepeda, FNP    haloperidol lactate injection 1 mg, 1 mg, Intravenous, Q4H PRN, Radha Durbin, JACOB    HYDROmorphone tablet 2 mg, 2 mg, Oral, Q3H PRN, Yara Spear MD, 2 mg at 12/05/24 1129    LORazepam injection 2 mg, 2 mg, Intravenous, Q1H PRN, Chelo Zepeda, FNP    metoclopramide  "injection 5 mg, 5 mg, Intravenous, Q6H PRN, Radha Durbin FNP    morphine injection 4 mg, 4 mg, Intravenous, Q1H PRN, Chelo Zepeda FNP    ondansetron injection 8 mg, 8 mg, Intravenous, Q8H PRN, Radha Durbin FNP    scopolamine 1.3-1.5 mg (1 mg over 3 days) 1 patch, 1 patch, Transdermal, Q3 Days, Radha Durbin FNP, 1 patch at 12/15/24 1840    senna-docusate 8.6-50 mg per tablet 1 tablet, 1 tablet, Oral, Daily PRN, Luis Mooney MD    Flushing PICC/Midline Protocol, , , Until Discontinued **AND** sodium chloride 0.9% flush 10 mL, 10 mL, Intravenous, Q12H PRN, Jean Pierre Decker MD       Current Facility-Administered Medications:     [COMPLETED] calcium gluconate IVPB, 1 g, Intravenous, Once **AND** calcium gluconate IVPB, 1 g, Intravenous, Q10 Min PRN    dextrose 10%, 12.5 g, Intravenous, PRN    dextrose 10%, 25 g, Intravenous, PRN    fentaNYL, 100 mcg, Intravenous, Q2H PRN    haloperidol lactate, 1 mg, Intravenous, Q4H PRN    HYDROmorphone, 2 mg, Oral, Q3H PRN    lorazepam, 2 mg, Intravenous, Q1H PRN    metoclopramide, 5 mg, Intravenous, Q6H PRN    morphine, 4 mg, Intravenous, Q1H PRN    ondansetron, 8 mg, Intravenous, Q8H PRN    senna-docusate 8.6-50 mg, 1 tablet, Oral, Daily PRN    Flushing PICC/Midline Protocol, , , Until Discontinued **AND** sodium chloride 0.9%, 10 mL, Intravenous, Q12H PRN     Family History   Problem Relation Name Age of Onset    COPD Mother      COPD Father            Review of Systems   Unable to perform ROS: Patient unresponsive            Objective:   /76   Pulse (!) 117   Temp 98.1 °F (36.7 °C) (Axillary)   Resp (!) 48   Ht 5' 5.98" (1.676 m)   Wt 59 kg (130 lb 1.1 oz)   SpO2 (!) 82%   BMI 21.00 kg/m²      Physical Exam   Constitutional: He appears ill.   Cardiovascular: An irregular rhythm present. Pulmonary:      Breath sounds: Rhonchi present.     Abdominal: Soft.   Musculoskeletal:      Comments: cachectic   Neurological: "   Actively dying            Review of Symptoms      Symptom Assessment (ESAS 0-10 Scale)  Pain:  0  Dyspnea:  0  Anxiety:  0  Nausea:  0  Depression:  0  Anorexia:  0  Fatigue:  0  Insomnia:  0  Restlessness:  0  Agitation:  0         Psychosocial/Cultural:   See Palliative Psychosocial Note: Yes  **Primary  to Follow**  Palliative Care  Consult: No      Advance Care Planning   Advance Directives:     Decision Making:  Family answered questions  Goals of Care: What is most important right now is to focus on comfort and QOL . Accordingly, we have decided that the best plan to meet the patient's goals includes pivot to comfort-focused care.          PAINAD: NA    Caregiver burden formerly assessed: Yes      No results displayed because visit has over 200 results.               > 50% of 40 min of encounter was spent in chart review, face to face discussion of goals of care, symptom assessment, coordination of care and emotional support.    Apurva Talbot FNP, [facility]  Palliative Medicine  [facility]

## 2024-12-16 NOTE — PROGRESS NOTES
Inpatient Nutrition Assessment    Admit Date: 12/3/2024   Total duration of encounter: 13 days   Patient Age: 69 y.o.    Nutrition Recommendation/Prescription     No nutrition interventions needed at this time. Reconsult RD if aggressive care restarted.     Communication of Recommendations: reviewed with nurse    Nutrition Assessment     Malnutrition Assessment/Nutrition-Focused Physical Exam    Malnutrition Context: chronic illness (12/04/24 1006)  Malnutrition Level: moderate (12/04/24 1006)  Energy Intake (Malnutrition): other (see comments) (Unable to assess) (12/04/24 1006)  Weight Loss (Malnutrition): other (see comments) (Unable to assess) (12/04/24 1006)  Subcutaneous Fat (Malnutrition): mild depletion (12/04/24 1006)  Orbital Region (Subcutaneous Fat Loss): mild depletion        Muscle Mass (Malnutrition): mild depletion (12/04/24 1006)  Rastafari Region (Muscle Loss): mild depletion  Clavicle Bone Region (Muscle Loss): mild depletion                             A minimum of two characteristics is recommended for diagnosis of either severe or non-severe malnutrition.    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  Pulmonary hypertension  Electrolyte derangement    Relevant Medical History: antisynthetase syndrome, ILD, chronic hypoxic respiratory failure     Scheduled Medications:  ketamine in 0.9 % sod chloride, 150 mg, Once  scopolamine, 1 patch, Q3 Days    Continuous Infusions:     PRN Medications:  calcium gluconate IVPB, 1 g, Q10 Min PRN  dextrose 10%, 12.5 g, PRN  dextrose 10%, 25 g, PRN  fentaNYL, 100 mcg, Q2H PRN  glycopyrrolate, 0.1 mg, TID PRN  haloperidol lactate, 1 mg, Q4H PRN  HYDROmorphone, 2 mg, Q3H PRN  lorazepam, 1 mg, Q1H PRN  metoclopramide, 5 mg, Q6H PRN  morphine, 2 mg, Q4H PRN  ondansetron, 8 mg, Q8H PRN  senna-docusate 8.6-50 mg, 1  "tablet, Daily PRN  sodium chloride 0.9%, 10 mL, Q12H PRN    Calorie Containing IV Medications: no significant kcals from medications at this time    Recent Labs   Lab 12/11/24  0323 12/12/24  0347 12/13/24  0349 12/14/24  0342 12/14/24  0832 12/15/24  0259    141 142 140  --  140   K 4.8 4.5 4.8 5.7*  --  5.3*   CALCIUM 8.9 8.4* 8.7* 8.9  --  9.2   PHOS  --   --  3.2  --   --   --    MG  --   --  1.90  --   --   --     104 106 103  --  100   CO2 34* 33* 30 30  --  35*   BUN 22.6 20.6 18.6 20.1  --  17.1   CREATININE 0.60* 0.63* 0.67* 0.64*  --  0.62*   EGFRNORACEVR >60 >60 >60 >60  --  >60   GLUCOSE 70* 133* 150* 117*  --  108   BILITOT 0.4 0.4 0.4 0.3 0.3 0.3   ALKPHOS 78 77 82 79  --  86   ALT 25 27 28 27  --  24   AST 23 23 25 24  --  22   ALBUMIN 2.4* 2.4* 2.4* 2.5*  --  2.6*   WBC 9.17 7.68 8.25 7.75  --  9.94   HGB 15.6 15.6 16.0 16.2  --  15.6   HCT 50.8 51.4 53.5* 55.1*  --  53.2*     Nutrition Orders:  No diet orders on file      Appetite/Oral Intake: not applicable/not applicable  Factors Affecting Nutritional Intake: none identified  Social Needs Impacting Access to Food: none identified per MD notes  Food/Nondenominational/Cultural Preferences: unable to obtain  Food Allergies: no known food allergies  Last Bowel Movement: 12/02/24  Wound(s):  None documented     Comments    12/4/24: Discussed with RN. Will provide tube feeding recommendations for when appropriate to start tube feeding. Receiving kcal from meds.  Noted previous EMR wts show trend in decreasing wt. Unable to verify UBW at this time.     12/6/24: TF continues. Tolerated per RN. Still receiving kcal from meds.     12/10/24: TF continues @ goal rate.     12/12/24: TF continues, tolerated per RN. Still receiving kcal from meds.     12/16/24: Noted palliative withdrawal of care. No longer receiving TF. No nutrition interventions needed at this time.     Anthropometrics    Height: 5' 5.98" (167.6 cm), Height Method: Measured  Last Weight: " 59 kg (130 lb 1.1 oz) (12/04/24 0217), Weight Method: Bed Scale  BMI (Calculated): 21  BMI Classification: normal (BMI 18.5-24.9)        Ideal Body Weight (IBW), Male: 141.88 lb     % Ideal Body Weight, Male (lb): 91.6 %                          Usual Weight Provided By: unable to obtain usual weight    Wt Readings from Last 5 Encounters:   12/04/24 59 kg (130 lb 1.1 oz)   11/25/24 63.2 kg (139 lb 6.4 oz)   11/22/24 64.9 kg (143 lb)   11/18/24 62.6 kg (138 lb)   11/11/24 60.2 kg (132 lb 12.8 oz)     Weight Change(s) Since Admission:   Wt Readings from Last 1 Encounters:   12/04/24 0217 59 kg (130 lb 1.1 oz)   12/03/24 1855 59 kg (130 lb)   Admit Weight: 59 kg (130 lb) (12/03/24 1855), Weight Method: Bed Scale    Enteral Nutrition     Patient not receiving enteral nutrition support at this time.     Parenteral Nutrition     Patient not receiving parenteral nutrition support at this time.    Evaluation of Received Nutrient Intake    Calories: not meeting estimated needs  Protein: not meeting estimated needs    Patient Education     Not applicable.    Nutrition Diagnosis     PES: Inadequate oral intake related to acute illness as evidenced by NPO post extubation. (active)     PES: Moderate chronic disease or condition related malnutrition Related to chronic condition As Evidenced by:  - weight loss: Unable to assess - muscle mass depletion: 4 areas of mild muscle loss (Pectoralis, Clavicle, Trapezius, Temporalis) - loss of subcutaneous fat: 2 areas of mild fat loss (Infraorbital, Buccal) new    Nutrition Interventions     Intervention(s): collaboration with other providers    Goal: Meet greater than 80% of nutritional needs by follow-up. (goal discontinued)  Goal: Tolerate enteral feeding at goal rate by follow-up. (goal discontinued)    Nutrition Goals & Monitoring     Dietitian will monitor:  progression of care  Discharge planning:  comfort measures  Nutrition Risk/Follow-Up: low (follow-up in 5-7 days)   Please  consult if re-assessment needed sooner.

## 2024-12-23 NOTE — PHYSICIAN QUERY
Please clarify the Present on Admission (POA) status of the diagnosis: sepsis    Yes  
Please clarify the nutritional diagnosis associated with the clinical findings (include all that apply):  Moderate protein calorie malnutrition   
n/a